# Patient Record
Sex: FEMALE | Race: WHITE | NOT HISPANIC OR LATINO | Employment: FULL TIME | URBAN - METROPOLITAN AREA
[De-identification: names, ages, dates, MRNs, and addresses within clinical notes are randomized per-mention and may not be internally consistent; named-entity substitution may affect disease eponyms.]

---

## 2017-01-09 DIAGNOSIS — G47.00 INSOMNIA: ICD-10-CM

## 2017-01-10 RX ORDER — TRAZODONE HYDROCHLORIDE 50 MG/1
TABLET ORAL
Qty: 30 TABLET | Refills: 0 | Status: SHIPPED | OUTPATIENT
Start: 2017-01-10 | End: 2017-02-06 | Stop reason: SDUPTHER

## 2017-02-06 DIAGNOSIS — G47.00 INSOMNIA: ICD-10-CM

## 2017-02-06 RX ORDER — TRAZODONE HYDROCHLORIDE 50 MG/1
TABLET ORAL
Qty: 30 TABLET | Refills: 0 | Status: SHIPPED | OUTPATIENT
Start: 2017-02-06 | End: 2017-03-10 | Stop reason: SDUPTHER

## 2017-02-15 ENCOUNTER — OFFICE VISIT (OUTPATIENT)
Dept: FAMILY MEDICINE | Facility: CLINIC | Age: 47
End: 2017-02-15
Payer: COMMERCIAL

## 2017-02-15 VITALS
RESPIRATION RATE: 20 BRPM | BODY MASS INDEX: 39.68 KG/M2 | WEIGHT: 293 LBS | HEART RATE: 70 BPM | TEMPERATURE: 98 F | OXYGEN SATURATION: 97 % | SYSTOLIC BLOOD PRESSURE: 118 MMHG | DIASTOLIC BLOOD PRESSURE: 78 MMHG | HEIGHT: 72 IN

## 2017-02-15 DIAGNOSIS — Z01.818 PREOP EXAMINATION: Primary | ICD-10-CM

## 2017-02-15 PROCEDURE — 99999 PR PBB SHADOW E&M-EST. PATIENT-LVL III: CPT | Mod: PBBFAC,,, | Performed by: FAMILY MEDICINE

## 2017-02-15 PROCEDURE — 99213 OFFICE O/P EST LOW 20 MIN: CPT | Mod: S$GLB,,, | Performed by: FAMILY MEDICINE

## 2017-02-15 RX ORDER — ERGOCALCIFEROL 1.25 MG/1
50000 CAPSULE ORAL
Status: ON HOLD | COMMUNITY
End: 2020-03-29 | Stop reason: HOSPADM

## 2017-02-15 NOTE — MR AVS SNAPSHOT
[facility]  [address]  [place] [address]  Phone: [phone]                  Shireen Anton   2/15/2017 11:30 AM   Office Visit    Description:  Female : 1970   Provider:  Jeancarlos Zamora MD   Department:  [facility]           Reason for Visit     Pre-op Exam           Diagnoses this Visit        Comments    Preop examination    -  Primary            To Do List           Goals (5 Years of Data)     None      Follow-Up and Disposition     Return if symptoms worsen or fail to improve.      [facility]     [facility] Care [facility] -  Assistance  Registered nurses in the [facility] Call Center provide clinical advisement, health education, appointment booking, and other advisory services.  Call for this free service at [phone].             Medications           Message regarding Medications     Verify the changes and/or additions to your medication regime listed below are the same as discussed with your clinician today.  If any of these changes or additions are incorrect, please notify your healthcare provider.             Verify that the below list of medications is an accurate representation of the medications you are currently taking.  If none reported, the list may be blank. If incorrect, please contact your healthcare provider. Carry this list with you in case of emergency.           Current Medications     cetirizine (ZYRTEC) 10 MG tablet Take 10 mg by mouth once daily.    citalopram (CELEXA) 40 MG tablet Take 1 tablet (40 mg total) by mouth once daily.    ergocalciferol (VITAMIN D2) 50,000 unit Cap Take 50,000 Units by mouth every 7 days.    etonogestrel-ethinyl estradiol (NUVARING) 0.12-0.015 mg/24 hr vaginal ring Place 1 each vaginally every 28 days.    FLUVIRIN 7793-4267 45 mcg (15 mcg x 3)/0.5 mL Susp     FLUVIRIN [id number] 45 mcg (15 mcg x 3)/0.5 mL Susp ADM 0.5ML IM UTD    gabapentin (NEURONTIN) 300 MG capsule Take 1 capsule  (300 mg total) by mouth 3 (three) times daily.    omeprazole (PRILOSEC OTC) 20 MG tablet Take 20 mg by mouth once daily.    trazodone (DESYREL) 50 MG tablet TAKE ONE TABLET BY MOUTH IN THE EVENING           Clinical Reference Information           Your Vitals Were     BP Pulse Temp Resp Height Weight    118/78 (BP Location: Left arm, Patient Position: Sitting, BP Method: Manual) 70 98.1 °F (36.7 °C) (Oral) 20 6' (1.829 m) 136 kg (299 lb 13.2 oz)    SpO2 BMI             97% 40.66 kg/m2         Blood Pressure          Most Recent Value    BP  118/78      Allergies as of 2/15/2017     No Known Allergies      Immunizations Administered on Date of Encounter - 2/15/2017     None      Language Assistance Services     ATTENTION: Language assistance services are available, free of charge. Please call [phone].      ATENCIÓN: Si habla jeffañol, tiene a colon disposición servicios gratuitos de asistencia lingüística. Llame al [phone].     MIKE Ý: N?u b?n nói Ti?ng Vi?t, có các d?ch v? h? tr? ngôn ng? mi?n phí dành cho b?n. G?i s? [phone].         [facility] complies with applicable Federal civil rights laws and does not discriminate on the basis of race, color, national origin, age, disability, or sex.

## 2017-02-15 NOTE — PROGRESS NOTES
Routine Office Visit    Patient Name: Shireen Anton    : 1970  MRN: [id number]    Subjective:  Shireen is a 47 y.o. female who presents today for:    1. Pre-op clearance  Patient presenting today for pre-op clearance for gastric sleeve procedure.  She has already seen cardiology and been cleared.  She does not suffer from any pulmonary diseases.  She states that she does have a murmur, but it is not causing any problems.  She has no other physical ailments    Past Medical History  Past Medical History   Diagnosis Date    Allergy     Depression     GERD (gastroesophageal reflux disease)        Past Surgical History  Past Surgical History   Procedure Laterality Date     section      Breast augmentation      Cholecystectomy      Orif humerus fracture         Family History  Family History   Problem Relation Age of Onset    Breast cancer Maternal Grandmother 63    Diabetes Father     Hypertension Father     Heart disease Father     Thyroid disease Mother     Cancer Mother 71     SCC of lung    Cancer Sister 48     pharyngeal cancer       Social History  Social History     Social History    Marital status: Single     Spouse name: N/A    Number of children: N/A    Years of education: N/A     Occupational History     [employer]     Social History Main Topics    Smoking status: Former Smoker     Packs/day: 1.00     Years: 16.00     Quit date: 2011    Smokeless tobacco: Never Used    Alcohol use Yes      Comment: Ocaasional    Drug use: Not on file    Sexual activity: Yes     Partners: Male     Birth control/ protection: Inserts     Other Topics Concern    Not on file     Social History Narrative    Together since 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  Playing [employer].She is a registered nurse for [employer]       Current Medications  Current Outpatient Prescriptions on File Prior to Visit   Medication Sig Dispense Refill    cetirizine (ZYRTEC) 10 MG tablet Take 10 mg by mouth  once daily.      citalopram (CELEXA) 40 MG tablet Take 1 tablet (40 mg total) by mouth once daily. 30 tablet 6    etonogestrel-ethinyl estradiol (NUVARING) 0.12-0.015 mg/24 hr vaginal ring Place 1 each vaginally every 28 days. 3 each 4    FLUVIRIN [id number] 45 mcg (15 mcg x 3)/0.5 mL Susp   0    FLUVIRIN [id number] 45 mcg (15 mcg x 3)/0.5 mL Susp ADM 0.5ML IM UTD  0    gabapentin (NEURONTIN) 300 MG capsule Take 1 capsule (300 mg total) by mouth 3 (three) times daily. 90 capsule 11    omeprazole (PRILOSEC OTC) 20 MG tablet Take 20 mg by mouth once daily.      trazodone (DESYREL) 50 MG tablet TAKE ONE TABLET BY MOUTH IN THE EVENING 30 tablet 0     No current facility-administered medications on file prior to visit.        Allergies   Review of patient's allergies indicates:  No Known Allergies    Review of Systems (Pertinent positives)  Review of Systems   Constitutional: Negative.    Eyes: Negative.    Respiratory: Negative.    Cardiovascular: Negative.    Gastrointestinal: Negative.    Genitourinary: Negative.    Musculoskeletal: Negative.    Skin: Negative.          Visit Vitals    /78 (BP Location: Left arm, Patient Position: Sitting, BP Method: Manual)    Pulse 70    Temp 98.1 °F (36.7 °C) (Oral)    Resp 20    Ht 6' (1.829 m)    Wt 136 kg (299 lb 13.2 oz)    SpO2 97%    BMI 40.66 kg/m2       GENERAL APPEARANCE: in no apparent distress and well developed and well nourished  HEENT: PERRL, EOMI, Sclera clear, anicteric, Oropharynx clear, no lesions, Neck supple with midline trachea  NECK: normal, supple, no adenopathy, thyroid normal in size  RESPIRATORY: appears well, vitals normal, no respiratory distress, acyanotic, normal RR, chest clear, no wheezing, crepitations, rhonchi, normal symmetric air entry  HEART: regular rate and rhythm, S1, S2 normal, no murmur, click, rub or gallop.    ABDOMEN: abdomen is soft without tenderness, no masses, no hernias, no organomegaly, no rebound, no guarding.  Suprapubic tenderness absent. No CVA tenderness.  SKIN: no rashes, no wounds, no other lesions  PSYCH: Alert, oriented x 3, thought content appropriate, speech normal, pleasant and cooperative, good eye contact, well groomed    Assessment/Plan:  Shireen Anton is a 47 y.o. female who presents today for :    Shireen was seen today for pre-op exam.    Diagnoses and all orders for this visit:    Preop examination    1.  Patient labs have been done  2.  Cleared by cardiology  3.  Physical exam negative  4.  Follow up as needed  5.  She is low risk for moderate risk procedure    Jeancarlos Zamora MD

## 2017-03-10 DIAGNOSIS — G47.00 INSOMNIA: ICD-10-CM

## 2017-03-10 RX ORDER — TRAZODONE HYDROCHLORIDE 50 MG/1
TABLET ORAL
Qty: 30 TABLET | Refills: 0 | Status: SHIPPED | OUTPATIENT
Start: 2017-03-10 | End: 2017-04-10 | Stop reason: SDUPTHER

## 2017-04-10 DIAGNOSIS — G47.00 INSOMNIA: ICD-10-CM

## 2017-04-10 RX ORDER — TRAZODONE HYDROCHLORIDE 50 MG/1
TABLET ORAL
Qty: 30 TABLET | Refills: 0 | Status: SHIPPED | OUTPATIENT
Start: 2017-04-10 | End: 2017-08-14 | Stop reason: SDUPTHER

## 2017-05-01 ENCOUNTER — HOSPITAL ENCOUNTER (INPATIENT)
Facility: HOSPITAL | Age: 47
LOS: 15 days | Discharge: HOME HEALTH | DRG: 673 | End: 2017-05-16
Attending: EMERGENCY MEDICINE | Admitting: INTERNAL MEDICINE
Payer: COMMERCIAL

## 2017-05-01 DIAGNOSIS — M62.82 NON-TRAUMATIC RHABDOMYOLYSIS: ICD-10-CM

## 2017-05-01 DIAGNOSIS — N17.9 ACUTE RENAL FAILURE, UNSPECIFIED ACUTE RENAL FAILURE TYPE: ICD-10-CM

## 2017-05-01 DIAGNOSIS — I21.4 NSTEMI (NON-ST ELEVATED MYOCARDIAL INFARCTION): ICD-10-CM

## 2017-05-01 DIAGNOSIS — N17.9 ACUTE RENAL FAILURE: Primary | ICD-10-CM

## 2017-05-01 DIAGNOSIS — H91.93 ACUTE HEARING LOSS OF BOTH EARS: ICD-10-CM

## 2017-05-01 DIAGNOSIS — G93.41 METABOLIC ENCEPHALOPATHY: ICD-10-CM

## 2017-05-01 DIAGNOSIS — R41.82 ALTERED MENTAL STATUS: ICD-10-CM

## 2017-05-01 LAB
ALBUMIN SERPL BCP-MCNC: 3.7 G/DL
ALP SERPL-CCNC: 105 U/L
ALT SERPL W/O P-5'-P-CCNC: 3864 U/L
AMORPH CRY URNS QL MICRO: NORMAL
AMPHET+METHAMPHET UR QL: NEGATIVE
ANION GAP SERPL CALC-SCNC: 21 MMOL/L
APAP SERPL-MCNC: <3 UG/ML
AST SERPL-CCNC: 9471 U/L
BACTERIA #/AREA URNS HPF: NORMAL /HPF
BACTERIA GENITAL QL WET PREP: ABNORMAL
BARBITURATES UR QL SCN>200 NG/ML: NEGATIVE
BASOPHILS # BLD AUTO: ABNORMAL K/UL
BASOPHILS NFR BLD: 0 %
BENZODIAZ UR QL SCN>200 NG/ML: NEGATIVE
BILIRUB SERPL-MCNC: 1 MG/DL
BILIRUB UR QL STRIP: NEGATIVE
BNP SERPL-MCNC: 673 PG/ML
BUN SERPL-MCNC: 34 MG/DL
BZE UR QL SCN: NEGATIVE
CALCIUM SERPL-MCNC: 7.8 MG/DL
CANNABINOIDS UR QL SCN: NEGATIVE
CHLORIDE SERPL-SCNC: 105 MMOL/L
CK SERPL-CCNC: ABNORMAL U/L
CLARITY UR: CLEAR
CLUE CELLS VAG QL WET PREP: ABNORMAL
CO2 SERPL-SCNC: 17 MMOL/L
COLOR UR: YELLOW
CREAT SERPL-MCNC: 4.2 MG/DL
CREAT UR-MCNC: 169.7 MG/DL
DIFFERENTIAL METHOD: ABNORMAL
EOSINOPHIL # BLD AUTO: ABNORMAL K/UL
EOSINOPHIL NFR BLD: 0 %
ERYTHROCYTE [DISTWIDTH] IN BLOOD BY AUTOMATED COUNT: 14 %
EST. GFR  (AFRICAN AMERICAN): 14 ML/MIN/1.73 M^2
EST. GFR  (NON AFRICAN AMERICAN): 12 ML/MIN/1.73 M^2
ETHANOL SERPL-MCNC: 11 MG/DL
FILAMENT FUNGI VAG WET PREP-#/AREA: ABNORMAL
GLUCOSE SERPL-MCNC: 116 MG/DL
GLUCOSE UR QL STRIP: NEGATIVE
HCT VFR BLD AUTO: 41.9 %
HGB BLD-MCNC: 13.9 G/DL
HGB UR QL STRIP: ABNORMAL
HYALINE CASTS #/AREA URNS LPF: 0 /LPF
KETONES UR QL STRIP: NEGATIVE
LEUKOCYTE ESTERASE UR QL STRIP: NEGATIVE
LIPASE SERPL-CCNC: 89 U/L
LYMPHOCYTES # BLD AUTO: ABNORMAL K/UL
LYMPHOCYTES NFR BLD: 17 %
MAGNESIUM SERPL-MCNC: 2.7 MG/DL
MCH RBC QN AUTO: 30.5 PG
MCHC RBC AUTO-ENTMCNC: 33.2 %
MCV RBC AUTO: 92 FL
METHADONE UR QL SCN>300 NG/ML: NEGATIVE
MICROSCOPIC COMMENT: NORMAL
MONOCYTES # BLD AUTO: ABNORMAL K/UL
MONOCYTES NFR BLD: 10 %
NEUTROPHILS NFR BLD: 69 %
NEUTS BAND NFR BLD MANUAL: 4 %
NITRITE UR QL STRIP: NEGATIVE
NRBC BLD-RTO: 2 /100 WBC
OPIATES UR QL SCN: NORMAL
PCP UR QL SCN>25 NG/ML: NEGATIVE
PH UR STRIP: 5 [PH] (ref 5–8)
PHOSPHATE SERPL-MCNC: 10.6 MG/DL
PLATELET # BLD AUTO: 129 K/UL
PMV BLD AUTO: 11.9 FL
POCT GLUCOSE: 132 MG/DL (ref 70–110)
POTASSIUM SERPL-SCNC: 5.5 MMOL/L
PROT SERPL-MCNC: 7.4 G/DL
PROT UR QL STRIP: ABNORMAL
RBC # BLD AUTO: 4.55 M/UL
RBC #/AREA URNS HPF: 2 /HPF (ref 0–4)
SALICYLATES SERPL-MCNC: <5 MG/DL
SODIUM SERPL-SCNC: 143 MMOL/L
SP GR UR STRIP: 1.01 (ref 1–1.03)
SPECIMEN SOURCE: ABNORMAL
SQUAMOUS #/AREA URNS HPF: 2 /HPF
T VAGINALIS GENITAL QL WET PREP: ABNORMAL
TOXICOLOGY INFORMATION: NORMAL
TROPONIN I SERPL DL<=0.01 NG/ML-MCNC: 16.18 NG/ML
URN SPEC COLLECT METH UR: ABNORMAL
UROBILINOGEN UR STRIP-ACNC: ABNORMAL EU/DL
WBC # BLD AUTO: 11.2 K/UL
WBC #/AREA URNS HPF: 0 /HPF (ref 0–5)
WBC #/AREA VAG WET PREP: ABNORMAL
YEAST GENITAL QL WET PREP: ABNORMAL

## 2017-05-01 PROCEDURE — P9612 CATHETERIZE FOR URINE SPEC: HCPCS

## 2017-05-01 PROCEDURE — 84100 ASSAY OF PHOSPHORUS: CPT

## 2017-05-01 PROCEDURE — 87591 N.GONORRHOEAE DNA AMP PROB: CPT

## 2017-05-01 PROCEDURE — 93005 ELECTROCARDIOGRAM TRACING: CPT

## 2017-05-01 PROCEDURE — 85027 COMPLETE CBC AUTOMATED: CPT

## 2017-05-01 PROCEDURE — 25000242 PHARM REV CODE 250 ALT 637 W/ HCPCS: Performed by: EMERGENCY MEDICINE

## 2017-05-01 PROCEDURE — 82550 ASSAY OF CK (CPK): CPT

## 2017-05-01 PROCEDURE — 83690 ASSAY OF LIPASE: CPT

## 2017-05-01 PROCEDURE — 82570 ASSAY OF URINE CREATININE: CPT

## 2017-05-01 PROCEDURE — 63600175 PHARM REV CODE 636 W HCPCS: Performed by: EMERGENCY MEDICINE

## 2017-05-01 PROCEDURE — 83880 ASSAY OF NATRIURETIC PEPTIDE: CPT

## 2017-05-01 PROCEDURE — 82962 GLUCOSE BLOOD TEST: CPT

## 2017-05-01 PROCEDURE — 87210 SMEAR WET MOUNT SALINE/INK: CPT

## 2017-05-01 PROCEDURE — 99291 CRITICAL CARE FIRST HOUR: CPT | Mod: 25

## 2017-05-01 PROCEDURE — 85007 BL SMEAR W/DIFF WBC COUNT: CPT

## 2017-05-01 PROCEDURE — 96361 HYDRATE IV INFUSION ADD-ON: CPT

## 2017-05-01 PROCEDURE — 25000003 PHARM REV CODE 250: Performed by: EMERGENCY MEDICINE

## 2017-05-01 PROCEDURE — 96375 TX/PRO/DX INJ NEW DRUG ADDON: CPT

## 2017-05-01 PROCEDURE — 12000002 HC ACUTE/MED SURGE SEMI-PRIVATE ROOM

## 2017-05-01 PROCEDURE — 80329 ANALGESICS NON-OPIOID 1 OR 2: CPT

## 2017-05-01 PROCEDURE — 81000 URINALYSIS NONAUTO W/SCOPE: CPT

## 2017-05-01 PROCEDURE — 80053 COMPREHEN METABOLIC PANEL: CPT

## 2017-05-01 PROCEDURE — 94640 AIRWAY INHALATION TREATMENT: CPT

## 2017-05-01 PROCEDURE — 84484 ASSAY OF TROPONIN QUANT: CPT

## 2017-05-01 PROCEDURE — 80307 DRUG TEST PRSMV CHEM ANLYZR: CPT | Mod: 59

## 2017-05-01 PROCEDURE — 83735 ASSAY OF MAGNESIUM: CPT

## 2017-05-01 PROCEDURE — 96374 THER/PROPH/DIAG INJ IV PUSH: CPT

## 2017-05-01 PROCEDURE — 80320 DRUG SCREEN QUANTALCOHOLS: CPT

## 2017-05-01 RX ORDER — ASPIRIN 325 MG
325 TABLET ORAL
Status: COMPLETED | OUTPATIENT
Start: 2017-05-01 | End: 2017-05-01

## 2017-05-01 RX ORDER — ALBUTEROL SULFATE 2.5 MG/.5ML
2.5 SOLUTION RESPIRATORY (INHALATION)
Status: COMPLETED | OUTPATIENT
Start: 2017-05-01 | End: 2017-05-01

## 2017-05-01 RX ORDER — VASOPRESSIN 20 [USP'U]/ML
0.4 INJECTION, SOLUTION INTRAMUSCULAR; SUBCUTANEOUS ONCE
Status: DISCONTINUED | OUTPATIENT
Start: 2017-05-01 | End: 2017-05-01

## 2017-05-01 RX ORDER — MORPHINE SULFATE 10 MG/ML
4 INJECTION INTRAMUSCULAR; INTRAVENOUS; SUBCUTANEOUS
Status: COMPLETED | OUTPATIENT
Start: 2017-05-01 | End: 2017-05-01

## 2017-05-01 RX ORDER — HEPARIN SODIUM 5000 [USP'U]/ML
5000 INJECTION, SOLUTION INTRAVENOUS; SUBCUTANEOUS
Status: COMPLETED | OUTPATIENT
Start: 2017-05-01 | End: 2017-05-01

## 2017-05-01 RX ADMIN — ALBUTEROL SULFATE 2.5 MG: 2.5 SOLUTION RESPIRATORY (INHALATION) at 08:05

## 2017-05-01 RX ADMIN — SODIUM CHLORIDE 1000 ML: 0.9 INJECTION, SOLUTION INTRAVENOUS at 05:05

## 2017-05-01 RX ADMIN — HEPARIN SODIUM 5000 UNITS: 5000 INJECTION, SOLUTION INTRAVENOUS; SUBCUTANEOUS at 09:05

## 2017-05-01 RX ADMIN — MORPHINE SULFATE 4 MG: 10 INJECTION INTRAVENOUS at 10:05

## 2017-05-01 RX ADMIN — SODIUM CHLORIDE 1000 ML: 0.9 INJECTION, SOLUTION INTRAVENOUS at 07:05

## 2017-05-01 RX ADMIN — ASPIRIN 325 MG ORAL TABLET 325 MG: 325 PILL ORAL at 07:05

## 2017-05-01 NOTE — ED NOTES
Question pt. If she has to urinate, pt. Asked if she has a glover cath in. Explained to pt. That she does not however if she does not urinated we will have to straight cath her. Pt. Unable to recall going to get her MRI done. She reported she did not recall leaving the room.

## 2017-05-01 NOTE — IP AVS SNAPSHOT
[place] [address] [address] [address]  Phone: [phone]           Patient Discharge Instructions   Our goal is to set you up for success. This packet includes information on your condition, medications, and your home care.  It will help you care for yourself to prevent having to return to the hospital.     Please ask your nurse if you have any questions.      There are many details to remember when preparing to leave the hospital. Here is what you will need to do:    1. Take your medicine. If you are prescribed medications, review your Medication List on the following pages. You may have new medications to  at the pharmacy and others that you'll need to stop taking. Review the instructions for how and when to take your medications. Talk with your doctor or nurses if you are unsure of what to do.     2. Go to your follow-up appointments. Specific follow-up information is listed in the following pages. Your may be contacted by a nurse or clinical provider about future appointments. Be sure we have all of the phone numbers to reach you. Please contact your provider's office if you are unable to make an appointment.     3. Watch for warning signs. Your doctor or nurse will give you detailed warning signs to watch for and when to call for assistance. These instructions may also include educational information about your condition. If you experience any of warning signs to your health, call your doctor.               ** Verify the list of medication(s) below is accurate and up to date. Carry this with you in case of emergency. If your medications have changed, please notify your healthcare provider.             Medication List      START taking these medications        Additional Info                      amlodipine 10 MG tablet   Commonly known as:  NORVASC   Quantity:  30 tablet   Refills:  5   Dose:  10 mg    Last time this was given:  10 mg on 5/16/2017  9:40 AM    Instructions:  Take 1 tablet (10 mg total) by mouth once daily.     Begin Date    AM    Noon    PM    Bedtime       amoxicillin-clavulanate 250-125mg 250-125 mg Tab   Commonly known as:  AUGMENTIN   Quantity:  14 tablet   Refills:  0   Dose:  1 tablet   Indications:  uti    Last time this was given:  1 tablet on 5/16/2017  9:40 AM   Instructions:  Take 1 tablet by mouth every 12 (twelve) hours.     Begin Date    AM    Noon    PM    Bedtime       aspirin 81 MG EC tablet   Commonly known as:  ECOTRIN   Refills:  0   Dose:  81 mg    Last time this was given:  81 mg on 5/16/2017  9:40 AM   Instructions:  Take 1 tablet (81 mg total) by mouth once daily.     Begin Date    AM    Noon    PM    Bedtime       calcium citrate 200 mg (950 mg) tablet   Commonly known as:  CALCITRATE   Quantity:  90 tablet   Refills:  5   Dose:  200 mg    Last time this was given:  200 mg on 5/16/2017  2:34 PM   Instructions:  Take 1 tablet (200 mg total) by mouth 3 (three) times daily.     Begin Date    AM    Noon    PM    Bedtime       cyanocobalamin 250 MCG tablet   Commonly known as:  VITAMIN B-12   Quantity:  30 tablet   Refills:  5   Dose:  250 mcg    Last time this was given:  250 mcg on 5/16/2017  9:40 AM   Instructions:  Take 1 tablet (250 mcg total) by mouth once daily.     Begin Date    AM    Noon    PM    Bedtime       hydrALAZINE 50 MG tablet   Commonly known as:  APRESOLINE   Quantity:  90 tablet   Refills:  5   Dose:  50 mg    Last time this was given:  50 mg on 5/16/2017  2:34 PM   Instructions:  Take 1 tablet (50 mg total) by mouth every 8 (eight) hours.     Begin Date    AM    Noon    PM    Bedtime       metoprolol tartrate 25 MG tablet   Commonly known as:  LOPRESSOR   Quantity:  60 tablet   Refills:  5   Dose:  25 mg    Last time this was given:  25 mg on 5/16/2017  9:40 AM   Instructions:  Take 1 tablet (25 mg total) by mouth 2 (two) times daily.     Begin Date    AM    Noon    PM    Bedtime         CONTINUE taking these  medications        Additional Info                      cetirizine 10 MG tablet   Commonly known as:  ZYRTEC   Refills:  0   Dose:  10 mg    Instructions:  Take 10 mg by mouth once daily.     Begin Date    AM    Noon    PM    Bedtime       citalopram 40 MG tablet   Commonly known as:  CELEXA   Quantity:  30 tablet   Refills:  6   Dose:  40 mg    Instructions:  Take 1 tablet (40 mg total) by mouth once daily.     Begin Date    AM    Noon    PM    Bedtime       etonogestrel-ethinyl estradiol 0.12-0.015 mg/24 hr vaginal ring   Commonly known as:  NUVARING   Quantity:  3 each   Refills:  4   Dose:  1 each    Instructions:  Place 1 each vaginally every 28 days.     Begin Date    AM    Noon    PM    Bedtime       FLUVIRIN [id number] 45 mcg (15 mcg x 3)/0.5 mL Susp   Refills:  0   Generic drug:  flu vaccine  2015-16 (4 yr+)      Begin Date    AM    Noon    PM    Bedtime       FLUVIRIN [id number] 45 mcg (15 mcg x 3)/0.5 mL Susp   Refills:  0   Generic drug:  flu vaccine  2016-17(4yr up)    Instructions:  ADM 0.5ML IM UTD     Begin Date    AM    Noon    PM    Bedtime       gabapentin 300 MG capsule   Commonly known as:  NEURONTIN   Quantity:  90 capsule   Refills:  11   Dose:  300 mg    Instructions:  Take 1 capsule (300 mg total) by mouth 3 (three) times daily.     Begin Date    AM    Noon    PM    Bedtime       omeprazole 20 MG tablet   Commonly known as:  PRILOSEC OTC   Refills:  0   Dose:  20 mg    Instructions:  Take 20 mg by mouth once daily.     Begin Date    AM    Noon    PM    Bedtime       trazodone 50 MG tablet   Commonly known as:  DESYREL   Quantity:  30 tablet   Refills:  0    Instructions:  TAKE ONE TABLET BY MOUTH IN THE EVENING     Begin Date    AM    Noon    PM    Bedtime       VITAMIN D2 50,000 unit Cap   Refills:  0   Dose:  66691 Units   Generic drug:  ergocalciferol    Instructions:  Take 50,000 Units by mouth every 7 days.     Begin Date    AM    Noon    PM    Bedtime            Where to Get Your  Medications      You can get these medications from any pharmacy     Bring a paper prescription for each of these medications     amlodipine 10 MG tablet    amoxicillin-clavulanate 250-125mg 250-125 mg Tab    calcium citrate 200 mg (950 mg) tablet    cyanocobalamin 250 MCG tablet    hydrALAZINE 50 MG tablet    metoprolol tartrate 25 MG tablet         Information about where to get these medications is not yet available     ! Ask your nurse or doctor about these medications     aspirin 81 MG EC tablet                  Please bring to all follow up appointments:    1. A copy of your discharge instructions.  2. All medicines you are currently taking in their original bottles.  3. Identification and insurance card.    Please arrive 15 minutes ahead of scheduled appointment time.    Please call 24 hours in advance if you must reschedule your appointment and/or time.        Your Scheduled Appointments     May 23, 2017  1:00 PM CDT   Established Patient Visit with Jeancarlos Zamora MD   [facility] ([facility])    [address] [place] [address]   [phone]            May 30, 2017  1:00 PM CDT   Established Patient Visit with Kamaljit Sierra MD   [facility] ([facility])    [address] [facility] [address]  [place] [address] [phone]              Follow-up Information     Follow up with [facility] On 5/17/2017.    Why:  Outpatient Dialysis:  Report for your first dialysis on Wednesday, May 17, 2017 at 2:30 p.m.  Bring your ID and insurance card.    Contact information:    [address] [place] [address] [address] [facility] [address] [phone]        Follow up with Jeancarlos Zamora MD On 5/23/2017.    Specialty:  Family Medicine    Why:  1:00 p.m. on Tuesday, May 23, 2017 see Dr. Zamora for your hospital followup visit.  The new address of the provider is [facility] [place] [address]    Contact information:    [address] [place] [address] [place]  [id number] [phone]          Follow up with Jeanine Saxena MD In 1 week.    Specialty:  Nephrology    Contact information:    [address] [place] [address] [address]  [place] [address] [phone]          Follow up with Kamaljit Sierra MD On 5/30/2017.    Specialty:  Cardiology    Why:  1:00 p.m. on Tuesday, May 30, 2017 see Dr. Sierra for your hospital followup visit.      Contact information:    [address] [facility]  [place] [address] [phone]          Follow up with [facility].    Specialties:  Home Health Services, Physical Therapy, Occupational Therapy    Why:  Home Health    Contact information:    [address]  [address]  [place] [id number] [phone]          Discharge Instructions     Future Orders    Activity as tolerated     Diet general     Questions:    Total calories:      Fat restriction, if any:      Protein restriction, if any:      Na restriction, if any:  2gNa    Fluid restriction:      Additional restrictions:        Discharge References/Attachments     ALTERED LEVEL OF CONSCIOUSNESS (CHILD) (ENGLISH)    CHOOSING A BARIATRIC SURGERY PROCEDURE (ENGLISH)    EVALUATION FOR BARIATRIC SURGERY (ENGLISH)    RHABDOMYOLYSIS (ENGLISH)    AMLODIPINE TABLETS (ENGLISH)    AMOXICILLIN; CLAVULANIC ACID EXTENDED-RELEASE TABLETS (ENGLISH)    ASPIRIN, ASA CHEWABLE TABLETS (ENGLISH)    CALCIUM SALTS ORAL TABLETS (ENGLISH)    CYANOCOBALAMIN, PYRIDOXINE, AND FOLATE (ENGLISH)    HYDRALAZINE TABLETS (ENGLISH)    METOPROLOL TABLETS (ENGLISH)    KIDNEY INJURY, ACUTE, DISCHARGE INSTRUCTIONS FOR (ENGLISH)        Primary Diagnosis     Your primary diagnosis was:  Acute Kidney Failure      Admission Information     Date & Time Provider Department [facility]    5/1/2017  5:25 PM Cheo Wilks MD [facility] [id number]      Care Providers     Provider Role Specialty Primary office phone    Cheo Wilks MD Attending Provider Hospitalist [phone]    Mateo Villanueva  MD Consulting Physician  Nephrology [phone]    Edson Reeder MD Consulting Physician  Gastroenterology [phone]    Abhishek Harvey MD Consulting Physician  Neurology [phone]    Lizett Calhoun MD Consulting Physician  Infectious Diseases  [phone]      Your Vitals Were     BP Pulse Temp Resp Height Weight    169/77 (BP Location: Right arm, Patient Position: Lying, BP Method: Automatic) 80 98.3 °F (36.8 °C) (Oral) 18 6' (1.829 m) 137 kg (302 lb)    Last Period SpO2 BMI          04/20/2017 94% 40.96 kg/m2        Recent Lab Values        5/2/2017                           6:21 AM           A1C 5.5           Comment for A1C at  6:21 AM on 5/2/2017:  According to ADA guidelines, hemoglobin A1C <7.0% represents  optimal control in non-pregnant diabetic patients.  Different  metrics may apply to specific populations.   Standards of Medical Care in Diabetes - 2016.  For the purpose of screening for the presence of diabetes:  <5.7%     Consistent with the absence of diabetes  5.7-6.4%  Consistent with increasing risk for diabetes   (prediabetes)  >or=6.5%  Consistent with diabetes  Currently no consensus exists for use of hemoglobin A1C  for diagnosis of diabetes for children.        Allergies as of 5/16/2017     No Known Allergies      [facility]     [facility] Care [facility] - 24/7 Assistance  Unless otherwise directed by your provider, please contact [facility], our nurse care line that is available for 24/7 assistance.     Registered nurses in the [facility] provide clinical advisement, health education, appointment booking, and other advisory services.  Call for this free service at [phone].        Advance Directives     An advance directive is a document which, in the event you are no longer able to make decisions for yourself, tells your healthcare team what kind of treatment you do or do not want to receive, or who you would like to make those decisions for you.  If  you do not currently have an advance directive, [facility] encourages you to create one.  For more information call:  [phone]), [phone] ([phone]),  or log on to [email or web address].        Smoking Cessation     If you would like to quit smoking:   You may be eligible for free services if you are a [place] resident and started smoking cigarettes before September 1, 1988.  Call the Smoking Cessation Trust (SCT) toll free at (642) 286-7541 or (021) 796-6340.   Call 9-690-QUIT-NOW if you do not meet the above criteria.   Contact us via email: [email or web address]   View our website for more information: [email or web address]        Language Assistance Services     ATTENTION: Language assistance services are available, free of charge. Please call [phone].      ATENCIÓN: Si habla español, tiene a colon disposición servicios gratuitos de asistencia lingüística. Llame al [phone].     CHÚ Ý: N?u b?n nói Ti?ng Vi?t, có các d?ch v? h? tr? ngôn ng? mi?n phí dành cho b?n. G?i s? [phone].        Stroke Education               [facility] complies with applicable Federal civil rights laws and does not discriminate on the basis of race, color, national origin, age, disability, or sex.

## 2017-05-02 PROBLEM — E66.01 MORBID OBESITY: Status: ACTIVE | Noted: 2017-05-02

## 2017-05-02 PROBLEM — R74.8 ELEVATED LIVER ENZYMES: Status: ACTIVE | Noted: 2017-05-02

## 2017-05-02 PROBLEM — N17.9 ACUTE RENAL FAILURE: Status: ACTIVE | Noted: 2017-05-02

## 2017-05-02 PROBLEM — M62.82 NON-TRAUMATIC RHABDOMYOLYSIS: Status: ACTIVE | Noted: 2017-05-02

## 2017-05-02 PROBLEM — I21.4 NSTEMI (NON-ST ELEVATED MYOCARDIAL INFARCTION): Status: ACTIVE | Noted: 2017-05-02

## 2017-05-02 LAB
ALBUMIN SERPL BCP-MCNC: 3.3 G/DL
ALP SERPL-CCNC: 97 U/L
ALT SERPL W/O P-5'-P-CCNC: 3430 U/L
ANION GAP SERPL CALC-SCNC: 14 MMOL/L
APTT BLDCRRT: 25.7 SEC
APTT BLDCRRT: 40.1 SEC
AST SERPL-CCNC: 9976 U/L
BASOPHILS # BLD AUTO: 0.02 K/UL
BASOPHILS NFR BLD: 0.2 %
BILIRUB SERPL-MCNC: 1 MG/DL
BUN SERPL-MCNC: 45 MG/DL
C TRACH DNA SPEC QL NAA+PROBE: NOT DETECTED
CALCIUM SERPL-MCNC: 7.2 MG/DL
CHLORIDE SERPL-SCNC: 105 MMOL/L
CHLORIDE UR-SCNC: 23 MMOL/L
CHOLEST/HDLC SERPL: 4.7 {RATIO}
CK MB SERPL-MCNC: >600 NG/ML
CK MB SERPL-RTO: ABNORMAL %
CK SERPL-CCNC: ABNORMAL U/L
CK SERPL-CCNC: ABNORMAL U/L
CO2 SERPL-SCNC: 20 MMOL/L
CREAT SERPL-MCNC: 4.4 MG/DL
CREAT UR-MCNC: 282.5 MG/DL
CREAT UR-MCNC: 282.5 MG/DL
CRP SERPL-MCNC: 175.81 MG/L
DIASTOLIC DYSFUNCTION: NO
DIFFERENTIAL METHOD: ABNORMAL
EOSINOPHIL # BLD AUTO: 0 K/UL
EOSINOPHIL NFR BLD: 0 %
EOSINOPHIL URNS QL WRIGHT STN: NORMAL
ERYTHROCYTE [DISTWIDTH] IN BLOOD BY AUTOMATED COUNT: 14 %
ERYTHROCYTE [SEDIMENTATION RATE] IN BLOOD BY WESTERGREN METHOD: 36 MM/HR
EST. GFR  (AFRICAN AMERICAN): 13 ML/MIN/1.73 M^2
EST. GFR  (NON AFRICAN AMERICAN): 11 ML/MIN/1.73 M^2
ESTIMATED PA SYSTOLIC PRESSURE: 26.63
GLUCOSE SERPL-MCNC: 139 MG/DL
GRAM STN SPEC: NORMAL
GRAM STN SPEC: NORMAL
HCT VFR BLD AUTO: 37.1 %
HDL/CHOLESTEROL RATIO: 21.3 %
HDLC SERPL-MCNC: 136 MG/DL
HDLC SERPL-MCNC: 29 MG/DL
HGB BLD-MCNC: 12.5 G/DL
HIV1+2 IGG SERPL QL IA.RAPID: NEGATIVE
INR PPP: 1.4
LDLC SERPL CALC-MCNC: 75.4 MG/DL
LIPASE SERPL-CCNC: 66 U/L
LYMPHOCYTES # BLD AUTO: 1.2 K/UL
LYMPHOCYTES NFR BLD: 9.5 %
MCH RBC QN AUTO: 30.9 PG
MCHC RBC AUTO-ENTMCNC: 33.7 %
MCV RBC AUTO: 92 FL
MITRAL VALVE MOBILITY: NORMAL
MONOCYTES # BLD AUTO: 1 K/UL
MONOCYTES NFR BLD: 8.5 %
N GONORRHOEA DNA SPEC QL NAA+PROBE: NOT DETECTED
NEUTROPHILS # BLD AUTO: 9.9 K/UL
NEUTROPHILS NFR BLD: 80.9 %
NONHDLC SERPL-MCNC: 107 MG/DL
OSMOLALITY SERPL: 304 MOSM/KG
OSMOLALITY UR: 341 MOSM/KG
PLATELET # BLD AUTO: 121 K/UL
PLATELET # BLD AUTO: 121 K/UL
PMV BLD AUTO: 11.7 FL
PMV BLD AUTO: 11.7 FL
POTASSIUM SERPL-SCNC: 4.7 MMOL/L
POTASSIUM UR-SCNC: 60 MMOL/L
PROT SERPL-MCNC: 6.8 G/DL
PROT UR-MCNC: 228 MG/DL
PROT/CREAT RATIO, UR: 0.81
PROTHROMBIN TIME: 14.3 SEC
RBC # BLD AUTO: 4.05 M/UL
RETIRED EF AND QEF - SEE NOTES: 50 (ref 55–65)
SODIUM SERPL-SCNC: 139 MMOL/L
SODIUM UR-SCNC: 30 MMOL/L
T3FREE SERPL-MCNC: 2.1 PG/ML
T4 FREE SERPL-MCNC: 1.52 NG/DL
TRICUSPID VALVE REGURGITATION: NORMAL
TRIGL SERPL-MCNC: 158 MG/DL
TROPONIN I SERPL DL<=0.01 NG/ML-MCNC: 14.15 NG/ML
TROPONIN I SERPL DL<=0.01 NG/ML-MCNC: 16.12 NG/ML
TSH SERPL DL<=0.005 MIU/L-ACNC: 0.47 UIU/ML
WBC # BLD AUTO: 12.16 K/UL

## 2017-05-02 PROCEDURE — 99222 1ST HOSP IP/OBS MODERATE 55: CPT | Mod: ,,, | Performed by: PSYCHIATRY & NEUROLOGY

## 2017-05-02 PROCEDURE — 80061 LIPID PANEL: CPT

## 2017-05-02 PROCEDURE — 94761 N-INVAS EAR/PLS OXIMETRY MLT: CPT

## 2017-05-02 PROCEDURE — 25000003 PHARM REV CODE 250: Performed by: HOSPITALIST

## 2017-05-02 PROCEDURE — 84439 ASSAY OF FREE THYROXINE: CPT

## 2017-05-02 PROCEDURE — 82553 CREATINE MB FRACTION: CPT

## 2017-05-02 PROCEDURE — 85025 COMPLETE CBC W/AUTO DIFF WBC: CPT

## 2017-05-02 PROCEDURE — 80053 COMPREHEN METABOLIC PANEL: CPT

## 2017-05-02 PROCEDURE — 85610 PROTHROMBIN TIME: CPT

## 2017-05-02 PROCEDURE — 36415 COLL VENOUS BLD VENIPUNCTURE: CPT

## 2017-05-02 PROCEDURE — 93306 TTE W/DOPPLER COMPLETE: CPT

## 2017-05-02 PROCEDURE — 93306 TTE W/DOPPLER COMPLETE: CPT | Mod: 26,,, | Performed by: INTERNAL MEDICINE

## 2017-05-02 PROCEDURE — 84300 ASSAY OF URINE SODIUM: CPT

## 2017-05-02 PROCEDURE — 86703 HIV-1/HIV-2 1 RESULT ANTBDY: CPT

## 2017-05-02 PROCEDURE — 84425 ASSAY OF VITAMIN B-1: CPT

## 2017-05-02 PROCEDURE — 99255 IP/OBS CONSLTJ NEW/EST HI 80: CPT | Mod: ,,, | Performed by: INTERNAL MEDICINE

## 2017-05-02 PROCEDURE — 84484 ASSAY OF TROPONIN QUANT: CPT | Mod: 91

## 2017-05-02 PROCEDURE — 84443 ASSAY THYROID STIM HORMONE: CPT

## 2017-05-02 PROCEDURE — 83970 ASSAY OF PARATHORMONE: CPT

## 2017-05-02 PROCEDURE — 83690 ASSAY OF LIPASE: CPT

## 2017-05-02 PROCEDURE — 83036 HEMOGLOBIN GLYCOSYLATED A1C: CPT

## 2017-05-02 PROCEDURE — 82550 ASSAY OF CK (CPK): CPT | Mod: 91

## 2017-05-02 PROCEDURE — 25000003 PHARM REV CODE 250: Performed by: EMERGENCY MEDICINE

## 2017-05-02 PROCEDURE — 87205 SMEAR GRAM STAIN: CPT | Mod: 91

## 2017-05-02 PROCEDURE — 80074 ACUTE HEPATITIS PANEL: CPT

## 2017-05-02 PROCEDURE — 25000003 PHARM REV CODE 250: Performed by: INTERNAL MEDICINE

## 2017-05-02 PROCEDURE — 86141 C-REACTIVE PROTEIN HS: CPT

## 2017-05-02 PROCEDURE — 63600175 PHARM REV CODE 636 W HCPCS: Performed by: EMERGENCY MEDICINE

## 2017-05-02 PROCEDURE — 83930 ASSAY OF BLOOD OSMOLALITY: CPT

## 2017-05-02 PROCEDURE — 82607 VITAMIN B-12: CPT

## 2017-05-02 PROCEDURE — C9113 INJ PANTOPRAZOLE SODIUM, VIA: HCPCS | Performed by: EMERGENCY MEDICINE

## 2017-05-02 PROCEDURE — 82436 ASSAY OF URINE CHLORIDE: CPT

## 2017-05-02 PROCEDURE — 84540 ASSAY OF URINE/UREA-N: CPT

## 2017-05-02 PROCEDURE — 87205 SMEAR GRAM STAIN: CPT

## 2017-05-02 PROCEDURE — 85730 THROMBOPLASTIN TIME PARTIAL: CPT

## 2017-05-02 PROCEDURE — 84133 ASSAY OF URINE POTASSIUM: CPT

## 2017-05-02 PROCEDURE — 21400001 HC TELEMETRY ROOM

## 2017-05-02 PROCEDURE — 84481 FREE ASSAY (FT-3): CPT

## 2017-05-02 PROCEDURE — 83935 ASSAY OF URINE OSMOLALITY: CPT

## 2017-05-02 PROCEDURE — 27000221 HC OXYGEN, UP TO 24 HOURS

## 2017-05-02 PROCEDURE — 85651 RBC SED RATE NONAUTOMATED: CPT

## 2017-05-02 PROCEDURE — 84156 ASSAY OF PROTEIN URINE: CPT

## 2017-05-02 PROCEDURE — 82746 ASSAY OF FOLIC ACID SERUM: CPT

## 2017-05-02 RX ORDER — POLYETHYLENE GLYCOL 3350 17 G/17G
17 POWDER, FOR SOLUTION ORAL DAILY
Status: DISCONTINUED | OUTPATIENT
Start: 2017-05-02 | End: 2017-05-16 | Stop reason: HOSPADM

## 2017-05-02 RX ORDER — MORPHINE SULFATE 10 MG/ML
5 INJECTION INTRAMUSCULAR; INTRAVENOUS; SUBCUTANEOUS EVERY 4 HOURS PRN
Status: COMPLETED | OUTPATIENT
Start: 2017-05-02 | End: 2017-05-08

## 2017-05-02 RX ORDER — MORPHINE SULFATE 10 MG/ML
2 INJECTION INTRAMUSCULAR; INTRAVENOUS; SUBCUTANEOUS EVERY 4 HOURS PRN
Status: DISCONTINUED | OUTPATIENT
Start: 2017-05-02 | End: 2017-05-02

## 2017-05-02 RX ORDER — CLOPIDOGREL BISULFATE 75 MG/1
75 TABLET ORAL DAILY
Status: DISCONTINUED | OUTPATIENT
Start: 2017-05-03 | End: 2017-05-02

## 2017-05-02 RX ORDER — ALBUTEROL SULFATE 2.5 MG/.5ML
2.5 SOLUTION RESPIRATORY (INHALATION)
Status: DISCONTINUED | OUTPATIENT
Start: 2017-05-02 | End: 2017-05-02

## 2017-05-02 RX ORDER — OXYCODONE AND ACETAMINOPHEN 5; 325 MG/1; MG/1
1 TABLET ORAL EVERY 6 HOURS PRN
Status: COMPLETED | OUTPATIENT
Start: 2017-05-02 | End: 2017-05-12

## 2017-05-02 RX ORDER — PANTOPRAZOLE SODIUM 40 MG/10ML
40 INJECTION, POWDER, LYOPHILIZED, FOR SOLUTION INTRAVENOUS DAILY
Status: DISCONTINUED | OUTPATIENT
Start: 2017-05-02 | End: 2017-05-05 | Stop reason: ALTCHOICE

## 2017-05-02 RX ORDER — SEVELAMER CARBONATE 800 MG/1
1600 TABLET, FILM COATED ORAL
Status: DISPENSED | OUTPATIENT
Start: 2017-05-02 | End: 2017-05-05

## 2017-05-02 RX ORDER — SODIUM CHLORIDE 9 MG/ML
INJECTION, SOLUTION INTRAVENOUS CONTINUOUS
Status: DISCONTINUED | OUTPATIENT
Start: 2017-05-02 | End: 2017-05-02

## 2017-05-02 RX ORDER — HEPARIN SODIUM 10000 [USP'U]/100ML
18 INJECTION, SOLUTION INTRAVENOUS
Status: DISCONTINUED | OUTPATIENT
Start: 2017-05-02 | End: 2017-05-02

## 2017-05-02 RX ORDER — ASPIRIN 81 MG/1
81 TABLET ORAL DAILY
Status: DISCONTINUED | OUTPATIENT
Start: 2017-05-02 | End: 2017-05-16 | Stop reason: HOSPADM

## 2017-05-02 RX ORDER — OXYCODONE AND ACETAMINOPHEN 10; 325 MG/1; MG/1
1 TABLET ORAL EVERY 6 HOURS PRN
Status: COMPLETED | OUTPATIENT
Start: 2017-05-02 | End: 2017-05-11

## 2017-05-02 RX ORDER — ONDANSETRON 2 MG/ML
4 INJECTION INTRAMUSCULAR; INTRAVENOUS EVERY 4 HOURS PRN
Status: DISCONTINUED | OUTPATIENT
Start: 2017-05-02 | End: 2017-05-02

## 2017-05-02 RX ORDER — MORPHINE SULFATE 10 MG/ML
4 INJECTION INTRAMUSCULAR; INTRAVENOUS; SUBCUTANEOUS EVERY 4 HOURS PRN
Status: DISCONTINUED | OUTPATIENT
Start: 2017-05-02 | End: 2017-05-02

## 2017-05-02 RX ORDER — ASPIRIN 325 MG
325 TABLET ORAL DAILY
Status: DISCONTINUED | OUTPATIENT
Start: 2017-05-02 | End: 2017-05-02

## 2017-05-02 RX ORDER — ONDANSETRON 2 MG/ML
8 INJECTION INTRAMUSCULAR; INTRAVENOUS EVERY 12 HOURS PRN
Status: DISCONTINUED | OUTPATIENT
Start: 2017-05-02 | End: 2017-05-10

## 2017-05-02 RX ORDER — CLOPIDOGREL 300 MG/1
300 TABLET, FILM COATED ORAL ONCE
Status: DISCONTINUED | OUTPATIENT
Start: 2017-05-02 | End: 2017-05-02

## 2017-05-02 RX ORDER — SODIUM CHLORIDE 9 MG/ML
INJECTION, SOLUTION INTRAVENOUS ONCE
Status: COMPLETED | OUTPATIENT
Start: 2017-05-02 | End: 2017-05-02

## 2017-05-02 RX ORDER — HEPARIN SODIUM,PORCINE/D5W 25000/250
16 INTRAVENOUS SOLUTION INTRAVENOUS CONTINUOUS
Status: DISCONTINUED | OUTPATIENT
Start: 2017-05-02 | End: 2017-05-09

## 2017-05-02 RX ADMIN — SODIUM CHLORIDE: 0.9 INJECTION, SOLUTION INTRAVENOUS at 04:05

## 2017-05-02 RX ADMIN — PANTOPRAZOLE SODIUM 40 MG: 40 INJECTION, POWDER, FOR SOLUTION INTRAVENOUS at 12:05

## 2017-05-02 RX ADMIN — ASPIRIN 81 MG: 81 TABLET, COATED ORAL at 12:05

## 2017-05-02 RX ADMIN — MORPHINE SULFATE 4 MG: 10 INJECTION INTRAVENOUS at 01:05

## 2017-05-02 RX ADMIN — SODIUM CHLORIDE: 0.9 INJECTION, SOLUTION INTRAVENOUS at 12:05

## 2017-05-02 RX ADMIN — DEXTROSE MONOHYDRATE: 5 INJECTION, SOLUTION INTRAVENOUS at 07:05

## 2017-05-02 RX ADMIN — HEPARIN SODIUM AND DEXTROSE 16 UNITS/KG/HR: 10000; 5 INJECTION INTRAVENOUS at 07:05

## 2017-05-02 RX ADMIN — DEXTROSE MONOHYDRATE: 5 INJECTION, SOLUTION INTRAVENOUS at 05:05

## 2017-05-02 RX ADMIN — OXYCODONE HYDROCHLORIDE AND ACETAMINOPHEN 1 TABLET: 5; 325 TABLET ORAL at 07:05

## 2017-05-02 RX ADMIN — SODIUM CHLORIDE: 0.9 INJECTION, SOLUTION INTRAVENOUS at 03:05

## 2017-05-02 RX ADMIN — HEPARIN SODIUM AND DEXTROSE 16 UNITS/KG/HR: 10000; 5 INJECTION INTRAVENOUS at 03:05

## 2017-05-02 NOTE — CONSULTS
"Chief Complaint:  "I cannot hear."    HPI:  The patient is a 47 year old woman with a history of GERD and depression presenting with bilateral hearing loss, a stroke, NSTEMI, shock liver, and renal insufficiency.  She underwent a gastric sleeve procedure in [place] on 17 and the procedure was uncomplicated.  She has been taking a liquid narcotic.  The patient became altered over the weekend and has had bilateral hearing loss. She was found to be hypotensive with multiorgan failure and rhabdomyolysis along with a stroke.  She does not have abdominal pain, weight loss, nausea, emesis, diarrhea, or constipation.  The patient has never undergone a colonoscopy.    Past Medical History:   Diagnosis Date    Allergy     Depression     GERD (gastroesophageal reflux disease)      Past Surgical History:   Procedure Laterality Date    breast augmentation       SECTION      CHOLECYSTECTOMY      LAPAROSCOPIC GASTRIC BANDING  2017    ORIF HUMERUS FRACTURE  1987     Family History   Problem Relation Age of Onset    Breast cancer Maternal Grandmother 63    Diabetes Father     Hypertension Father     Heart disease Father     Thyroid disease Mother     Cancer Mother 71     SCC of lung    Cancer Sister 48     pharyngeal cancer     Social History     Social History    Marital status: Single     Spouse name: N/A    Number of children: N/A    Years of education: N/A     Occupational History     [employer]     Social History Main Topics    Smoking status: Former Smoker     Packs/day: 1.00     Years: 16.00     Quit date: 2011    Smokeless tobacco: Never Used    Alcohol use Yes      Comment: occasional    Drug use: No    Sexual activity: Yes     Partners: Male     Birth control/ protection: Inserts     Other Topics Concern    Not on file     Social History Narrative    Together since 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  Playing [employer].She is a registered nurse for [employer]      aspirin  81 " mg Oral Daily    pantoprazole  40 mg Intravenous Daily    polyethylene glycol  17 g Oral Daily     Review of patient's allergies indicates:  No Known Allergies    ROS:  No chest pain or dyspnea.  No dysuria.  No heartburn or dysphagia.  Otherwise as stated above.  Ten other systems negative.    Vitals:    05/02/17 0400 05/02/17 0744 05/02/17 0816 05/02/17 1208   BP: 132/74 (!) 162/77  139/72   BP Location:  Right arm  Right arm   Patient Position:  Lying  Lying   BP Method:  Automatic  Automatic   Pulse: 90 87 85 88   Resp: 20 19 18 18   Temp: 97.9 °F (36.6 °C) 98.1 °F (36.7 °C)  98.1 °F (36.7 °C)   TempSrc:  Oral  Oral   SpO2: (!) 91% (!) 93% (!) 93% (!) 92%   Weight: 135.9 kg (299 lb 8 oz)      Height:         P.E.:  GEN: A x O x 3, NAD, bilateral hearing loss  SKIN: No jaundice  HEENT: EOMI, PERRL, anicteric sclera  CV: RRR, no M/R/G  Chest: CTA B  Abdomen: soft, NTND, normoactive BS  Ext: No C/C/E.  2+ dorsalis pedis pulses B  Neuro: No asterixes or tremors.    Musculoskeletal: Decreased strength bilaterally    Labs:  Recent Results (from the past 336 hour(s))   CBC auto differential    Collection Time: 05/02/17  1:45 AM   Result Value Ref Range    WBC 12.16 3.90 - 12.70 K/uL    Hemoglobin 12.5 12.0 - 16.0 g/dL    Hematocrit 37.1 37.0 - 48.5 %    Platelets 121 (L) 150 - 350 K/uL   CBC auto differential    Collection Time: 05/01/17  5:36 PM   Result Value Ref Range    WBC 11.20 3.90 - 12.70 K/uL    Hemoglobin 13.9 12.0 - 16.0 g/dL    Hematocrit 41.9 37.0 - 48.5 %    Platelets 129 (L) 150 - 350 K/uL     CMP  Sodium   Date Value Ref Range Status   05/02/2017 139 136 - 145 mmol/L Final     Potassium   Date Value Ref Range Status   05/02/2017 4.7 3.5 - 5.1 mmol/L Final     Chloride   Date Value Ref Range Status   05/02/2017 105 95 - 110 mmol/L Final     CO2   Date Value Ref Range Status   05/02/2017 20 (L) 23 - 29 mmol/L Final     Glucose   Date Value Ref Range Status   05/02/2017 139 (H) 70 - 110 mg/dL Final      BUN, Bld   Date Value Ref Range Status   05/02/2017 45 (H) 6 - 20 mg/dL Final     Creatinine   Date Value Ref Range Status   05/02/2017 4.4 (H) 0.5 - 1.4 mg/dL Final     Calcium   Date Value Ref Range Status   05/02/2017 7.2 (L) 8.7 - 10.5 mg/dL Final     Total Protein   Date Value Ref Range Status   05/02/2017 6.8 6.0 - 8.4 g/dL Final     Albumin   Date Value Ref Range Status   05/02/2017 3.3 (L) 3.5 - 5.2 g/dL Final     Total Bilirubin   Date Value Ref Range Status   05/02/2017 1.0 0.1 - 1.0 mg/dL Final     Comment:     For infants and newborns, interpretation of results should be based  on gestational age, weight and in agreement with clinical  observations.  Premature Infant recommended reference ranges:  Up to 24 hours.............<8.0 mg/dL  Up to 48 hours............<12.0 mg/dL  3-5 days..................<15.0 mg/dL  6-29 days.................<15.0 mg/dL       Alkaline Phosphatase   Date Value Ref Range Status   05/02/2017 97 55 - 135 U/L Final     AST   Date Value Ref Range Status   05/02/2017 9976 (H) 10 - 40 U/L Final     ALT   Date Value Ref Range Status   05/02/2017 3430 (H) 10 - 44 U/L Final     Anion Gap   Date Value Ref Range Status   05/02/2017 14 8 - 16 mmol/L Final     eGFR if    Date Value Ref Range Status   05/02/2017 13 (A) >60 mL/min/1.73 m^2 Final     eGFR if non    Date Value Ref Range Status   05/02/2017 11 (A) >60 mL/min/1.73 m^2 Final     Comment:     Calculation used to obtain the estimated glomerular filtration  rate (eGFR) is the CKD-EPI equation. Since race is unknown   in our information system, the eGFR values for   -American and Non--American patients are given   for each creatinine result.           Recent Labs  Lab 05/02/17  0145 05/02/17  0741   INR 1.4*  --    APTT 25.7 40.1*     CT of Head:  Findings concerning for small area of acute infarction involving the right cerebellar hemisphere.  Otherwise, no acute intracranial  abnormality identified.    A/P:  The patient is a 47 year old woman with a history of GERD and depression presenting with bilateral hearing loss, a stroke, NSTEMI, rhabdomyolysis, shock liver, and renal insufficiency.  1.  Shock Liver - the patient had hypotension, which could have caused her troponin, liver enzymes, and creatinine to increase.  She also has rhabdomyolysis and her AST is markedly increased compared to her ALT, which is consistent with rhabdomyolysis.  She needs to be rehydrated well.  Her liver enzymes may improve quickly with hydration.  A hepatitis panel is pending.  A doppler ultrasound of the liver will be obtained. Her increased INR is concerning and should be monitored daily.    Thank you for this consult.

## 2017-05-02 NOTE — CONSULTS
[facility]  Cardiology  Consult Note    Patient Name: Shireen Anton  MRN: [id number]  Admission Date: 5/1/2017  Hospital Length of Stay: 1 days  Code Status: Full Code   Attending Provider: Cheo Wilks MD   Consulting Provider: Kevin Irby MD  Primary Care Physician: Jeancarlos Zamora MD  Principal Problem:<principal problem not specified>    Patient information was obtained from patient and ER records.     Inpatient consult to Cardiology  Consult performed by: KEVIN IRBY  Consult ordered by: KEVIN OWENS  Reason for consult: elev trop        Subjective:     Chief Complaint:  Hearing loss     HPI:   Card: SHARRI Deluna    47 y.o. female that (in part)  has a past medical history of Allergy; Depression; and GERD (gastroesophageal reflux disease). Presents to [facility] Emergency Department complaining of acute bilateral hearing loss. Patient reports going to bed Sunday night and her last coherent memory was signing a check for her daughter's . Monday morning her  reported that he had a hard time waking her up and said associated vigorously shake her because she appeared unresponsive. She was disoriented and unable to hear. She had a gastric sleeve on Thursday, April 27 that was uncomplicated. During the perioperative period she reported to be normal and had no complaints other than some minor pain expected after surgery. Throughout Friday, Saturday, and Sunday experienced no significant problems other than being increasingly thirsty. She was given a liquid form of opioid medications which she was taking as prescribed, she states. She continues to experience bilateral hearing loss that is constant. Characterized by only hearing high-pitched sounds. This is a first episode and a new problem for her. No radiation of symptoms. No relieving factors. No exacerbating factors.  No seizures, slurred speech, headaches, incoordination,  paraesthesias, ataxia, vertigo, or tremors.     In the emergency department routine laboratory studies, EKG, and cardiac enzymes were obtained. There was concern for multiple organ dysfunction including markedly elevated troponin levels, acute renal failure, markedly elevated liver enzymes, and creatinine kinase levels greater than 40,000. She denies significant nausea, vomiting, diarrhea, or urination. Denies any significant hypotensive episodes. She does not recall anything occurring after  night to the time of hospitalization. She does report having some vaginal bleeding that started this afternoon. She uses NuvaRing for contraception.    The pt denies CP/SOB/palps/LH/dizziness/LOC.  No prior cardiac hx.  She was seen by Dr. Deluna preop and underwent cardiac testing (echo/stress) which were apparently normal.  Aside from acute hearing loss, the pt denies any sxs.      Past Medical History:   Diagnosis Date    Allergy     Depression     GERD (gastroesophageal reflux disease)        Past Surgical History:   Procedure Laterality Date    breast augmentation       SECTION      CHOLECYSTECTOMY      LAPAROSCOPIC GASTRIC BANDING  2017    ORIF HUMERUS FRACTURE         Review of patient's allergies indicates:  No Known Allergies    No current facility-administered medications on file prior to encounter.      Current Outpatient Prescriptions on File Prior to Encounter   Medication Sig    cetirizine (ZYRTEC) 10 MG tablet Take 10 mg by mouth once daily.    citalopram (CELEXA) 40 MG tablet Take 1 tablet (40 mg total) by mouth once daily.    ergocalciferol (VITAMIN D2) 50,000 unit Cap Take 50,000 Units by mouth every 7 days.    gabapentin (NEURONTIN) 300 MG capsule Take 1 capsule (300 mg total) by mouth 3 (three) times daily.    omeprazole (PRILOSEC OTC) 20 MG tablet Take 20 mg by mouth once daily.    trazodone (DESYREL) 50 MG tablet TAKE ONE TABLET BY MOUTH IN THE EVENING     etonogestrel-ethinyl estradiol (NUVARING) 0.12-0.015 mg/24 hr vaginal ring Place 1 each vaginally every 28 days.    FLUVIRIN 1665-2766 45 mcg (15 mcg x 3)/0.5 mL Susp     FLUVIRIN 8768-5189 45 mcg (15 mcg x 3)/0.5 mL Susp ADM 0.5ML IM UTD     Family History     Problem Relation (Age of Onset)    Breast cancer Maternal Grandmother (63)    Cancer Mother (71), Sister (48)    Diabetes Father    Heart disease Father    Hypertension Father    Thyroid disease Mother        Social History Main Topics    Smoking status: Former Smoker     Packs/day: 1.00     Years: 16.00     Quit date: 11/25/2011    Smokeless tobacco: Never Used    Alcohol use Yes      Comment: occasional    Drug use: No    Sexual activity: Yes     Partners: Male     Birth control/ protection: Inserts     Review of Systems   Constitution: Negative for chills, diaphoresis, fever and weakness.   HENT: Positive for hearing loss. Negative for headaches and nosebleeds.    Eyes: Positive for visual disturbance (chronic). Negative for blurred vision and double vision.   Cardiovascular: Negative for chest pain, claudication, cyanosis, dyspnea on exertion, leg swelling, orthopnea, palpitations, paroxysmal nocturnal dyspnea and syncope.   Respiratory: Negative for cough, shortness of breath and wheezing.    Skin: Negative for dry skin and poor wound healing.   Musculoskeletal: Negative for back pain, joint swelling and myalgias.   Gastrointestinal: Positive for abdominal pain (post gastric bypass). Negative for nausea and vomiting.   Genitourinary: Positive for non-menstrual bleeding. Negative for hematuria.   Neurological: Negative for dizziness, numbness and seizures.   Psychiatric/Behavioral: Negative for altered mental status and depression.     Objective:     Vital Signs (Most Recent):  Temp: 97.9 °F (36.6 °C) (05/02/17 0400)  Pulse: 90 (05/02/17 0400)  Resp: 20 (05/02/17 0400)  BP: 132/74 (05/02/17 0400)  SpO2: (!) 91 % (05/02/17 0400) Vital Signs (24h  Range):  Temp:  [97.6 °F (36.4 °C)-97.9 °F (36.6 °C)] 97.9 °F (36.6 °C)  Pulse:  [] 90  Resp:  [18-20] 20  SpO2:  [77 %-100 %] 91 %  BP: ()/(58-79) 132/74     Weight: 135.9 kg (299 lb 8 oz)  Body mass index is 40.62 kg/(m^2).    SpO2: (!) 91 %  O2 Device (Oxygen Therapy): nasal cannula      Intake/Output Summary (Last 24 hours) at 05/02/17 0636  Last data filed at 05/02/17 0600   Gross per 24 hour   Intake          1498.55 ml   Output                0 ml   Net          1498.55 ml       Lines/Drains/Airways     Peripheral Intravenous Line                 Peripheral IV - Single Lumen 05/01/17 1738 Right Hand less than 1 day         Peripheral IV - Single Lumen 05/01/17 2322 Left Hand less than 1 day                Physical Exam   Constitutional: She is oriented to person, place, and time. She appears well-developed and well-nourished. No distress.   HENT:   Head: Normocephalic and atraumatic.   Mouth/Throat: No oropharyngeal exudate.   Eyes: Conjunctivae and EOM are normal. Pupils are equal, round, and reactive to light. No scleral icterus.   Neck: Normal range of motion. Neck supple. No JVD present. No tracheal deviation present.   Cardiovascular: Normal rate, regular rhythm, S1 normal and S2 normal.  Exam reveals no gallop and no friction rub.    No murmur heard.  Pulmonary/Chest: Effort normal and breath sounds normal. No respiratory distress. She has no wheezes. She has no rales. She exhibits no tenderness.   Abdominal: Soft. Bowel sounds are normal. There is no tenderness.   obese   Musculoskeletal: Normal range of motion. She exhibits no edema.   Neurological: She is alert and oriented to person, place, and time. A cranial nerve deficit (Pt reports chr visual difficulty and acute hearing loss) is present.   Skin: Skin is warm and dry. She is not diaphoretic.   Psychiatric: She has a normal mood and affect. Her behavior is normal. Judgment normal.       Current Medications:   aspirin  325 mg Oral  Daily    pantoprazole  40 mg Intravenous Daily    polyethylene glycol  17 g Oral Daily      heparin (porcine) in D5W 16 Units/kg/hr (05/02/17 0315)    sodium bicarbonate drip 150 mL/hr at 05/02/17 0541     heparin (PORCINE), heparin (PORCINE), morphine, ondansetron, oxycodone-acetaminophen, oxycodone-acetaminophen    Laboratory:  CBC:    Recent Labs  Lab 01/28/15  0746 05/01/17  1736 05/02/17  0145   WHITE BLOOD CELL COUNT 6.26 11.20 12.16   HEMOGLOBIN 13.9 13.9 12.5   HEMATOCRIT 39.3 41.9 37.1   PLATELETS 164 129 L 121 L  121 L       CHEMISTRIES:    Recent Labs  Lab 01/28/15  0746 05/01/17  1736   GLUCOSE 101 116 H   SODIUM 140 143   POTASSIUM 3.5 5.5 H   BUN BLD 11 34 H   CREATININE 0.7 4.2 H   EGFR IF  >60 14 A   EGFR IF NON- >60 12 A   CALCIUM 9.3 7.8 L   MAGNESIUM  --  2.7 H       CARDIAC BIOMARKERS:    Recent Labs  Lab 05/01/17  1736 05/02/17  0023   CPK >39696 H  --    TROPONIN I 16.176 H 16.117 H       COAGS:    Recent Labs  Lab 05/02/17  0145   INR 1.4 H       LIPIDS/LFTS:    Recent Labs  Lab 01/28/15  0746 05/01/17  1736   CHOLESTEROL 167  --    TRIGLYCERIDES 96  --    HDL 59  --    LDL CHOLESTEROL 88.8  --    NON-HDL CHOLESTEROL 108  --    AST 28 9471 H   ALT 42 3864 H           Diagnostic Results:  ECG (personally reviewed tracings):   5/1/17 1748 SR 96, IRBBB, NSSTTW changes, no prior tracing available    Chest X-Ray (personally reviewed image(s)): 5/1/17 NAD    Echo: pending    Records requested from Dr. Deluna: EKG, echo, stress test reports      Assessment and Plan:     Acute hearing loss of both ears  Per IM/neuro    NSTEMI (non-ST elevated myocardial infarction)  No clinical correlate for ACS  Trop rise is certainly concerning  EKG with nonspec changes  Prior stress test/echo/ekg reports requested from Dr. Deluna  Change ASA to 81mg qd  Will hold off on plavix load  Agree with IV heparin while undergoing workup  Check echo  Check CKMB  Will consider cath  pending improvement in renal fxn  Check lipids, hold off on statin rx until LFTs normalize pending clinical course      Elevated liver enzymes  Continue to monitor    Acute renal failure  Per IM  Agree with IVF  Avoid nephrotoxins  Consider nephrology consult  Will obviously hope to avoid cath until renal fxn normalizes    Morbid obesity  S/P gastric sleeve on 4/27/17.    Non-traumatic rhabdomyolysis  ?new problem vs CPK elevation from recent surgery.      VTE Risk Mitigation         Ordered     Medium Risk of VTE  Once      05/02/17 0012     Place RENU hose  Until discontinued      05/02/17 0012     Place sequential compression device  Until discontinued      05/02/17 0012          Thank you for your consult. I will follow-up with patient. Please contact us if you have any additional questions.    Kevin Sánchez MD  Cardiology   [facility]

## 2017-05-02 NOTE — ASSESSMENT & PLAN NOTE
No clinical correlate for ACS  Trop rise is certainly concerning  EKG with nonspec changes  Prior stress test/echo/ekg reports requested from Dr. Deluna  Change ASA to 81mg qd  Will hold off on plavix load  Agree with IV heparin while undergoing workup  Check echo  Check CKMB  Will consider cath pending improvement in renal fxn  Check lipids, hold off on statin rx until LFTs normalize pending clinical course

## 2017-05-02 NOTE — PLAN OF CARE
05/02/17 1649   Discharge Assessment   Assessment Type Discharge Planning Assessment   Confirmed/corrected address and phone number on facesheet? Yes   Assessment information obtained from? Patient   Communicated expected length of stay with patient/caregiver no   Type of Healthcare Directive Received (BrotherRicci, is Next of Kin  [phone])   If Healthcare Directive is received, is it scanned into Epic? no (comment)   Prior to hospitilization cognitive status: Alert/Oriented   Prior to hospitalization functional status: Independent   Current cognitive status: Alert/Oriented   Current Functional Status: Independent   Arrived From admitted as an inpatient;home or self-care   Lives With significant other;child(mckenzie), dependent   Able to Return to Prior Arrangements yes   Is patient able to care for self after discharge? Yes   How many people do you have in your home that can help with your care after discharge? 1   Who are your caregiver(s) and their phone number(s)? Bella   Patient's perception of discharge disposition home or selfcare   Readmission Within The Last 30 Days current reason for admission unrelated to previous admission  (Gastric Bypass [facility])   Patient currently being followed by outpatient case management? No   Patient currently receives home health services? No   Does the patient currently use HME? No   Patient currently receives private duty nursing? N/A   Patient currently receives any other outside agency services? No   Equipment Currently Used at Home other (see comments)  (shower chair)   Do you have any problems affording any of your prescribed medications? No   Is the patient taking medications as prescribed? yes   Do you have any financial concerns preventing you from receiving the healthcare you need? No   Does the patient have transportation to healthcare appointments? Yes   Transportation Available car   On Dialysis? No   Does the patient receive  services at the Coumadin Clinic? No   Are there any open cases? No   Discharge Plan A Home with family   Discharge Plan B Home with family   Patient/Family In Agreement With Plan yes   Sandra Schwartz, THOM, ACRUPESH-FINN, CCM  05/02/2017

## 2017-05-02 NOTE — PLAN OF CARE
Problem: Fluid Volume Deficit (Adult)  Goal: Fluid/Electrolyte Balance  Patient will demonstrate the desired outcomes by discharge/transition of care.   Outcome: Ongoing (interventions implemented as appropriate)    05/02/17 0595   Fluid Volume Deficit (Adult)   Fluid/Electrolyte Balance making progress toward outcome         Comments:   Sodium bicarbonate 1 mEq/mL (8.4 %) 150 mEq in dextrose 5 % 1,000 mL infusing @ 150 mL/hr. Heparin infusing at 16 units/kg/hr.

## 2017-05-02 NOTE — H&P
[facility] Hospital Medicine  History & Physical    Patient Name: Shireen Anton  MRN: [id number]  Admission Date: 05/02/2017  Attending Physician: Kevin Crisostomo MD, MPH      PCP:     Jeancarlos Zamora MD    CC:     Chief Complaint   Patient presents with    Hearing Problem     via ems pt complaining of losing hearing since waking up this morning ,also of feeling tingling in several parts of her body        HISTORY OF PRESENT ILLNESS:     Shireen Anton is a 47 y.o. female that (in part)  has a past medical history of Allergy; Depression; and GERD (gastroesophageal reflux disease). Presents to [facility] Emergency Department complaining of acute bilateral hearing loss.  Patient reports going to bed Sunday night and her last coherent memory was signing a check for her daughter's .  Monday morning her  reported that he had a hard time waking her up and said associated vigorously shake her because she appeared unresponsive.  She was disoriented and unable to hear.  She had a gastric sleeve on Thursday, April 27 that was uncomplicated.  During the perioperative period she reported to be normal and had no complaints other than some minor pain expected after surgery.  Throughout Friday, Saturday, and Sunday experienced no significant problems other than being increasingly thirsty.  She was given a liquid form of opioid medications which she was taking as prescribed, she states.  She continues to experience bilateral hearing loss that is constant.  Characterized by only hearing high-pitched sounds.  This is a first episode and a new problem for her.  No radiation of symptoms.  No relieving factors.  No exacerbating factors.   No seizures, slurred speech, headaches, incoordination, paraesthesias, ataxia, vertigo, or tremors.    In the emergency department routine laboratory studies, EKG, and cardiac enzymes were obtained.  There was concern for multiple organ  dysfunction including markedly elevated troponin levels, acute renal failure, markedly elevated liver enzymes, and creatinine kinase levels greater than 40,000.  She denies significant nausea, vomiting, diarrhea, or urination.  Denies any significant hypotensive episodes.  She does not recall anything occurring after Sunday night to the time of hospitalization.  She does report having some vaginal bleeding that started this afternoon.  She uses NuvaRing for contraception.    Hospital medicine has been asked to admit for further evaluation and treatment.       REVIEW OF SYSTEMS:     -- Constitutional: Unresponsiveness as noted above in history of present illness.  No fever or chills.  -- Eyes: No visual changes, diplopia, pain, tearing, blind spots, or discharge.   -- Ears, nose, mouth, throat, and face: No congestion, sore throat, epistaxis, d/c, bleeding gums, neck stiffness masses, or dental issues.  -- Respiratory: No cough, shortness of breath, hemoptysis, stridor, wheezing, or night sweats.  -- Cardiovascular: No chest pain, DAMIAN, syncope, PND, edema, cyanosis, or palpitations.   -- Gastrointestinal: As above in history of present illness.  -- Genitourinary: No hematuria, dysuria, frequency, urgency, nocturia, polyuria, stones, or incontinence.  -- Integument/breast: Chronic erythema/rash on face.  No pruritis, dryness, or changes in hair  -- Hematologic/lymphatic: No easy bruising or lymphadenopathy.   -- Musculoskeletal: Acute diffuse myalgias and increased generalized muscle weakness.  No acute arthralgias,joint swelling, acute limitations of ROM.  -- Neurological: As above in history of present illness.   -- Behavioral/Psych: No auditory or visual hallucinations, depression, or suicidal/homicidal ideations.  -- Endocrine: Polydipsia.  No heat or cold intolerance or unintentional weight gain / loss.  -- Allergy/Immunologic: No recurrent infections or adverse reaction to food, insects, or difficulty  breathing.    Pain Scale  2 on scale of 1 to 10    PAST MEDICAL / SURGICAL HISTORY:     Past Medical History:   Diagnosis Date    Allergy     Depression     GERD (gastroesophageal reflux disease)      Past Surgical History:   Procedure Laterality Date    breast augmentation       SECTION      CHOLECYSTECTOMY      LAPAROSCOPIC GASTRIC BANDING  2017    ORIF HUMERUS FRACTURE           FAMILY HISTORY:     Family History   Problem Relation Age of Onset    Breast cancer Maternal Grandmother 63    Diabetes Father     Hypertension Father     Heart disease Father     Thyroid disease Mother     Cancer Mother 71     SCC of lung    Cancer Sister 48     pharyngeal cancer         SOCIAL HISTORY:     Social History   Substance Use Topics    Smoking status: Former Smoker     Packs/day: 1.00     Years: 16.     Quit date: 2011    Smokeless tobacco: Never Used    Alcohol use Yes      Comment: occasional     Social History     Social History    Marital status: Single     Spouse name: N/A    Number of children: N/A    Years of education: N/A     Occupational History     [employer]     Social History Main Topics    Smoking status: Former Smoker     Packs/day: 1.00     Years: 16.     Quit date: 2011    Smokeless tobacco: Never Used    Alcohol use Yes      Comment: occasional    Drug use: No    Sexual activity: Yes     Partners: Male     Birth control/ protection: Inserts     Other Topics Concern    None     Social History Narrative    Together since 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  Playing [employer].She is a registered nurse for [employer]         ALLERGIES:       Review of patient's allergies indicates:  No Known Allergies      HEALTH SCREENING:     Prevnar 13 pneumonia vaccine = no evidence of previous vaccination found in the medical record      HOME MEDICATIONS:     Prior to Admission medications    Medication Sig Start Date End Date Taking? Authorizing Provider    cetirizine (ZYRTEC) 10 MG tablet Take 10 mg by mouth once daily.   Yes Historical Provider, MD   citalopram (CELEXA) 40 MG tablet Take 1 tablet (40 mg total) by mouth once daily. 10/10/16  Yes Jeancarlos Zamora MD   ergocalciferol (VITAMIN D2) 50,000 unit Cap Take 50,000 Units by mouth every 7 days.   Yes Historical Provider, MD   gabapentin (NEURONTIN) 300 MG capsule Take 1 capsule (300 mg total) by mouth 3 (three) times daily. 10/20/16 10/20/17 Yes Jeancarlos Zamora MD   omeprazole (PRILOSEC OTC) 20 MG tablet Take 20 mg by mouth once daily.   Yes Historical Provider, MD   trazodone (DESYREL) 50 MG tablet TAKE ONE TABLET BY MOUTH IN THE EVENING 4/10/17  Yes Jeancarlos Zamora MD   etonogestrel-ethinyl estradiol (NUVARING) 0.12-0.015 mg/24 hr vaginal ring Place 1 each vaginally every 28 days. 6/20/16   Antione Jung MD   FLUVIRIN 1517-2496 45 mcg (15 mcg x 3)/0.5 mL Susp  10/14/15   Historical Provider, MD   FLUVIRIN [id number] 45 mcg (15 mcg x 3)/0.5 mL Susp ADM 0.5ML IM UTD 9/28/16   Historical Provider, MD         [facility] MEDICATIONS:     Scheduled Meds:   albuterol sulfate  2.5 mg Nebulization Q4H WAKE    pantoprazole  40 mg Intravenous Daily    polyethylene glycol  17 g Oral Daily     Continuous Infusions:   sodium chloride 0.9% 250 mL/hr at 05/02/17 0314    heparin (porcine) in D5W 16 Units/kg/hr (05/02/17 0315)     PRN Meds:.heparin (PORCINE), heparin (PORCINE), morphine, morphine, ondansetron      PHYSICAL EXAM:     Wt Readings from Last 1 Encounters:   05/01/17 1710 136.1 kg (300 lb)     Body mass index is 40.69 kg/(m^2).  Vitals:    05/01/17 2248 05/01/17 2318 05/01/17 2348 05/02/17 0035   BP: 121/76 129/68 122/61    BP Location:       Patient Position:       BP Method:       Pulse: 94 90 90 96   Resp: 20  18    Temp:       TempSrc:       SpO2: 96% (!) 93% (!) 94%    Weight:       Height:              -- General appearance: Alert, awake, morbidly obese female sitting on the side of the bed.  Very hard  of hearing.  well developed. appears stated age   -- Head: normocephalic, atraumatic .  Very hard of hearing  -- Eyes: conjunctivae clear. Extraocular muscles intact  -- Nose: Nares normal. Septum midline.   -- Mouth/Throat: lips, mucosa, and tongue normal. no throat erythema.   -- Neck: supple, symmetrical, trachea midline, no JVD and thyroid not grossly enlarged, appears symmetric  -- Lungs: clear to auscultation bilaterally. normal respiratory effort. No use of accessory muscles.   -- Chest wall: no tenderness. equal bilateral chest rise   -- Heart: regular rate and rhythm. S1, S2 normal.  no click, rub or gallop   -- Abdomen: surgical wound present.  Mildly tender.  Morbidly Obese.  soft, non-distended, non-tympanic; bowel sounds normal; megaly exam limited by body habitus  -- Extremities: no cyanosis, clubbing or edema.   -- Pulses: 2+ and symmetric   -- Skin: Erythematous facial rash.  color normal, texture normal, turgor normal.   -- Neurologic: Bilateral hearing loss.  Normal strength and tone. No focal numbness or weakness. CNII-XII intact. [place] coma scale: eyes open spontaneously-4, oriented & converses-5, obeys commands-6.  No tremors.      LABORATORY STUDIES:     Recent Results (from the past 36 hour(s))   Comprehensive metabolic panel    Collection Time: 05/01/17  5:36 PM   Result Value Ref Range    Sodium 143 136 - 145 mmol/L    Potassium 5.5 (H) 3.5 - 5.1 mmol/L    Chloride 105 95 - 110 mmol/L    CO2 17 (L) 23 - 29 mmol/L    Glucose 116 (H) 70 - 110 mg/dL    BUN, Bld 34 (H) 6 - 20 mg/dL    Creatinine 4.2 (H) 0.5 - 1.4 mg/dL    Calcium 7.8 (L) 8.7 - 10.5 mg/dL    Total Protein 7.4 6.0 - 8.4 g/dL    Albumin 3.7 3.5 - 5.2 g/dL    Total Bilirubin 1.0 0.1 - 1.0 mg/dL    Alkaline Phosphatase 105 55 - 135 U/L    AST 9471 (H) 10 - 40 U/L    ALT 3864 (H) 10 - 44 U/L    Anion Gap 21 (H) 8 - 16 mmol/L    eGFR if African American 14 (A) >60 mL/min/1.73 m^2    eGFR if non African American 12 (A) >60 mL/min/1.73  m^2   CBC auto differential    Collection Time: 05/01/17  5:36 PM   Result Value Ref Range    WBC 11.20 3.90 - 12.70 K/uL    RBC 4.55 4.00 - 5.40 M/uL    Hemoglobin 13.9 12.0 - 16.0 g/dL    Hematocrit 41.9 37.0 - 48.5 %    MCV 92 82 - 98 fL    MCH 30.5 27.0 - 31.0 pg    MCHC 33.2 32.0 - 36.0 %    RDW 14.0 11.5 - 14.5 %    Platelets 129 (L) 150 - 350 K/uL    MPV 11.9 9.2 - 12.9 fL    Lymph # CANCELED 1.0 - 4.8 K/uL    Mono # CANCELED 0.3 - 1.0 K/uL    Eos # CANCELED 0.0 - 0.5 K/uL    Baso # CANCELED 0.00 - 0.20 K/uL    nRBC 2 (A) 0 /100 WBC    Gran% 69.0 38.0 - 73.0 %    Lymph% 17.0 (L) 18.0 - 48.0 %    Mono% 10.0 4.0 - 15.0 %    Eosinophil% 0.0 0.0 - 8.0 %    Basophil% 0.0 0.0 - 1.9 %    Bands 4.0 %    Differential Method Manual    Lipase    Collection Time: 05/01/17  5:36 PM   Result Value Ref Range    Lipase 89 (H) 4 - 60 U/L   Magnesium    Collection Time: 05/01/17  5:36 PM   Result Value Ref Range    Magnesium 2.7 (H) 1.6 - 2.6 mg/dL   Phosphorus    Collection Time: 05/01/17  5:36 PM   Result Value Ref Range    Phosphorus 10.6 (HH) 2.7 - 4.5 mg/dL   Troponin I    Collection Time: 05/01/17  5:36 PM   Result Value Ref Range    Troponin I 16.176 (H) 0.000 - 0.026 ng/mL   Brain natriuretic peptide    Collection Time: 05/01/17  5:36 PM   Result Value Ref Range     (H) 0 - 99 pg/mL   Ethanol    Collection Time: 05/01/17  5:36 PM   Result Value Ref Range    Alcohol, Medical, Serum 11 (H) <10 mg/dL   CPK    Collection Time: 05/01/17  5:36 PM   Result Value Ref Range    CPK >61904 (H) 20 - 180 U/L   Acetaminophen level    Collection Time: 05/01/17  5:36 PM   Result Value Ref Range    Acetaminophen (Tylenol), Serum <3.0 (L) 10.0 - 20.0 ug/mL   Salicylate level    Collection Time: 05/01/17  5:36 PM   Result Value Ref Range    Salicylate Lvl <5.0 (L) 15.0 - 30.0 mg/dL   POCT glucose    Collection Time: 05/01/17  5:37 PM   Result Value Ref Range    POCT Glucose 132 (H) 70 - 110 mg/dL   Urinalysis    Collection Time:  05/01/17  7:05 PM   Result Value Ref Range    Specimen UA Urine, Catheterized     Color, UA Yellow Yellow, Straw, Christina    Appearance, UA Clear Clear    pH, UA 5.0 5.0 - 8.0    Specific Gravity, UA 1.015 1.005 - 1.030    Protein, UA 1+ (A) Negative    Glucose, UA Negative Negative    Ketones, UA Negative Negative    Bilirubin (UA) Negative Negative    Occult Blood UA 2+ (A) Negative    Nitrite, UA Negative Negative    Urobilinogen, UA 2.0-3.0 (A) <2.0 EU/dL    Leukocytes, UA Negative Negative   Drug screen panel, emergency    Collection Time: 05/01/17  7:05 PM   Result Value Ref Range    Benzodiazepines Negative     Methadone metabolites Negative     Cocaine (Metab.) Negative     Opiate Scrn, Ur Presumptive Positive     Barbiturate Screen, Ur Negative     Amphetamine Screen, Ur Negative     THC Negative     Phencyclidine Negative     Creatinine, Random Ur 169.7 15.0 - 325.0 mg/dL    Toxicology Information SEE COMMENT    Urinalysis Microscopic    Collection Time: 05/01/17  7:05 PM   Result Value Ref Range    RBC, UA 2 0 - 4 /hpf    WBC, UA 0 0 - 5 /hpf    Bacteria, UA Rare None-Occ /hpf    Squam Epithel, UA 2 /hpf    Hyaline Casts, UA 0 0-1/lpf /lpf    Amorphous, UA Occasional None-Moderate    Microscopic Comment SEE COMMENT    Vaginal Screen Vagina    Collection Time: 05/01/17  7:16 PM   Result Value Ref Range    Trichomonas None None    Clue Cells, Wet Prep Few (A) None    Budding Yeast None None    Fungal Hyphae None None    WBC - Vaginal Screen Moderate (A) None    Bacteria - Vaginal Screen Moderate (A) None    Wet Prep Source Vagina None   Troponin I    Collection Time: 05/02/17 12:23 AM   Result Value Ref Range    Troponin I 16.117 (H) 0.000 - 0.026 ng/mL   APTT    Collection Time: 05/02/17  1:45 AM   Result Value Ref Range    aPTT 25.7 21.0 - 32.0 sec   Protime-INR    Collection Time: 05/02/17  1:45 AM   Result Value Ref Range    Prothrombin Time 14.3 (H) 9.0 - 12.5 sec    INR 1.4 (H) 0.8 - 1.2   Platelet  count    Collection Time: 05/02/17  1:45 AM   Result Value Ref Range    Platelets 121 (L) 150 - 350 K/uL    MPV 11.7 9.2 - 12.9 fL   CBC auto differential    Collection Time: 05/02/17  1:45 AM   Result Value Ref Range    WBC 12.16 3.90 - 12.70 K/uL    RBC 4.05 4.00 - 5.40 M/uL    Hemoglobin 12.5 12.0 - 16.0 g/dL    Hematocrit 37.1 37.0 - 48.5 %    MCV 92 82 - 98 fL    MCH 30.9 27.0 - 31.0 pg    MCHC 33.7 32.0 - 36.0 %    RDW 14.0 11.5 - 14.5 %    Platelets 121 (L) 150 - 350 K/uL    MPV 11.7 9.2 - 12.9 fL    Gran # 9.9 (H) 1.8 - 7.7 K/uL    Lymph # 1.2 1.0 - 4.8 K/uL    Mono # 1.0 0.3 - 1.0 K/uL    Eos # 0.0 0.0 - 0.5 K/uL    Baso # 0.02 0.00 - 0.20 K/uL    Gran% 80.9 (H) 38.0 - 73.0 %    Lymph% 9.5 (L) 18.0 - 48.0 %    Mono% 8.5 4.0 - 15.0 %    Eosinophil% 0.0 0.0 - 8.0 %    Basophil% 0.2 0.0 - 1.9 %    Differential Method Automated        Lab Results   Component Value Date    INR 1.4 (H) 05/02/2017     No results found for: HGBA1C  Recent Labs      05/01/17   1737   POCTGLUCOSE  132*           MICROBIOLOGY DATA:     Urine Culture, Routine   Date Value Ref Range Status   12/16/2015   Final    Multiple organisms isolated. None in predominance.  Repeat if   12/16/2015 clinically necessary.  Final   02/12/2015 No significant growth  Final       Microbiology x 7d:   Microbiology Results (last 7 days)     Procedure Component Value Units Date/Time    C. trachomatis/N. gonorrhoeae by AMP DNA Cervix [[id number]] Collected:  05/01/17 1916    Order Status:  Sent Specimen:  Genital Updated:  05/01/17 1917            IMAGING:     Imaging Results         CT Renal Stone Study ABD Pelvis WO (Final result) Result time:  05/01/17 20:44:45    Final result by Srinath Loyd MD (05/01/17 20:44:45)    Impression:       Status post gastric bypass surgery.  No evidence of bowel obstruction or gastric outlet obstruction.    Fatty infiltration of the liver.    Diverticulosis coli with minimal inflammatory changes surrounding the sigmoid  mesocolon.  Suggest clinical correlation to exclude component of acute diverticulitis.    Additional findings as above.              Electronically signed by: OREN DICK MD  Date:     05/01/17  Time:    20:44     Narrative:    Exam: [id number]  05/01/17  19:20:17 [id number] ([facility]) : CT RENAL STONE STUDY ABD PELVIS WO    Technique:    This exam was done specifically to evaluate for renal stone disease as per request.  Therefore, no oral and no IV contrast was used. Using helical CT technique, axial images of the abdomen and pelvis were obtained from the top of the liver through the base of the bladder.    Comparison:    3/20/15    Findings:      The examination is degraded by patient motion.      There is diffuse groundglass attenuation of the bilateral lung bases.  The heart is normal in appearance.  The vessels are unremarkable.  There is no evidence of lymphadenopathy in the abdomen or pelvis.    There is fluid within the distal esophagus.  The patient is status post gastrectomy.  The appearance is unremarkable.  There is no evidence of bowel obstruction.  The small bowel loops are unremarkable.  The appendix is within normal limits.  There is diverticulosis coli predominantly involving the sigmoid colon.  There are minimal inflammatory changes surrounding the sigmoid mesocolon.  No drainable collection is identified.  There is fatty hypertrophy of the ileocecal valve.  The large bowel is otherwise unremarkable.    There is fatty infiltration of the liver.  The patient is status post cholecystectomy.  The biliary tree is within normal limits.  The spleen is unremarkable.  There are splenules present.  The pancreas is within normal limits.  The adrenal glands are unremarkable.    The kidneys, ureters, and urinary bladder are within normal limits.  The uterus is unremarkable.    There is no evidence of free fluid in the abdomen or pelvis.  There is no evidence of free air.  There is no evidence of pneumatosis.    There  are degenerative disease in the osseous structures.  No fractures are identified.  The patient is status post bilateral breast prostheses placement.            X-Ray Chest 1 View (Final result) Result time:  05/01/17 18:46:07    Final result by Kirk Felton MD (05/01/17 18:46:07)    Impression:     No acute process.      Electronically signed by: KIRK FELTON MD  Date:     05/01/17  Time:    18:46     Narrative:    Altered mental status.    Comparison: None.    Single frontal view the chest was obtained.    Trachea is patent.  Cardiac silhouette is normal in size.  Lungs are well-expanded and clear.  No effusion, pneumothorax or free air below the diaphragm.  No acute osseous abnormality.            CT Head Without Contrast (Final result) Result time:  05/01/17 19:21:49    Final result by Srinath Dick MD (05/01/17 19:21:49)    Impression:       No acute intracranial process.    Right maxillary sinus disease.          Electronically signed by: SRINATH DICK MD  Date:     05/01/17  Time:    19:21     Narrative:    Exam: [id number]  05/01/17  17:45:34 [id number] ([facility]) : CT HEAD WITHOUT CONTRAST    Technique:    Axial CT scan of the head was obtained from the vertex to the skull base without intravenous contrast. Coronal and Sagittal reformats were obtained.     Comparison:     None     Findings:      The subcutaneous tissues are within normal limits.  The bony calvarium is intact.  There is an air-fluid level within the right maxillary sinus.  The remaining paranasal sinuses and mastoid air cells are clear.    There are no extra-axial fluid collections.  There is no evidence of intracranial hemorrhage.  The ventricles and sulci are within normal limits.  The great-white differentiation is maintained.  There is no evidence of midline shift.  There is no evidence of mass effect.                CONSULTS:     IP CONSULT TO CARDIOLOGY  IP CONSULT TO NEPHROLOGY  IP CONSULT TO DIETARY       ASSESSMENT & PLAN:     Primary  Diagnosis:  <principal problem not specified>    Active Hospital Problems    Diagnosis  POA    NSTEMI (non-ST elevated myocardial infarction) [I21.4]  Yes     Priority: 1 - High    Elevated liver enzymes [R74.8]  Yes     Priority: 2     Acute renal failure [N17.9]  Yes     Priority: 3     Non-traumatic rhabdomyolysis [M62.82]  Yes     Priority: 4      Recent Labs      05/01/17   1736  05/02/17   0023   CPK  >54798*   --    TROPONINI  16.176*  16.117*           Acute hearing loss of both ears [H91.93]  Yes     Priority: 5     Morbid obesity [E66.01]  Yes     Body mass index is 40.69 kg/(m^2).          Resolved Hospital Problems    Diagnosis Date Resolved POA   No resolved problems to display.         NSTEMI (non-ST elevated myocardial infarction)  No report of Acute Chest Pain  · Intitial EKG findings shows no evidence of ST elevation MI, but there are concerns for depressions in lateral leads  · Initial troponin is well above normal limits, although the overall trend is flat    Recent Labs      05/01/17   1736  05/02/17   0023   CPK  >12564*   --    TROPONINI  16.176*  16.117*       · Low to Intermediate to risk for MI;  morbid obesity  · Trend cardiac enzymes  · Aspirin  · Beta-blocker   · Statin  · Supplemental oxygen  · nitroglycerine and morphine PRN  · 2D echocardiogram  · TSH, FT3, FT4  · ESR and cardiac specific CRP   · PT, PTT, INR   · Tight glycemic control  · Monitor on telemetry unit  · Consult cardiologist  · On heparin drip; ED physician verified this was okay with general surgery prior to initiation  · Considered Plavix, although hesitant to do this with recent surgery and potential long lasting effects of Plavix compared to heparin above.  Will defer to cardiology and general surgery for further recommendations      Elevated liver enzymes  · Markedly elevated liver enzymes with normal bilirubin and mildly elevated lipase  · Concern for shock liver due to hypotension episode  · Continue to trend  AST ALT  · Hepatitis panel pending  · Consult GI for further evaluation and recommendations      Acute renal failure  · Concern this may be related to acute hypotensive episode as well  · As evidenced by sharp decrease in GFR.  Baseline creatinine = 0.7  · Will evaluate for pre-renal, intrarenal, and post-obstructive etiology.  · Obtain:  1.  protein/creatinine ratio  2. urine and serum osmolalities  3. urine electrolytes (Na, Cl, K)  4. LDH  5. Complement  6. Bar's stain for eosinophils  · Renal ultrasound  · Monitor with serial Cr / GFR levels closely with serial labs  · Avoid nephrotoxic medications such as NSAIDs, IV contrast, or RAAS blockade  · Nephrology consult            Acute hearing loss of both ears  · Unknown etiology  · Head CT negative except for right maxillary sinus disease  · Concerning this may also be related to hypotensive episode causing central ischemia  · Order MRI without contrast due to decreased GFR  · Neurology consult      Morbid obesity  · Status post gastric bypass   · Nutrition consult as an outpatient      Non-traumatic rhabdomyolysis  · unknown etiology for CPK greater than 40,000, but again suspicious for significant hypotensive episode  · Trend CPK levels  · Initiate sodium bicarbonate drip  · Nephrology consulted          VTE Risk Mitigation         Ordered     Medium Risk of VTE  Once      05/02/17 0012     Place RENU hose  Until discontinued      05/02/17 0012     Place sequential compression device  Until discontinued      05/02/17 0012            Adult PRN medications available   DVT prophylaxis given       DISPOSITION:     Will admit to the Hospital Medicine service for further evaluation and treatment.    Chart reviewed and updated where applicable.    High Risk Conditions:  Patient has a condition that poses threat to life and bodily function: Multiple organ dysfunction syndrome      ===============================================================    Kevin Crisostomo  MD, MPH  Department of Hospital Medicine   [facility]  [phone] pg  (7pm - 6am)          This note is dictated using Dragon Medical 360 voice recognition software.  There are word recognition mistakes that are occasionally missed on review.

## 2017-05-02 NOTE — ASSESSMENT & PLAN NOTE
· Markedly elevated liver enzymes with normal bilirubin and mildly elevated lipase  · Concern for shock liver due to hypotension episode  · Continue to trend AST ALT  · Hepatitis panel pending  · Consult GI for further evaluation and recommendations

## 2017-05-02 NOTE — ASSESSMENT & PLAN NOTE
Per IM  Agree with IVF  Avoid nephrotoxins  Consider nephrology consult  Will obviously hope to avoid cath until renal fxn normalizes

## 2017-05-02 NOTE — ASSESSMENT & PLAN NOTE
· Concern this may be related to acute hypotensive episode as well  · As evidenced by sharp decrease in GFR.  Baseline creatinine = 0.7  · Will evaluate for pre-renal, intrarenal, and post-obstructive etiology.  · Obtain:  1.  protein/creatinine ratio  2. urine and serum osmolalities  3. urine electrolytes (Na, Cl, K)  4. LDH  5. Complement  6. Bar's stain for eosinophils  · Renal ultrasound  · Monitor with serial Cr / GFR levels closely with serial labs  · Avoid nephrotoxic medications such as NSAIDs, IV contrast, or RAAS blockade  · Nephrology consult

## 2017-05-02 NOTE — CONSULTS
[facility]  Neurology  Consult Note    Patient Name: Shireen Anton  MRN: [id number]  Admission Date: 2017  Hospital Length of Stay: 1 days  Code Status: Full Code   Attending Provider: Cheo Wilks MD   Consulting Provider: Abhishek Gilbert MD  Primary Care Physician: Jeancarlos Zamora MD  Principal Problem:<principal problem not specified>    Inpatient consult to Neurology  Consult performed by: ABHISHEK GILBERT  Consult ordered by: CORNELIA OWENS        Subjective:     Chief Complaint/Reason for consult: AMS/Hearing loss    HPI: 46 y/o female with medical Hx as listed below admitted for acute mental status changes and hearing loss. Ms. Anton came to ED after reported two days of confusion with sudden hearing loss yesterday. Her significant other in the room states that she has gastric sleeve surgery almost one week priors with no complications. It was noted on  that pt was speaking incoherently then was difficult to arouse Monday morning. Upon awakening pt complained of hearing loss, bilaterally. In ED pt was found to be in multiorgan failure (kidneys, liver, heart). Today pt is more coherent and hearing has slightly improved. She does state that has been taking pain meds and dose was recently increased. Denies illicit drug use. No lateralized weakness, numbness, visual or speech disturbances.    Past Medical History:   Diagnosis Date    Allergy     Depression     GERD (gastroesophageal reflux disease)        Past Surgical History:   Procedure Laterality Date    breast augmentation       SECTION      CHOLECYSTECTOMY      LAPAROSCOPIC GASTRIC BANDING  2017    ORIF HUMERUS FRACTURE         Review of patient's allergies indicates:  No Known Allergies    Current Neurological Medications:     No current facility-administered medications on file prior to encounter.      Current Outpatient Prescriptions on File Prior to Encounter   Medication Sig     cetirizine (ZYRTEC) 10 MG tablet Take 10 mg by mouth once daily.    citalopram (CELEXA) 40 MG tablet Take 1 tablet (40 mg total) by mouth once daily.    ergocalciferol (VITAMIN D2) 50,000 unit Cap Take 50,000 Units by mouth every 7 days.    gabapentin (NEURONTIN) 300 MG capsule Take 1 capsule (300 mg total) by mouth 3 (three) times daily.    omeprazole (PRILOSEC OTC) 20 MG tablet Take 20 mg by mouth once daily.    trazodone (DESYREL) 50 MG tablet TAKE ONE TABLET BY MOUTH IN THE EVENING    etonogestrel-ethinyl estradiol (NUVARING) 0.12-0.015 mg/24 hr vaginal ring Place 1 each vaginally every 28 days.    FLUVIRIN 7876-3753 45 mcg (15 mcg x 3)/0.5 mL Susp     FLUVIRIN [id number] 45 mcg (15 mcg x 3)/0.5 mL Susp ADM 0.5ML IM UTD      Family History     Problem Relation (Age of Onset)    Breast cancer Maternal Grandmother (63)    Cancer Mother (71), Sister (48)    Diabetes Father    Heart disease Father    Hypertension Father    Thyroid disease Mother        Social History Main Topics    Smoking status: Former Smoker     Packs/day: 1.00     Years: 16.00     Quit date: 11/25/2011    Smokeless tobacco: Never Used    Alcohol use Yes      Comment: occasional    Drug use: No    Sexual activity: Yes     Partners: Male     Birth control/ protection: Inserts     Review of Systems   Constitutional: Negative for chills and fever.   HENT: Positive for hearing loss. Negative for tinnitus and trouble swallowing.    Eyes: Negative for photophobia and visual disturbance.   Respiratory: Negative for shortness of breath.    Cardiovascular: Negative for chest pain and palpitations.   Gastrointestinal: Negative for abdominal pain.   Endocrine: Negative for cold intolerance and heat intolerance.   Genitourinary: Negative for dysuria.   Musculoskeletal: Negative for back pain and gait problem.   Neurological: Positive for dizziness. Negative for tremors, seizures, syncope, facial asymmetry, speech difficulty, weakness,  light-headedness, numbness and headaches.          Objective:     Vital Signs (Most Recent):  Temp: 98.1 °F (36.7 °C) (05/02/17 1208)  Pulse: 88 (05/02/17 1208)  Resp: 18 (05/02/17 1208)  BP: 139/72 (05/02/17 1208)  SpO2: (!) 92 % (05/02/17 1208) Vital Signs (24h Range):  Temp:  [97.6 °F (36.4 °C)-98.1 °F (36.7 °C)] 98.1 °F (36.7 °C)  Pulse:  [] 88  Resp:  [18-20] 18  SpO2:  [77 %-100 %] 92 %  BP: ()/(58-79) 139/72     Weight: 135.9 kg (299 lb 8 oz)  Body mass index is 40.62 kg/(m^2).    Physical Exam  General: well developed, well nourished, appears stated age, no distress  Head: normocephalic, erythema in nasal and  Eyes: conjunctivae/corneas clear.  Nose: no discharge, no epistaxis  Neck: no adenopathy and no carotid bruit  Heart: regular rate and rhythm.  Abdomen: non-distended and bowel sounds normal.  Extremities: no cyanosis or edema  Skin: no rash  Neurologic: Mental status: alert; oriented to person, place; following commands  Cranial nerves: pupils are round at 3 mm and reactive to light; EOMI; no facial asymmetry; tongue protrudes midline;   Strength: 4/5 strength in four extremities       Significant Labs:   CBC:   Recent Labs  Lab 05/01/17  1736 05/02/17  0145   WBC 11.20 12.16   HGB 13.9 12.5   HCT 41.9 37.1   * 121*  121*     CMP:   Recent Labs  Lab 05/01/17  1736 05/02/17  0621   * 139*    139   K 5.5* 4.7    105   CO2 17* 20*   BUN 34* 45*   CREATININE 4.2* 4.4*   CALCIUM 7.8* 7.2*   MG 2.7*  --    PROT 7.4 6.8   ALBUMIN 3.7 3.3*   BILITOT 1.0 1.0   ALKPHOS 105 97   AST 9471* 9976*   ALT 3864* 3430*   ANIONGAP 21* 14   EGFRNONAA 12* 11*       Significant Imaging:   MRI brain  There is no midline shift, hydrocephalus, or mass effect.  There is no abnormal gradient susceptibility to suggest intracranial hemorrhage.  Subtle focus of diffusion restriction within the right cerebellum with corresponding increased T2 signal concerning for a small area of acute  infarction.  This is difficult to confirm on ADC sequence due to its small size.  There are no other foci abnormal diffusion restriction.  There is no significant degree of white matter changes on flair imaging.  No abnormal extra-axial fluid collections.  There is no evidence to suggest a space-occupying mass on noncontrast imaging.  There are no abnormalities in the expected courses of the seventh nerves identified on the 3-D fiesta images.  The orbits and visualized paranasal sinuses demonstrate no significant abnormality.  The mastoid air cells are clear.  The sellar region demonstrates no significant abnormality.  Craniocervical junction demonstrates no significant abnormality.   Impression       Findings concerning for small area of acute infarction involving the right cerebellar hemisphere.  Otherwise, no acute intracranial abnormality identified.    This report was Flagged in the [facility] Medical record.       Electronically signed by: SAMPSON COYNE MD  Date: 05/02/17  Time: 10:49        Assessment and Plan:     48 y/o female consulted for hearing loss and AMS    1. Hearing loss and AMS: suspect that her presentation including multiorgan failure is the result of a global ischemic event. Pt came hypotensive to ED and it's unknown for how long she was like that. Her abnormal liver and renal function as well as possible ischemia have cause an encephalopathy.    -Will see if pt responds to fluids and reassess.    2. Stroke: as above MRI showed a small are of infarction on right cerebellum. This area seems to be in a watershed zone possibly correalting with the theory of a global ischemic event.   -Pt is anticoagulated for her markedly elevated troponins. No need for antiplatelets a this point.     Active Diagnoses:    Diagnosis Date Noted POA    NSTEMI (non-ST elevated myocardial infarction) [I21.4] 05/02/2017 Yes    Elevated liver enzymes [R74.8] 05/02/2017 Yes    Acute renal failure [N17.9] 05/02/2017 Yes     Morbid obesity [E66.01] 05/02/2017 Yes    Non-traumatic rhabdomyolysis [M62.82] 05/02/2017 Yes    Acute hearing loss of both ears [H91.93] 05/01/2017 Yes      Problems Resolved During this Admission:    Diagnosis Date Noted Date Resolved POA       VTE Risk Mitigation         Ordered     Medium Risk of VTE  Once      05/02/17 0012          Thank you for your consult. I will follow-up with patient. Please contact us if you have any additional questions.    Abhishek Harvey MD  Neurology  [facility]

## 2017-05-02 NOTE — PLAN OF CARE
05/02/17 0943   Discharge Assessment   Assessment Type Discharge Planning Assessment  (attempted to meet with pt to complete d/c planning assessment. pt off unit for testing)

## 2017-05-02 NOTE — ASSESSMENT & PLAN NOTE
· Unknown etiology  · Head CT negative except for right maxillary sinus disease  · Concerning this may also be related to hypotensive episode causing central ischemia  · Order MRI without contrast due to decreased GFR  · Neurology consult

## 2017-05-02 NOTE — ASSESSMENT & PLAN NOTE
No report of Acute Chest Pain  · Intitial EKG findings shows no evidence of ST elevation MI, but there are concerns for depressions in lateral leads  · Initial troponin is well above normal limits, although the overall trend is flat    Recent Labs      05/01/17   1736  05/02/17   0023   CPK  >51567*   --    TROPONINI  16.176*  16.117*       · Low to Intermediate to risk for MI;  morbid obesity  · Trend cardiac enzymes  · Aspirin  · Beta-blocker   · Statin  · Supplemental oxygen  · nitroglycerine and morphine PRN  · 2D echocardiogram  · TSH, FT3, FT4  · ESR and cardiac specific CRP   · PT, PTT, INR   · Tight glycemic control  · Monitor on telemetry unit  · Consult cardiologist  · On heparin drip; ED physician verified this was okay with general surgery prior to initiation  · Considered Plavix, although hesitant to do this with recent surgery and potential long lasting effects of Plavix compared to heparin above.  Will defer to cardiology and general surgery for further recommendations

## 2017-05-02 NOTE — SUBJECTIVE & OBJECTIVE
Past Medical History:   Diagnosis Date    Allergy     Depression     GERD (gastroesophageal reflux disease)        Past Surgical History:   Procedure Laterality Date    breast augmentation       SECTION      CHOLECYSTECTOMY      LAPAROSCOPIC GASTRIC BANDING  2017    ORIF HUMERUS FRACTURE         Review of patient's allergies indicates:  No Known Allergies    No current facility-administered medications on file prior to encounter.      Current Outpatient Prescriptions on File Prior to Encounter   Medication Sig    cetirizine (ZYRTEC) 10 MG tablet Take 10 mg by mouth once daily.    citalopram (CELEXA) 40 MG tablet Take 1 tablet (40 mg total) by mouth once daily.    ergocalciferol (VITAMIN D2) 50,000 unit Cap Take 50,000 Units by mouth every 7 days.    gabapentin (NEURONTIN) 300 MG capsule Take 1 capsule (300 mg total) by mouth 3 (three) times daily.    omeprazole (PRILOSEC OTC) 20 MG tablet Take 20 mg by mouth once daily.    trazodone (DESYREL) 50 MG tablet TAKE ONE TABLET BY MOUTH IN THE EVENING    etonogestrel-ethinyl estradiol (NUVARING) 0.12-0.015 mg/24 hr vaginal ring Place 1 each vaginally every 28 days.    FLUVIRIN 8767-1375 45 mcg (15 mcg x 3)/0.5 mL Susp     FLUVIRIN [id number] 45 mcg (15 mcg x 3)/0.5 mL Susp ADM 0.5ML IM UTD     Family History     Problem Relation (Age of Onset)    Breast cancer Maternal Grandmother (63)    Cancer Mother (71), Sister (48)    Diabetes Father    Heart disease Father    Hypertension Father    Thyroid disease Mother        Social History Main Topics    Smoking status: Former Smoker     Packs/day: 1.00     Years: 16.00     Quit date: 2011    Smokeless tobacco: Never Used    Alcohol use Yes      Comment: occasional    Drug use: No    Sexual activity: Yes     Partners: Male     Birth control/ protection: Inserts     Review of Systems   Constitution: Negative for chills, diaphoresis, fever and weakness.   HENT: Positive for hearing  loss. Negative for headaches and nosebleeds.    Eyes: Positive for visual disturbance (chronic). Negative for blurred vision and double vision.   Cardiovascular: Negative for chest pain, claudication, cyanosis, dyspnea on exertion, leg swelling, orthopnea, palpitations, paroxysmal nocturnal dyspnea and syncope.   Respiratory: Negative for cough, shortness of breath and wheezing.    Skin: Negative for dry skin and poor wound healing.   Musculoskeletal: Negative for back pain, joint swelling and myalgias.   Gastrointestinal: Positive for abdominal pain (post gastric bypass). Negative for nausea and vomiting.   Genitourinary: Positive for non-menstrual bleeding. Negative for hematuria.   Neurological: Negative for dizziness, numbness and seizures.   Psychiatric/Behavioral: Negative for altered mental status and depression.     Objective:     Vital Signs (Most Recent):  Temp: 97.9 °F (36.6 °C) (05/02/17 0400)  Pulse: 90 (05/02/17 0400)  Resp: 20 (05/02/17 0400)  BP: 132/74 (05/02/17 0400)  SpO2: (!) 91 % (05/02/17 0400) Vital Signs (24h Range):  Temp:  [97.6 °F (36.4 °C)-97.9 °F (36.6 °C)] 97.9 °F (36.6 °C)  Pulse:  [] 90  Resp:  [18-20] 20  SpO2:  [77 %-100 %] 91 %  BP: ()/(58-79) 132/74     Weight: 135.9 kg (299 lb 8 oz)  Body mass index is 40.62 kg/(m^2).    SpO2: (!) 91 %  O2 Device (Oxygen Therapy): nasal cannula      Intake/Output Summary (Last 24 hours) at 05/02/17 0636  Last data filed at 05/02/17 0600   Gross per 24 hour   Intake          1498.55 ml   Output                0 ml   Net          1498.55 ml       Lines/Drains/Airways     Peripheral Intravenous Line                 Peripheral IV - Single Lumen 05/01/17 1738 Right Hand less than 1 day         Peripheral IV - Single Lumen 05/01/17 2322 Left Hand less than 1 day                Physical Exam   Constitutional: She is oriented to person, place, and time. She appears well-developed and well-nourished. No distress.   HENT:   Head: Normocephalic  and atraumatic.   Mouth/Throat: No oropharyngeal exudate.   Eyes: Conjunctivae and EOM are normal. Pupils are equal, round, and reactive to light. No scleral icterus.   Neck: Normal range of motion. Neck supple. No JVD present. No tracheal deviation present.   Cardiovascular: Normal rate, regular rhythm, S1 normal and S2 normal.  Exam reveals no gallop and no friction rub.    No murmur heard.  Pulmonary/Chest: Effort normal and breath sounds normal. No respiratory distress. She has no wheezes. She has no rales. She exhibits no tenderness.   Abdominal: Soft. Bowel sounds are normal. There is no tenderness.   obese   Musculoskeletal: Normal range of motion. She exhibits no edema.   Neurological: She is alert and oriented to person, place, and time. A cranial nerve deficit (Pt reports chr visual difficulty and acute hearing loss) is present.   Skin: Skin is warm and dry. She is not diaphoretic.   Psychiatric: She has a normal mood and affect. Her behavior is normal. Judgment normal.       Current Medications:   aspirin  325 mg Oral Daily    pantoprazole  40 mg Intravenous Daily    polyethylene glycol  17 g Oral Daily      heparin (porcine) in D5W 16 Units/kg/hr (05/02/17 0315)    sodium bicarbonate drip 150 mL/hr at 05/02/17 0541     heparin (PORCINE), heparin (PORCINE), morphine, ondansetron, oxycodone-acetaminophen, oxycodone-acetaminophen    Laboratory:  CBC:    Recent Labs  Lab 01/28/15  0746 05/01/17  1736 05/02/17  0145   WHITE BLOOD CELL COUNT 6.26 11.20 12.16   HEMOGLOBIN 13.9 13.9 12.5   HEMATOCRIT 39.3 41.9 37.1   PLATELETS 164 129 L 121 L  121 L       CHEMISTRIES:    Recent Labs  Lab 01/28/15  0746 05/01/17  1736   GLUCOSE 101 116 H   SODIUM 140 143   POTASSIUM 3.5 5.5 H   BUN BLD 11 34 H   CREATININE 0.7 4.2 H   EGFR IF  >60 14 A   EGFR IF NON- >60 12 A   CALCIUM 9.3 7.8 L   MAGNESIUM  --  2.7 H       CARDIAC BIOMARKERS:    Recent Labs  Lab 05/01/17  1736 05/02/17  0023    CPK >22824 H  --    TROPONIN I 16.176 H 16.117 H       COAGS:    Recent Labs  Lab 05/02/17  0145   INR 1.4 H       LIPIDS/LFTS:    Recent Labs  Lab 01/28/15  0746 05/01/17  1736   CHOLESTEROL 167  --    TRIGLYCERIDES 96  --    HDL 59  --    LDL CHOLESTEROL 88.8  --    NON-HDL CHOLESTEROL 108  --    AST 28 9471 H   ALT 42 3864 H           Diagnostic Results:  ECG (personally reviewed tracings):   5/1/17 1748 SR 96, IRBBB, NSSTTW changes, no prior tracing available    Chest X-Ray (personally reviewed image(s)): 5/1/17 NAD    Echo: pending    Records requested from Dr. Deluna: EKG, echo, stress test reports

## 2017-05-02 NOTE — CONSULTS
Very unfortunate 47 y o f with hx of GERD, Depression, Obesity who this past Thursday had a gastric sleeve.    Did well posty op drinking @ 2 qt a day. Yesterday pt family found her with AMS and brought her to the hospital.    Evaluation in the er showed hypotension, low O2 sat,severe rhabdomyolysis with a CPK > 40,000 cause unclear. CT of abdomen was non diagnostic . WBC 11K no L shift, HH 13/41 Plat 129K. Lytes 143/105/5.5/17 BUN/Creat 34/4.2 PO4 10.6 Bili 1 AST/ALT 9471/3864  Trop 1 16  . UA 1+ prot 2+ heme Renal US    Pt co being somewhat confussed and has noted decreased hearing. Denies taking meds not prescribed to her, denies OTC meds, of her meds she has been prescribed she does not take them all on a regular basis.    Denies prior hx of rhabdo, recent massive trauma (except for recent surgery) or being recumbent for a prolonged time.    Denies CNS ENT CP GI  RHEUM OR DERM SX    Last BM 4 days ago. Has had some bloody Vag dc not uncommon on her BC meds    Past Medical History:   Diagnosis Date    Allergy     Depression     GERD (gastroesophageal reflux disease)      Review of patient's allergies indicates:  No Known Allergies    Current Facility-Administered Medications   Medication    aspirin EC tablet 81 mg    heparin 25,000 units in dextrose 5% 250 mL (100 units/mL) bolus from bag; PRN BOLUS    heparin 25,000 units in dextrose 5% 250 mL (100 units/mL) bolus from bag; PRN BOLUS    heparin 25,000 units in dextrose 5% 250 mL (100 units/mL) infusion; FEMALE    morphine injection 5 mg    ondansetron injection 8 mg    oxycodone-acetaminophen  mg per tablet 1 tablet    oxycodone-acetaminophen 5-325 mg per tablet 1 tablet    pantoprazole injection 40 mg    polyethylene glycol packet 17 g    sodium bicarbonate 1 mEq/mL (8.4 %) 150 mEq in dextrose 5 % 1,000 mL infusion       LABS    Recent Results (from the past 24 hour(s))   Comprehensive metabolic panel    Collection Time: 05/01/17   5:36 PM   Result Value Ref Range    Sodium 143 136 - 145 mmol/L    Potassium 5.5 (H) 3.5 - 5.1 mmol/L    Chloride 105 95 - 110 mmol/L    CO2 17 (L) 23 - 29 mmol/L    Glucose 116 (H) 70 - 110 mg/dL    BUN, Bld 34 (H) 6 - 20 mg/dL    Creatinine 4.2 (H) 0.5 - 1.4 mg/dL    Calcium 7.8 (L) 8.7 - 10.5 mg/dL    Total Protein 7.4 6.0 - 8.4 g/dL    Albumin 3.7 3.5 - 5.2 g/dL    Total Bilirubin 1.0 0.1 - 1.0 mg/dL    Alkaline Phosphatase 105 55 - 135 U/L    AST 9471 (H) 10 - 40 U/L    ALT 3864 (H) 10 - 44 U/L    Anion Gap 21 (H) 8 - 16 mmol/L    eGFR if African American 14 (A) >60 mL/min/1.73 m^2    eGFR if non African American 12 (A) >60 mL/min/1.73 m^2   CBC auto differential    Collection Time: 05/01/17  5:36 PM   Result Value Ref Range    WBC 11.20 3.90 - 12.70 K/uL    RBC 4.55 4.00 - 5.40 M/uL    Hemoglobin 13.9 12.0 - 16.0 g/dL    Hematocrit 41.9 37.0 - 48.5 %    MCV 92 82 - 98 fL    MCH 30.5 27.0 - 31.0 pg    MCHC 33.2 32.0 - 36.0 %    RDW 14.0 11.5 - 14.5 %    Platelets 129 (L) 150 - 350 K/uL    MPV 11.9 9.2 - 12.9 fL    Lymph # CANCELED 1.0 - 4.8 K/uL    Mono # CANCELED 0.3 - 1.0 K/uL    Eos # CANCELED 0.0 - 0.5 K/uL    Baso # CANCELED 0.00 - 0.20 K/uL    nRBC 2 (A) 0 /100 WBC    Gran% 69.0 38.0 - 73.0 %    Lymph% 17.0 (L) 18.0 - 48.0 %    Mono% 10.0 4.0 - 15.0 %    Eosinophil% 0.0 0.0 - 8.0 %    Basophil% 0.0 0.0 - 1.9 %    Bands 4.0 %    Differential Method Manual    Lipase    Collection Time: 05/01/17  5:36 PM   Result Value Ref Range    Lipase 89 (H) 4 - 60 U/L   Magnesium    Collection Time: 05/01/17  5:36 PM   Result Value Ref Range    Magnesium 2.7 (H) 1.6 - 2.6 mg/dL   Phosphorus    Collection Time: 05/01/17  5:36 PM   Result Value Ref Range    Phosphorus 10.6 (HH) 2.7 - 4.5 mg/dL   Troponin I    Collection Time: 05/01/17  5:36 PM   Result Value Ref Range    Troponin I 16.176 (H) 0.000 - 0.026 ng/mL   Brain natriuretic peptide    Collection Time: 05/01/17  5:36 PM   Result Value Ref Range     (H) 0 -  99 pg/mL   Ethanol    Collection Time: 05/01/17  5:36 PM   Result Value Ref Range    Alcohol, Medical, Serum 11 (H) <10 mg/dL   CPK    Collection Time: 05/01/17  5:36 PM   Result Value Ref Range    CPK >82517 (H) 20 - 180 U/L   Acetaminophen level    Collection Time: 05/01/17  5:36 PM   Result Value Ref Range    Acetaminophen (Tylenol), Serum <3.0 (L) 10.0 - 20.0 ug/mL   Salicylate level    Collection Time: 05/01/17  5:36 PM   Result Value Ref Range    Salicylate Lvl <5.0 (L) 15.0 - 30.0 mg/dL   POCT glucose    Collection Time: 05/01/17  5:37 PM   Result Value Ref Range    POCT Glucose 132 (H) 70 - 110 mg/dL   Urinalysis    Collection Time: 05/01/17  7:05 PM   Result Value Ref Range    Specimen UA Urine, Catheterized     Color, UA Yellow Yellow, Straw, Christina    Appearance, UA Clear Clear    pH, UA 5.0 5.0 - 8.0    Specific Gravity, UA 1.015 1.005 - 1.030    Protein, UA 1+ (A) Negative    Glucose, UA Negative Negative    Ketones, UA Negative Negative    Bilirubin (UA) Negative Negative    Occult Blood UA 2+ (A) Negative    Nitrite, UA Negative Negative    Urobilinogen, UA 2.0-3.0 (A) <2.0 EU/dL    Leukocytes, UA Negative Negative   Drug screen panel, emergency    Collection Time: 05/01/17  7:05 PM   Result Value Ref Range    Benzodiazepines Negative     Methadone metabolites Negative     Cocaine (Metab.) Negative     Opiate Scrn, Ur Presumptive Positive     Barbiturate Screen, Ur Negative     Amphetamine Screen, Ur Negative     THC Negative     Phencyclidine Negative     Creatinine, Random Ur 169.7 15.0 - 325.0 mg/dL    Toxicology Information SEE COMMENT    Urinalysis Microscopic    Collection Time: 05/01/17  7:05 PM   Result Value Ref Range    RBC, UA 2 0 - 4 /hpf    WBC, UA 0 0 - 5 /hpf    Bacteria, UA Rare None-Occ /hpf    Squam Epithel, UA 2 /hpf    Hyaline Casts, UA 0 0-1/lpf /lpf    Amorphous, UA Occasional None-Moderate    Microscopic Comment SEE COMMENT    C. trachomatis/N. gonorrhoeae by AMP DNA Cervix     Collection Time: 05/01/17  7:16 PM   Result Value Ref Range    Chlamydia, Amplified DNA Not Detected Not Detected    N gonorrhoeae, amplified DNA Not Detected Not Detected   Vaginal Screen Vagina    Collection Time: 05/01/17  7:16 PM   Result Value Ref Range    Trichomonas None None    Clue Cells, Wet Prep Few (A) None    Budding Yeast None None    Fungal Hyphae None None    WBC - Vaginal Screen Moderate (A) None    Bacteria - Vaginal Screen Moderate (A) None    Wet Prep Source Vagina None   Troponin I    Collection Time: 05/02/17 12:23 AM   Result Value Ref Range    Troponin I 16.117 (H) 0.000 - 0.026 ng/mL   APTT    Collection Time: 05/02/17  1:45 AM   Result Value Ref Range    aPTT 25.7 21.0 - 32.0 sec   Protime-INR    Collection Time: 05/02/17  1:45 AM   Result Value Ref Range    Prothrombin Time 14.3 (H) 9.0 - 12.5 sec    INR 1.4 (H) 0.8 - 1.2   Platelet count    Collection Time: 05/02/17  1:45 AM   Result Value Ref Range    Platelets 121 (L) 150 - 350 K/uL    MPV 11.7 9.2 - 12.9 fL   CBC auto differential    Collection Time: 05/02/17  1:45 AM   Result Value Ref Range    WBC 12.16 3.90 - 12.70 K/uL    RBC 4.05 4.00 - 5.40 M/uL    Hemoglobin 12.5 12.0 - 16.0 g/dL    Hematocrit 37.1 37.0 - 48.5 %    MCV 92 82 - 98 fL    MCH 30.9 27.0 - 31.0 pg    MCHC 33.7 32.0 - 36.0 %    RDW 14.0 11.5 - 14.5 %    Platelets 121 (L) 150 - 350 K/uL    MPV 11.7 9.2 - 12.9 fL    Gran # 9.9 (H) 1.8 - 7.7 K/uL    Lymph # 1.2 1.0 - 4.8 K/uL    Mono # 1.0 0.3 - 1.0 K/uL    Eos # 0.0 0.0 - 0.5 K/uL    Baso # 0.02 0.00 - 0.20 K/uL    Gran% 80.9 (H) 38.0 - 73.0 %    Lymph% 9.5 (L) 18.0 - 48.0 %    Mono% 8.5 4.0 - 15.0 %    Eosinophil% 0.0 0.0 - 8.0 %    Basophil% 0.2 0.0 - 1.9 %    Differential Method Automated    TSH    Collection Time: 05/02/17  1:45 AM   Result Value Ref Range    TSH 0.474 0.400 - 4.000 uIU/mL   T4, free    Collection Time: 05/02/17  1:45 AM   Result Value Ref Range    Free T4 1.52 (H) 0.71 - 1.51 ng/dL   T3, free     Collection Time: 05/02/17  1:45 AM   Result Value Ref Range    T3, Free 2.1 (L) 2.3 - 4.2 pg/mL   High sensitivity CRP (Cardiac CRP)    Collection Time: 05/02/17  1:45 AM   Result Value Ref Range    CRP, High Sensitivity 175.81 (H) 0.00 - 3.19 mg/L   Sedimentation rate, manual    Collection Time: 05/02/17  6:20 AM   Result Value Ref Range    Sed Rate 36 (H) 0 - 20 mm/Hr   Troponin I    Collection Time: 05/02/17  6:21 AM   Result Value Ref Range    Troponin I 14.148 (H) 0.000 - 0.026 ng/mL   Comprehensive metabolic panel    Collection Time: 05/02/17  6:21 AM   Result Value Ref Range    Sodium 139 136 - 145 mmol/L    Potassium 4.7 3.5 - 5.1 mmol/L    Chloride 105 95 - 110 mmol/L    CO2 20 (L) 23 - 29 mmol/L    Glucose 139 (H) 70 - 110 mg/dL    BUN, Bld 45 (H) 6 - 20 mg/dL    Creatinine 4.4 (H) 0.5 - 1.4 mg/dL    Calcium 7.2 (L) 8.7 - 10.5 mg/dL    Total Protein 6.8 6.0 - 8.4 g/dL    Albumin 3.3 (L) 3.5 - 5.2 g/dL    Total Bilirubin 1.0 0.1 - 1.0 mg/dL    Alkaline Phosphatase 97 55 - 135 U/L    AST 9976 (H) 10 - 40 U/L    ALT 3430 (H) 10 - 44 U/L    Anion Gap 14 8 - 16 mmol/L    eGFR if African American 13 (A) >60 mL/min/1.73 m^2    eGFR if non African American 11 (A) >60 mL/min/1.73 m^2   Rapid HIV    Collection Time: 05/02/17  6:21 AM   Result Value Ref Range    HIV Rapid Testing Negative Negative   Osmolality    Collection Time: 05/02/17  6:21 AM   Result Value Ref Range    Osmolality 304 (H) 275 - 295 mOsm/kg   CK    Collection Time: 05/02/17  6:21 AM   Result Value Ref Range    CPK >62836 (H) 20 - 180 U/L   Lipase    Collection Time: 05/02/17  6:21 AM   Result Value Ref Range    Lipase 66 (H) 4 - 60 U/L   CK-MB    Collection Time: 05/02/17  6:21 AM   Result Value Ref Range    CPK >14046 (H) 20 - 180 U/L    CPK MB >600.0 (H) 0.1 - 6.5 ng/mL    MB% Unable to calculate 0.0 - 5.0 %   Lipid panel    Collection Time: 05/02/17  6:21 AM   Result Value Ref Range    Cholesterol 136 120 - 199 mg/dL    Triglycerides 158 (H)  30 - 150 mg/dL    HDL 29 (L) 40 - 75 mg/dL    LDL Cholesterol 75.4 63.0 - 159.0 mg/dL    HDL/Chol Ratio 21.3 20.0 - 50.0 %    Total Cholesterol/HDL Ratio 4.7 2.0 - 5.0    Non-HDL Cholesterol 107 mg/dL   APTT    Collection Time: 05/02/17  7:41 AM   Result Value Ref Range    aPTT 40.1 (H) 21.0 - 32.0 sec   2D echo with color flow doppler    Collection Time: 05/02/17 11:20 AM   Result Value Ref Range    EF 50 55 - 65    Diastolic Dysfunction No     Est. PA Systolic Pressure 26.63     Mitral Valve Mobility NORMAL     Tricuspid Valve Regurgitation TRIVIAL        I/O last 3 completed shifts:  In: 1498.6 [I.V.:1403.3; IV Piggyback:95.3]  Out: -     Vitals:    05/02/17 0400 05/02/17 0744 05/02/17 0816 05/02/17 1208   BP: 132/74 (!) 162/77  139/72   Pulse: 90 87 85 88   Resp: 20 19 18 18   Temp: 97.9 °F (36.6 °C) 98.1 °F (36.7 °C)  98.1 °F (36.7 °C)   TempSrc:  Oral  Oral   SpO2: (!) 91% (!) 93% (!) 93% (!) 92%   Weight: 135.9 kg (299 lb 8 oz)      Height:           No Jvd, Thyromegaly or Lymphadenopathy  Lungs: Fairly clear anteriorly and laterally  Cor: RRR no G or rubs  Abd: Soft benign very decreased bowel sounds non tender, incisions dry not red not swollen. Few subcutaneous hematomas noted and lower right abd (anticoagulation shots)    Ext: No E C C  Face is very ruboric specially cheeks and forehead ( she has hx of rosacea)    A)  Severe GASPER  Acute and severe Rhabdomyolysis cause unclear  Hx of Hypotension and low o2 sat in the ER  Decrease mentation  High CRP  Severe Hyperpo4  Elevation LFT's  Elevated Trop 1  Pulmonary HTN    At this point we do not have a clear cut cause for Rhabdo, I can speculate on maybe prolonged recumbent position, medications (she is not taking any of the usual medications that can cause rhabdo) or infections ( there are no signs of it )     P)    Treatment of choice IV bicarb drips, forced uo, close follow up daily weight, I/O and  Bmp  Treat po4  Check PTHi  Consider lasix iv to force uo,  might need a loop diuretics and or mannitol IV ( if bp allows)  Watch and replace Ca as needed  Close obsevration for possible causes of Rhabdo  Consider MI/CVA/DVT  Might need HD

## 2017-05-02 NOTE — ED PROVIDER NOTES
"Encounter Date: 5/1/2017    SCRIBE #1 NOTE: I, Salina Gary, am scribing for, and in the presence of,  Susana Clarke MD. I have scribed the following portions of the note - Other sections scribed: HP, ROS and PE.       History     Chief Complaint   Patient presents with    Hearing Problem     via ems pt complaining of losing hearing since waking up this morning ,also of feeling tingling in several parts of her body      Review of patient's allergies indicates:  No Known Allergies  HPI Comments: CC: Hearing Problem    HPI: This 47 y.o. Female presents to the ED with acute, improved AMS and persistent severe bilateral hearing loss, onset today.     Patient is s/p partial gastrectomy with gastric sleeve by Dr. Kevin Monroy of [facility] on Thursday 4/27/17 at outpatient surgical center in [place]. She was discharged Friday 4/28/17 without issue. She has been tolerating PO "flavored water" and recently advanced her diet to protein shakes. Per her fiance, who accompanies her in the ED, she was doing well until today. She had mild abdominal discomfort but has been making normal stools. She has been taking lortab elixir PRN pain without issue.    Patient was fatigued this morning and slept all day. Her fiance tried to call her around 1:30pm and she did not answer. When he arrived home from work around 4pm, she was difficult to rouse, and he activated EMS. Patient states, "I had gastric sleeve (surgery) on Thursday (4/27/17)...my fiance woke me up (this afternoon) and said I was out of my mind". No attempted treatment reported. No alleviating factors. Patient complains primarily of being unable to hear. She also states she does not remember the weekend at all; she thought today was Thursday 4/27. (The above weekend report is via fiance.)    Joanna adds that upon awaking this afternoon, patient also began to experience vaginal bleeding. Patient removed her NuvaRing this past Monday 5/24/17 " around 1 week ago (was instructed to remove it prior to surgery) and has not replaced it.      PMH allergy, depression, GERD  PSH breast augmentation, cholecystectomy, gastrectomy with sleeve as above, csection  Meds nuvaring, neurontin, celexa, zyrtec, trazodone    The history is provided by the patient and a significant other. No  was used.     Past Medical History:   Diagnosis Date    Allergy     Depression     GERD (gastroesophageal reflux disease)      Past Surgical History:   Procedure Laterality Date    breast augmentation       SECTION      CHOLECYSTECTOMY      LAPAROSCOPIC GASTRIC BANDING  2017    ORIF HUMERUS FRACTURE  1987     Family History   Problem Relation Age of Onset    Breast cancer Maternal Grandmother 63    Diabetes Father     Hypertension Father     Heart disease Father     Thyroid disease Mother     Cancer Mother 71     SCC of lung    Cancer Sister 48     pharyngeal cancer     Social History   Substance Use Topics    Smoking status: Former Smoker     Packs/day: 1.00     Years: 16.00     Quit date: 2011    Smokeless tobacco: Never Used    Alcohol use Yes      Comment: occasional     Review of Systems   Constitutional: Negative for fever.   HENT: Positive for hearing loss (acute). Negative for sore throat.         (+) difficulty hearing in the bilateral ears   Eyes: Negative for pain.   Respiratory: Negative for shortness of breath.    Cardiovascular: Negative for chest pain.   Gastrointestinal: Positive for abdominal pain (soreness). Negative for nausea.   Genitourinary: Positive for vaginal bleeding. Negative for dysuria.   Musculoskeletal: Negative for back pain and neck stiffness.   Skin: Positive for wound (postop scars). Negative for rash.   Neurological: Negative for weakness.       Physical Exam   Initial Vitals   BP Pulse Resp Temp SpO2   17 1710 17 1710 17 1710 -- 17 1751   99/58 98 18  77 %      Physical Exam    Nursing note and vitals reviewed.  Constitutional: She appears well-developed and well-nourished. She is not diaphoretic.  Non-toxic appearance. She does not appear ill.   Awake, alert female, appears stated age. Answers questions appropriately if those questions are shouted at her or written out.   HENT:   Head: Normocephalic and atraumatic.   Dry oropharynx   Eyes: Conjunctivae and EOM are normal. Pupils are equal, round, and reactive to light.   Neck: Normal range of motion. Neck supple.   Cardiovascular: Normal rate, regular rhythm and normal heart sounds.   Pulmonary/Chest: Effort normal. No respiratory distress. She has no wheezes. She has no rhonchi. She has rales.   Trace rales to left lung.   Abdominal: Soft. Normal appearance and bowel sounds are normal. She exhibits no distension. There is no tenderness. There is no rebound and no guarding.   Multiple surgical sites present to the abdomen. Steri-strips in place with dried blood. No erythema. Some bruising.   Genitourinary:   Genitourinary Comments: Normal external genitalia. Moderate red blood in vault without clot. Os closed. No CMT.   Musculoskeletal: Normal range of motion. She exhibits no edema or tenderness.   Neurological: She is alert. She has normal strength.   Awake, alert, appropriate aside from thinking today is Thursday. After correction x 1, A/O x 4.   Skin: Skin is warm and dry.   Minimal erythema to chin, appears chronic.    Psychiatric: She has a normal mood and affect.         ED Course   Critical Care  Date/Time: 5/1/2017 11:00 PM  Performed by: ANKUR ALEJO  Authorized by: LATONYA MCCURDY   Direct patient critical care time: 10 minutes  Additional history critical care time: 10 minutes  Ordering / reviewing critical care time: 10 minutes  Documentation critical care time: 10 minutes  Consulting other physicians critical care time: 10 minutes  Consult with family critical care time: 5 minutes  Total  critical care time (exclusive of procedural time) : 55 minutes        Labs Reviewed   COMPREHENSIVE METABOLIC PANEL - Abnormal; Notable for the following:        Result Value    Potassium 5.5 (*)     CO2 17 (*)     Glucose 116 (*)     BUN, Bld 34 (*)     Creatinine 4.2 (*)     Calcium 7.8 (*)     AST 9471 (*)     ALT 3864 (*)     Anion Gap 21 (*)     eGFR if  14 (*)     eGFR if non  12 (*)     All other components within normal limits   CBC W/ AUTO DIFFERENTIAL - Abnormal; Notable for the following:     Platelets 129 (*)     nRBC 2 (*)     Lymph% 17.0 (*)     All other components within normal limits   LIPASE - Abnormal; Notable for the following:     Lipase 89 (*)     All other components within normal limits   MAGNESIUM - Abnormal; Notable for the following:     Magnesium 2.7 (*)     All other components within normal limits   PHOSPHORUS - Abnormal; Notable for the following:     Phosphorus 10.6 (*)     All other components within normal limits    Narrative:     PHOS   critical result(s) called and verbal readback obtained from   LUDWIN LEIVA., 05/01/2017 18:54   TROPONIN I - Abnormal; Notable for the following:     Troponin I 16.176 (*)     All other components within normal limits   B-TYPE NATRIURETIC PEPTIDE - Abnormal; Notable for the following:      (*)     All other components within normal limits   ALCOHOL,MEDICAL (ETHANOL) - Abnormal; Notable for the following:     Alcohol, Medical, Serum 11 (*)     All other components within normal limits   POCT GLUCOSE - Abnormal; Notable for the following:     POCT Glucose 132 (*)     All other components within normal limits   URINALYSIS   DRUG SCREEN PANEL, URINE EMERGENCY   POCT GLUCOSE MONITORING CONTINUOUS     EKG Readings: (Independently Interpreted)   17:48: NSR, HR 96. Normal axis. Normal intervals. ST depressions in V3-V6. Minimally peaked T waves. No STEMI.  20:02: NSR, HR 95. Normal axis. Normal intervals. ST  depressions in II, III, aVF, V3-V6. No STEMI.       X-Rays:   Independently Interpreted Readings:   Other Readings:  CXR NAD    Medical Decision Making:   History:   Old Medical Records: I decided to obtain old medical records.  Old Records Summarized: records from previous admission(s).  Initial Assessment:   This is an emergent evaluation of a 47-year-old female who is postop day 4 status post elective partial gastrectomy with gastric sleeve that outpatient surgical center, now with acute onset altered mental status and severely diminished hearing.  Patient complains that her oropharynx is dry.  She reports mild abdominal soreness.  Differential Diagnosis:   Differential includes postop infection, acute renal failure, electrolyte derangement, CVA, ICH, toxidrome (questionable narcotic overdose, DC'd on Lortab elixir), rhabdomyolysis, ACS, CHF, other.  Independently Interpreted Test(s):   I have ordered and independently interpreted X-rays - see prior notes.  I have ordered and independently interpreted EKG Reading(s) - see prior notes  Clinical Tests:   Lab Tests: Ordered and Reviewed  Radiological Study: Ordered and Reviewed  Medical Tests: Ordered and Reviewed  ED Management:  The patient's EKG show inferolateral depressions without STEMI.  Chest x-ray is reassuring.  Head CT is negative for acute process.      Labs are significantly deranged with a CPK greater than 40,000, consistent with rhabdomyolysis.  Troponin is 16.  BNP is 673.  Alcohol is 11.  Acetaminophen and salicylate are negative.  CBC is reassuring; minimally low platelet count, 129, is consistent with priors.  Patient is in acute renal failure.  BUNs 34, creatinine 4.2.  Potassium 5.5.  Bicarbonate 17, presumably renal.  AST and ALT extremely elevated, 9471 and 3874.  Lipase elevated, 89.  Magnesium elevated, 2.7.  Phosphorus elevated, 10.6.  Urine (straight cath) is not infected.  Tox screen shows only opiates, prescribed.    CT of abdomen and  pelvis was performed without contrast due to acute renal failure.  This did not show any acute complications of the patient's recent procedure.    Unclear etiology of patient's rhabdomyolysis, acute renal failure, elevated troponin, deranged LFTs.  I suspect her hearing loss and altered mental status are secondary to her metabolic derangements. (I suspect her vaginal bleeding is unrelated to this episode and is likely due to her pre-op discontinuing of her NuvaRing as instructed.)    I discussed the patient with Dr. Crisostomo, nocturnist, for admission to hospitalist Dr. Wilks.  He agreed to admit to telemetry for trending troponins, echocardiogram, cardiology consult, nephrology consult, and IV hydration, as well as neuro checks.  He suggested I discuss the patient with her surgeons to determine whether it was safe to heparinize her given that she is postop.  (Unfortunately with her ARF she is unlikely to go to cath lab.)    I discussed the patient with Dr. Andrea Yoder, on call for her surgeon, Dr. Kevin Monroy.  Dr. Yoder actually called Dr. Monroy, who then called me as well.  Both surgeons state that the procedure was uncomplicated, and they are equally puzzled.  I did ask specifically whether it was alright to anticoagulate the patient with heparin given her recent procedure, and they agreed that this was appropriate as long as the CT did not show any evidence of bleeding.  Dr. Monroy has left his cell phone number for our providers in case we need him:  [phone].    In the emergency department, I gave the patient normal saline 2 L IV bolus for her hyperphosphatemia and hypermagnesemia as well as albuterol nebs ×3 for her hyperkalemia.  I gave the patient oral aspirin for her elevated troponin.  I have anticoagulated her with IV heparin 5000 units and have ordered a heparin drip for her admission.  She also had morphine IV for discomfort.    The patient is aware of her diagnoses.  She is aware of her  impending admission.  She remains unable to hear well but is awake and alert and her vitals are reassuring.    Other:   I have discussed this case with another health care provider.            Scribe Attestation:   Scribe #1: I performed the above scribed service and the documentation accurately describes the services I performed. I attest to the accuracy of the note.    Attending Attestation:           Physician Attestation for Scribe:  Physician Attestation Statement for Scribe #1: I, Susana Clarke MD, reviewed documentation, as scribed by Salina Gary in my presence, and it is both accurate and complete.                 ED Course     Clinical Impression:   The primary encounter diagnosis was Acute hearing loss of both ears. Diagnoses of Altered mental status, Acute renal failure, unspecified acute renal failure type, NSTEMI (non-ST elevated myocardial infarction), and Non-traumatic rhabdomyolysis were also pertinent to this visit.          Susana Clarke MD  05/02/17 0435       Susana Clarke MD  05/02/17 0523       Susana Clarke MD  05/02/17 1548

## 2017-05-02 NOTE — SIGNIFICANT EVENT
Reviewed H and P by my colleague and agree with A and P. Patient presenting with bilateral hearing loss and multiple lab abnormalities all could be explained from hypotension/decreased perfusion- from narcotics?  Multiple consults are now following. Labs reviewed from 5/2. MRI shows acute infarct in R cerebellar hemisphere. Neuro, renal and GI consults pending.

## 2017-05-02 NOTE — NURSING
"At the request of Dr. Clarke, I communicated to pt via her [employer] writing board, that she was in Rhabdo, acute renal failure, elevated liver enzymes and troponin.  I also wrote that Dr. Clarke had spoken with her surgery team.  I informed her the plan was for her to be admitted.  Pt states she has no questions.  I told her I wanted to speak with her significant other.  Pt states "he must not have checked on me since my surgery for me to have rhabdo"  She does not remember anything since surgery.      I spoke with Flynn (sig. Other- [phone]) He states she was normal since surgery.  She was ambulating around home, drinking the liquids and protein shakes as directed, keeping up with the amounts consumed.  She has a liquid form of hydrocodone, and the bottle is half full.  The last time he saw her normal was Sunday pm.  He did not awaken her when he left for work this morning, he tried to call her around 1pm and got no answer.  Found her unresponsive, altered when he got home.  Flynn states he offered to help her during the weekend and she responded she was doing good and did not need his help, he thought her recovery was going well until he got home from work today.  "

## 2017-05-02 NOTE — ASSESSMENT & PLAN NOTE
· unknown etiology for CPK greater than 40,000, but again suspicious for significant hypotensive episode  · Trend CPK levels  · Initiate sodium bicarbonate drip  · Nephrology consulted

## 2017-05-03 PROBLEM — I63.9 ACUTE ISCHEMIC STROKE: Status: ACTIVE | Noted: 2017-05-03

## 2017-05-03 PROBLEM — G93.41 METABOLIC ENCEPHALOPATHY: Status: ACTIVE | Noted: 2017-05-03

## 2017-05-03 LAB
ALBUMIN SERPL BCP-MCNC: 2.8 G/DL
ALP SERPL-CCNC: 85 U/L
ALT SERPL W/O P-5'-P-CCNC: 2344 U/L
ANION GAP SERPL CALC-SCNC: 16 MMOL/L
APTT BLDCRRT: 43.8 SEC
AST SERPL-CCNC: 3385 U/L
BILIRUB SERPL-MCNC: 0.9 MG/DL
BUN SERPL-MCNC: 55 MG/DL
CALCIUM SERPL-MCNC: 6.8 MG/DL
CHLORIDE SERPL-SCNC: 98 MMOL/L
CHOLEST/HDLC SERPL: 5 {RATIO}
CK MB SERPL-MCNC: 173 NG/ML
CK MB SERPL-RTO: ABNORMAL %
CK SERPL-CCNC: ABNORMAL U/L
CK SERPL-CCNC: ABNORMAL U/L
CO2 SERPL-SCNC: 23 MMOL/L
CREAT SERPL-MCNC: 4.9 MG/DL
EST. GFR  (AFRICAN AMERICAN): 12 ML/MIN/1.73 M^2
EST. GFR  (NON AFRICAN AMERICAN): 10 ML/MIN/1.73 M^2
ESTIMATED AVG GLUCOSE: 111 MG/DL
FOLATE SERPL-MCNC: 19.8 NG/ML
GLUCOSE SERPL-MCNC: 146 MG/DL
HAV IGM SERPL QL IA: NEGATIVE
HBA1C MFR BLD HPLC: 5.5 %
HBV CORE IGM SERPL QL IA: NEGATIVE
HBV SURFACE AG SERPL QL IA: NEGATIVE
HCV AB SERPL QL IA: NEGATIVE
HDL/CHOLESTEROL RATIO: 20 %
HDLC SERPL-MCNC: 125 MG/DL
HDLC SERPL-MCNC: 25 MG/DL
INR PPP: 1.3
LDLC SERPL CALC-MCNC: 67.4 MG/DL
LIPASE SERPL-CCNC: 70 U/L
MAGNESIUM SERPL-MCNC: 2.4 MG/DL
NONHDLC SERPL-MCNC: 100 MG/DL
PHOSPHATE SERPL-MCNC: 5.4 MG/DL
POTASSIUM SERPL-SCNC: 4.1 MMOL/L
PROT SERPL-MCNC: 6 G/DL
PROTHROMBIN TIME: 13.2 SEC
PTH-INTACT SERPL-MCNC: 480.9 PG/ML
SODIUM SERPL-SCNC: 137 MMOL/L
TRIGL SERPL-MCNC: 163 MG/DL
UUN UR-MCNC: 185 MG/DL
VIT B12 SERPL-MCNC: >2000 PG/ML

## 2017-05-03 PROCEDURE — C9113 INJ PANTOPRAZOLE SODIUM, VIA: HCPCS | Performed by: EMERGENCY MEDICINE

## 2017-05-03 PROCEDURE — 63600175 PHARM REV CODE 636 W HCPCS: Performed by: EMERGENCY MEDICINE

## 2017-05-03 PROCEDURE — 85610 PROTHROMBIN TIME: CPT

## 2017-05-03 PROCEDURE — 94761 N-INVAS EAR/PLS OXIMETRY MLT: CPT

## 2017-05-03 PROCEDURE — 25000003 PHARM REV CODE 250: Performed by: INTERNAL MEDICINE

## 2017-05-03 PROCEDURE — 80053 COMPREHEN METABOLIC PANEL: CPT

## 2017-05-03 PROCEDURE — 99233 SBSQ HOSP IP/OBS HIGH 50: CPT | Mod: ,,, | Performed by: INTERNAL MEDICINE

## 2017-05-03 PROCEDURE — 21400001 HC TELEMETRY ROOM

## 2017-05-03 PROCEDURE — 84100 ASSAY OF PHOSPHORUS: CPT

## 2017-05-03 PROCEDURE — 83690 ASSAY OF LIPASE: CPT

## 2017-05-03 PROCEDURE — 99232 SBSQ HOSP IP/OBS MODERATE 35: CPT | Mod: ,,, | Performed by: PSYCHIATRY & NEUROLOGY

## 2017-05-03 PROCEDURE — 27000221 HC OXYGEN, UP TO 24 HOURS

## 2017-05-03 PROCEDURE — 82553 CREATINE MB FRACTION: CPT

## 2017-05-03 PROCEDURE — 83735 ASSAY OF MAGNESIUM: CPT

## 2017-05-03 PROCEDURE — 25000003 PHARM REV CODE 250: Performed by: HOSPITALIST

## 2017-05-03 PROCEDURE — 97110 THERAPEUTIC EXERCISES: CPT

## 2017-05-03 PROCEDURE — 97165 OT EVAL LOW COMPLEX 30 MIN: CPT

## 2017-05-03 PROCEDURE — 63600175 PHARM REV CODE 636 W HCPCS: Performed by: INTERNAL MEDICINE

## 2017-05-03 PROCEDURE — 80061 LIPID PANEL: CPT

## 2017-05-03 PROCEDURE — 85730 THROMBOPLASTIN TIME PARTIAL: CPT

## 2017-05-03 RX ORDER — FUROSEMIDE 10 MG/ML
80 INJECTION INTRAMUSCULAR; INTRAVENOUS ONCE
Status: COMPLETED | OUTPATIENT
Start: 2017-05-03 | End: 2017-05-03

## 2017-05-03 RX ORDER — METOPROLOL TARTRATE 25 MG/1
25 TABLET, FILM COATED ORAL 2 TIMES DAILY
Status: DISCONTINUED | OUTPATIENT
Start: 2017-05-03 | End: 2017-05-16 | Stop reason: HOSPADM

## 2017-05-03 RX ORDER — CALCIUM CARBONATE 200(500)MG
500 TABLET,CHEWABLE ORAL EVERY 8 HOURS
Status: DISCONTINUED | OUTPATIENT
Start: 2017-05-03 | End: 2017-05-04

## 2017-05-03 RX ADMIN — CALCIUM CARBONATE (ANTACID) CHEW TAB 500 MG 500 MG: 500 CHEW TAB at 10:05

## 2017-05-03 RX ADMIN — SEVELAMER CARBONATE 1600 MG: 800 TABLET, FILM COATED ORAL at 11:05

## 2017-05-03 RX ADMIN — DEXTROSE MONOHYDRATE: 5 INJECTION, SOLUTION INTRAVENOUS at 05:05

## 2017-05-03 RX ADMIN — CALCIUM CARBONATE (ANTACID) CHEW TAB 500 MG 500 MG: 500 CHEW TAB at 02:05

## 2017-05-03 RX ADMIN — METOPROLOL TARTRATE 25 MG: 25 TABLET, FILM COATED ORAL at 07:05

## 2017-05-03 RX ADMIN — ASPIRIN 81 MG: 81 TABLET, COATED ORAL at 07:05

## 2017-05-03 RX ADMIN — HEPARIN SODIUM AND DEXTROSE 16 UNITS/KG/HR: 10000; 5 INJECTION INTRAVENOUS at 07:05

## 2017-05-03 RX ADMIN — METOPROLOL TARTRATE 25 MG: 25 TABLET, FILM COATED ORAL at 10:05

## 2017-05-03 RX ADMIN — PANTOPRAZOLE SODIUM 40 MG: 40 INJECTION, POWDER, FOR SOLUTION INTRAVENOUS at 09:05

## 2017-05-03 RX ADMIN — SEVELAMER CARBONATE 1600 MG: 800 TABLET, FILM COATED ORAL at 07:05

## 2017-05-03 RX ADMIN — DEXTROSE MONOHYDRATE: 5 INJECTION, SOLUTION INTRAVENOUS at 10:05

## 2017-05-03 RX ADMIN — DEXTROSE MONOHYDRATE: 5 INJECTION, SOLUTION INTRAVENOUS at 03:05

## 2017-05-03 RX ADMIN — SEVELAMER CARBONATE 1600 MG: 800 TABLET, FILM COATED ORAL at 05:05

## 2017-05-03 RX ADMIN — FUROSEMIDE 80 MG: 10 INJECTION, SOLUTION INTRAMUSCULAR; INTRAVENOUS at 11:05

## 2017-05-03 NOTE — NURSING
Pt accidentally pulled IV out.  Cath tip intact.  Bleeding stopped after pressure held and pressure dressing applied.  Techs gave pt a complete bath and linen change.

## 2017-05-03 NOTE — ASSESSMENT & PLAN NOTE
No report of Acute Chest Pain  · Intitial EKG findings shows no evidence of ST elevation MI, but there are concerns for depressions in lateral leads  · Initial troponin is well above normal limits, although the overall trend is flat    Recent Labs      05/01/17   1736  05/02/17   0023  05/02/17   0621  05/03/17   0607   CPK  >84053*   --   >50819*  >63520*  >13413*  >99834*   CPKMB   --    --   >600.0*  173.0*   MB   --    --   Unable to calculate  Unable to calculate   TROPONINI  16.176*  16.117*  14.148*   --        · Low to Intermediate to risk for MI;  morbid obesity  · Trend cardiac enzymes  · Aspirin  · Beta-blocker   · Statin  · Supplemental oxygen  · nitroglycerine and morphine PRN  · 2D echocardiogram  · TSH, FT3, FT4  · ESR and cardiac specific CRP   · PT, PTT, INR   · Tight glycemic control  · Monitor on telemetry unit  · Consult cardiologist  · On heparin drip; ED physician verified this was okay with general surgery prior to initiation  · Considered Plavix, although hesitant to do this with recent surgery and potential long lasting effects of Plavix compared to heparin above.  Will defer to cardiology and general surgery for further recommendations

## 2017-05-03 NOTE — ASSESSMENT & PLAN NOTE
· Head CT negative except for right maxillary sinus disease  · Concerning this may also be related to hypotensive episode causing central ischemia  · Order MRI without contrast due to decreased GFR  · Neurology consult  · Some improvement.

## 2017-05-03 NOTE — CONSULTS
[facility]  Adult Nutrition  Consult Note    SUMMARY     Recommendations  Recommendation/Intervention:   1.) Continue with Sugar Free Clear liquids for x5 days advancing to Full Liquid diet for x14 days.   2.) Recommend Beneprotein TID mixed in sugar free clears or milk to meet protein needs or (60 g/day) or patient may bring protein powder from home.   3.) Recommend chewable x2 MVI daily, calcium citrate + vitamin D (500 mg TID), B12 sublingual (500 mcg daily), and iron tablet 36 mg/day.     Goals: 1.) patient will tolerate diet with minimal nausea/vomiting   Nutrition Goal Status: new  Communication of RD Recs:  (sticky note to MD)    1. Acute hearing loss of both ears    2. Altered mental status    3. Acute renal failure, unspecified acute renal failure type    4. NSTEMI (non-ST elevated myocardial infarction)    5. Non-traumatic rhabdomyolysis      Past Medical History:   Diagnosis Date    Allergy     Depression     GERD (gastroesophageal reflux disease)        Reason for Assessment  Reason for Assessment: nurse/nurse practitioner consult  General Information Comments: Admits with acute hearing loss in both ears. S/p gastric sleeve on 4/27/17. Tolerating sugar free clear liquids this am, sipping slowly. Patient reports she has a protein powder from home, ok to bring to hospital and mix in sugar free clears or full liquids after diet advances. Denies nausea/vomiting this am. Reports UBW of 300#. Discussed diet progression with patient who reports she had surgery at [facility]. States her fiance will bring her diet packet from that hospital. RD agreed. Discussed diet advancement, importance of sugar free liquids, and meeting protein needs.     Nutrition Prescription Ordered  Current Diet Order: Sugar Free Clear Liquids       Evaluation of Received Nutrients/Fluid Intake  Other Calories (kcal): 612  IV Fluid (mL): 3600  Tolerance: tolerating     Nutrition Risk Screen  Nutrition Risk  Screen: other (see comments) (Post Gastric Bypass)    Nutrition/Diet History  Factors Affecting Nutritional Intake: altered gastrointestinal function    Labs/Tests/Procedures/Meds  Diagnostic Test/Procedure Review: reviewed, pertinent  Pertinent Labs Reviewed: reviewed, pertinent  BMP  Lab Results   Component Value Date     05/03/2017    K 4.1 05/03/2017    CL 98 05/03/2017    CO2 23 05/03/2017    BUN 55 (H) 05/03/2017    CREATININE 4.9 (H) 05/03/2017    CALCIUM 6.8 (LL) 05/03/2017    ANIONGAP 16 05/03/2017    ESTGFRAFRICA 12 (A) 05/03/2017    EGFRNONAA 10 (A) 05/03/2017     Lab Results   Component Value Date    ALBUMIN 2.8 (L) 05/03/2017     Lab Results   Component Value Date    CALCIUM 6.8 (LL) 05/03/2017    PHOS 5.4 (H) 05/03/2017     No results for input(s): POCTGLUCOSE in the last 24 hours.   Lab Results   Component Value Date    CHOL 125 05/03/2017    CHOL 136 05/02/2017    CHOL 167 01/28/2015     Lab Results   Component Value Date    HDL 25 (L) 05/03/2017    HDL 29 (L) 05/02/2017    HDL 59 01/28/2015     Lab Results   Component Value Date    LDLCALC 67.4 05/03/2017    LDLCALC 75.4 05/02/2017    LDLCALC 88.8 01/28/2015     Lab Results   Component Value Date    TRIG 163 (H) 05/03/2017    TRIG 158 (H) 05/02/2017    TRIG 96 01/28/2015     Lab Results   Component Value Date    CHOLHDL 20.0 05/03/2017    CHOLHDL 21.3 05/02/2017    CHOLHDL 35.3 01/28/2015         Pertinent Medications Reviewed: reviewed  Scheduled Meds:   aspirin  81 mg Oral Daily    metoprolol tartrate  25 mg Oral BID    pantoprazole  40 mg Intravenous Daily    polyethylene glycol  17 g Oral Daily    sevelamer carbonate  1,600 mg Oral TID WM     Continuous Infusions:   heparin (porcine) in D5W 16 Units/kg/hr (05/03/17 0748)    sodium bicarbonate drip 150 mL/hr at 05/03/17 0506         Physical Findings  Overall Physical Appearance: obese, on oxygen therapy  Skin:  (Darwin score 15)    Anthropometrics  Height (inches): 72.01 in  Weight  Method: Stated  Weight (kg): 135.9 kg  Ideal Body Weight (IBW), Female: 160.05 lb  % Ideal Body Weight, Female (lb): 187.2 lb  BMI (kg/m2): 40.62  BMI Grade: greater than 40 - morbid obesity      Estimated/Assessed Needs  Weight Used For Calorie Calculations: 135.9 kg (299 lb 9.7 oz)   Height (cm): 182.9 cm  Energy Need Method: [place]-St Jeor (Will not meet energy needs due to intentional weight loss, S/p gastric sleeve)  RMR ([place]-St. Jeor Equation): 2118.37  Weight Used For Protein Calculations: 72.7 kg (160 lb 4.4 oz) (ideal body weight)  1.0 gm Protein (gm): 72.85 and 1.2 gm Protein (gm): 87.42- 60 gram protein/day minimum for s/p gastric sleeve  Fluid Need Method:  (1864 mls or per MD)      Assessment and Plan  Nutrition Diagnosis:   Problem- altered gastrointestinal function  Etiology: alterations in GI structure/function  Signs/Symptoms: S/p gastric sleeve   Status: new    Monitor and Evaluation  Food and Nutrient Intake: energy intake, food and beverage intake  Food and Nutrient Adminstration: diet order  Anthropometric Measurements: weight, weight change, body mass index  Biochemical Data, Medical Tests and Procedures: electrolyte and renal panel, glucose/endocrine profile, lipid profile  Nutrition-Focused Physical Findings: skin, overall appearance  % Intake of Estimated Energy Needs: 0 - 25 %  % Meal Intake: 25%    Nutrition Risk  Level of Risk:  (x1 weekly)    Nutrition Follow-Up  RD Follow-up?: Yes    Discharge Planning: discharge on s/p gastric sleeve pureed diet.

## 2017-05-03 NOTE — PLAN OF CARE
Problem: Patient Care Overview  Goal: Plan of Care Review  Outcome: Ongoing (interventions implemented as appropriate)    05/02/17 2209   Coping/Psychosocial   Plan Of Care Reviewed With patient         Problem: Fall Risk (Adult)  Goal: Absence of Falls  Patient will demonstrate the desired outcomes by discharge/transition of care.   Outcome: Ongoing (interventions implemented as appropriate)    05/02/17 2209   Fall Risk (Adult)   Absence of Falls making progress toward outcome         Problem: Fluid Volume Deficit (Adult)  Goal: Fluid/Electrolyte Balance  Patient will demonstrate the desired outcomes by discharge/transition of care.   Outcome: Ongoing (interventions implemented as appropriate)    05/02/17 2209   Fluid Volume Deficit (Adult)   Fluid/Electrolyte Balance making progress toward outcome         Problem: Pressure Ulcer Risk (Darwin Scale) (Adult,Obstetrics,Pediatric)  Goal: Skin Integrity  Patient will demonstrate the desired outcomes by discharge/transition of care.   Outcome: Ongoing (interventions implemented as appropriate)    05/02/17 2209   Pressure Ulcer Risk (Darwin Scale) (Adult,Obstetrics,Pediatric)   Skin Integrity making progress toward outcome         Problem: Infection, Risk/Actual (Adult)  Goal: Infection Prevention/Resolution  Patient will demonstrate the desired outcomes by discharge/transition of care.   Outcome: Ongoing (interventions implemented as appropriate)    05/02/17 2209   Infection, Risk/Actual (Adult)   Infection Prevention/Resolution making progress toward outcome

## 2017-05-03 NOTE — SUBJECTIVE & OBJECTIVE
Past Medical History:   Diagnosis Date    Allergy     Depression     GERD (gastroesophageal reflux disease)        Past Surgical History:   Procedure Laterality Date    breast augmentation       SECTION      CHOLECYSTECTOMY      LAPAROSCOPIC GASTRIC BANDING  2017    ORIF HUMERUS FRACTURE         Review of patient's allergies indicates:  No Known Allergies    No current facility-administered medications on file prior to encounter.      Current Outpatient Prescriptions on File Prior to Encounter   Medication Sig    cetirizine (ZYRTEC) 10 MG tablet Take 10 mg by mouth once daily.    citalopram (CELEXA) 40 MG tablet Take 1 tablet (40 mg total) by mouth once daily.    ergocalciferol (VITAMIN D2) 50,000 unit Cap Take 50,000 Units by mouth every 7 days.    gabapentin (NEURONTIN) 300 MG capsule Take 1 capsule (300 mg total) by mouth 3 (three) times daily.    omeprazole (PRILOSEC OTC) 20 MG tablet Take 20 mg by mouth once daily.    trazodone (DESYREL) 50 MG tablet TAKE ONE TABLET BY MOUTH IN THE EVENING    etonogestrel-ethinyl estradiol (NUVARING) 0.12-0.015 mg/24 hr vaginal ring Place 1 each vaginally every 28 days.    FLUVIRIN 4525-4858 45 mcg (15 mcg x 3)/0.5 mL Susp     FLUVIRIN 1526-3758 45 mcg (15 mcg x 3)/0.5 mL Susp ADM 0.5ML IM UTD     Family History     Problem Relation (Age of Onset)    Breast cancer Maternal Grandmother (63)    Cancer Mother (71), Sister (48)    Diabetes Father    Heart disease Father    Hypertension Father    Thyroid disease Mother        Social History Main Topics    Smoking status: Former Smoker     Packs/day: 1.00     Years: 16.00     Quit date: 2011    Smokeless tobacco: Never Used    Alcohol use Yes      Comment: occasional    Drug use: No    Sexual activity: Yes     Partners: Male     Birth control/ protection: Inserts     Review of Systems   Gastrointestinal: Negative for melena.   Genitourinary: Negative for hematuria.     Objective:      Vital Signs (Most Recent):  Temp: 98.1 °F (36.7 °C) (05/03/17 0400)  Pulse: 86 (05/03/17 0400)  Resp: 18 (05/03/17 0400)  BP: (!) 168/86 (05/03/17 0400)  SpO2: 95 % (05/03/17 0400) Vital Signs (24h Range):  Temp:  [97.7 °F (36.5 °C)-98.1 °F (36.7 °C)] 98.1 °F (36.7 °C)  Pulse:  [83-92] 86  Resp:  [18-19] 18  SpO2:  [92 %-96 %] 95 %  BP: (139-168)/(67-86) 168/86     Weight: 135.9 kg (299 lb 8 oz)  Body mass index is 40.62 kg/(m^2).    SpO2: 95 %  O2 Device (Oxygen Therapy): nasal cannula      Intake/Output Summary (Last 24 hours) at 05/03/17 0724  Last data filed at 05/03/17 0506   Gross per 24 hour   Intake          2209.28 ml   Output              450 ml   Net          1759.28 ml       Lines/Drains/Airways     Drain                 Urethral Catheter 05/02/17 1540 Straight-tip less than 1 day          Peripheral Intravenous Line                 Peripheral IV - Single Lumen 05/03/17 0107 Left Forearm less than 1 day         Peripheral IV - Single Lumen 05/03/17 0202 Right Forearm less than 1 day                Physical Exam   Constitutional: She is oriented to person, place, and time. She appears well-developed and well-nourished. No distress.   HENT:   Head: Normocephalic and atraumatic.   Mouth/Throat: No oropharyngeal exudate.   Eyes: Conjunctivae and EOM are normal. Pupils are equal, round, and reactive to light. No scleral icterus.   Neck: Normal range of motion. Neck supple. No JVD present. No tracheal deviation present.   Cardiovascular: Normal rate, regular rhythm, S1 normal and S2 normal.  Exam reveals no gallop and no friction rub.    No murmur heard.  Pulmonary/Chest: Effort normal and breath sounds normal. No respiratory distress. She has no wheezes. She has no rales. She exhibits no tenderness.   Abdominal: Soft. Bowel sounds are normal. There is no tenderness.   obese   Musculoskeletal: Normal range of motion. She exhibits no edema.   Neurological: She is alert and oriented to person, place, and  time. A cranial nerve deficit (Pt reports chr visual difficulty and acute hearing loss) is present.   Skin: Skin is warm and dry. She is not diaphoretic.   Psychiatric: She has a normal mood and affect. Her behavior is normal. Judgment normal.       Current Medications:   aspirin  81 mg Oral Daily    pantoprazole  40 mg Intravenous Daily    polyethylene glycol  17 g Oral Daily    sevelamer carbonate  1,600 mg Oral TID WM      heparin (porcine) in D5W 16 Units/kg/hr (05/02/17 1912)    sodium bicarbonate drip 150 mL/hr at 05/03/17 0506     heparin (PORCINE), heparin (PORCINE), morphine, ondansetron, oxycodone-acetaminophen, oxycodone-acetaminophen    Laboratory:  CBC:    Recent Labs  Lab 01/28/15  0746 05/01/17 1736 05/02/17  0145   WHITE BLOOD CELL COUNT 6.26 11.20 12.16   HEMOGLOBIN 13.9 13.9 12.5   HEMATOCRIT 39.3 41.9 37.1   PLATELETS 164 129 L 121 L  121 L       CHEMISTRIES:    Recent Labs  Lab 01/28/15  0746 05/01/17 1736 05/02/17  0621   GLUCOSE 101 116 H 139 H   SODIUM 140 143 139   POTASSIUM 3.5 5.5 H 4.7   BUN BLD 11 34 H 45 H   CREATININE 0.7 4.2 H 4.4 H   EGFR IF  >60 14 A 13 A   EGFR IF NON- >60 12 A 11 A   CALCIUM 9.3 7.8 L 7.2 L   MAGNESIUM  --  2.7 H  --        CARDIAC BIOMARKERS:    Recent Labs  Lab 05/01/17 1736 05/02/17  0023 05/02/17  0621   CPK >78438 H  --  >03687 H  >49047 H   CPK MB  --   --  >600.0 H   TROPONIN I 16.176 H 16.117 H 14.148 H       COAGS:    Recent Labs  Lab 05/02/17  0145 05/03/17  0607   INR 1.4 H 1.3 H       LIPIDS/LFTS:    Recent Labs  Lab 01/28/15  0746 05/01/17 1736 05/02/17  0621   CHOLESTEROL 167  --  136   TRIGLYCERIDES 96  --  158 H   HDL 59  --  29 L   LDL CHOLESTEROL 88.8  --  75.4   NON-HDL CHOLESTEROL 108  --  107   AST 28 9471 H 9976 H   ALT 42 3864 H 3430 H     Lab Results   Component Value Date    TSH 0.474 05/02/2017           Diagnostic Results:  ECG (personally reviewed tracings):   5/1/17 1748 SR 96, IRBBB, NSSTTW  changes, no prior tracing available    Chest X-Ray (personally reviewed image(s)): 5/1/17 NAD    Echo: 5/2/17 (images pers rev)    1 - TDS, poor endocardial definition.     2 - Low normal to mildly depressed left ventricular systolic function (EF 50-55%).     3 - No diagnostic wall motion abnormalities.     4 - Trivial tricuspid regurgitation.     5 - The estimated PA systolic pressure is greater than 27 mmHg.     Records requested from Dr. Deluna: EKG, echo, stress test reports

## 2017-05-03 NOTE — PROGRESS NOTES
[facility] Hospital Medicine  Progress Note    Patient Name: Shireen Anton  MRN: [id number]  Patient Class: IP- Inpatient   Admission Date: 5/1/2017  Length of Stay: 2 days  Attending Physician: Cheo Wilks MD  Primary Care Provider: Jeancarlos Zamora MD        Subjective:     Principal Problem:Acute hearing loss of both ears    HPI:  Shireen Anton is a 47 y.o. female that (in part)  has a past medical history of Allergy; Depression; and GERD (gastroesophageal reflux disease). Presents to [facility] Emergency Department complaining of acute bilateral hearing loss.  Patient reports going to bed Sunday night and her last coherent memory was signing a check for her daughter's .  Monday morning her  reported that he had a hard time waking her up and said associated vigorously shake her because she appeared unresponsive.  She was disoriented and unable to hear.  She had a gastric sleeve on Thursday, April 27 that was uncomplicated.  During the perioperative period she reported to be normal and had no complaints other than some minor pain expected after surgery.  Throughout Friday, Saturday, and Sunday experienced no significant problems other than being increasingly thirsty.  She was given a liquid form of opioid medications which she was taking as prescribed, she states.  She continues to experience bilateral hearing loss that is constant.  Characterized by only hearing high-pitched sounds.  This is a first episode and a new problem for her.  No radiation of symptoms.  No relieving factors.  No exacerbating factors.    In the emergency department routine laboratory studies, EKG, and cardiac enzymes were obtained.  There was concern for multiple organ dysfunction including markedly elevated troponin levels, acute renal failure, markedly elevated liver enzymes, and creatinine kinase levels greater than 40,000.  She denies significant nausea, vomiting,  diarrhea, or urination.  Denies any significant hypotensive episodes.  She does not recall anything occurring after Sunday night to the time of hospitalization.  She does report having some vaginal bleeding that started this afternoon.  She uses NuvaRing for contraception.    Hospital medicine has been asked to admit for further evaluation and treatment.         Hospital Course:  Patient was admitted to the hospital for evaluation of acute renal failure, shock liver, as well as rhabdomyolysis likely from episode of prolonged hypotension possibly from narcotic use given post surgery for gastric bypass.  The patient's LFT's improved markedly by the second day. The patient was started on IV fluids with a bicarb drip.  She was found by imaging to have a R cerebellar infarct consistent with a water-shed area. Multiple consults were called in including- GI, Cards, Neurolgy and nephrology.  The patients bilateral hearing loss improved some.     Interval History: No new issues. Hearing a little better.     Review of Systems   Constitutional: Negative for activity change.   HENT: Negative for congestion.    Respiratory: Negative for chest tightness and shortness of breath.    Cardiovascular: Negative for chest pain.   Gastrointestinal: Negative for abdominal pain.     Objective:     Vital Signs (Most Recent):  Temp: 98.1 °F (36.7 °C) (05/03/17 0802)  Pulse: 84 (05/03/17 0802)  Resp: 18 (05/03/17 0802)  BP: 136/88 (05/03/17 0802)  SpO2: 98 % (05/03/17 0858) Vital Signs (24h Range):  Temp:  [97.7 °F (36.5 °C)-98.1 °F (36.7 °C)] 98.1 °F (36.7 °C)  Pulse:  [83-92] 84  Resp:  [18] 18  SpO2:  [87 %-98 %] 98 %  BP: (136-168)/(67-88) 136/88     Weight: 135.9 kg (299 lb 8 oz)  Body mass index is 40.62 kg/(m^2).    Intake/Output Summary (Last 24 hours) at 05/03/17 0859  Last data filed at 05/03/17 0506   Gross per 24 hour   Intake          2209.28 ml   Output              450 ml   Net          1759.28 ml      Physical Exam    Constitutional: She appears well-developed and well-nourished.   Neurological: She is alert.   Skin: Skin is warm.   Psychiatric: She has a normal mood and affect.   Vitals reviewed.      Significant Labs:   BMP:   Recent Labs  Lab 05/03/17  0607   *      K 4.1   CL 98   CO2 23   BUN 55*   CREATININE 4.9*   CALCIUM 6.8*   MG 2.4     CBC:   Recent Labs  Lab 05/01/17  1736 05/02/17  0145   WBC 11.20 12.16   HGB 13.9 12.5   HCT 41.9 37.1   * 121*  121*       Significant Imaging    Assessment/Plan:      * Acute hearing loss of both ears    · Head CT negative except for right maxillary sinus disease  · Concerning this may also be related to hypotensive episode causing central ischemia  · Order MRI without contrast due to decreased GFR  · Neurology consult  · Some improvement.       NSTEMI (non-ST elevated myocardial infarction)  No report of Acute Chest Pain  · Intitial EKG findings shows no evidence of ST elevation MI, but there are concerns for depressions in lateral leads  · Initial troponin is well above normal limits, although the overall trend is flat    Recent Labs      05/01/17   1736  05/02/17   0023  05/02/17   0621  05/03/17   0607   CPK  >24505*   --   >46405*  >99721*  >31978*  >85083*   CPKMB   --    --   >600.0*  173.0*   MB   --    --   Unable to calculate  Unable to calculate   TROPONINI  16.176*  16.117*  14.148*   --        · Low to Intermediate to risk for MI;  morbid obesity  · Trend cardiac enzymes  · Aspirin  · Beta-blocker   · Statin  · Supplemental oxygen  · nitroglycerine and morphine PRN  · 2D echocardiogram  · TSH, FT3, FT4  · ESR and cardiac specific CRP   · PT, PTT, INR   · Tight glycemic control  · Monitor on telemetry unit  · Consult cardiologist  · On heparin drip; ED physician verified this was okay with general surgery prior to initiation  · Considered Plavix, although hesitant to do this with recent surgery and potential long lasting effects of Plavix compared  to heparin above.  Will defer to cardiology and general surgery for further recommendations  · Eventual cath per cards once renal function improves.   ·       Elevated liver enzymes  · Markedly elevated liver enzymes with normal bilirubin and mildly elevated lipase  · Concern for shock liver due to hypotension episode  · Continue to trend AST ALT  · Hepatitis panel pending  · Consult GI for further evaluation and recommendations  · Improved       Acute renal failure  · Concern this may be related to acute hypotensive episode as well  · As evidenced by sharp decrease in GFR.  Baseline creatinine = 0.7  · Will evaluate for pre-renal, intrarenal, and post-obstructive etiology.  · Obtain:  1.  protein/creatinine ratio  2. urine and serum osmolalities  3. urine electrolytes (Na, Cl, K)  4. LDH  5. Complement  6. Bar's stain for eosinophils  · Renal ultrasound  · Monitor with serial Cr / GFR levels closely with serial labs  · Avoid nephrotoxic medications such as NSAIDs, IV contrast, or RAAS blockade  · Nephrology consult            Morbid obesity  · Status post gastric bypass   · Nutrition consult as an outpatient      Non-traumatic rhabdomyolysis  · unknown etiology for CPK greater than 40,000, but again suspicious for significant hypotensive episode  · Trend CPK levels  · Initiate sodium bicarbonate drip  · Nephrology consulted      Acute ischemic stroke        VTE Risk Mitigation         Ordered     Medium Risk of VTE  Once      05/02/17 0012        Continue IV fluids. Supportive care. Multiple consults. OT recommends home with H/H.   Cheo Bond MD  Department of Hospital Medicine   [facility]

## 2017-05-03 NOTE — PROGRESS NOTES
[facility]  Neurology  Progress Note    Patient Name: Shireen Anton  MRN: [id number]  Admission Date: 5/1/2017  Hospital Length of Stay: 2 days  Code Status: Full Code   Attending Provider: Cheo Wilks MD  Primary Care Physician: Jeancarlos Zamora MD   Principal Problem:Acute hearing loss of both ears    Subjective:     Interval History: 46 y/o female with medical Hx as listed below admitted for acute mental status changes and hearing loss. Ms. Anton came to ED after reported two days of confusion with sudden hearing loss yesterday. Her significant other in the room states that she has gastric sleeve surgery almost one week priors with no complications. It was noted on Sunday that pt was speaking incoherently then was difficult to arouse Monday morning. Upon awakening pt complained of hearing loss, bilaterally. In ED pt was found to be in multiorgan failure (kidneys, liver, heart). Today pt is more coherent and hearing has slightly improved. She does state that has been taking pain meds and dose was recently increased. Denies illicit drug use. No lateralized weakness, numbness, visual or speech disturbances.    -5/3/17: Pt reports feeling better today. Hearing improving as well as mentation.    Current Neurological Medications:    Current Facility-Administered Medications   Medication Dose Route Frequency Provider Last Rate Last Dose    aspirin EC tablet 81 mg  81 mg Oral Daily Kevin Sánchez MD   81 mg at 05/03/17 0749    heparin 25,000 units in dextrose 5% 250 mL (100 units/mL) bolus from bag; PRN BOLUS  70 Units/kg Intravenous PRN Kevin Crisostomo MD   9,527 Units at 05/02/17 0258    heparin 25,000 units in dextrose 5% 250 mL (100 units/mL) bolus from bag; PRN BOLUS  35 Units/kg Intravenous PRN Kevin Crisostomo MD        heparin 25,000 units in dextrose 5% 250 mL (100 units/mL) infusion; FEMALE  16 Units/kg/hr Intravenous Continuous Kevin Crisostomo MD 21.8 mL/hr at  05/03/17 0748 16 Units/kg/hr at 05/03/17 0748    metoprolol tartrate (LOPRESSOR) tablet 25 mg  25 mg Oral BID Kevin Sánchez MD   25 mg at 05/03/17 0750    morphine injection 5 mg  5 mg Intravenous Q4H PRN Kevin Crisostomo MD        ondansetron injection 8 mg  8 mg Intravenous Q12H PRN Kevin Crisostomo MD        oxycodone-acetaminophen  mg per tablet 1 tablet  1 tablet Oral Q6H PRN Kevin Crisostomo MD        oxycodone-acetaminophen 5-325 mg per tablet 1 tablet  1 tablet Oral Q6H PRN Kevin Crisostomo MD   1 tablet at 05/02/17 1958    pantoprazole injection 40 mg  40 mg Intravenous Daily Susana Clarke MD   40 mg at 05/03/17 0952    polyethylene glycol packet 17 g  17 g Oral Daily Susana Clarke MD        sevelamer carbonate tablet 1,600 mg  1,600 mg Oral TID WM Mateo Villanueva MD   1,600 mg at 05/03/17 0750    sodium bicarbonate 1 mEq/mL (8.4 %) 150 mEq in dextrose 5 % 1,000 mL infusion   Intravenous Continuous Kevin Crisostomo  mL/hr at 05/03/17 0506         Review of Systems   Constitutional: Negative for chills and fever.   HENT: Positive for hearing loss. Negative for tinnitus and trouble swallowing.   Eyes: Negative for photophobia and visual disturbance.   Respiratory: Negative for shortness of breath.   Cardiovascular: Negative for chest pain and palpitations.   Gastrointestinal: Negative for abdominal pain.   Endocrine: Negative for cold intolerance and heat intolerance.   Genitourinary: Negative for dysuria.   Musculoskeletal: Negative for back pain and gait problem.   Neurological: Negative for tremors, seizures, syncope, facial asymmetry, speech difficulty, weakness, light-headedness, numbness, or headaches.     Objective:     Vital Signs (Most Recent):  Temp: 98.1 °F (36.7 °C) (05/03/17 0802)  Pulse: 84 (05/03/17 0802)  Resp: 18 (05/03/17 0802)  BP: 136/88 (05/03/17 0802)  SpO2: 98 % (05/03/17 0858) Vital Signs (24h Range):  Temp:  [97.7 °F (36.5  °C)-98.1 °F (36.7 °C)] 98.1 °F (36.7 °C)  Pulse:  [83-92] 84  Resp:  [18] 18  SpO2:  [87 %-98 %] 98 %  BP: (136-168)/(67-88) 136/88     Weight: 135.9 kg (299 lb 8 oz)  Body mass index is 40.62 kg/(m^2).    Physical Exam  General: well developed, well nourished, appears stated age, no distress  Head: normocephalic, erythema in nasal and  Eyes: conjunctivae/corneas clear.  Nose/Ears: no discharge, no epistaxis; normal tympanic membrane  Neck: no adenopathy and no carotid bruit  Heart: regular rate and rhythm.  Abdomen: non-distended and bowel sounds normal.  Extremities: no cyanosis or edema  Skin: no rash  Neurologic: Mental status: alert; oriented to person, place, time; following commands  Cranial nerves: pupils are round at 3 mm and reactive to light; EOMI; no facial asymmetry; tongue protrudes midline;   Strength: 4/5 strength in four extremities proximally; 4+/5 distally  No sensory deficits       Significant Labs:   CBC:   Recent Labs  Lab 05/01/17 1736 05/02/17  0145   WBC 11.20 12.16   HGB 13.9 12.5   HCT 41.9 37.1   * 121*  121*     CMP:   Recent Labs  Lab 05/01/17 1736 05/02/17  0621 05/03/17  0607   * 139* 146*    139 137   K 5.5* 4.7 4.1    105 98   CO2 17* 20* 23   BUN 34* 45* 55*   CREATININE 4.2* 4.4* 4.9*   CALCIUM 7.8* 7.2* 6.8*   MG 2.7*  --  2.4   PROT 7.4 6.8 6.0   ALBUMIN 3.7 3.3* 2.8*   BILITOT 1.0 1.0 0.9   ALKPHOS 105 97 85   AST 9471* 9976* 3385*   ALT 3864* 3430* 2344*   ANIONGAP 21* 14 16   EGFRNONAA 12* 11* 10*         Assessment and Plan:     46 y/o female consulted for hearing loss and AMS     1. Hearing loss and AMS: suspect that her presentation including multiorgan failure is the result of a global ischemic event causing an encephalopathy. Pt came hypotensive to ED and it's unknown for how long she was like that. Uncertain if an unknown illicit substance is playing a role but pt denies.  -Improving mental status and hearing. Liver enzymes trending down,  renal function not improving.   Unresolved rhabdo with high CRP is concerning for an inflammatory myopathy (e.g. dermatomyositis given her facial rash) among other etiologies such as metabolic. If no resolution with IVF's pt may need muscle biopsy.     2. Stroke: as above MRI showed a small are of infarction on right cerebellum. This area seems to be in a watershed zone possibly correlating with the theory of a global ischemic event. On focal findings from this infarction.  -For now no other interventions.     Active Diagnoses:    Diagnosis Date Noted POA    PRINCIPAL PROBLEM:  Acute hearing loss of both ears [H91.93] 05/01/2017 Yes    Acute ischemic stroke [I63.9] 05/03/2017 Yes    NSTEMI (non-ST elevated myocardial infarction) [I21.4] 05/02/2017 Yes    Elevated liver enzymes [R74.8] 05/02/2017 Yes    Acute renal failure [N17.9] 05/02/2017 Yes    Morbid obesity [E66.01] 05/02/2017 Yes    Non-traumatic rhabdomyolysis [M62.82] 05/02/2017 Yes      Problems Resolved During this Admission:    Diagnosis Date Noted Date Resolved POA       VTE Risk Mitigation         Ordered     Medium Risk of VTE  Once      05/02/17 0012          Abhishek Harvey MD  Neurology  [facility]

## 2017-05-03 NOTE — PROGRESS NOTES
[facility]  Cardiology  Progress Note    Patient Name: Shireen Anton  MRN: [id number]  Admission Date: 2017  Hospital Length of Stay: 2 days  Code Status: Full Code   Attending Physician: Cheo Wilks MD   Primary Care Physician: Jeancarlos Zamora MD  Expected Discharge Date:   Principal Problem:<principal problem not specified>    Subjective:     Hospital Course:   No events  Denies cp/sob.  Hearing improved.  Pt seen by renal, neuro and GI consults yesterday.  Still awaiting records from Dr. Hassan, d/w RN    Tele: SR, PSVT      Past Medical History:   Diagnosis Date    Allergy     Depression     GERD (gastroesophageal reflux disease)        Past Surgical History:   Procedure Laterality Date    breast augmentation       SECTION      CHOLECYSTECTOMY      LAPAROSCOPIC GASTRIC BANDING  2017    ORIF HUMERUS FRACTURE         Review of patient's allergies indicates:  No Known Allergies    No current facility-administered medications on file prior to encounter.      Current Outpatient Prescriptions on File Prior to Encounter   Medication Sig    cetirizine (ZYRTEC) 10 MG tablet Take 10 mg by mouth once daily.    citalopram (CELEXA) 40 MG tablet Take 1 tablet (40 mg total) by mouth once daily.    ergocalciferol (VITAMIN D2) 50,000 unit Cap Take 50,000 Units by mouth every 7 days.    gabapentin (NEURONTIN) 300 MG capsule Take 1 capsule (300 mg total) by mouth 3 (three) times daily.    omeprazole (PRILOSEC OTC) 20 MG tablet Take 20 mg by mouth once daily.    trazodone (DESYREL) 50 MG tablet TAKE ONE TABLET BY MOUTH IN THE EVENING    etonogestrel-ethinyl estradiol (NUVARING) 0.12-0.015 mg/24 hr vaginal ring Place 1 each vaginally every 28 days.    FLUVIRIN 0364-2133 45 mcg (15 mcg x 3)/0.5 mL Susp     FLUVIRIN [id number] 45 mcg (15 mcg x 3)/0.5 mL Susp ADM 0.5ML IM UTD     Family History     Problem Relation (Age of Onset)    Breast cancer Maternal  Grandmother (63)    Cancer Mother (71), Sister (48)    Diabetes Father    Heart disease Father    Hypertension Father    Thyroid disease Mother        Social History Main Topics    Smoking status: Former Smoker     Packs/day: 1.00     Years: 16.00     Quit date: 11/25/2011    Smokeless tobacco: Never Used    Alcohol use Yes      Comment: occasional    Drug use: No    Sexual activity: Yes     Partners: Male     Birth control/ protection: Inserts     Review of Systems   Gastrointestinal: Negative for melena.   Genitourinary: Negative for hematuria.     Objective:     Vital Signs (Most Recent):  Temp: 98.1 °F (36.7 °C) (05/03/17 0400)  Pulse: 86 (05/03/17 0400)  Resp: 18 (05/03/17 0400)  BP: (!) 168/86 (05/03/17 0400)  SpO2: 95 % (05/03/17 0400) Vital Signs (24h Range):  Temp:  [97.7 °F (36.5 °C)-98.1 °F (36.7 °C)] 98.1 °F (36.7 °C)  Pulse:  [83-92] 86  Resp:  [18-19] 18  SpO2:  [92 %-96 %] 95 %  BP: (139-168)/(67-86) 168/86     Weight: 135.9 kg (299 lb 8 oz)  Body mass index is 40.62 kg/(m^2).    SpO2: 95 %  O2 Device (Oxygen Therapy): nasal cannula      Intake/Output Summary (Last 24 hours) at 05/03/17 0724  Last data filed at 05/03/17 0506   Gross per 24 hour   Intake          2209.28 ml   Output              450 ml   Net          1759.28 ml       Lines/Drains/Airways     Drain                 Urethral Catheter 05/02/17 1540 Straight-tip less than 1 day          Peripheral Intravenous Line                 Peripheral IV - Single Lumen 05/03/17 0107 Left Forearm less than 1 day         Peripheral IV - Single Lumen 05/03/17 0202 Right Forearm less than 1 day                Physical Exam   Constitutional: She is oriented to person, place, and time. She appears well-developed and well-nourished. No distress.   HENT:   Head: Normocephalic and atraumatic.   Mouth/Throat: No oropharyngeal exudate.   Eyes: Conjunctivae and EOM are normal. Pupils are equal, round, and reactive to light. No scleral icterus.   Neck: Normal  range of motion. Neck supple. No JVD present. No tracheal deviation present.   Cardiovascular: Normal rate, regular rhythm, S1 normal and S2 normal.  Exam reveals no gallop and no friction rub.    No murmur heard.  Pulmonary/Chest: Effort normal and breath sounds normal. No respiratory distress. She has no wheezes. She has no rales. She exhibits no tenderness.   Abdominal: Soft. Bowel sounds are normal. There is no tenderness.   obese   Musculoskeletal: Normal range of motion. She exhibits no edema.   Neurological: She is alert and oriented to person, place, and time. A cranial nerve deficit (Pt reports chr visual difficulty and acute hearing loss) is present.   Skin: Skin is warm and dry. She is not diaphoretic.   Psychiatric: She has a normal mood and affect. Her behavior is normal. Judgment normal.       Current Medications:   aspirin  81 mg Oral Daily    pantoprazole  40 mg Intravenous Daily    polyethylene glycol  17 g Oral Daily    sevelamer carbonate  1,600 mg Oral TID WM      heparin (porcine) in D5W 16 Units/kg/hr (05/02/17 1912)    sodium bicarbonate drip 150 mL/hr at 05/03/17 0506     heparin (PORCINE), heparin (PORCINE), morphine, ondansetron, oxycodone-acetaminophen, oxycodone-acetaminophen    Laboratory:  CBC:    Recent Labs  Lab 01/28/15  0746 05/01/17  1736 05/02/17  0145   WHITE BLOOD CELL COUNT 6.26 11.20 12.16   HEMOGLOBIN 13.9 13.9 12.5   HEMATOCRIT 39.3 41.9 37.1   PLATELETS 164 129 L 121 L  121 L       CHEMISTRIES:    Recent Labs  Lab 01/28/15  0746 05/01/17 1736 05/02/17  0621   GLUCOSE 101 116 H 139 H   SODIUM 140 143 139   POTASSIUM 3.5 5.5 H 4.7   BUN BLD 11 34 H 45 H   CREATININE 0.7 4.2 H 4.4 H   EGFR IF  >60 14 A 13 A   EGFR IF NON- >60 12 A 11 A   CALCIUM 9.3 7.8 L 7.2 L   MAGNESIUM  --  2.7 H  --        CARDIAC BIOMARKERS:    Recent Labs  Lab 05/01/17  1736 05/02/17  0023 05/02/17  0621   CPK >48941 H  --  >61057 H  >28851 H   CPK MB  --   --   >600.0 H   TROPONIN I 16.176 H 16.117 H 14.148 H       COAGS:    Recent Labs  Lab 05/02/17  0145 05/03/17  0607   INR 1.4 H 1.3 H       LIPIDS/LFTS:    Recent Labs  Lab 01/28/15  0746 05/01/17  1736 05/02/17  0621   CHOLESTEROL 167  --  136   TRIGLYCERIDES 96  --  158 H   HDL 59  --  29 L   LDL CHOLESTEROL 88.8  --  75.4   NON-HDL CHOLESTEROL 108  --  107   AST 28 9471 H 9976 H   ALT 42 3864 H 3430 H     Lab Results   Component Value Date    TSH 0.474 05/02/2017           Diagnostic Results:  ECG (personally reviewed tracings):   5/1/17 1748 SR 96, IRBBB, NSSTTW changes, no prior tracing available    Chest X-Ray (personally reviewed image(s)): 5/1/17 NAD    Echo: 5/2/17 (images pers rev)    1 - TDS, poor endocardial definition.     2 - Low normal to mildly depressed left ventricular systolic function (EF 50-55%).     3 - No diagnostic wall motion abnormalities.     4 - Trivial tricuspid regurgitation.     5 - The estimated PA systolic pressure is greater than 27 mmHg.     Records requested from Dr. Deluna: EKG, echo, stress test reports      Assessment and Plan:     NSTEMI (non-ST elevated myocardial infarction)  No clinical correlate for ACS  Trop rise is certainly concerning  EKG with nonspec changes  Prior stress test/echo/ekg reports requested from Dr. Deluna  Cont med rx  Add metoprolol 25mg bid  Agree with IV heparin while undergoing workup  Will consider eventual cath pending improvement in renal fxn  Check lipids, hold off on statin rx until LFTs normalize pending clinical course      Acute renal failure  Per IM/Renal  Agree with IVF  Avoid nephrotoxins  Will obviously hope to avoid cath until renal fxn normalizes    Non-traumatic rhabdomyolysis  ?new problem vs CPK elevation from recent surgery.    Acute hearing loss of both ears  Improving  Per IM/neuro  ?Watershed cerebellar CVA    Elevated liver enzymes  Presumed shock liver  Continue to monitor  GI following    Morbid obesity  S/P gastric sleeve  on 4/27/17.      VTE Risk Mitigation         Ordered     Medium Risk of VTE  Once      05/02/17 0012          Kevin Sánchez MD  Cardiology  [facility]

## 2017-05-03 NOTE — ASSESSMENT & PLAN NOTE
· Markedly elevated liver enzymes with normal bilirubin and mildly elevated lipase  · Concern for shock liver due to hypotension episode  · Continue to trend AST ALT  · Hepatitis panel pending  · Consult GI for further evaluation and recommendations  · Improved

## 2017-05-03 NOTE — PT/OT/SLP EVAL
Occupational Therapy  Evaluation    Shireen Anton   MRN: [id number]   Admitting Diagnosis: Acute hearing loss of both ears    OT Date of Treatment: 17   OT Start Time: 1155  OT Stop Time: 1225  OT Total Time (min): 30 min    Billable Minutes:  Evaluation 15  Therapeutic Exercise 15    Diagnosis: Acute hearing loss of both ears       Past Medical History:   Diagnosis Date    Allergy     Depression     GERD (gastroesophageal reflux disease)       Past Surgical History:   Procedure Laterality Date    breast augmentation       SECTION      CHOLECYSTECTOMY      LAPAROSCOPIC GASTRIC BANDING  2017    ORIF HUMERUS FRACTURE         Referring physician: Efe  Date referred to OT: 5/3/17    General Precautions: Standard, fall  Orthopedic Precautions: N/A  Braces: N/A          Patient History:  Living Environment  Lives With: significant other, child(mckenzie), dependent  Living Arrangements: house  Home Layout: Able to live on 1st floor  Equipment Currently Used at Home: shower chair    Prior level of function:   Bed Mobility/Transfers: independent  Grooming: independent  Bathing: independent  Upper Body Dressing: independent  Lower Body Dressing: independent  Toileting: independent  Home Management Skills: independent  Homemaking Responsibilities: Yes  Driving License: Yes  Mode of Transportation: Family  Occupation: Full time employment     Dominant hand: right    Subjective:  Communicated with nurse prior to session.    Chief Complaint: Kidney Failure as per patient  Patient/Family stated goals: to get better    Pain Ratin/10      Pain Rating Post-Intervention: 0/10    Objective:  Patient found with: oxygen, peripheral IV, telemetry    Cognitive Exam:  Oriented to: Situation  Follows Commands/attention: Follows one-step commands  Communication: voice is coarse  Memory:  Appropriate at this time but nurse reports confusion and decreased memory  Safety awareness/insight to disability:  impaired  Coping skills/emotional control: Inappropriate laughter    Visual/perceptual:  Impaired  nurse reports that patient wears glasses, pt states that her vision is blurry    Physical Exam:  Postural examination/scapula alignment: No postural abnormalities identified  Skin integrity: Visible skin intact  Edema: None noted     Sensation:   Intact    Upper Extremity Range of Motion:  Right Upper Extremity: WFL  Left Upper Extremity: WFL    Upper Extremity Strength:  Right Upper Extremity: WFL  Left Upper Extremity: WFL   Strength: good    Fine motor coordination:   Intact    Gross motor coordination: WFL    Functional Mobility:  Bed Mobility:  Rolling/Turning to Left: Supervision  Scooting/Bridging: Supervision  Sit to Supine: Supervision    Transfers:  Sit <> Stand Assistance: Contact Guard Assistance  Sit <> Stand Assistive Device: No Assistive Device    Activities of Daily Living:  Feeding Level of Assistance: Modified independent  UE Dressing Level of Assistance: Minimum assistance     Balance:   Static Sit: GOOD-: Takes MODERATE challenges from all directions but inconsistently  Dynamic Sit: GOOD-: Maintains balance through MODERATE excursions of active trunk movement,     Static Stand: POOR+: Needs MINIMAL assist to maintain  Dynamic stand: POOR: N/A    Therapeutic Activities and Exercises:  3 sets of 10 edge of bed    AM-PAC 6 CLICK ADL  How much help from another person does this patient currently need?  1 = Unable, Total/Dependent Assistance  2 = A lot, Maximum/Moderate Assistance  3 = A little, Minimum/Contact Guard/Supervision  4 = None, Modified Slope/Independent    Putting on and taking off regular lower body clothing? : 2  Bathing (including washing, rinsing, drying)?: 2  Toileting, which includes using toilet, bedpan, or urinal? : 2  Putting on and taking off regular upper body clothing?: 3  Taking care of personal grooming such as brushing teeth?: 3  Eating meals?: 4  Total Score:  "16    AM-PAC Raw Score CMS "G-Code Modifier Level of Impairment Assistance   6 % Total / Unable   7 - 9 CM 80 - 100% Maximal Assist   10 - 14 CL 60 - 80% Moderate Assist   15 - 19 CK 40 - 60% Moderate Assist   20 - 22 CJ 20 - 40% Minimal Assist   23 CI 1-20% SBA / CGA   24 CH 0% Independent/ Mod I       Patient left edge of bed and then pt returned self to bed after 5 minutes with all lines intact and call button in reach with significant other in room and nurse notified.    Assessment:  Shireen Anton is a 47 y.o. female with a medical diagnosis of Acute hearing loss of both ears and presents with good mobility. Pt's de meaner is slightly altered. Pt would benefit from continued therapy to address functional mobility.     Rehab identified problem list/impairments: Rehab identified problem list/impairments: weakness, impaired endurance, impaired self care skills, impaired functional mobilty, gait instability, impaired balance, visual deficits    Rehab potential is good.    Activity tolerance: Good    Discharge recommendations: Discharge Facility/Level Of Care Needs: home with home health     Barriers to discharge: Barriers to Discharge: Significant other is arranging assistance from aunt when he is at work.     Equipment recommendations:  (TBD)     GOALS:   Occupational Therapy Goals        Problem: Occupational Therapy Goal    Goal Priority Disciplines Outcome Interventions   Occupational Therapy Goal     OT, PT/OT Ongoing (interventions implemented as appropriate)    Description:  Goals to be met by: 5/9/17     Patient will increase functional independence with ADLs by performing:    UE Dressing with Supervision.  Grooming while standing with Minimal Assistance.  Toileting from toilet with Supervision for hygiene and clothing management.   Toilet transfer to toilet with Supervision.  Upper extremity exercise program x20 reps per handout, with independence.                PLAN:  Patient to be seen 3 x/week " to address the above listed problems via self-care/home management, therapeutic activities, therapeutic exercises  Plan of Care expires: 05/09/17  Plan of Care reviewed with: patient, significant other         Denia Heard OT  05/03/2017

## 2017-05-03 NOTE — NURSING
Pt returned from test and is sitting up in bed AAOx3 with family and friends at BS.  Pt falling asleep between care.  Resp even NL.  O2 on @3L NC with sats 96%.  Pt states she is still having hearing difficulty.  No weakness noted in any extremities.  No facial drooping or slurred speech noted.  Campos intact draining dark tea colored urine.  Heparin drip infusing @ 21.8 ml/hr and Bicarb infusing @ 150 ml/hr.  Pt c/o HA - Admin PRN pain medication.  Plan of care discussed with pt.  Repositioned pt utilizing wedge.  Advised pt to call for assistance.  Call light in reach, bed alarm on, pt close to the nursing station.

## 2017-05-03 NOTE — PLAN OF CARE
Problem: Patient Care Overview  Goal: Plan of Care Review  Recommendations  Recommendation/Intervention:   1.) Continue with Sugar Free Clear liquids for x5 days advancing to Full Liquid diet for x14 days.   2.) Recommend Beneprotein TID mixed in sugar free clears or milk to meet protein needs or (60 g/day) or patient may bring protein powder from home.   3.) Recommend chewable x2 MVI daily, calcium citrate + vitamin D (500 mg TID), B12 sublingual (500 mcg daily), and iron tablet 36 mg/day.      Goals: 1.) patient will tolerate diet with minimal nausea/vomiting   Nutrition Goal Status: new  Communication of JESSICA Recs: (sticky note to MD)

## 2017-05-03 NOTE — SUBJECTIVE & OBJECTIVE
Interval History: No new issues. Hearing a little better.     Review of Systems   Constitutional: Negative for activity change.   HENT: Negative for congestion.    Respiratory: Negative for chest tightness and shortness of breath.    Cardiovascular: Negative for chest pain.   Gastrointestinal: Negative for abdominal pain.     Objective:     Vital Signs (Most Recent):  Temp: 98.1 °F (36.7 °C) (05/03/17 0802)  Pulse: 84 (05/03/17 0802)  Resp: 18 (05/03/17 0802)  BP: 136/88 (05/03/17 0802)  SpO2: 98 % (05/03/17 0858) Vital Signs (24h Range):  Temp:  [97.7 °F (36.5 °C)-98.1 °F (36.7 °C)] 98.1 °F (36.7 °C)  Pulse:  [83-92] 84  Resp:  [18] 18  SpO2:  [87 %-98 %] 98 %  BP: (136-168)/(67-88) 136/88     Weight: 135.9 kg (299 lb 8 oz)  Body mass index is 40.62 kg/(m^2).    Intake/Output Summary (Last 24 hours) at 05/03/17 0859  Last data filed at 05/03/17 0506   Gross per 24 hour   Intake          2209.28 ml   Output              450 ml   Net          1759.28 ml      Physical Exam   Constitutional: She appears well-developed and well-nourished.   Neurological: She is alert.   Skin: Skin is warm.   Psychiatric: She has a normal mood and affect.   Vitals reviewed.      Significant Labs:   BMP:   Recent Labs  Lab 05/03/17  0607   *      K 4.1   CL 98   CO2 23   BUN 55*   CREATININE 4.9*   CALCIUM 6.8*   MG 2.4     CBC:   Recent Labs  Lab 05/01/17  1736 05/02/17  0145   WBC 11.20 12.16   HGB 13.9 12.5   HCT 41.9 37.1   * 121*  121*       Significant Imaging

## 2017-05-03 NOTE — ASSESSMENT & PLAN NOTE
No clinical correlate for ACS  Trop rise is certainly concerning  EKG with nonspec changes  Prior stress test/echo/ekg reports requested from Dr. Deluna  Cont med rx  Add metoprolol 25mg bid  Agree with IV heparin while undergoing workup  Will consider eventual cath pending improvement in renal fxn  Check lipids, hold off on statin rx until LFTs normalize pending clinical course

## 2017-05-03 NOTE — PROGRESS NOTES
Subjective:  CC= elevated liver enzymes    Patient overall feeling better this morning. She denies abdominal pain and emesis.     Objective:  Vitals:    05/03/17 0400 05/03/17 0802 05/03/17 0855 05/03/17 0858   BP: (!) 168/86 136/88     BP Location:  Right arm     Patient Position:  Lying     BP Method:  Automatic     Pulse: 86 84     Resp: 18 18     Temp: 98.1 °F (36.7 °C) 98.1 °F (36.7 °C)     TempSrc: Oral Oral     SpO2: 95% 96% (!) 87% 98%   Weight:       Height:         GEN: Alert and oriented. Obese female in no apparent distress   HEENT: Normocephalic, anicteric sclera, hearing loss   Cardiovascular: Regular rate and rhythm. No murmur appreciated.   Chest: Non-labored respirations. Breath sound equal.   Abdomen: soft, NTND, +BS  Extermities: No C/C/E. 2+ dorsalis pedis pulses bilaterally    Lab Results   Component Value Date    WBC 12.16 05/02/2017    HGB 12.5 05/02/2017    HCT 37.1 05/02/2017    MCV 92 05/02/2017     (L) 05/02/2017     (L) 05/02/2017     Lab Results   Component Value Date     05/03/2017    K 4.1 05/03/2017    CL 98 05/03/2017    CO2 23 05/03/2017    BUN 55 (H) 05/03/2017    CREATININE 4.9 (H) 05/03/2017    CALCIUM 6.8 (LL) 05/03/2017    ANIONGAP 16 05/03/2017    ESTGFRAFRICA 12 (A) 05/03/2017    EGFRNONAA 10 (A) 05/03/2017     AST= 9976 --> 3385  ALT= 3430 --> 2344  ALP= 85  Bilirubin= 0.9  INR= 1.4 --> 1.3    Imaging:  Ultrasound liver with doppler (5/3/17):  Impression:  Hepatomegaly with fatty change. Liver vasculature unremarkable.   Prior cholecystectomy      Assessment:  47 year old female with a history of GERD, depression and recent sleeve gastrectomy presenting with bilateral hearing loss, CVA, NSTEMI, rhabdomyolysis, shock liver and acute renal failure.    Plan:  1. Elevated liver enzymes - hepatocellular pattern, suspect due to shock liver from hypotension / rhabdo. Improved significantly with hydration. Doppler ultrasound with unremarkable liver vasculature.  Follow-up results of acute viral hepatitis panel. Monitor hepatic function panel, INR. Will follow briefly.

## 2017-05-03 NOTE — ASSESSMENT & PLAN NOTE
Per IM/Renal  Agree with IVF  Avoid nephrotoxins  Will obviously hope to avoid cath until renal fxn normalizes

## 2017-05-03 NOTE — PROGRESS NOTES
Awake alert oriented NAD    Hearing is back to 90 % of usual    Denies CNS ENT CP GI  RHEUM OR DERM SX  Past Medical History:   Diagnosis Date    Allergy     Depression     GERD (gastroesophageal reflux disease)      Review of patient's allergies indicates:  No Known Allergies    Current Facility-Administered Medications   Medication    aspirin EC tablet 81 mg    calcium carbonate 200 mg calcium (500 mg) chewable tablet 500 mg    heparin 25,000 units in dextrose 5% 250 mL (100 units/mL) bolus from bag; PRN BOLUS    heparin 25,000 units in dextrose 5% 250 mL (100 units/mL) bolus from bag; PRN BOLUS    heparin 25,000 units in dextrose 5% 250 mL (100 units/mL) infusion; FEMALE    metoprolol tartrate (LOPRESSOR) tablet 25 mg    morphine injection 5 mg    ondansetron injection 8 mg    oxycodone-acetaminophen  mg per tablet 1 tablet    oxycodone-acetaminophen 5-325 mg per tablet 1 tablet    pantoprazole injection 40 mg    polyethylene glycol packet 17 g    sevelamer carbonate tablet 1,600 mg    sodium bicarbonate 1 mEq/mL (8.4 %) 150 mEq in dextrose 5 % 1,000 mL infusion       LABS    Recent Results (from the past 24 hour(s))   2D echo with color flow doppler    Collection Time: 05/02/17 11:20 AM   Result Value Ref Range    EF 50 55 - 65    Diastolic Dysfunction No     Est. PA Systolic Pressure 26.63     Mitral Valve Mobility NORMAL     Tricuspid Valve Regurgitation TRIVIAL    Vitamin B12    Collection Time: 05/02/17 12:49 PM   Result Value Ref Range    Vitamin B-12 >2000 (H) 210 - 950 pg/mL   Folate    Collection Time: 05/02/17 12:49 PM   Result Value Ref Range    Folate 19.8 4.0 - 24.0 ng/mL   PTH, intact    Collection Time: 05/02/17 12:49 PM   Result Value Ref Range    PTH, Intact 480.9 (H) 9.0 - 77.0 pg/mL   Gram stain    Collection Time: 05/02/17  3:40 PM   Result Value Ref Range    Gram Stain Result No organisms seen     Gram Stain Result Few WBC's    Bar's Stain, Urine Random     Collection Time: 05/02/17  3:40 PM   Result Value Ref Range    Bar's Stain, Ur No eosinophils seen No eosinophils seen   Osmolality, urine    Collection Time: 05/02/17  3:40 PM   Result Value Ref Range    Osmolality, Ur 341 50 - 1200 mOsm/kg   Urea nitrogen, urine, random    Collection Time: 05/02/17  3:40 PM   Result Value Ref Range    Urine Urea Nitrogen, Random 185 140 - 1050 mg/dL   Creatinine, urine, random    Collection Time: 05/02/17  3:40 PM   Result Value Ref Range    Creatinine, Random Ur 282.5 15.0 - 325.0 mg/dL   Sodium, urine, random    Collection Time: 05/02/17  3:40 PM   Result Value Ref Range    Sodium Urine Random 30 20 - 250 mmol/L   Chloride, urine, random    Collection Time: 05/02/17  3:40 PM   Result Value Ref Range    Chloride, [place] Ur 23 (L) 25 - 200 mmol/L   Potassium, urine, random    Collection Time: 05/02/17  3:40 PM   Result Value Ref Range    Potassium Urine Random 60 15 - 95 mmol/L   Protein / creatinine ratio, urine    Collection Time: 05/02/17  3:40 PM   Result Value Ref Range    Protein, Urine Random 228 mg/dL    Creatinine, Random Ur 282.5 15.0 - 325.0 mg/dL    Prot/Creat Ratio, Ur 0.81 (H) 0.00 - 0.20   Protime-INR    Collection Time: 05/03/17  6:07 AM   Result Value Ref Range    Prothrombin Time 13.2 (H) 9.0 - 12.5 sec    INR 1.3 (H) 0.8 - 1.2   Comprehensive metabolic panel    Collection Time: 05/03/17  6:07 AM   Result Value Ref Range    Sodium 137 136 - 145 mmol/L    Potassium 4.1 3.5 - 5.1 mmol/L    Chloride 98 95 - 110 mmol/L    CO2 23 23 - 29 mmol/L    Glucose 146 (H) 70 - 110 mg/dL    BUN, Bld 55 (H) 6 - 20 mg/dL    Creatinine 4.9 (H) 0.5 - 1.4 mg/dL    Calcium 6.8 (LL) 8.7 - 10.5 mg/dL    Total Protein 6.0 6.0 - 8.4 g/dL    Albumin 2.8 (L) 3.5 - 5.2 g/dL    Total Bilirubin 0.9 0.1 - 1.0 mg/dL    Alkaline Phosphatase 85 55 - 135 U/L    AST 3385 (H) 10 - 40 U/L    ALT 2344 (H) 10 - 44 U/L    Anion Gap 16 8 - 16 mmol/L    eGFR if African American 12 (A) >60 mL/min/1.73 m^2     eGFR if non African American 10 (A) >60 mL/min/1.73 m^2   Lipid panel    Collection Time: 05/03/17  6:07 AM   Result Value Ref Range    Cholesterol 125 120 - 199 mg/dL    Triglycerides 163 (H) 30 - 150 mg/dL    HDL 25 (L) 40 - 75 mg/dL    LDL Cholesterol 67.4 63.0 - 159.0 mg/dL    HDL/Chol Ratio 20.0 20.0 - 50.0 %    Total Cholesterol/HDL Ratio 5.0 2.0 - 5.0    Non-HDL Cholesterol 100 mg/dL   Phosphorus    Collection Time: 05/03/17  6:07 AM   Result Value Ref Range    Phosphorus 5.4 (H) 2.7 - 4.5 mg/dL   Magnesium    Collection Time: 05/03/17  6:07 AM   Result Value Ref Range    Magnesium 2.4 1.6 - 2.6 mg/dL   CK    Collection Time: 05/03/17  6:07 AM   Result Value Ref Range    CPK >16788 (H) 20 - 180 U/L   Lipase    Collection Time: 05/03/17  6:07 AM   Result Value Ref Range    Lipase 70 (H) 4 - 60 U/L   CK-MB    Collection Time: 05/03/17  6:07 AM   Result Value Ref Range    CPK >09736 (H) 20 - 180 U/L    CPK .0 (H) 0.1 - 6.5 ng/mL    MB% Unable to calculate 0.0 - 5.0 %   ]    I/O last 3 completed shifts:  In: 3707.8 [P.O.:480; I.V.:3132.6; IV Piggyback:95.3]  Out: 450 [Urine:450]    Vitals:    05/03/17 0400 05/03/17 0802 05/03/17 0855 05/03/17 0858   BP: (!) 168/86 136/88     Pulse: 86 84     Resp: 18 18     Temp: 98.1 °F (36.7 °C) 98.1 °F (36.7 °C)     TempSrc: Oral Oral     SpO2: 95% 96% (!) 87% 98%   Weight:       Height:           No Jvd, Thyromegaly or Lymphadenopathy  Lungs: Fairly clear anteriorly and laterally  Cor: RRR no G or rubs  Abd: Soft benign good bowel sounds non tender  Ext: No E C C    A)  Severe GASPER  ml 24 hours day 3   Acute and severe Rhabdomyolysis cause unclear  Hx of Hypotension and low o2 sat in the ER  Decrease mentation better  High CRP  Severe Hyperpo4 better  Elevation LFT's better  Elevated Trop 1 cariology following  Pulmonary HTN      P)    Continue rx as is    I am worried about using Mannitol on this pt because she Oliguric. Will try lasix instead

## 2017-05-03 NOTE — NURSING
"Pt called on call light.  When I went into the room pt was holding her hand where she just broke her IV tubing and IV was dislodged.  Pt was saying "I don't know why I did it but I just went pop and broke the tubing.  I'm crazy right?"  While saying this, pt was laughing.  Pt kept repeating herself and saying she's crazy while laughing at the same time.  Pt would quickly fall asleep.  I woke pt again and asked her if she remembered what she did and she repeated the same above.  House supervisor called in to restart a second IV and pt telling the supervisor the same thing.  I also noted some dry blood in pt's left ear.  This is the second time tonight.  I cleaned the ear really well and will continue to monitor for bleeding.     "

## 2017-05-03 NOTE — PLAN OF CARE
Problem: Occupational Therapy Goal  Goal: Occupational Therapy Goal  Goals to be met by: 5/9/17     Patient will increase functional independence with ADLs by performing:    UE Dressing with Supervision.  Grooming while standing with Minimal Assistance.  Toileting from toilet with Supervision for hygiene and clothing management.   Toilet transfer to toilet with Supervision.  Upper extremity exercise program x20 reps per handout, with independence.  Outcome: Ongoing (interventions implemented as appropriate)  Pt to continue to work with therapy to address self care and general mobility.

## 2017-05-04 PROBLEM — K72.00 SHOCK LIVER: Status: ACTIVE | Noted: 2017-05-02

## 2017-05-04 LAB
ALBUMIN SERPL BCP-MCNC: 2.8 G/DL
ALP SERPL-CCNC: 84 U/L
ALT SERPL W/O P-5'-P-CCNC: 1714 U/L
ANION GAP SERPL CALC-SCNC: 14 MMOL/L
APTT BLDCRRT: 57.8 SEC
APTT BLDCRRT: 57.8 SEC
AST SERPL-CCNC: 1378 U/L
BILIRUB SERPL-MCNC: 0.9 MG/DL
BUN SERPL-MCNC: 59 MG/DL
CALCIUM SERPL-MCNC: 6.8 MG/DL
CHLORIDE SERPL-SCNC: 90 MMOL/L
CK SERPL-CCNC: ABNORMAL U/L
CO2 SERPL-SCNC: 35 MMOL/L
CREAT SERPL-MCNC: 5.9 MG/DL
EST. GFR  (AFRICAN AMERICAN): 9 ML/MIN/1.73 M^2
EST. GFR  (NON AFRICAN AMERICAN): 8 ML/MIN/1.73 M^2
GLUCOSE SERPL-MCNC: 111 MG/DL
POTASSIUM SERPL-SCNC: 3.2 MMOL/L
PROT SERPL-MCNC: 5.7 G/DL
SODIUM SERPL-SCNC: 139 MMOL/L

## 2017-05-04 PROCEDURE — 21400001 HC TELEMETRY ROOM

## 2017-05-04 PROCEDURE — 80053 COMPREHEN METABOLIC PANEL: CPT

## 2017-05-04 PROCEDURE — 99233 SBSQ HOSP IP/OBS HIGH 50: CPT | Mod: ,,, | Performed by: INTERNAL MEDICINE

## 2017-05-04 PROCEDURE — 63600175 PHARM REV CODE 636 W HCPCS: Performed by: EMERGENCY MEDICINE

## 2017-05-04 PROCEDURE — 25000003 PHARM REV CODE 250: Performed by: EMERGENCY MEDICINE

## 2017-05-04 PROCEDURE — 25000003 PHARM REV CODE 250: Performed by: INTERNAL MEDICINE

## 2017-05-04 PROCEDURE — 99232 SBSQ HOSP IP/OBS MODERATE 35: CPT | Mod: ,,, | Performed by: PSYCHIATRY & NEUROLOGY

## 2017-05-04 PROCEDURE — G8979 MOBILITY GOAL STATUS: HCPCS | Mod: CH

## 2017-05-04 PROCEDURE — 85730 THROMBOPLASTIN TIME PARTIAL: CPT

## 2017-05-04 PROCEDURE — 36415 COLL VENOUS BLD VENIPUNCTURE: CPT

## 2017-05-04 PROCEDURE — 25000003 PHARM REV CODE 250: Performed by: HOSPITALIST

## 2017-05-04 PROCEDURE — G8978 MOBILITY CURRENT STATUS: HCPCS | Mod: CJ

## 2017-05-04 PROCEDURE — 97161 PT EVAL LOW COMPLEX 20 MIN: CPT

## 2017-05-04 PROCEDURE — C9113 INJ PANTOPRAZOLE SODIUM, VIA: HCPCS | Performed by: EMERGENCY MEDICINE

## 2017-05-04 PROCEDURE — 82550 ASSAY OF CK (CPK): CPT

## 2017-05-04 RX ORDER — SODIUM CHLORIDE 450 MG/100ML
INJECTION, SOLUTION INTRAVENOUS CONTINUOUS
Status: DISCONTINUED | OUTPATIENT
Start: 2017-05-04 | End: 2017-05-06

## 2017-05-04 RX ADMIN — SODIUM CHLORIDE: 0.45 INJECTION, SOLUTION INTRAVENOUS at 11:05

## 2017-05-04 RX ADMIN — METOPROLOL TARTRATE 25 MG: 25 TABLET, FILM COATED ORAL at 09:05

## 2017-05-04 RX ADMIN — ASPIRIN 81 MG: 81 TABLET, COATED ORAL at 08:05

## 2017-05-04 RX ADMIN — HEPARIN SODIUM AND DEXTROSE 16 UNITS/KG/HR: 10000; 5 INJECTION INTRAVENOUS at 05:05

## 2017-05-04 RX ADMIN — DEXTROSE MONOHYDRATE: 5 INJECTION, SOLUTION INTRAVENOUS at 05:05

## 2017-05-04 RX ADMIN — METOPROLOL TARTRATE 25 MG: 25 TABLET, FILM COATED ORAL at 08:05

## 2017-05-04 RX ADMIN — CALCIUM CARBONATE (ANTACID) CHEW TAB 500 MG 500 MG: 500 CHEW TAB at 05:05

## 2017-05-04 RX ADMIN — POLYETHYLENE GLYCOL 3350 17 G: 17 POWDER, FOR SOLUTION ORAL at 08:05

## 2017-05-04 RX ADMIN — SEVELAMER CARBONATE 1600 MG: 800 TABLET, FILM COATED ORAL at 11:05

## 2017-05-04 RX ADMIN — SEVELAMER CARBONATE 1600 MG: 800 TABLET, FILM COATED ORAL at 05:05

## 2017-05-04 RX ADMIN — SEVELAMER CARBONATE 1600 MG: 800 TABLET, FILM COATED ORAL at 08:05

## 2017-05-04 RX ADMIN — PANTOPRAZOLE SODIUM 40 MG: 40 INJECTION, POWDER, FOR SOLUTION INTRAVENOUS at 08:05

## 2017-05-04 NOTE — SUBJECTIVE & OBJECTIVE
Interval History: Pt reports she is now able to hear, generalized weakness, no acute events.    Review of Systems   Constitutional: Negative for chills and fever.   HENT: Negative for trouble swallowing.    Respiratory: Negative for shortness of breath.    Cardiovascular: Negative for chest pain.   Gastrointestinal: Negative for nausea and vomiting.     Objective:     Vital Signs (Most Recent):  Temp: 98.6 °F (37 °C) (05/04/17 0739)  Pulse: 64 (05/04/17 0851)  Resp: 18 (05/04/17 0739)  BP: 130/84 (05/04/17 0739)  SpO2: (!) 93 % (05/04/17 0739) Vital Signs (24h Range):  Temp:  [97.7 °F (36.5 °C)-98.6 °F (37 °C)] 98.6 °F (37 °C)  Pulse:  [59-72] 64  Resp:  [18-19] 18  SpO2:  [92 %-98 %] 93 %  BP: (111-152)/(56-92) 130/84     Weight: (!) 138.8 kg (305 lb 14.4 oz)  Body mass index is 41.49 kg/(m^2).    Intake/Output Summary (Last 24 hours) at 05/04/17 0930  Last data filed at 05/04/17 0700   Gross per 24 hour   Intake             4297 ml   Output              275 ml   Net             4022 ml      Physical Exam   Constitutional: She is oriented to person, place, and time. She appears well-developed and well-nourished.   Eyes: EOM are normal.   Neck: Normal range of motion.   Cardiovascular: Normal rate and regular rhythm.    Pulmonary/Chest: Effort normal and breath sounds normal.   Abdominal: Soft. Bowel sounds are normal.   Musculoskeletal: Normal range of motion.   Neurological: She is alert and oriented to person, place, and time.   UE 4/5 and LE 5/5 strength.   Skin: Skin is warm and dry.   Psychiatric: She has a normal mood and affect.   Vitals reviewed.      Significant Labs: All pertinent labs within the past 24 hours have been reviewed.    Significant Imaging: I have reviewed and interpreted all pertinent imaging results/findings within the past 24 hours.

## 2017-05-04 NOTE — PROGRESS NOTES
Dr. Justin informed patient output this shift 50cc and patient is currently on 1/2 NS at 75cc/hr.

## 2017-05-04 NOTE — PT/OT/SLP EVAL
Physical Therapy  Evaluation    Shireen Anton   MRN: [id number]   Admitting Diagnosis: Acute hearing loss of both ears    PT Received On: 17  PT Start Time: 1056     PT Stop Time: 1118    PT Total Time (min): 22 min       Billable Minutes:  Evaluation 22 min    Diagnosis: Acute hearing loss of both ears  Acute CVA, NSTEMI    Past Medical History:   Diagnosis Date    Allergy     Depression     GERD (gastroesophageal reflux disease)       Past Surgical History:   Procedure Laterality Date    breast augmentation       SECTION      CHOLECYSTECTOMY      LAPAROSCOPIC GASTRIC BANDING  2017    ORIF HUMERUS FRACTURE         General Precautions: Standard, fall, hearing impaired, respiratory    Patient History:  Lives With: significant other, child(mckenzie), dependent  Living Arrangements: house (no concerns)  Transportation Available: car (Pt was driving PTA.)  Equipment Currently Used at Home: shower chair      Previous Level of Function:  Ambulation Skills: independent    Subjective:  Pt reported SOB sitting EOB without O2.    Chief Complaint: SOB and weakness  Patient goals: to get well    Pain Rating:  (Pt c/o soreness to abdomen from recent gastric sleeve sx on .)                    Objective:   Patient found with: glover catheter, oxygen 3L, peripheral IV, telemetry ([employer] monitor)       Follows Commands/attention: Follows multistep commands; Pt regaining hearing, although not 100% yet.   Communication: clear/fluent  Safety awareness/insight to disability: intact    Physical Exam:  Postural examination/scapula alignment: No postural abnormalities identified    Skin integrity: Visible skin intact  Edema: Mild abdomen and BLE    Sensation:   Intact  light/touch BLE      Lower Extremity Range of Motion:  Right Lower Extremity: WNL  Left Lower Extremity: WNL    Lower Extremity Strength:  Right Lower Extremity: 4+/5  Left Lower Extremity: 4+/5     Fine motor  coordination:  Intact    Gross motor coordination: WFL    Functional Mobility:  Bed Mobility:  Scooting/Bridging: Modified Independent  Supine to Sit: Modified Independent (HOB elevated)    Transfers:  Sit <> Stand Assistance: Modified Independent  Sit <> Stand Assistive Device: Rolling Walker    Gait:   Gait Distance: 50 ft with brief standing rest breaks; Gait limited 2* pt c/o SOB despite 3L O2 NC.  Pt also c/o weakness.  Assistance 1: Stand by Assistance  Gait Assistive Device: Rolling walker and 3L O2 NC  Gait Pattern: swing-through gait  Gait Deviation(s): decreased step length, decreased velocity of limb motion, increased time in double stance, decreased amish      Balance:   Static Sit: FAIR+: Able to take MINIMAL challenges from all directions  Dynamic Sit: FAIR+: Maintains balance through MINIMAL excursions of active trunk motion  Static Stand: FAIR+: Takes MINIMAL challenges from all directions  Dynamic stand: FAIR+: Needs CLOSE SUPERVISION during gait and is able to right self with minor LOB      AM-PAC 6 CLICK MOBILITY  How much help from another person does this patient currently need?   1 = Unable, Total/Dependent Assistance  2 = A lot, Maximum/Moderate Assistance  3 = A little, Minimum/Contact Guard/Supervision  4 = None, Modified [place]/Independent    Turning over in bed (including adjusting bedclothes, sheets and blankets)?: 4  Sitting down on and standing up from a chair with arms (e.g., wheelchair, bedside commode, etc.): 4  Moving from lying on back to sitting on the side of the bed?: 4  Moving to and from a bed to a chair (including a wheelchair)?: 3  Need to walk in hospital room?: 3  Climbing 3-5 steps with a railing?: 3  Total Score: 21     AM-PAC Raw Score CMS G-Code Modifier Level of Impairment Assistance   6 % Total / Unable   7 - 9 CM 80 - 100% Maximal Assist   10 - 14 CL 60 - 80% Moderate Assist   15 - 19 CK 40 - 60% Moderate Assist   20 - 22 CJ 20 - 40% Minimal Assist    23 CI 1-20% SBA / CGA   24 CH 0% Independent/ Mod I     Patient left up in chair with all lines intact, call button in reach, nurse Abdi notified and [employer] present.    Assessment:     Rehab identified problem list/impairments: Rehab identified problem list/impairments: weakness, impaired endurance, gait instability, impaired balance, decreased lower extremity function, impaired cardiopulmonary response to activity    Rehab potential is good.    Activity tolerance: Fair-    Discharge recommendations: Discharge Facility/Level Of Care Needs: home health PT (with family assistance)     Barriers to discharge: Barriers to Discharge: None    Equipment recommendations: Equipment Needed After Discharge:  (TBD)     GOALS:   Physical Therapy Goals        Problem: Physical Therapy Goal    Goal Priority Disciplines Outcome Goal Variances Interventions   Physical Therapy Goal     PT/OT, PT      Description:  Goals to be met by: 17     Patient will increase functional independence with mobility by performin. Supine to sit with Modified [place]  2. Rolling to Left and Right with Modified [place]  3. Sit to stand transfer with Modified [place]  4. Bed to chair transfer with Modified [place]  4. Gait  x 250 feet with Modified [place]   5. Lower extremity exercise program x 30 reps per handout, with independence                PLAN:    Patient to be seen 6 x/week (Mon-Fri; Sat or Sun) to address the above listed problems via gait training, therapeutic activities, therapeutic exercises  Plan of Care expires: 17  Plan of Care reviewed with: patient    Functional Assessment Tool Used: AM-PAC  Score: 21  Functional Limitation: Mobility: Walking and moving around  Mobility: Walking and Moving Around Current Status (): MEETA  Mobility: Walking and Moving Around Goal Status (): CARL Ruano, PT  2017

## 2017-05-04 NOTE — PLAN OF CARE
Problem: Physical Therapy Goal  Goal: Physical Therapy Goal  Goals to be met by: 17     Patient will increase functional independence with mobility by performin. Supine to sit with Modified [place]  2. Rolling to Left and Right with Modified [place]  3. Sit to stand transfer with Modified [place]  4. Bed to chair transfer with Modified [place]  4. Gait x 250 feet with Modified [place]   5. Lower extremity exercise program x 30 reps per handout, with independence  Please encourage and assist pt OOB>chair for all meals.

## 2017-05-04 NOTE — ASSESSMENT & PLAN NOTE
Continue heparin gtt and ASA, as per Cardiology, will need cath pending improvement of renal function.

## 2017-05-04 NOTE — ASSESSMENT & PLAN NOTE
Per IM/Renal  Creat rising  Agree with IVF  Avoid nephrotoxins  Will obviously hope to avoid cath until renal fxn normalizes

## 2017-05-04 NOTE — PLAN OF CARE
Problem: Patient Care Overview  Goal: Plan of Care Review  Outcome: Ongoing (interventions implemented as appropriate)  No falls this shift bed kept in low position call light and phone remain within reach bed alarm remain active. Patient able to reposition self in bed without assist and 1 person assist to and from chair. S/P surgery to abdomen 4/28/17     S/P gastric sleeve 4/27/17 possible multiple organ failure r/t gastric sleeve.   Gi consulted   Admit 5/2/17 AST 9,976/ ALT 3,430 as of 5/4/17 AST 1378/ ALT 1714  5/2/17 US of liver: Hepatomegaly with fatty change. Liver vasculature unremarkable.     5/1/17 non traumatic rhabdomyolysis   Admit 5/3/17 CPK greater 40,000 as of 5/4/17 35,289      Nephrology following  5/2/17 US of Kidney: Bilateral kidneys demonstrate abnormal elevated resistive indices which can be seen with acute or chronic medical kidney disease  Today 5/4/17 Ca 6.8 however, calcium carbonate d/c 5/4/17  Admit 5/1/17 BUN 34/ creatine 4.2 today 5/4/17 BUN 59/creatine 5.9  Patient Na bicarb IV and PO d/c 5/4/17 patient started on 1/2 NS at 45cc/hr.     Neurology consulted and following  Hearing loss noted however, hearing has improved.   5/2/17 MRI of brain: Findings concerning for small area of acute infarction involving the right cerebellar hemisphere.     Cardiology consulted  Highest troponin 16.176   5/2/17 EF 50%

## 2017-05-04 NOTE — PROGRESS NOTES
[facility] Hospital Medicine  Progress Note    Patient Name: Shireen Anton  MRN: [id number]  Patient Class: IP- Inpatient   Admission Date: 5/1/2017  Length of Stay: 3 days  Attending Physician: Gely Justin MD  Primary Care Provider: Jeancarlos Zamora MD        Subjective:     Principal Problem:Acute hearing loss of both ears    HPI:  Shireen Anton is a 47 y.o. female that (in part)  has a past medical history of Allergy; Depression; and GERD (gastroesophageal reflux disease). Presents to [facility] Emergency Department complaining of acute bilateral hearing loss.  Patient reports going to bed Sunday night and her last coherent memory was signing a check for her daughter's .  Monday morning her  reported that he had a hard time waking her up and said associated vigorously shake her because she appeared unresponsive.  She was disoriented and unable to hear.  She had a gastric sleeve on Thursday, April 27 that was uncomplicated.  During the perioperative period she reported to be normal and had no complaints other than some minor pain expected after surgery.  Throughout Friday, Saturday, and Sunday experienced no significant problems other than being increasingly thirsty.  She was given a liquid form of opioid medications which she was taking as prescribed, she states.  She continues to experience bilateral hearing loss that is constant.  Characterized by only hearing high-pitched sounds.  This is a first episode and a new problem for her.  No radiation of symptoms.  No relieving factors.  No exacerbating factors. In the emergency department routine laboratory studies, EKG, and cardiac enzymes were obtained.  There was concern for multiple organ dysfunction including markedly elevated troponin levels, acute renal failure, markedly elevated liver enzymes, and creatinine kinase levels greater than 40,000.  She denies significant nausea, vomiting, diarrhea, or  urination.  Denies any significant hypotensive episodes.  She does not recall anything occurring after Sunday night to the time of hospitalization.  She does report having some vaginal bleeding that started this afternoon.  She uses NuvaRing for contraception.    Hospital Course:  Ms. Anton was admitted for an acute CVA with hearing loss and MSOF with NSTEMI, acute renal failure, shock liver, and rhabdomyolysis likely from episode of prolonged hypotension possibly from narcotic use given post surgery for gastric sleave.  Pt seen by Neurology, Cardiology, Nephrology with slow clinical improvement.    Interval History: Pt reports she is now able to hear, generalized weakness, no acute events.    Review of Systems   Constitutional: Negative for chills and fever.   HENT: Negative for trouble swallowing.    Respiratory: Negative for shortness of breath.    Cardiovascular: Negative for chest pain.   Gastrointestinal: Negative for nausea and vomiting.     Objective:     Vital Signs (Most Recent):  Temp: 98.6 °F (37 °C) (05/04/17 0739)  Pulse: 64 (05/04/17 0851)  Resp: 18 (05/04/17 0739)  BP: 130/84 (05/04/17 0739)  SpO2: (!) 93 % (05/04/17 0739) Vital Signs (24h Range):  Temp:  [97.7 °F (36.5 °C)-98.6 °F (37 °C)] 98.6 °F (37 °C)  Pulse:  [59-72] 64  Resp:  [18-19] 18  SpO2:  [92 %-98 %] 93 %  BP: (111-152)/(56-92) 130/84     Weight: (!) 138.8 kg (305 lb 14.4 oz)  Body mass index is 41.49 kg/(m^2).    Intake/Output Summary (Last 24 hours) at 05/04/17 0930  Last data filed at 05/04/17 0700   Gross per 24 hour   Intake             4297 ml   Output              275 ml   Net             4022 ml      Physical Exam   Constitutional: She is oriented to person, place, and time. She appears well-developed and well-nourished.   Eyes: EOM are normal.   Neck: Normal range of motion.   Cardiovascular: Normal rate and regular rhythm.    Pulmonary/Chest: Effort normal and breath sounds normal.   Abdominal: Soft. Bowel sounds are normal.    Musculoskeletal: Normal range of motion.   Neurological: She is alert and oriented to person, place, and time.   UE 4/5 and LE 5/5 strength.   Skin: Skin is warm and dry.   Psychiatric: She has a normal mood and affect.   Vitals reviewed.      Significant Labs: All pertinent labs within the past 24 hours have been reviewed.    Significant Imaging: I have reviewed and interpreted all pertinent imaging results/findings within the past 24 hours.    Assessment/Plan:      Acute ischemic stroke  Seen on MRI, continue ASA, no statin at this time due to shock liver, Neurology following, PT/OT.  Will likely need placement. Hearing improved.    Metabolic encephalopathy  Resolved, due to MSOF.    * Acute hearing loss of both ears  Due to above, resolving.    NSTEMI (non-ST elevated myocardial infarction)  Continue heparin gtt and ASA, as per Cardiology, will need cath pending improvement of renal function.    Acute renal failure  Creatinine continues to increase, CPK trending down, as per Nephrology, monitor closely.    Non-traumatic rhabdomyolysis  Probable global hypotensive event causing MSOF, CPK now trending down, monitor.    Shock liver  Due to shock liver, LFTs trending down, monitor.    Morbid obesity  Status post gastric sleave, will consult nutrition consult soon.      VTE Risk Mitigation         Ordered     Medium Risk of VTE  Once      05/02/17 0012          Gely Justin MD  Department of Hospital Medicine   [facility]

## 2017-05-04 NOTE — ASSESSMENT & PLAN NOTE
Seen on MRI, continue ASA, no statin at this time due to shock liver, Neurology following, PT/OT.  Will likely need placement. Hearing improved.

## 2017-05-04 NOTE — PROGRESS NOTES
PT aaox4 with telemetry monitor in place with alarm o2 reapplied at 3 liters n/c Intravenous fluids were infusing to rt and lt forearm. Sodium bicarb in D5W infusing at 150 ml/hour and heparin infusion  Is infusing at 21.8 ml hour.iv sites are clear.no s/s of bleeding noted.glover catheter is patent and draining well tea colored urine no clots or sediment noted. Personal items are within reach and bed alarm is in place for safety.

## 2017-05-04 NOTE — PROGRESS NOTES
[facility]  Cardiology  Progress Note    Patient Name: Shireen Anton  MRN: [id number]  Admission Date: 2017  Hospital Length of Stay: 3 days  Code Status: Full Code   Attending Physician: Gely Justin MD   Primary Care Physician: Jeancarlos Zamora MD  Expected Discharge Date:   Principal Problem:Acute hearing loss of both ears    Subjective:     Hospital Course:   Pt had some chest discomfort overnight.  No sob.  Hearing improved.  Records from Dr. Deluna office reviewed.  Stress test referenced, but no reports available.  Not clear what type of test this was (i.e. ETT, MARLO, MPI?).    Tele: SR      Past Medical History:   Diagnosis Date    Allergy     Depression     GERD (gastroesophageal reflux disease)        Past Surgical History:   Procedure Laterality Date    breast augmentation       SECTION      CHOLECYSTECTOMY      LAPAROSCOPIC GASTRIC BANDING  2017    ORIF HUMERUS FRACTURE         Review of patient's allergies indicates:  No Known Allergies    No current facility-administered medications on file prior to encounter.      Current Outpatient Prescriptions on File Prior to Encounter   Medication Sig    cetirizine (ZYRTEC) 10 MG tablet Take 10 mg by mouth once daily.    citalopram (CELEXA) 40 MG tablet Take 1 tablet (40 mg total) by mouth once daily.    ergocalciferol (VITAMIN D2) 50,000 unit Cap Take 50,000 Units by mouth every 7 days.    gabapentin (NEURONTIN) 300 MG capsule Take 1 capsule (300 mg total) by mouth 3 (three) times daily.    omeprazole (PRILOSEC OTC) 20 MG tablet Take 20 mg by mouth once daily.    trazodone (DESYREL) 50 MG tablet TAKE ONE TABLET BY MOUTH IN THE EVENING    etonogestrel-ethinyl estradiol (NUVARING) 0.12-0.015 mg/24 hr vaginal ring Place 1 each vaginally every 28 days.    FLUVIRIN 3295-1010 45 mcg (15 mcg x 3)/0.5 mL Susp     FLUVIRIN 5199-3234 45 mcg (15 mcg x 3)/0.5 mL Susp ADM 0.5ML IM UTD     Family History      Problem Relation (Age of Onset)    Breast cancer Maternal Grandmother (63)    Cancer Mother (71), Sister (48)    Diabetes Father    Heart disease Father    Hypertension Father    Thyroid disease Mother        Social History Main Topics    Smoking status: Former Smoker     Packs/day: 1.00     Years: 16.00     Quit date: 11/25/2011    Smokeless tobacco: Never Used    Alcohol use Yes      Comment: occasional    Drug use: No    Sexual activity: Yes     Partners: Male     Birth control/ protection: Inserts     Review of Systems   Gastrointestinal: Negative for melena.   Genitourinary: Negative for hematuria.     Objective:     Vital Signs (Most Recent):  Temp: 98 °F (36.7 °C) (05/04/17 0519)  Pulse: 67 (05/04/17 0519)  Resp: 18 (05/04/17 0519)  BP: 135/77 (05/04/17 0519)  SpO2: 95 % (05/04/17 0519) Vital Signs (24h Range):  Temp:  [97.7 °F (36.5 °C)-98.4 °F (36.9 °C)] 98 °F (36.7 °C)  Pulse:  [63-84] 67  Resp:  [18-19] 18  SpO2:  [87 %-98 %] 95 %  BP: (111-152)/(56-92) 135/77     Weight: (!) 138.8 kg (305 lb 14.4 oz)  Body mass index is 41.49 kg/(m^2).    SpO2: 95 %  O2 Device (Oxygen Therapy): nasal cannula      Intake/Output Summary (Last 24 hours) at 05/04/17 0711  Last data filed at 05/04/17 0520   Gross per 24 hour   Intake             2421 ml   Output              275 ml   Net             2146 ml       Lines/Drains/Airways     Drain                 Urethral Catheter 05/02/17 1540 Straight-tip 1 day          Peripheral Intravenous Line                 Peripheral IV - Single Lumen 05/03/17 0107 Left Forearm 1 day         Peripheral IV - Single Lumen 05/03/17 0202 Right Forearm 1 day                Physical Exam   Constitutional: She is oriented to person, place, and time. She appears well-developed and well-nourished. No distress.   HENT:   Head: Normocephalic and atraumatic.   Mouth/Throat: No oropharyngeal exudate.   Eyes: Conjunctivae and EOM are normal. Pupils are equal, round, and reactive to light. No  scleral icterus.   Neck: Normal range of motion. Neck supple. No JVD present. No tracheal deviation present.   Cardiovascular: Normal rate, regular rhythm, S1 normal and S2 normal.  Exam reveals no gallop and no friction rub.    No murmur heard.  Pulmonary/Chest: Effort normal and breath sounds normal. No respiratory distress. She has no wheezes. She has no rales. She exhibits no tenderness.   Abdominal: Soft. Bowel sounds are normal. There is no tenderness.   obese   Musculoskeletal: Normal range of motion. She exhibits no edema.   Neurological: She is alert and oriented to person, place, and time. A cranial nerve deficit (Pt reports chr visual difficulty and acute hearing loss) is present.   Skin: Skin is warm and dry. She is not diaphoretic.   Psychiatric: She has a normal mood and affect. Her behavior is normal. Judgment normal.       Current Medications:   aspirin  81 mg Oral Daily    calcium carbonate  500 mg Oral Q8H    metoprolol tartrate  25 mg Oral BID    pantoprazole  40 mg Intravenous Daily    polyethylene glycol  17 g Oral Daily    sevelamer carbonate  1,600 mg Oral TID WM      heparin (porcine) in D5W 16 Units/kg/hr (05/04/17 0559)     heparin (PORCINE), heparin (PORCINE), morphine, ondansetron, oxycodone-acetaminophen, oxycodone-acetaminophen    Laboratory:  CBC:    Recent Labs  Lab 01/28/15  0746 05/01/17  1736 05/02/17  0145   WHITE BLOOD CELL COUNT 6.26 11.20 12.16   HEMOGLOBIN 13.9 13.9 12.5   HEMATOCRIT 39.3 41.9 37.1   PLATELETS 164 129 L 121 L  121 L       CHEMISTRIES:    Recent Labs  Lab 05/01/17  1736 05/02/17  0621 05/03/17  0607   GLUCOSE 116 H 139 H 146 H   SODIUM 143 139 137   POTASSIUM 5.5 H 4.7 4.1   BUN BLD 34 H 45 H 55 H   CREATININE 4.2 H 4.4 H 4.9 H   EGFR IF  14 A 13 A 12 A   EGFR IF NON- 12 A 11 A 10 A   CALCIUM 7.8 L 7.2 L 6.8 LL   MAGNESIUM 2.7 H  --  2.4       CARDIAC BIOMARKERS:    Recent Labs  Lab 05/01/17  1736 05/02/17  0023  05/02/17  0621 05/03/17  0607   CPK >57157 H  --  >82087 H  >13040 H >93220 H  >14798 H   CPK MB  --   --  >600.0 H 173.0 H   TROPONIN I 16.176 H 16.117 H 14.148 H  --        COAGS:    Recent Labs  Lab 05/02/17  0145 05/03/17  0607   INR 1.4 H 1.3 H       LIPIDS/LFTS:    Recent Labs  Lab 01/28/15  0746 05/01/17  1736 05/02/17  0621 05/03/17  0607   CHOLESTEROL 167  --  136 125   TRIGLYCERIDES 96  --  158 H 163 H   HDL 59  --  29 L 25 L   LDL CHOLESTEROL 88.8  --  75.4 67.4   NON-HDL CHOLESTEROL 108  --  107 100   AST 28 9471 H 9976 H 3385 H   ALT 42 3864 H 3430 H 2344 H     Lab Results   Component Value Date    TSH 0.474 05/02/2017           Diagnostic Results:  ECG (personally reviewed tracings):   5/1/17 1748 SR 96, IRBBB, NSSTTW changes, no prior tracing available    Chest X-Ray (personally reviewed image(s)): 5/1/17 NAD    Echo: 5/2/17 (images pers rev)    1 - TDS, poor endocardial definition.     2 - Low normal to mildly depressed left ventricular systolic function (EF 50-55%).     3 - No diagnostic wall motion abnormalities.     4 - Trivial tricuspid regurgitation.     5 - The estimated PA systolic pressure is greater than 27 mmHg.     Records requested from Dr. Deluna: EKG, echo, stress test reports (reviewed, but not particularly helpful).      Assessment and Plan:     NSTEMI (non-ST elevated myocardial infarction)  Trop rise is certainly concerning  EKG with nonspec changes  Cont med rx  Inc metoprolol 50mg bid  Agree with IV heparin while undergoing workup  Will consider eventual cath pending improvement in renal fxn  Check lipids, hold off on statin rx until LFTs normalize pending clinical course      Acute renal failure  Per IM/Renal  Creat rising  Agree with IVF  Avoid nephrotoxins  Will obviously hope to avoid cath until renal fxn normalizes    Non-traumatic rhabdomyolysis  ?new problem vs CPK elevation from recent surgery.    * Acute hearing loss of both ears  Improving  Per IM/neuro  ?Watershed  cerebellar CVA    Elevated liver enzymes  Presumed shock liver  Continue to monitor  GI following    Morbid obesity  S/P gastric sleeve on 4/27/17.      VTE Risk Mitigation         Ordered     Medium Risk of VTE  Once      05/02/17 0012          Kevin Sánchez MD  Cardiology  [facility]

## 2017-05-04 NOTE — SUBJECTIVE & OBJECTIVE
Past Medical History:   Diagnosis Date    Allergy     Depression     GERD (gastroesophageal reflux disease)        Past Surgical History:   Procedure Laterality Date    breast augmentation       SECTION      CHOLECYSTECTOMY      LAPAROSCOPIC GASTRIC BANDING  2017    ORIF HUMERUS FRACTURE         Review of patient's allergies indicates:  No Known Allergies    No current facility-administered medications on file prior to encounter.      Current Outpatient Prescriptions on File Prior to Encounter   Medication Sig    cetirizine (ZYRTEC) 10 MG tablet Take 10 mg by mouth once daily.    citalopram (CELEXA) 40 MG tablet Take 1 tablet (40 mg total) by mouth once daily.    ergocalciferol (VITAMIN D2) 50,000 unit Cap Take 50,000 Units by mouth every 7 days.    gabapentin (NEURONTIN) 300 MG capsule Take 1 capsule (300 mg total) by mouth 3 (three) times daily.    omeprazole (PRILOSEC OTC) 20 MG tablet Take 20 mg by mouth once daily.    trazodone (DESYREL) 50 MG tablet TAKE ONE TABLET BY MOUTH IN THE EVENING    etonogestrel-ethinyl estradiol (NUVARING) 0.12-0.015 mg/24 hr vaginal ring Place 1 each vaginally every 28 days.    FLUVIRIN 6717-2295 45 mcg (15 mcg x 3)/0.5 mL Susp     FLUVIRIN 4227-1954 45 mcg (15 mcg x 3)/0.5 mL Susp ADM 0.5ML IM UTD     Family History     Problem Relation (Age of Onset)    Breast cancer Maternal Grandmother (63)    Cancer Mother (71), Sister (48)    Diabetes Father    Heart disease Father    Hypertension Father    Thyroid disease Mother        Social History Main Topics    Smoking status: Former Smoker     Packs/day: 1.00     Years: 16.00     Quit date: 2011    Smokeless tobacco: Never Used    Alcohol use Yes      Comment: occasional    Drug use: No    Sexual activity: Yes     Partners: Male     Birth control/ protection: Inserts     Review of Systems   Gastrointestinal: Negative for melena.   Genitourinary: Negative for hematuria.     Objective:      Vital Signs (Most Recent):  Temp: 98 °F (36.7 °C) (05/04/17 0519)  Pulse: 67 (05/04/17 0519)  Resp: 18 (05/04/17 0519)  BP: 135/77 (05/04/17 0519)  SpO2: 95 % (05/04/17 0519) Vital Signs (24h Range):  Temp:  [97.7 °F (36.5 °C)-98.4 °F (36.9 °C)] 98 °F (36.7 °C)  Pulse:  [63-84] 67  Resp:  [18-19] 18  SpO2:  [87 %-98 %] 95 %  BP: (111-152)/(56-92) 135/77     Weight: (!) 138.8 kg (305 lb 14.4 oz)  Body mass index is 41.49 kg/(m^2).    SpO2: 95 %  O2 Device (Oxygen Therapy): nasal cannula      Intake/Output Summary (Last 24 hours) at 05/04/17 0711  Last data filed at 05/04/17 0520   Gross per 24 hour   Intake             2421 ml   Output              275 ml   Net             2146 ml       Lines/Drains/Airways     Drain                 Urethral Catheter 05/02/17 1540 Straight-tip 1 day          Peripheral Intravenous Line                 Peripheral IV - Single Lumen 05/03/17 0107 Left Forearm 1 day         Peripheral IV - Single Lumen 05/03/17 0202 Right Forearm 1 day                Physical Exam   Constitutional: She is oriented to person, place, and time. She appears well-developed and well-nourished. No distress.   HENT:   Head: Normocephalic and atraumatic.   Mouth/Throat: No oropharyngeal exudate.   Eyes: Conjunctivae and EOM are normal. Pupils are equal, round, and reactive to light. No scleral icterus.   Neck: Normal range of motion. Neck supple. No JVD present. No tracheal deviation present.   Cardiovascular: Normal rate, regular rhythm, S1 normal and S2 normal.  Exam reveals no gallop and no friction rub.    No murmur heard.  Pulmonary/Chest: Effort normal and breath sounds normal. No respiratory distress. She has no wheezes. She has no rales. She exhibits no tenderness.   Abdominal: Soft. Bowel sounds are normal. There is no tenderness.   obese   Musculoskeletal: Normal range of motion. She exhibits no edema.   Neurological: She is alert and oriented to person, place, and time. A cranial nerve deficit (Pt  reports chr visual difficulty and acute hearing loss) is present.   Skin: Skin is warm and dry. She is not diaphoretic.   Psychiatric: She has a normal mood and affect. Her behavior is normal. Judgment normal.       Current Medications:   aspirin  81 mg Oral Daily    calcium carbonate  500 mg Oral Q8H    metoprolol tartrate  25 mg Oral BID    pantoprazole  40 mg Intravenous Daily    polyethylene glycol  17 g Oral Daily    sevelamer carbonate  1,600 mg Oral TID WM      heparin (porcine) in D5W 16 Units/kg/hr (05/04/17 0559)     heparin (PORCINE), heparin (PORCINE), morphine, ondansetron, oxycodone-acetaminophen, oxycodone-acetaminophen    Laboratory:  CBC:    Recent Labs  Lab 01/28/15  0746 05/01/17 1736 05/02/17  0145   WHITE BLOOD CELL COUNT 6.26 11.20 12.16   HEMOGLOBIN 13.9 13.9 12.5   HEMATOCRIT 39.3 41.9 37.1   PLATELETS 164 129 L 121 L  121 L       CHEMISTRIES:    Recent Labs  Lab 05/01/17 1736 05/02/17 0621 05/03/17  0607   GLUCOSE 116 H 139 H 146 H   SODIUM 143 139 137   POTASSIUM 5.5 H 4.7 4.1   BUN BLD 34 H 45 H 55 H   CREATININE 4.2 H 4.4 H 4.9 H   EGFR IF  14 A 13 A 12 A   EGFR IF NON- 12 A 11 A 10 A   CALCIUM 7.8 L 7.2 L 6.8 LL   MAGNESIUM 2.7 H  --  2.4       CARDIAC BIOMARKERS:    Recent Labs  Lab 05/01/17  1736 05/02/17  0023 05/02/17 0621 05/03/17  0607   CPK >91039 H  --  >09719 H  >88247 H >33925 H  >73935 H   CPK MB  --   --  >600.0 H 173.0 H   TROPONIN I 16.176 H 16.117 H 14.148 H  --        COAGS:    Recent Labs  Lab 05/02/17  0145 05/03/17  0607   INR 1.4 H 1.3 H       LIPIDS/LFTS:    Recent Labs  Lab 01/28/15  0746 05/01/17  1736 05/02/17  0621 05/03/17  0607   CHOLESTEROL 167  --  136 125   TRIGLYCERIDES 96  --  158 H 163 H   HDL 59  --  29 L 25 L   LDL CHOLESTEROL 88.8  --  75.4 67.4   NON-HDL CHOLESTEROL 108  --  107 100   AST 28 9471 H 9976 H 3385 H   ALT 42 3864 H 3430 H 2344 H     Lab Results   Component Value Date    TSH 0.474 05/02/2017            Diagnostic Results:  ECG (personally reviewed tracings):   5/1/17 1748 SR 96, IRBBB, NSSTTW changes, no prior tracing available    Chest X-Ray (personally reviewed image(s)): 5/1/17 NAD    Echo: 5/2/17 (images pers rev)    1 - TDS, poor endocardial definition.     2 - Low normal to mildly depressed left ventricular systolic function (EF 50-55%).     3 - No diagnostic wall motion abnormalities.     4 - Trivial tricuspid regurgitation.     5 - The estimated PA systolic pressure is greater than 27 mmHg.     Records requested from Dr. Deluna: EKG, echo, stress test reports (reviewed, but not particularly helpful).

## 2017-05-04 NOTE — PROGRESS NOTES
Shireen Anton is a 47 y.o. female patient.    Follow for GASPER, rhabdomyolysis    Reportedly feeling better  Hearing coming back  Comfortable    Scheduled Meds:   aspirin  81 mg Oral Daily    calcium carbonate  500 mg Oral Q8H    metoprolol tartrate  25 mg Oral BID    pantoprazole  40 mg Intravenous Daily    polyethylene glycol  17 g Oral Daily    sevelamer carbonate  1,600 mg Oral TID WM       Review of patient's allergies indicates:  No Known Allergies      Vital Signs Range (Last 24H):  Temp:  [97.7 °F (36.5 °C)-98.6 °F (37 °C)]   Pulse:  [59-72]   Resp:  [18-19]   BP: (111-152)/(56-92)   SpO2:  [92 %-98 %]     I & O (Last 24H):  Intake/Output Summary (Last 24 hours) at 05/04/17 1025  Last data filed at 05/04/17 0700   Gross per 24 hour   Intake             4297 ml   Output              275 ml   Net             4022 ml           Physical Exam:  General appearance: well developed, well nourished, no distress  Lungs:  clear to auscultation bilaterally and normal respiratory effort  Heart: regular rate and rhythm  Abdomen: soft, non-tender non-distented; bowel sounds normal; no masses,  no organomegaly  Extremities: no cyanosis or edema, or clubbing    Laboratory:  CBC:   Recent Labs  Lab 05/02/17  0145   WBC 12.16   RBC 4.05   HGB 12.5   HCT 37.1   *  121*   MCV 92   MCH 30.9   MCHC 33.7     CMP:   Recent Labs  Lab 05/04/17  0449   *   CALCIUM 6.8*   ALBUMIN 2.8*   PROT 5.7*      K 3.2*   CO2 35*   CL 90*   BUN 59*   CREATININE 5.9*   ALKPHOS 84   ALT 1714*   AST 1378*   BILITOT 0.9       Imp/Plan    GASPER - ATN, creatinine worse  Rhabdomyolysis  Elevated LFT - ? Shock liver vs ischemia  Pulmonary HTN    D/c oral bicarb  IVF w/ 1/2 NS  CMP in am      Trac T Le  5/4/2017

## 2017-05-04 NOTE — PROGRESS NOTES
[facility]  Neurology  Progress Note    Patient Name: Shireen Anton  MRN: [id number]  Admission Date: 5/1/2017  Hospital Length of Stay: 3 days  Code Status: Full Code   Attending Provider: Gely Justin MD  Primary Care Physician: Jeancarlos Zamora MD   Principal Problem:Acute hearing loss of both ears    Subjective:     Interval History: 46 y/o female with medical Hx as listed below admitted for acute mental status changes and hearing loss. Ms. Anton came to ED after reported two days of confusion with sudden hearing loss yesterday. Her significant other in the room states that she has gastric sleeve surgery almost one week priors with no complications. It was noted on Sunday that pt was speaking incoherently then was difficult to arouse Monday morning. Upon awakening pt complained of hearing loss, bilaterally. In ED pt was found to be in multiorgan failure (kidneys, liver, heart). Today pt is more coherent and hearing has slightly improved. She does state that has been taking pain meds and dose was recently increased. Denies illicit drug use. No lateralized weakness, numbness, visual or speech disturbances.     -5/3/17: Pt reports feeling better today. Hearing improving as well as mentation.    -5/4/17: hearing continues to improve. Pt able to hear low tone sounds. No muscle pain.    Current Neurological Medications:     Current Facility-Administered Medications   Medication Dose Route Frequency Provider Last Rate Last Dose    aspirin EC tablet 81 mg  81 mg Oral Daily Kevin Sánchez MD   81 mg at 05/04/17 0851    calcium carbonate 200 mg calcium (500 mg) chewable tablet 500 mg  500 mg Oral Q8H Cheo Wilks MD   500 mg at 05/04/17 0519    heparin 25,000 units in dextrose 5% 250 mL (100 units/mL) bolus from bag; PRN BOLUS  70 Units/kg Intravenous PRN Kevin Crisostomo MD   9,527 Units at 05/02/17 0258    heparin 25,000 units in dextrose 5% 250 mL (100 units/mL) bolus from bag; PRN  BOLUS  35 Units/kg Intravenous PRN Kevin Crisostomo MD        heparin 25,000 units in dextrose 5% 250 mL (100 units/mL) infusion; FEMALE  16 Units/kg/hr Intravenous Continuous Kevin Crisostomo MD 21.8 mL/hr at 05/04/17 0559 16 Units/kg/hr at 05/04/17 0559    metoprolol tartrate (LOPRESSOR) tablet 25 mg  25 mg Oral BID Kevin Sánchez MD   25 mg at 05/04/17 0851    morphine injection 5 mg  5 mg Intravenous Q4H PRN Kevin Crisostomo MD        ondansetron injection 8 mg  8 mg Intravenous Q12H PRN Kevin Crisostomo MD        oxycodone-acetaminophen  mg per tablet 1 tablet  1 tablet Oral Q6H PRN Kevin Crisostomo MD        oxycodone-acetaminophen 5-325 mg per tablet 1 tablet  1 tablet Oral Q6H PRN Kevin Crisostomo MD   1 tablet at 05/02/17 1958    pantoprazole injection 40 mg  40 mg Intravenous Daily Susana Clarke MD   40 mg at 05/04/17 0852    polyethylene glycol packet 17 g  17 g Oral Daily Susana Clarke MD   17 g at 05/04/17 0851    sevelamer carbonate tablet 1,600 mg  1,600 mg Oral TID WM Mateo Villanueva MD   1,600 mg at 05/04/17 0851       Review of Systems   Constitutional: Negative for chills and fever.   HENT: Positive for hearing loss. Negative for tinnitus and trouble swallowing.   Eyes: Negative for photophobia and visual disturbance.   Respiratory: Negative for shortness of breath.   Cardiovascular: Negative for chest pain and palpitations.   Gastrointestinal: Negative for abdominal pain.   Endocrine: Negative for cold intolerance and heat intolerance.   Genitourinary: Negative for dysuria.   Musculoskeletal: Negative for back pain and gait problem.   Neurological: Negative for tremors, seizures, syncope, facial asymmetry, speech difficulty, weakness, light-headedness, numbness, or headaches.     Objective:     Vital Signs (Most Recent):  Temp: 98.6 °F (37 °C) (05/04/17 0739)  Pulse: 64 (05/04/17 0851)  Resp: 18 (05/04/17 0739)  BP: 130/84 (05/04/17  0739)  SpO2: (!) 93 % (05/04/17 0739) Vital Signs (24h Range):  Temp:  [97.7 °F (36.5 °C)-98.6 °F (37 °C)] 98.6 °F (37 °C)  Pulse:  [59-72] 64  Resp:  [18-19] 18  SpO2:  [92 %-98 %] 93 %  BP: (111-152)/(56-92) 130/84     Weight: (!) 138.8 kg (305 lb 14.4 oz)  Body mass index is 41.49 kg/(m^2).    Physical Exam  General: well developed, well nourished, appears stated age, no distress  Head: normocephalic, erythema in nasal and  Eyes: conjunctivae/corneas clear.  Nose/Ears: no discharge, no epistaxis; normal tympanic membrane  Neck: no adenopathy and no carotid bruit  Heart: regular rate and rhythm.  Abdomen: non-distended and bowel sounds normal.  Extremities: no cyanosis or edema  Skin: no rash  Neurologic: Mental status: alert; oriented to person, place, time; following commands  Cranial nerves: pupils are round at 3 mm and reactive to light; EOMI; no facial asymmetry; tongue protrudes midline;   Strength: 4/5 in proximal limbs; distal 4+/5 strength in four extremities  No sensory deficits       Significant Labs:   CBC: No results for input(s): WBC, HGB, HCT, PLT in the last 48 hours.  CMP:   Recent Labs  Lab 05/03/17  0607 05/04/17  0449   * 111*    139   K 4.1 3.2*   CL 98 90*   CO2 23 35*   BUN 55* 59*   CREATININE 4.9* 5.9*   CALCIUM 6.8* 6.8*   MG 2.4  --    PROT 6.0 5.7*   ALBUMIN 2.8* 2.8*   BILITOT 0.9 0.9   ALKPHOS 85 84   AST 3385* 1378*   ALT 2344* 1714*   ANIONGAP 16 14   EGFRNONAA 10* 8*     Inflammatory Markers: No results for input(s): SEDRATE, CRP, PROCAL in the last 48 hours.      Assessment and Plan:     48 y/o female consulted for hearing loss and AMS      1. Hearing loss and AMS: suspect that her presentation including multiorgan failure is the result of a global ischemic event causing an encephalopathy. Pt came hypotensive to ED and it's unknown for how long she was like that. Uncertain if an unknown illicit substance is playing a role but pt denies.  -Improving mental status and  hearing. Liver enzymes trending down, renal function has worsned. Renal on case.  CK beginning to decrease. Will continue to monitor.      2. Stroke: as above MRI showed a small are of infarction on right cerebellum. This area seems to be in a watershed zone possibly correlating with the theory of a global ischemic event. On focal findings from this infarction.  -For now no other interventions.     Active Diagnoses:    Diagnosis Date Noted POA    PRINCIPAL PROBLEM:  Acute hearing loss of both ears [H91.93] 05/01/2017 Yes    Acute ischemic stroke [I63.9] 05/03/2017 Yes    Metabolic encephalopathy [G93.41] 05/03/2017 Yes    NSTEMI (non-ST elevated myocardial infarction) [I21.4] 05/02/2017 Yes    Elevated liver enzymes [R74.8] 05/02/2017 Yes    Acute renal failure [N17.9] 05/02/2017 Yes    Morbid obesity [E66.01] 05/02/2017 Yes    Non-traumatic rhabdomyolysis [M62.82] 05/02/2017 Yes      Problems Resolved During this Admission:    Diagnosis Date Noted Date Resolved POA       VTE Risk Mitigation         Ordered     Medium Risk of VTE  Once      05/02/17 0012          Abhishek Harvey MD  Neurology  [facility]

## 2017-05-04 NOTE — PROGRESS NOTES
Chief Complaint / Reason for Consult: Abnormal LFT's    No acute events overnight    ROS: No CP, SOB, F/C    Patient Vitals for the past 24 hrs:   BP Temp Temp src Pulse Resp SpO2 Weight   05/04/17 1140 121/76 98.6 °F (37 °C) Oral (!) 58 16 98 % -   05/04/17 0851 - - - 64 - - -   05/04/17 0803 - - - (!) 59 - - -   05/04/17 0739 130/84 98.6 °F (37 °C) Oral (!) 59 18 (!) 93 % -   05/04/17 0520 - - - - - - (!) 138.8 kg (305 lb 14.4 oz)   05/04/17 0519 135/77 98 °F (36.7 °C) Oral 67 18 95 % -   05/04/17 0056 (!) 152/81 98.2 °F (36.8 °C) Oral 63 19 96 % (!) 140.2 kg (309 lb)   05/03/17 2210 131/79 - - 66 - - -   05/03/17 2122 (!) 134/92 98.3 °F (36.8 °C) Oral 67 19 (!) 92 % -   05/03/17 1920 - - - 68 - - -   05/03/17 1644 131/77 98.4 °F (36.9 °C) Oral 64 18 (!) 94 % -       Physical Exam:  Gen - Well developed, well nourished, no apparent distress  HEENT - Anicteric  CV - S1, S2, no murmurs/rubs  Lungs - CTA-B, normal excursion  Abd - Soft, NT, ND, normal BS's, no HSM.  Ext - No c/c/e  Neuro - No asterixis    Labs:    Recent Labs  Lab 05/04/17  0449   CALCIUM 6.8*   PROT 5.7*      K 3.2*   CO2 35*   CL 90*   BUN 59*   CREATININE 5.9*   ALKPHOS 84   ALT 1714*   AST 1378*   BILITOT 0.9       Recent Labs  Lab 05/04/17  0449   APTT 57.8*  57.8*     CPK 09106  Viral Hep serologies - Negative A/B/C    Imaging:  Reviewed U/S    Assessment:  This patient is a 47 y.o. female with:   1. Abnormal LFT's - consistent with Rhabdo +/- Hypotension/shock liver.  Improving.  Negative Viral Hep serologies, U/S shows no significant abnormalities.    2. CVA, NSTEMI, CKD, B-hearing loss....    Recommendations:  1. Continue with supportive care, IVF's.  2. Follow LFT's, INR.  3. Will follow with you.

## 2017-05-04 NOTE — ASSESSMENT & PLAN NOTE
Trop rise is certainly concerning  EKG with nonspec changes  Cont med rx  Inc metoprolol 50mg bid  Agree with IV heparin while undergoing workup  Will consider eventual cath pending improvement in renal fxn  Check lipids, hold off on statin rx until LFTs normalize pending clinical course

## 2017-05-04 NOTE — PLAN OF CARE
Problem: Fluid Volume Deficit (Adult)  Intervention: Monitor/Manage Hypovolemia    05/04/17 0625   Coping/Psychosocial Interventions   Environmental Support calm environment promoted   Nutrition Interventions   Fluid/Electrolyte Management electrolyte binding therapy initiated;intravenous fluid replacement initiated           Comments:   Pt continues with iv fluids sodium bicarb infusion at 150 ml hour and heparin is infusing at 21.8cc/hour.pt urinary out put 100ml for this shift.pt consumed 240ml  Po this shift.

## 2017-05-05 LAB
ALBUMIN SERPL BCP-MCNC: 2.7 G/DL
ALP SERPL-CCNC: 74 U/L
ALT SERPL W/O P-5'-P-CCNC: 1173 U/L
ANION GAP SERPL CALC-SCNC: 15 MMOL/L
APTT BLDCRRT: 60.4 SEC
AST SERPL-CCNC: 561 U/L
BILIRUB SERPL-MCNC: 1.1 MG/DL
BUN SERPL-MCNC: 72 MG/DL
CALCIUM SERPL-MCNC: 7.2 MG/DL
CHLORIDE SERPL-SCNC: 87 MMOL/L
CK SERPL-CCNC: ABNORMAL U/L
CO2 SERPL-SCNC: 33 MMOL/L
CREAT SERPL-MCNC: 6.9 MG/DL
EST. GFR  (AFRICAN AMERICAN): 8 ML/MIN/1.73 M^2
EST. GFR  (NON AFRICAN AMERICAN): 7 ML/MIN/1.73 M^2
GLUCOSE SERPL-MCNC: 101 MG/DL
POTASSIUM SERPL-SCNC: 3.1 MMOL/L
PROT SERPL-MCNC: 5.8 G/DL
SODIUM SERPL-SCNC: 135 MMOL/L
VIT B1 SERPL-MCNC: 85 UG/L (ref 38–122)

## 2017-05-05 PROCEDURE — 99232 SBSQ HOSP IP/OBS MODERATE 35: CPT | Mod: ,,, | Performed by: PSYCHIATRY & NEUROLOGY

## 2017-05-05 PROCEDURE — 25000003 PHARM REV CODE 250: Performed by: INTERNAL MEDICINE

## 2017-05-05 PROCEDURE — 25000003 PHARM REV CODE 250: Performed by: EMERGENCY MEDICINE

## 2017-05-05 PROCEDURE — 97116 GAIT TRAINING THERAPY: CPT

## 2017-05-05 PROCEDURE — 27000221 HC OXYGEN, UP TO 24 HOURS

## 2017-05-05 PROCEDURE — 85730 THROMBOPLASTIN TIME PARTIAL: CPT

## 2017-05-05 PROCEDURE — 36415 COLL VENOUS BLD VENIPUNCTURE: CPT

## 2017-05-05 PROCEDURE — 21400001 HC TELEMETRY ROOM

## 2017-05-05 PROCEDURE — 82550 ASSAY OF CK (CPK): CPT

## 2017-05-05 PROCEDURE — 97110 THERAPEUTIC EXERCISES: CPT

## 2017-05-05 PROCEDURE — 97530 THERAPEUTIC ACTIVITIES: CPT

## 2017-05-05 PROCEDURE — 63600175 PHARM REV CODE 636 W HCPCS: Performed by: EMERGENCY MEDICINE

## 2017-05-05 PROCEDURE — 80053 COMPREHEN METABOLIC PANEL: CPT

## 2017-05-05 PROCEDURE — 25000003 PHARM REV CODE 250: Performed by: HOSPITALIST

## 2017-05-05 PROCEDURE — C9113 INJ PANTOPRAZOLE SODIUM, VIA: HCPCS | Performed by: EMERGENCY MEDICINE

## 2017-05-05 PROCEDURE — 99232 SBSQ HOSP IP/OBS MODERATE 35: CPT | Mod: ,,, | Performed by: INTERNAL MEDICINE

## 2017-05-05 RX ORDER — PANTOPRAZOLE SODIUM 40 MG/1
40 TABLET, DELAYED RELEASE ORAL DAILY
Status: DISCONTINUED | OUTPATIENT
Start: 2017-05-06 | End: 2017-05-16 | Stop reason: HOSPADM

## 2017-05-05 RX ADMIN — SEVELAMER CARBONATE 1600 MG: 800 TABLET, FILM COATED ORAL at 08:05

## 2017-05-05 RX ADMIN — OXYCODONE HYDROCHLORIDE AND ACETAMINOPHEN 1 TABLET: 5; 325 TABLET ORAL at 08:05

## 2017-05-05 RX ADMIN — POLYETHYLENE GLYCOL 3350 17 G: 17 POWDER, FOR SOLUTION ORAL at 08:05

## 2017-05-05 RX ADMIN — METOPROLOL TARTRATE 25 MG: 25 TABLET, FILM COATED ORAL at 08:05

## 2017-05-05 RX ADMIN — SODIUM CHLORIDE: 0.45 INJECTION, SOLUTION INTRAVENOUS at 01:05

## 2017-05-05 RX ADMIN — HEPARIN SODIUM AND DEXTROSE 16 UNITS/KG/HR: 10000; 5 INJECTION INTRAVENOUS at 04:05

## 2017-05-05 RX ADMIN — ASPIRIN 81 MG: 81 TABLET, COATED ORAL at 08:05

## 2017-05-05 RX ADMIN — PANTOPRAZOLE SODIUM 40 MG: 40 INJECTION, POWDER, FOR SOLUTION INTRAVENOUS at 08:05

## 2017-05-05 RX ADMIN — SEVELAMER CARBONATE 1600 MG: 800 TABLET, FILM COATED ORAL at 12:05

## 2017-05-05 RX ADMIN — OXYCODONE HYDROCHLORIDE AND ACETAMINOPHEN 1 TABLET: 5; 325 TABLET ORAL at 01:05

## 2017-05-05 RX ADMIN — SODIUM CHLORIDE 75 ML/HR: 0.45 INJECTION, SOLUTION INTRAVENOUS at 12:05

## 2017-05-05 NOTE — NURSING
1752- patient tolerating iv heparin no active bleeding. Campos output low po intake is not so good she doesn't like her diet but understands why she is on a renal diet clear liquids with no sugar. Urine is dark donald color. Both iv sites are clean dry and intact. Shara system monitor in progress and bed alarm is on.

## 2017-05-05 NOTE — NURSING
1100- assisted patient to the chair for position changes tolerating well. Shara monitor in progress and call light is on. Tolerating iv fluids and heparin . Campos to gravity urine is dark donald color with rest cast to it. No acute changes on telemetry.       1420- medicated for pain while sitting in chair with low dose of percocet. Effective patient was getting tired of sitting up in chair assisted back to bed ; bed alarm activated. No changes on telemetry . Shara monitor in progress . Side rails and head of bed elevated.

## 2017-05-05 NOTE — PLAN OF CARE
Problem: Physical Therapy Goal  Goal: Physical Therapy Goal  Goals to be met by: 17     Patient will increase functional independence with mobility by performin. Supine to sit with Modified Eddy  2. Rolling to Left and Right with Modified Eddy  3. Sit to stand transfer with Modified Eddy  4. Bed to chair transfer with Modified Eddy  4. Gait x 250 feet with Modified Eddy   5. Lower extremity exercise program x 30 reps per handout, with independence   Outcome: Ongoing (interventions implemented as appropriate)  Continue with POC.

## 2017-05-05 NOTE — NURSING
0730- bedside rounding report received from sara herring rn on patients progress and updated hand off report sheet given too. Patient is awake and alert. Campos to gravity . Nurse sara reports patient had some maroon colored vaginal drainage minimal . Patient reports she removed the nova ring birth control device and was suppose to reinsert it the day she was hospitalized. No acute distress or needs.

## 2017-05-05 NOTE — PROGRESS NOTES
[facility]  Cardiology  Progress Note    Patient Name: Shireen Anton  MRN: [id number]  Admission Date: 5/1/2017  Hospital Length of Stay: 4 days  Code Status: Full Code   Attending Physician: Gely Justin MD   Primary Care Physician: Jeancarlos Zamora MD  Expected Discharge Date:   Principal Problem:Acute hearing loss of both ears    Subjective:     Hospital Course: Rhabdo/ARF    Interval History: No acute events.  Currently sleeping    Review of Systems   All other systems reviewed and are negative.    Objective:     Vital Signs (Most Recent):  Temp: 97.7 °F (36.5 °C) (05/05/17 0735)  Pulse: 61 (05/05/17 0735)  Resp: 14 (05/05/17 0735)  BP: (!) 167/89 (05/05/17 0735)  SpO2: 98 % (05/05/17 0735) Vital Signs (24h Range):  Temp:  [97 °F (36.1 °C)-98.6 °F (37 °C)] 97.7 °F (36.5 °C)  Pulse:  [57-63] 61  Resp:  [14-20] 14  SpO2:  [90 %-98 %] 98 %  BP: (121-176)/(72-90) 167/89     Weight: (!) 138.8 kg (305 lb 14.4 oz)  Body mass index is 41.49 kg/(m^2).    SpO2: 98 %  O2 Device (Oxygen Therapy): nasal cannula      Intake/Output Summary (Last 24 hours) at 05/05/17 0915  Last data filed at 05/05/17 0700   Gross per 24 hour   Intake           3063.2 ml   Output              200 ml   Net           2863.2 ml       Lines/Drains/Airways     Drain                 Urethral Catheter 05/02/17 1540 Straight-tip 2 days          Peripheral Intravenous Line                 Peripheral IV - Single Lumen 05/03/17 0107 Left Forearm 2 days         Peripheral IV - Single Lumen 05/03/17 0202 Right Forearm 2 days                Physical Exam   Constitutional: She appears well-developed and well-nourished.   Cardiovascular: Normal rate and regular rhythm.    Pulmonary/Chest: Effort normal and breath sounds normal.   Musculoskeletal: She exhibits no edema.       Significant Labs:   BMP:   Recent Labs  Lab 05/04/17  0449 05/05/17  0536   * 101    135*   K 3.2* 3.1*   CL 90* 87*   CO2 35* 33*   BUN 59* 72*    CREATININE 5.9* 6.9*   CALCIUM 6.8* 7.2*    and CBC No results for input(s): WBC, HGB, HCT, PLT in the last 48 hours.    Significant Imaging: Echocardiogram:   2D echo with color flow doppler:   Results for orders placed or performed during the hospital encounter of 05/01/17   2D echo with color flow doppler   Result Value Ref Range    EF 50 55 - 65    Diastolic Dysfunction No     Est. PA Systolic Pressure 26.63     Mitral Valve Mobility NORMAL     Tricuspid Valve Regurgitation TRIVIAL      Assessment and Plan:     Brief HPI:     Active Diagnoses:    Diagnosis Date Noted POA    PRINCIPAL PROBLEM:  Acute hearing loss of both ears [H91.93] 05/01/2017 Yes    Acute ischemic stroke [I63.9] 05/03/2017 Yes    Metabolic encephalopathy [G93.41] 05/03/2017 Yes    NSTEMI (non-ST elevated myocardial infarction) [I21.4] 05/02/2017 Yes    Shock liver [K72.00] 05/02/2017 Yes    Acute renal failure [N17.9] 05/02/2017 Yes    Morbid obesity [E66.01] 05/02/2017 Yes    Non-traumatic rhabdomyolysis [M62.82] 05/02/2017 Yes      Problems Resolved During this Admission:    Diagnosis Date Noted Date Resolved POA       VTE Risk Mitigation         Ordered     Medium Risk of VTE  Once      05/02/17 0012        NSTEMI (non-ST elevated myocardial infarction)  Trop rise is certainly concerning but likely 2nd to strain  EKG with nonspec changes  Cont med rx  Inc metoprolol 50mg bid  Agree with IV heparin while undergoing workup  Will consider eventual cath pending improvement in renal fxn  Check lipids, hold off on statin rx until LFTs normalize pending clinical course        Acute renal failure  Per IM/Renal  Creat rising  Agree with IVF  Avoid nephrotoxins  Will obviously hope to avoid cath until renal fxn normalizes     Non-traumatic rhabdomyolysis  ?new problem vs CPK elevation from recent surgery.     * Acute hearing loss of both ears  Improving  Per IM/neuro  ?Watershed cerebellar CVA     Elevated liver enzymes  Presumed shock  liver  Continue to monitor  GI following     Morbid obesity  S/P gastric sleeve on 4/27/17.    Pedro Miller MD  Cardiology  [facility] [place]

## 2017-05-05 NOTE — NURSING
1700- patient sleepy encouraged her to eat . Campos to gravity. Patient has oxygen on at via nasal canula at 3 liters. Head of bed elevated. Shara monitor in progress and bed alarm is on. No acute distress

## 2017-05-05 NOTE — PROGRESS NOTES
Gastroenterology    CC: Elevated LFTs    HPI 47 y.o. female with no reported pain this morning.  No N/V.  No bleeding.       Past Medical History:   Diagnosis Date    Allergy     Depression     GERD (gastroesophageal reflux disease)          Review of Systems  General ROS: negative for chills, fever   Cardiovascular ROS: no chest pain or dyspnea     Physical Examination  BP (!) 176/90 (BP Location: Right arm, Patient Position: Lying, BP Method: Automatic)  Pulse 60  Temp 97 °F (36.1 °C) (Oral)   Resp 18  Ht 6' (1.829 m)  Wt (!) 138.8 kg (305 lb 14.4 oz)  LMP 04/20/2017  SpO2 (!) 90%  Breastfeeding? No  BMI 41.49 kg/m2  General appearance: alert, cooperative, no distress  HENT: Normocephalic, atraumatic, neck symmetrical, no nasal discharge   Eyes: conjunctivae/corneas clear, no icterus, EOM's intact  Lungs: resp non-labored  Heart: regular rate   Abdomen: soft, non-tender; ND  Extremities: extremities symmetric; no clubbing, cyanosis, or edema  Neurologic: Alert and oriented X 3, MAEW    Assessment:     This patient is a 47 y.o. female with:   1. Abnormal LFT's - consistent with Rhabdo +/- Hypotension/shock liver. Improving. Negative Viral Hep serologies, U/S shows no significant abnormalities. INR maintained.   2. CVA, NSTEMI, CKD, B-hearing loss....    Plan:   - Will follow up on LFTs this AM    Addendum  AST/ALT continue to trend down.  INR down on last check as well.  Will sign off- call with questions.

## 2017-05-05 NOTE — NURSING
0811- assessment completed and plan of care reviewed with the patient . Explained purpose of dario remote monitor. Campos to gravity dark donald color. pericare given with feminine toilettes. Labeled heparin infusion going into the right forearm . Primary fluids into the left forearm. Head of bed elevated. Patient encouraged to eat and drink fluids.

## 2017-05-05 NOTE — PLAN OF CARE
Problem: Occupational Therapy Goal  Goal: Occupational Therapy Goal  Goals to be met by: 5/9/17     Patient will increase functional independence with ADLs by performing:    UE Dressing with Supervision.  Grooming while standing with Minimal Assistance.  Toileting from toilet with Supervision for hygiene and clothing management.   Toilet transfer to toilet with Supervision.  Upper extremity exercise program x20 reps per handout, with independence.   Outcome: Ongoing (interventions implemented as appropriate)  OT recommended  Home health OT

## 2017-05-05 NOTE — NURSING
0915- patient ate all of her breakfast meal tolerated well. Denies pain or needs when i ask her. She looks sleepy and she is wearing her oxygen at 2 liters with aqua ilan added.    0940- head of bed elevated dario monitor in progress patient dozing off to sleep. Continue to monitor.

## 2017-05-05 NOTE — PROGRESS NOTES
[facility]  Neurology  Progress Note    Patient Name: Shireen Anton  MRN: [id number]  Admission Date: 5/1/2017  Hospital Length of Stay: 4 days  Code Status: Full Code   Attending Provider: Gely Justin MD  Primary Care Physician: Jeancarlos Zamora MD   Principal Problem:Acute hearing loss of both ears    Subjective:     Interval History: 48 y/o female with medical Hx as listed below admitted for acute mental status changes and hearing loss. Ms. Anton came to ED after reported two days of confusion with sudden hearing loss yesterday. Her significant other in the room states that she has gastric sleeve surgery almost one week priors with no complications. It was noted on Sunday that pt was speaking incoherently then was difficult to arouse Monday morning. Upon awakening pt complained of hearing loss, bilaterally. In ED pt was found to be in multiorgan failure (kidneys, liver, heart). Today pt is more coherent and hearing has slightly improved. She does state that has been taking pain meds and dose was recently increased. Denies illicit drug use. No lateralized weakness, numbness, visual or speech disturbances.      -5/3/17: Pt reports feeling better today. Hearing improving as well as mentation.     -5/4/17: hearing continues to improve. Pt able to hear low tone sounds. No muscle pain.    -5/5/17: Ms. Anton with no new complaints. Hearing markedly improved.       Current Neurological Medications:     Current Facility-Administered Medications   Medication Dose Route Frequency Provider Last Rate Last Dose    0.45% NaCl infusion   Intravenous Continuous Trac SOY Gomez MD 75 mL/hr at 05/05/17 1321      aspirin EC tablet 81 mg  81 mg Oral Daily Kevin Sánchez MD   81 mg at 05/05/17 0811    heparin 25,000 units in dextrose 5% 250 mL (100 units/mL) bolus from bag; PRN BOLUS  70 Units/kg Intravenous PRN Kevin Crisostomo MD   9,527 Units at 05/02/17 0258    heparin 25,000 units in dextrose 5% 250 mL  (100 units/mL) bolus from bag; PRN BOLUS  35 Units/kg Intravenous PRN Kvein Crisostomo MD        heparin 25,000 units in dextrose 5% 250 mL (100 units/mL) infusion; FEMALE  16 Units/kg/hr Intravenous Continuous Kevin Crisostomo MD 21.8 mL/hr at 05/05/17 1626 16 Units/kg/hr at 05/05/17 1626    metoprolol tartrate (LOPRESSOR) tablet 25 mg  25 mg Oral BID Kevin Sánchez MD   25 mg at 05/05/17 0811    morphine injection 5 mg  5 mg Intravenous Q4H PRN Kevin Crisostomo MD        ondansetron injection 8 mg  8 mg Intravenous Q12H PRN Kevin Crisostomo MD        oxycodone-acetaminophen  mg per tablet 1 tablet  1 tablet Oral Q6H PRN Kevin Crisostomo MD        oxycodone-acetaminophen 5-325 mg per tablet 1 tablet  1 tablet Oral Q6H PRN Kevin Crisostomo MD   1 tablet at 05/05/17 1318    [START ON 5/6/2017] pantoprazole EC tablet 40 mg  40 mg Oral Daily Gely Justin MD        polyethylene glycol packet 17 g  17 g Oral Daily Susana Clarke MD   17 g at 05/05/17 0811       Review of Systems   Constitutional: Negative for chills and fever.   HENT: Positive for hearing loss. Negative for tinnitus and trouble swallowing.   Eyes: Negative for photophobia and visual disturbance.   Respiratory: Negative for shortness of breath.   Cardiovascular: Negative for chest pain and palpitations.   Gastrointestinal: Negative for abdominal pain.   Endocrine: Negative for cold intolerance and heat intolerance.   Genitourinary: Negative for dysuria.   Musculoskeletal: Negative for back pain and gait problem.   Neurological: Negative for tremors, seizures, syncope, facial asymmetry, speech difficulty, weakness, light-headedness, numbness, or headaches.     Objective:     Vital Signs (Most Recent):  Temp: 98.7 °F (37.1 °C) (05/05/17 1613)  Pulse: (!) 57 (05/05/17 1613)  Resp: 17 (05/05/17 1613)  BP: (!) 141/85 (05/05/17 1613)  SpO2: 96 % (05/05/17 1613) Vital Signs (24h Range):  Temp:  [97 °F (36.1 °C)-98.7 °F  (37.1 °C)] 98.7 °F (37.1 °C)  Pulse:  [56-63] 57  Resp:  [14-20] 17  SpO2:  [90 %-98 %] 96 %  BP: (132-176)/(72-90) 141/85     Weight: (!) 138.8 kg (305 lb 14.4 oz)  Body mass index is 41.49 kg/(m^2).    Physical Exam  General: well developed, well nourished, appears stated age, no distress  Head: normocephalic, erythema in nasal and  Eyes: conjunctivae/corneas clear.  Nose/Ears: no discharge, no epistaxis; normal tympanic membrane  Neck: no adenopathy and no carotid bruit  Heart: regular rate and rhythm.  Abdomen: non-distended and bowel sounds normal.  Extremities: no cyanosis or edema  Skin: no rash  Neurologic: Mental status: alert; oriented to person, place, time; following commands  Cranial nerves: pupils are round at 3 mm and reactive to light; EOMI; no facial asymmetry; tongue protrudes midline;   Strength: 4/5 in proximal limbs; distal 4+/5 strength in four extremities  No sensory deficits       Significant Labs:   CBC: No results for input(s): WBC, HGB, HCT, PLT in the last 48 hours.  CMP:   Recent Labs  Lab 05/04/17  0449 05/05/17  0536   * 101    135*   K 3.2* 3.1*   CL 90* 87*   CO2 35* 33*   BUN 59* 72*   CREATININE 5.9* 6.9*   CALCIUM 6.8* 7.2*   PROT 5.7* 5.8*   ALBUMIN 2.8* 2.7*   BILITOT 0.9 1.1*   ALKPHOS 84 74   AST 1378* 561*   ALT 1714* 1173*   ANIONGAP 14 15   EGFRNONAA 8* 7*           Assessment and Plan:     46 y/o female consulted for hearing loss and AMS      1. Hearing loss and AMS: suspect that her presentation including multiorgan failure is the result of a global ischemic event causing also encephalopathy.   -Improving mental status and hearing. Liver enzymes trending down, renal function has worsened. Renal on case.  CK continue to trend down Will continue to monitor.      2. Stroke: as above MRI showed a small are of infarction on right cerebellum. This area seems to be in a watershed zone possibly correlating with the theory of a global ischemic event. On focal findings  from this infarction.  -For now no other interventions.     Active Diagnoses:    Diagnosis Date Noted POA    PRINCIPAL PROBLEM:  Acute hearing loss of both ears [H91.93] 05/01/2017 Yes    Acute ischemic stroke [I63.9] 05/03/2017 Yes    Metabolic encephalopathy [G93.41] 05/03/2017 Yes    NSTEMI (non-ST elevated myocardial infarction) [I21.4] 05/02/2017 Yes    Shock liver [K72.00] 05/02/2017 Yes    Acute renal failure [N17.9] 05/02/2017 Yes    Morbid obesity [E66.01] 05/02/2017 Yes    Non-traumatic rhabdomyolysis [M62.82] 05/02/2017 Yes      Problems Resolved During this Admission:    Diagnosis Date Noted Date Resolved POA       VTE Risk Mitigation         Ordered     Medium Risk of VTE  Once      05/02/17 0012          Abhishek Harvey MD  Neurology  [facility]

## 2017-05-05 NOTE — PT/OT/SLP PROGRESS
Occupational Therapy  Treatment    Shireen Anton   MRN: [id number]   Admitting Diagnosis: Acute hearing loss of both ears    OT Date of Treatment: 17   OT Start Time: 810  OT Stop Time: 833  OT Total Time (min): 23 min    Billable Minutes:  Therapeutic Activity 23    General Precautions: Standard, fall, respiratory  Orthopedic Precautions: N/A  Braces: N/A         Subjective:  Communicated with nurse prior to session.    Pain Ratin/10      Pain Rating Post-Intervention: 0/10    Objective:  Patient found with: glover catheter, peripheral IV     Functional Mobility:  Bed Mobility:  Rolling/Turning to Left: Supervision  Rolling/Turning Right: Supervision  Scooting/Bridging: Supervision  Supine to Sit: Supervision  Sit to Supine: Supervision    Transfers:   Sit <> Stand Assistance: Stand By Assistance    Activities of Daily Living:  Feeding Level of Assistance: Modified independent  Grooming Position: Seated  Grooming Level of Assistance: Stand by assistance      Balance:   Static Sit: GOOD-: Takes MODERATE challenges from all directions but inconsistently  Dynamic Sit: GOOD-: Maintains balance through MODERATE excursions of active trunk movement,     Therapeutic Activities and Exercises:  3 set of 10 on each plane using green [employer]    -[facility] 6 CLICK ADL   How much help from another person does this patient currently need?   1 = Unable, Total/Dependent Assistance  2 = A lot, Maximum/Moderate Assistance  3 = A little, Minimum/Contact Guard/Supervision  4 = None, Modified [place]/Independent    Putting on and taking off regular lower body clothing? : 2  Bathing (including washing, rinsing, drying)?: 2  Toileting, which includes using toilet, bedpan, or urinal? : 3  Putting on and taking off regular upper body clothing?: 3  Taking care of personal grooming such as brushing teeth?: 3  Eating meals?: 3  Total Score: 16     AM-PAC Raw Score CMS G-Code Modifier Level of Impairment Assistance   6 %  Total / Unable   7 - 9 CM 80 - 100% Maximal Assist   10 - 14 CL 60 - 80% Moderate Assist   15 - 19 CK 40 - 60% Moderate Assist   20 - 22 CJ 20 - 40% Minimal Assist   23 CI 1-20% SBA / CGA   24 CH 0% Independent/ Mod I     Patient left supine with all lines intact and call button in reach    ASSESSMENT:  Shireen Anton is a 47 y.o. female with a medical diagnosis of Acute hearing loss of both ears and presents with inappropriate laughter during session. Pt laughs at the end of each sentence.    Rehab identified problem list/impairments: Rehab identified problem list/impairments: weakness, impaired endurance, impaired self care skills, impaired functional mobilty, impaired balance, impaired cardiopulmonary response to activity, other (comment)    Rehab potential is good.    Activity tolerance: Good    Discharge recommendations: Discharge Facility/Level Of Care Needs: home health OT     Barriers to discharge: Barriers to Discharge: None    Equipment recommendations:  (TBD)     GOALS:   Occupational Therapy Goals        Problem: Occupational Therapy Goal    Goal Priority Disciplines Outcome Interventions   Occupational Therapy Goal     OT, PT/OT Ongoing (interventions implemented as appropriate)    Description:  Goals to be met by: 5/9/17     Patient will increase functional independence with ADLs by performing:    UE Dressing with Supervision.  Grooming while standing with Minimal Assistance.  Toileting from toilet with Supervision for hygiene and clothing management.   Toilet transfer to toilet with Supervision.  Upper extremity exercise program x20 reps per handout, with independence.                Plan:  Patient to be seen 3 x/week to address the above listed problems via self-care/home management, therapeutic activities, therapeutic exercises  Plan of Care expires: 05/12/17  Plan of Care reviewed with: patient         Denia Heard OT  05/05/2017

## 2017-05-05 NOTE — PLAN OF CARE
Problem: Renal Failure/Kidney Injury, Acute (Adult)  Intervention: Prevent/Manage Infection    05/05/17 0452   Safety Interventions   Infection Management aseptic technique maintained   Prevent/Manage Colorectal Surgical Infection   Fever Reduction/Comfort Measures medication administered       Intervention: Monitor/Manage Fluid, Acid Base Balance    05/05/17 0452   Nutrition Interventions   Fluid/Electrolyte Management fluids provided;oral rehydration therapy initiated   Positioning   Body Position positioned/repositioned independently   Safety Interventions   Medication Review/Management medications reviewed       Intervention: Evaluate/Maintain Nutrition Support    05/05/17 0452   Coping/Psychosocial Interventions   Environmental Support calm environment promoted       Intervention: Monitor/Manage Anemia/Signs of Bleeding    05/05/17 0452   Safety Interventions   Bleeding Precautions blood pressure closely monitored;monitored for signs of bleeding       Intervention: Promote Energy Conservation    05/05/17 0452   Pain/Comfort/Sleep Interventions   Sleep/Rest Enhancement relaxation techniques promoted       Intervention: Provide Oxygenation/Ventilation/Perfusion Support    05/05/17 0452   Activity   Activity Type activity adjusted per tolerance;activity clustered for rest period;bedrest with commode   Positioning   Head of Bed (HOB) HOB at 60 degrees   Respiratory Interventions   Airway/Ventilation Management airway patency maintained;pulmonary hygiene promoted           Comments:   Pt continues to be monitor for renal injury. Iv fluids of 1/2 normal saline infusing at 75 ml /hour. Heparin infusing at 21.8ml/hour both sites are clear. Pt continues with bariatric renal diet no nausea or vomiting reported.labs bun and cr on 5/4 59 and 5.9. This morning labs are pending. Urinary output this shift is 150ml

## 2017-05-05 NOTE — PROGRESS NOTES
Shireen Anton is a 47 y.o. female patient.    Follow for GASPER, rhabdomyolysis    No new c/o, comfortable    Scheduled Meds:   aspirin  81 mg Oral Daily    metoprolol tartrate  25 mg Oral BID    [START ON 5/6/2017] pantoprazole  40 mg Oral Daily    polyethylene glycol  17 g Oral Daily    sevelamer carbonate  1,600 mg Oral TID WM       Review of patient's allergies indicates:  No Known Allergies      Vital Signs Range (Last 24H):  Temp:  [97 °F (36.1 °C)-98.6 °F (37 °C)]   Pulse:  [57-63]   Resp:  [14-20]   BP: (121-176)/(72-90)   SpO2:  [90 %-98 %]     I & O (Last 24H):  Intake/Output Summary (Last 24 hours) at 05/05/17 0919  Last data filed at 05/05/17 0700   Gross per 24 hour   Intake           3063.2 ml   Output              200 ml   Net           2863.2 ml           Physical Exam:  General appearance: well developed, well nourished, no distress  Lungs:  diminished breath sounds bilaterally  Heart: regular rate and rhythm  Abdomen: mildly distended, (+) bowel sounds  Extremities: edema (+)    Laboratory:  CBC:   Recent Labs  Lab 05/02/17  0145   WBC 12.16   RBC 4.05   HGB 12.5   HCT 37.1   *  121*   MCV 92   MCH 30.9   MCHC 33.7     CMP:   Recent Labs  Lab 05/05/17  0536      CALCIUM 7.2*   ALBUMIN 2.7*   PROT 5.8*   *   K 3.1*   CO2 33*   CL 87*   BUN 72*   CREATININE 6.9*   ALKPHOS 74   ALT 1173*   *   BILITOT 1.1*       Imp/Plan    GASPER - ATN, creatinine up, u/o still low  Rhabdomyolysis  Pulmonary HTN  Elevated LFT - improving    Continue current Rx  May need acute dialysis if creatinine trending up and u/o low  Watch renal function closely  Discussed with patient    Nito Gomez  5/5/2017

## 2017-05-05 NOTE — NURSING
"Pt called nsg.station with c/o of a  Light tingly feeling to her facial cheeks.pt [email or web address].jing, pt moves all extremities well,vital signs taken  See flow sheet,pt on telemetry monitor showing normal sinus rhythm.pt denies c/o of chest pains and no respiratory distress noted. After assessment completed pt stated "I may just be a little anxious and denied tingling to face. Iv fluids infusing to rt and lt fore arm .heparin is infusing at 21.8cc/hour for 16 units and 1/2 normal saline infusing at 75 ml hour.glover catheter patent with light pink color urine in catheter. Pt with break through menses the patient stated she needs to change her neuva ring contraceptive.pt personal items within reach ,bedside camera Princess is at the bedside for safety as well as bed alarm.  "

## 2017-05-05 NOTE — PT/OT/SLP PROGRESS
Physical Therapy  Treatment    Shireen Anton   MRN: [id number]   Admitting Diagnosis: Acute hearing loss of both ears    PT Received On: 05/05/17  PT Start Time: 1455     PT Stop Time: 1520    PT Total Time (min): 25 min       Billable Minutes:  Gait [id number] and Therapeutic Exercise 10    Treatment Type: Treatment  PT/PTA: PTA     PTA Visit Number: 1       General Precautions: Standard, fall, respiratory, hearing impaired  Orthopedic Precautions: N/A   Braces: N/A         Subjective:  Communicated with nurse Mckeon prior to session.  Pt agreeable to therapy.     Pain Rating: 3/10  Location - Side: Bilateral     Location: leg , BLE swollen .  Pain Addressed: Pre-medicate for activity, Cessation of Activity, Nurse notified  Pain Rating Post-Intervention: 3/10    Objective:   Patient found with: telemetry, oxygen, peripheral IV    Functional Mobility:  Bed Mobility:   Rolling/Turning Right: Modified independent  Scooting/Bridging: Modified Independent  Supine to Sit: Modified Independent  Sit to Supine: Modified Independent    Transfers: VC's for safety technique and walker management.   Sit <> Stand Assistance: Contact Guard Assistance  Sit <> Stand Assistive Device: Rolling Walker    Gait:   Gait Distance: ~ 80 ft with 3L O2 NC. Pt required mutiples standing rest breaks during gait training 2* fatigue .  VC's for safety technique and walker management.   Assistance 1: Contact Guard Assistance/SBA   Gait Assistive Device: Rolling walker  Gait Pattern: swing-through gait  Gait Deviation(s): decreased amish, decreased velocity of limb motion, decreased step length, decreased swing-to-stance ratio, decreased weight-shifting ability    Balance:   Static Sit: GOOD-: Takes MODERATE challenges from all directions but inconsistently  Dynamic Sit: GOOD-: Maintains balance through MODERATE excursions of active trunk movement,     Static Stand: FAIR+: Takes MINIMAL challenges from all directions  Dynamic stand: FAIR+: Needs  CLOSE SUPERVISION during gait and is able to right self with minor LOB     Therapeutic Activities and Exercises:  Pt performed seated BLE x 10 reps x 2 : heel/toes raises, LAQ, HS, hip flexion and hip ABD/ADD. VC's for proper technique and sequence. Pt tolerated well.      AM-PAC 6 CLICK MOBILITY  How much help from another person does this patient currently need?   1 = Unable, Total/Dependent Assistance  2 = A lot, Maximum/Moderate Assistance  3 = A little, Minimum/Contact Guard/Supervision  4 = None, Modified [place]/Independent    Turning over in bed (including adjusting bedclothes, sheets and blankets)?: 4  Sitting down on and standing up from a chair with arms (e.g., wheelchair, bedside commode, etc.): 3  Moving from lying on back to sitting on the side of the bed?: 4  Moving to and from a bed to a chair (including a wheelchair)?: 3  Need to walk in hospital room?: 3  Climbing 3-5 steps with a railing?: 3  Total Score: 20    AM-PAC Raw Score CMS G-Code Modifier Level of Impairment Assistance   6 % Total / Unable   7 - 9 CM 80 - 100% Maximal Assist   10 - 14 CL 60 - 80% Moderate Assist   15 - 19 CK 40 - 60% Moderate Assist   20 - 22 CJ 20 - 40% Minimal Assist   23 CI 1-20% SBA / CGA   24 CH 0% Independent/ Mod I     Patient left with bed in chair position with all lines intact, call button in reach, nurse Linda notified and [employer] monitor  present.    Assessment:  Shireen Anton is a 47 y.o. female with a medical diagnosis of Acute hearing loss of both ears and presents with weakness, decreased endurance and functional mobility. Pt will benefit form further skilled therapy in order to get back to OF.    Rehab identified problem list/impairments: Rehab identified problem list/impairments: weakness, impaired endurance, decreased lower extremity function, pain, decreased coordination, decreased ROM, edema, impaired cardiopulmonary response to activity    Rehab potential is good.    Activity tolerance:  Fair+    Discharge recommendations: Discharge Facility/Level Of Care Needs: home health PT (with family assistance)     Barriers to discharge:  none     Equipment recommendations:   TBD     GOALS:   Physical Therapy Goals        Problem: Physical Therapy Goal    Goal Priority Disciplines Outcome Goal Variances Interventions   Physical Therapy Goal     PT/OT, PT      Description:  Goals to be met by: 17     Patient will increase functional independence with mobility by performin. Supine to sit with Modified [place]  2. Rolling to Left and Right with Modified [place]  3. Sit to stand transfer with Modified [place]  4. Bed to chair transfer with Modified [place]  4. Gait  x 250 feet with Modified [place]   5. Lower extremity exercise program x 30 reps per handout, with independence                PLAN:    Patient to be seen 6 x/week (Mon-Fri; Sat or Sun)  to address the above listed problems via gait training, therapeutic activities, therapeutic exercises  Plan of Care expires: 17  Plan of Care reviewed with: patient         Virginia Fung, PTA  2017

## 2017-05-06 LAB
ALBUMIN SERPL BCP-MCNC: 2.6 G/DL
ALP SERPL-CCNC: 69 U/L
ALT SERPL W/O P-5'-P-CCNC: 798 U/L
ANION GAP SERPL CALC-SCNC: 14 MMOL/L
APTT BLDCRRT: 59.5 SEC
AST SERPL-CCNC: 256 U/L
BILIRUB SERPL-MCNC: 0.9 MG/DL
BUN SERPL-MCNC: 82 MG/DL
CALCIUM SERPL-MCNC: 7.4 MG/DL
CHLORIDE SERPL-SCNC: 83 MMOL/L
CK SERPL-CCNC: 8158 U/L
CO2 SERPL-SCNC: 32 MMOL/L
CREAT SERPL-MCNC: 8 MG/DL
EST. GFR  (AFRICAN AMERICAN): 6 ML/MIN/1.73 M^2
EST. GFR  (NON AFRICAN AMERICAN): 6 ML/MIN/1.73 M^2
GLUCOSE SERPL-MCNC: 84 MG/DL
POTASSIUM SERPL-SCNC: 3.2 MMOL/L
PROT SERPL-MCNC: 5.9 G/DL
SODIUM SERPL-SCNC: 129 MMOL/L

## 2017-05-06 PROCEDURE — 25000003 PHARM REV CODE 250: Performed by: HOSPITALIST

## 2017-05-06 PROCEDURE — 97110 THERAPEUTIC EXERCISES: CPT

## 2017-05-06 PROCEDURE — 82550 ASSAY OF CK (CPK): CPT

## 2017-05-06 PROCEDURE — 21400001 HC TELEMETRY ROOM

## 2017-05-06 PROCEDURE — 63600175 PHARM REV CODE 636 W HCPCS: Performed by: INTERNAL MEDICINE

## 2017-05-06 PROCEDURE — 25000003 PHARM REV CODE 250: Performed by: INTERNAL MEDICINE

## 2017-05-06 PROCEDURE — 80053 COMPREHEN METABOLIC PANEL: CPT

## 2017-05-06 PROCEDURE — 99232 SBSQ HOSP IP/OBS MODERATE 35: CPT | Mod: ,,, | Performed by: PSYCHIATRY & NEUROLOGY

## 2017-05-06 PROCEDURE — 85730 THROMBOPLASTIN TIME PARTIAL: CPT

## 2017-05-06 PROCEDURE — 99232 SBSQ HOSP IP/OBS MODERATE 35: CPT | Mod: ,,, | Performed by: INTERNAL MEDICINE

## 2017-05-06 PROCEDURE — 25000003 PHARM REV CODE 250: Performed by: EMERGENCY MEDICINE

## 2017-05-06 PROCEDURE — 63600175 PHARM REV CODE 636 W HCPCS: Performed by: HOSPITALIST

## 2017-05-06 PROCEDURE — 97116 GAIT TRAINING THERAPY: CPT

## 2017-05-06 PROCEDURE — 36415 COLL VENOUS BLD VENIPUNCTURE: CPT

## 2017-05-06 RX ORDER — FUROSEMIDE 10 MG/ML
100 INJECTION INTRAMUSCULAR; INTRAVENOUS ONCE
Status: COMPLETED | OUTPATIENT
Start: 2017-05-06 | End: 2017-05-06

## 2017-05-06 RX ORDER — POTASSIUM CHLORIDE 20 MEQ/1
40 TABLET, EXTENDED RELEASE ORAL ONCE
Status: COMPLETED | OUTPATIENT
Start: 2017-05-06 | End: 2017-05-06

## 2017-05-06 RX ADMIN — ONDANSETRON 8 MG: 2 INJECTION INTRAMUSCULAR; INTRAVENOUS at 12:05

## 2017-05-06 RX ADMIN — OXYCODONE HYDROCHLORIDE AND ACETAMINOPHEN 1 TABLET: 10; 325 TABLET ORAL at 02:05

## 2017-05-06 RX ADMIN — ASPIRIN 81 MG: 81 TABLET, COATED ORAL at 08:05

## 2017-05-06 RX ADMIN — POLYETHYLENE GLYCOL 3350 17 G: 17 POWDER, FOR SOLUTION ORAL at 08:05

## 2017-05-06 RX ADMIN — HEPARIN SODIUM AND DEXTROSE 16 UNITS/KG/HR: 10000; 5 INJECTION INTRAVENOUS at 02:05

## 2017-05-06 RX ADMIN — OXYCODONE HYDROCHLORIDE AND ACETAMINOPHEN 1 TABLET: 5; 325 TABLET ORAL at 06:05

## 2017-05-06 RX ADMIN — FUROSEMIDE 100 MG: 10 INJECTION, SOLUTION INTRAMUSCULAR; INTRAVENOUS at 08:05

## 2017-05-06 RX ADMIN — PANTOPRAZOLE SODIUM 40 MG: 40 TABLET, DELAYED RELEASE ORAL at 08:05

## 2017-05-06 RX ADMIN — METOPROLOL TARTRATE 25 MG: 25 TABLET, FILM COATED ORAL at 08:05

## 2017-05-06 RX ADMIN — MORPHINE SULFATE 5 MG: 10 INJECTION INTRAVENOUS at 10:05

## 2017-05-06 RX ADMIN — SODIUM CHLORIDE: 0.45 INJECTION, SOLUTION INTRAVENOUS at 02:05

## 2017-05-06 RX ADMIN — POTASSIUM CHLORIDE 40 MEQ: 1500 TABLET, EXTENDED RELEASE ORAL at 08:05

## 2017-05-06 NOTE — ASSESSMENT & PLAN NOTE
Creatinine continues to increase, but CPK trending down, as per Nephrology, monitor closely.

## 2017-05-06 NOTE — SUBJECTIVE & OBJECTIVE
Interval History: Pt reports generalized weakness but did more with therapy with more steps taken, feeling swollen; no acute events.    Review of Systems   Constitutional: Negative for chills and fever.   HENT: Negative for trouble swallowing.    Respiratory: Negative for shortness of breath.    Cardiovascular: Negative for chest pain.   Gastrointestinal: Negative for nausea and vomiting.     Objective:     Vital Signs (Most Recent):  Temp: 97.5 °F (36.4 °C) (05/06/17 0805)  Pulse: 60 (05/06/17 0805)  Resp: 20 (05/06/17 0805)  BP: (!) 188/94 (05/06/17 0805)  SpO2: 99 % (05/06/17 0805) Vital Signs (24h Range):  Temp:  [97.5 °F (36.4 °C)-98.7 °F (37.1 °C)] 97.5 °F (36.4 °C)  Pulse:  [57-65] 60  Resp:  [14-20] 20  SpO2:  [96 %-99 %] 99 %  BP: (140-188)/(59-94) 188/94     Weight: (!) 138.8 kg (305 lb 14.4 oz)  Body mass index is 41.49 kg/(m^2).    Intake/Output Summary (Last 24 hours) at 05/06/17 0922  Last data filed at 05/06/17 0700   Gross per 24 hour   Intake           3941.2 ml   Output              185 ml   Net           3756.2 ml      Physical Exam   Constitutional: She is oriented to person, place, and time. She appears well-developed and well-nourished.   Eyes: EOM are normal.   Neck: Normal range of motion.   Cardiovascular: Normal rate and regular rhythm.    Pulmonary/Chest: Effort normal and breath sounds normal.   Abdominal: Soft. Bowel sounds are normal.   Musculoskeletal: Normal range of motion. She exhibits edema.   Neurological: She is alert and oriented to person, place, and time.   UE 4/5 and LE 5/5 strength.   Skin: Skin is warm and dry.   Psychiatric: She has a normal mood and affect.   Vitals reviewed.      Significant Labs: All pertinent labs within the past 24 hours have been reviewed.    Significant Imaging: I have reviewed and interpreted all pertinent imaging results/findings within the past 24 hours.

## 2017-05-06 NOTE — NURSING
1030- bedside rounding report given to ilene medrano rn on patients progress and updated hand off report sheet given as well. Dr woodruff is in the room at this time. No acute events urine color has improved straw yellow glover to gravity , bed alarm is on.

## 2017-05-06 NOTE — PLAN OF CARE
Problem: Physical Therapy Goal  Goal: Physical Therapy Goal  Goals to be met by: 17     Patient will increase functional independence with mobility by performin. Supine to sit with Modified [place]  2. Rolling to Left and Right with Modified [place]  3. Sit to stand transfer with Modified [place]  4. Bed to chair transfer with Modified [place]  4. Gait x 250 feet with Modified [place]   5. Lower extremity exercise program x 30 reps per handout, with independence   Outcome: Ongoing (interventions implemented as appropriate)  Patient with increase in gait distance today. Pt is continuing to progress well towards goals

## 2017-05-06 NOTE — SUBJECTIVE & OBJECTIVE
Interval History: Pt reports she is now able to hear, generalized weakness, no acute events.    Review of Systems   Constitutional: Negative for chills and fever.   HENT: Negative for trouble swallowing.    Respiratory: Negative for shortness of breath.    Cardiovascular: Negative for chest pain.   Gastrointestinal: Negative for nausea and vomiting.     Objective:     Vital Signs (Most Recent):  Temp: 97.7 °F (36.5 °C) (05/05/17 2347)  Pulse: (!) 59 (05/05/17 2347)  Resp: 18 (05/05/17 2347)  BP: (!) 140/67 (05/05/17 2347)  SpO2: 98 % (05/05/17 2000) Vital Signs (24h Range):  Temp:  [97 °F (36.1 °C)-98.7 °F (37.1 °C)] 97.7 °F (36.5 °C)  Pulse:  [56-65] 59  Resp:  [14-20] 18  SpO2:  [90 %-98 %] 98 %  BP: (138-176)/(59-91) 140/67     Weight: (!) 138.8 kg (305 lb 14.4 oz)  Body mass index is 41.49 kg/(m^2).    Intake/Output Summary (Last 24 hours) at 05/06/17 0038  Last data filed at 05/05/17 2348   Gross per 24 hour   Intake          4648.36 ml   Output              285 ml   Net          4363.36 ml      Physical Exam   Constitutional: She is oriented to person, place, and time. She appears well-developed and well-nourished.   Eyes: EOM are normal.   Neck: Normal range of motion.   Cardiovascular: Normal rate and regular rhythm.    Pulmonary/Chest: Effort normal and breath sounds normal.   Abdominal: Soft. Bowel sounds are normal.   Musculoskeletal: Normal range of motion.   Neurological: She is alert and oriented to person, place, and time.   UE 4/5 and LE 5/5 strength.   Skin: Skin is warm and dry.   Psychiatric: She has a normal mood and affect.   Vitals reviewed.      Significant Labs: All pertinent labs within the past 24 hours have been reviewed.    Significant Imaging: I have reviewed and interpreted all pertinent imaging results/findings within the past 24 hours.

## 2017-05-06 NOTE — ASSESSMENT & PLAN NOTE
Creatinine continues to increase, but CPK trending down, as per Nephrology, monitor closely. Pt will likely need HD as per Nephrology soon if no improvement soon.

## 2017-05-06 NOTE — PROGRESS NOTES
Shireen Anton is a 47 y.o. female patient.    Follow for GASPER, rhabdomyolysis    Feeling bloated  Otherwise comfortable    Scheduled Meds:   aspirin  81 mg Oral Daily    metoprolol tartrate  25 mg Oral BID    pantoprazole  40 mg Oral Daily    polyethylene glycol  17 g Oral Daily       Review of patient's allergies indicates:  No Known Allergies      Vital Signs Range (Last 24H):  Temp:  [97.7 °F (36.5 °C)-98.7 °F (37.1 °C)]   Pulse:  [56-65]   Resp:  [14-20]   BP: (140-171)/(59-91)   SpO2:  [96 %-98 %]     I & O (Last 24H):  Intake/Output Summary (Last 24 hours) at 05/06/17 0719  Last data filed at 05/06/17 0700   Gross per 24 hour   Intake           4197.2 ml   Output              185 ml   Net           4012.2 ml           Physical Exam:  General appearance: well developed, well nourished, no distress  Lungs:  diminished breath sounds bilaterally  Heart: regular rate and rhythm  Abdomen: soft, non-tender non-distented; bowel sounds normal; no masses,  no organomegaly  Extremities: edema (+)    Laboratory:  CBC:   Recent Labs  Lab 05/02/17  0145   WBC 12.16   RBC 4.05   HGB 12.5   HCT 37.1   *  121*   MCV 92   MCH 30.9   MCHC 33.7     CMP:   Recent Labs  Lab 05/05/17  0536      CALCIUM 7.2*   ALBUMIN 2.7*   PROT 5.8*   *   K 3.1*   CO2 33*   CL 87*   BUN 72*   CREATININE 6.9*   ALKPHOS 74   ALT 1173*   *   BILITOT 1.1*       Imp/Plan    GASPER - ATN, oliguric  Rhabdomyolysis - CPK trending down  Pulmonary HTN  CVA  Elevated LFT - trending down    Follow up on am labs  D/c IVF  KCl supplement  IV Lasix  Watch renal function closely  May need acute dialysis if not improving    Addendum - creatinine up  Continue as above  Patient is heading for dialysis    Nito Gomez  5/6/2017

## 2017-05-06 NOTE — NURSING
0715-bedside rounding report received from sirena tang rn on patients progress and updated hand off report sheet given too. No acute events overnight .

## 2017-05-06 NOTE — ASSESSMENT & PLAN NOTE
Seen on MRI, continue ASA, no statin at this time due to shock liver, Neurology following, PT/OT.  Hearing improved.

## 2017-05-06 NOTE — PT/OT/SLP PROGRESS
"Physical Therapy  Treatment    Shireen Anton   MRN: [id number]   Admitting Diagnosis: Acute hearing loss of both ears    PT Received On: 05/07/17  PT Start Time: 1056     PT Stop Time: 1119    PT Total Time (min): 23 min       Billable Minutes:  Gait [id number] and Therapeutic Exercise 9    Treatment Type: Treatment  PT/PTA: PTA     PTA Visit Number: 2       General Precautions: Standard, fall, respiratory  Orthopedic Precautions: N/A   Braces: N/A         Subjective:  Communicated with patient and nursing  prior to session. "I want to sit in the chair for a little while"    Pain Rating: 3/10  Location - Side: Bilateral     Location: leg  Pain Addressed: Pre-medicate for activity, Cessation of Activity, Nurse notified  Pain Rating Post-Intervention: 4/10    Objective:   Patient found with: peripheral IV, telemetry, oxygen    Functional Mobility:  Bed Mobility:   Rolling/Turning Right: Modified independent  Scooting/Bridging: Modified Independent  Supine to Sit: Modified Independent  Sit to Supine: Modified Independent    Transfers:  Sit <> Stand Assistance: Contact Guard Assistance  Sit <> Stand Assistive Device: Rolling Walker    Gait:   Gait Distance: 100 feet with rolling walker and 3 liter of O2 via NC. Decreased need for multiple rest breaks today but pt with noted decreased step length and amish. Cues for walker management   Assistance 1: Contact Guard Assistance  Gait Assistive Device: Rolling walker  Gait Pattern: swing-through gait  Gait Deviation(s): decreased amish, decreased velocity of limb motion, decreased swing-to-stance ratio      Balance:   Static Sit: GOOD-: Takes MODERATE challenges from all directions but inconsistently  Dynamic Sit: GOOD-: Maintains balance through MODERATE excursions of active trunk movement,     Static Stand: FAIR+: Takes MINIMAL challenges from all directions  Dynamic stand: FAIR+: Needs CLOSE SUPERVISION during gait and is able to right self with minor LOB     Therapeutic " Activities and Exercises:  Patient instructed in and performed seated bilateral lower extremity therapeutic exercises seated at edge of bed in all available planes 2 x 10 reps each with cues for form / technique      AM-PAC 6 CLICK MOBILITY  How much help from another person does this patient currently need?   1 = Unable, Total/Dependent Assistance  2 = A lot, Maximum/Moderate Assistance  3 = A little, Minimum/Contact Guard/Supervision  4 = None, Modified [place]/Independent    Turning over in bed (including adjusting bedclothes, sheets and blankets)?: 4  Sitting down on and standing up from a chair with arms (e.g., wheelchair, bedside commode, etc.): 3  Moving from lying on back to sitting on the side of the bed?: 4  Moving to and from a bed to a chair (including a wheelchair)?: 3  Need to walk in hospital room?: 3  Climbing 3-5 steps with a railing?: 3  Total Score: 20    AM-PAC Raw Score CMS G-Code Modifier Level of Impairment Assistance   6 % Total / Unable   7 - 9 CM 80 - 100% Maximal Assist   10 - 14 CL 60 - 80% Moderate Assist   15 - 19 CK 40 - 60% Moderate Assist   20 - 22 CJ 20 - 40% Minimal Assist   23 CI 1-20% SBA / CGA   24 CH 0% Independent/ Mod I     Patient left up in chair with all lines intact, call button in reach and spouse present.    Assessment:  Shireen Anton is a 47 y.o. female with a medical diagnosis of Acute hearing loss of both ears and presents with decreased strength , balance and impaired functional mobility and will continue to benefit from skilled physical therapy .    Rehab identified problem list/impairments: Rehab identified problem list/impairments: weakness, impaired endurance, decreased upper extremity function, decreased lower extremity function, impaired balance, impaired cardiopulmonary response to activity    Rehab potential is good.    Activity tolerance: Good    Discharge recommendations: Discharge Facility/Level Of Care Needs: home health PT     Barriers to  discharge: Barriers to Discharge: None    Equipment recommendations:       GOALS:   Physical Therapy Goals        Problem: Physical Therapy Goal    Goal Priority Disciplines Outcome Goal Variances Interventions   Physical Therapy Goal     PT/OT, PT Ongoing (interventions implemented as appropriate)     Description:  Goals to be met by: 17     Patient will increase functional independence with mobility by performin. Supine to sit with Modified [place]  2. Rolling to Left and Right with Modified [place]  3. Sit to stand transfer with Modified [place]  4. Bed to chair transfer with Modified [place]  4. Gait  x 250 feet with Modified [place]   5. Lower extremity exercise program x 30 reps per handout, with independence                PLAN:    Patient to be seen 6 x/week  to address the above listed problems via gait training, therapeutic activities, therapeutic exercises  Plan of Care expires: 17  Plan of Care reviewed with: patient         Kristin Menjivar, PTA  2017

## 2017-05-06 NOTE — PROGRESS NOTES
[facility] Hospital Medicine  Progress Note    Patient Name: Shireen Anton  MRN: [id number]  Patient Class: IP- Inpatient   Admission Date: 5/1/2017  Length of Stay: 5 days  Attending Physician: Gely Justin MD  Primary Care Provider: Jeancarlos Zamora MD        Subjective:     Principal Problem:Acute hearing loss of both ears    HPI:  Shireen Anton is a 47 y.o. female that (in part)  has a past medical history of Allergy; Depression; and GERD (gastroesophageal reflux disease). Presents to [facility] Emergency Department complaining of acute bilateral hearing loss.  Patient reports going to bed Sunday night and her last coherent memory was signing a check for her daughter's .  Monday morning her  reported that he had a hard time waking her up and said associated vigorously shake her because she appeared unresponsive.  She was disoriented and unable to hear.  She had a gastric sleeve on Thursday, April 27 that was uncomplicated.  During the perioperative period she reported to be normal and had no complaints other than some minor pain expected after surgery.  Throughout Friday, Saturday, and Sunday experienced no significant problems other than being increasingly thirsty.  She was given a liquid form of opioid medications which she was taking as prescribed, she states.  She continues to experience bilateral hearing loss that is constant.  Characterized by only hearing high-pitched sounds.  This is a first episode and a new problem for her.  No radiation of symptoms.  No relieving factors.  No exacerbating factors. In the emergency department routine laboratory studies, EKG, and cardiac enzymes were obtained.  There was concern for multiple organ dysfunction including markedly elevated troponin levels, acute renal failure, markedly elevated liver enzymes, and creatinine kinase levels greater than 40,000.  She denies significant nausea, vomiting, diarrhea, or  urination.  Denies any significant hypotensive episodes.  She does not recall anything occurring after Sunday night to the time of hospitalization.  She does report having some vaginal bleeding that started this afternoon.  She uses NuvaRing for contraception.    Hospital Course:  Ms. Anton was admitted for an acute CVA with hearing loss and MSOF with NSTEMI, acute renal failure, shock liver, and rhabdomyolysis likely from episode of prolonged hypotension possibly from narcotic use given post surgery for gastric sleave.  Pt seen by Neurology, Cardiology, Nephrology with slow clinical improvement except for continued climb in creatinine.    Interval History: Pt reports generalized weakness but did more with therapy with more steps taken, feeling swollen; no acute events.    Review of Systems   Constitutional: Negative for chills and fever.   HENT: Negative for trouble swallowing.    Respiratory: Negative for shortness of breath.    Cardiovascular: Negative for chest pain.   Gastrointestinal: Negative for nausea and vomiting.     Objective:     Vital Signs (Most Recent):  Temp: 97.5 °F (36.4 °C) (05/06/17 0805)  Pulse: 60 (05/06/17 0805)  Resp: 20 (05/06/17 0805)  BP: (!) 188/94 (05/06/17 0805)  SpO2: 99 % (05/06/17 0805) Vital Signs (24h Range):  Temp:  [97.5 °F (36.4 °C)-98.7 °F (37.1 °C)] 97.5 °F (36.4 °C)  Pulse:  [57-65] 60  Resp:  [14-20] 20  SpO2:  [96 %-99 %] 99 %  BP: (140-188)/(59-94) 188/94     Weight: (!) 138.8 kg (305 lb 14.4 oz)  Body mass index is 41.49 kg/(m^2).    Intake/Output Summary (Last 24 hours) at 05/06/17 0922  Last data filed at 05/06/17 0700   Gross per 24 hour   Intake           3941.2 ml   Output              185 ml   Net           3756.2 ml      Physical Exam   Constitutional: She is oriented to person, place, and time. She appears well-developed and well-nourished.   Eyes: EOM are normal.   Neck: Normal range of motion.   Cardiovascular: Normal rate and regular rhythm.     Pulmonary/Chest: Effort normal and breath sounds normal.   Abdominal: Soft. Bowel sounds are normal.   Musculoskeletal: Normal range of motion. She exhibits edema.   Neurological: She is alert and oriented to person, place, and time.   UE 4/5 and LE 5/5 strength.   Skin: Skin is warm and dry.   Psychiatric: She has a normal mood and affect.   Vitals reviewed.      Significant Labs: All pertinent labs within the past 24 hours have been reviewed.    Significant Imaging: I have reviewed and interpreted all pertinent imaging results/findings within the past 24 hours.    Assessment/Plan:      Acute ischemic stroke  Seen on MRI, continue ASA, no statin at this time due to shock liver, Neurology following, PT/OT.  Hearing improved.    Metabolic encephalopathy  Resolved, due to MSOF.    * Acute hearing loss of both ears  Due to above, resolving.    NSTEMI (non-ST elevated myocardial infarction)  Continue heparin gtt and ASA, as per Cardiology, will need cath pending improvement of renal function.    Acute renal failure  Creatinine continues to increase, but CPK trending down, as per Nephrology, monitor closely. Pt will likely need HD as per Nephrology soon if no improvement soon.    Non-traumatic rhabdomyolysis  Probable global hypotensive event causing MSOF, CPK now trending down, monitor.    Shock liver  Due to shock liver, LFTs trending down, monitor.    Morbid obesity  Status post gastric sleave, will consult nutrition consult soon.      VTE Risk Mitigation         Ordered     Medium Risk of VTE  Once      05/02/17 0012          Gely Justin MD  Department of Hospital Medicine   [facility]

## 2017-05-06 NOTE — PLAN OF CARE
Problem: Fall Risk (Adult)  Goal: Absence of Falls  Patient will demonstrate the desired outcomes by discharge/transition of care.   Outcome: Ongoing (interventions implemented as appropriate)    05/06/17 0526   Fall Risk (Adult)   Absence of Falls making progress toward outcome         Problem: Fluid Volume Deficit (Adult)  Goal: Fluid/Electrolyte Balance  Patient will demonstrate the desired outcomes by discharge/transition of care.   Outcome: Ongoing (interventions implemented as appropriate)    05/06/17 0526   Fluid Volume Deficit (Adult)   Fluid/Electrolyte Balance making progress toward outcome         Problem: Pressure Ulcer Risk (Darwin Scale) (Adult,Obstetrics,Pediatric)  Goal: Skin Integrity  Patient will demonstrate the desired outcomes by discharge/transition of care.   Outcome: Ongoing (interventions implemented as appropriate)    05/06/17 0526   Pressure Ulcer Risk (Darwin Scale) (Adult,Obstetrics,Pediatric)   Skin Integrity making progress toward outcome         Problem: Infection, Risk/Actual (Adult)  Goal: Infection Prevention/Resolution  Patient will demonstrate the desired outcomes by discharge/transition of care.   Outcome: Ongoing (interventions implemented as appropriate)    05/06/17 0526   Infection, Risk/Actual (Adult)   Infection Prevention/Resolution making progress toward outcome         Problem: Renal Failure/Kidney Injury, Acute (Adult)  Goal: Signs and Symptoms of Listed Potential Problems Will be Absent, Minimized or Managed (Renal Failure/Kidney Injury, Acute)  Signs and symptoms of listed potential problems will be absent, minimized or managed by discharge/transition of care (reference Renal Failure/Kidney Injury, Acute (Adult) CPG).   Outcome: Ongoing (interventions implemented as appropriate)    05/06/17 0526   Renal Failure/Kidney Injury, Acute   Problems Assessed (Acute Renal Failure/Kidney Injury) electrolyte imbalance   Problems Present (Acute Renal Failure/Kidney Injury)  electrolyte imbalance

## 2017-05-06 NOTE — NURSING
1845- bedside rounding report given to sirena tang rn on patients progress today and updated hand off report sheet given. No acute events this shift.

## 2017-05-06 NOTE — PROGRESS NOTES
[facility]  Cardiology  Progress Note    Patient Name: Shireen Anton  MRN: [id number]  Admission Date: 5/1/2017  Hospital Length of Stay: 5 days  Code Status: Full Code   Attending Physician: Gely Justin MD   Primary Care Physician: Jeancarlos Zamora MD  Expected Discharge Date:   Principal Problem:Acute hearing loss of both ears    Subjective:     Hospital Course: Rhabdo/ARF    Interval History: No acute events.  No cp or sob    Review of Systems   All other systems reviewed and are negative.    Objective:     Vital Signs (Most Recent):  Temp: 97.5 °F (36.4 °C) (05/06/17 0805)  Pulse: 60 (05/06/17 0805)  Resp: 20 (05/06/17 0805)  BP: (!) 188/94 (05/06/17 0805)  SpO2: 99 % (05/06/17 0805) Vital Signs (24h Range):  Temp:  [97.5 °F (36.4 °C)-98.7 °F (37.1 °C)] 97.5 °F (36.4 °C)  Pulse:  [57-65] 60  Resp:  [14-20] 20  SpO2:  [96 %-99 %] 99 %  BP: (140-188)/(59-94) 188/94     Weight: (!) 138.8 kg (305 lb 14.4 oz)  Body mass index is 41.49 kg/(m^2).    SpO2: 99 %  O2 Device (Oxygen Therapy): nasal cannula      Intake/Output Summary (Last 24 hours) at 05/06/17 1024  Last data filed at 05/06/17 0900   Gross per 24 hour   Intake           4015.4 ml   Output              185 ml   Net           3830.4 ml       Lines/Drains/Airways     Drain                 Urethral Catheter 05/02/17 1540 Straight-tip 3 days          Peripheral Intravenous Line                 Peripheral IV - Single Lumen 05/03/17 0202 Right Forearm 3 days         Peripheral IV - Single Lumen 05/05/17 2017 Left Wrist less than 1 day                Physical Exam   Constitutional: She appears well-developed and well-nourished.   Cardiovascular: Normal rate and regular rhythm.    Pulmonary/Chest: Effort normal and breath sounds normal.   Musculoskeletal: She exhibits no edema.       Significant Labs:   BMP:     Recent Labs  Lab 05/05/17  0536 05/06/17  0521    84   * 129*   K 3.1* 3.2*   CL 87* 83*   CO2 33* 32*   BUN 72* 82*    CREATININE 6.9* 8.0*   CALCIUM 7.2* 7.4*    and CBC No results for input(s): WBC, HGB, HCT, PLT in the last 48 hours.    Significant Imaging: Echocardiogram:   2D echo with color flow doppler:   Results for orders placed or performed during the hospital encounter of 05/01/17   2D echo with color flow doppler   Result Value Ref Range    EF 50 55 - 65    Diastolic Dysfunction No     Est. PA Systolic Pressure 26.63     Mitral Valve Mobility NORMAL     Tricuspid Valve Regurgitation TRIVIAL      Assessment and Plan:     Brief HPI:     Active Diagnoses:    Diagnosis Date Noted POA    PRINCIPAL PROBLEM:  Acute hearing loss of both ears [H91.93] 05/01/2017 Yes    Acute ischemic stroke [I63.9] 05/03/2017 Yes    Metabolic encephalopathy [G93.41] 05/03/2017 Yes    NSTEMI (non-ST elevated myocardial infarction) [I21.4] 05/02/2017 Yes    Shock liver [K72.00] 05/02/2017 Yes    Acute renal failure [N17.9] 05/02/2017 Yes    Morbid obesity [E66.01] 05/02/2017 Yes    Non-traumatic rhabdomyolysis [M62.82] 05/02/2017 Yes      Problems Resolved During this Admission:    Diagnosis Date Noted Date Resolved POA       VTE Risk Mitigation         Ordered     Medium Risk of VTE  Once      05/02/17 0012        NSTEMI (non-ST elevated myocardial infarction)  Trop rise is certainly concerning but likely 2nd to strain  EKG with nonspec changes  Cont med rx  Inc metoprolol 50mg bid  Agree with IV heparin while undergoing workup  Will consider eventual cath pending improvement in renal fxn  Check lipids, hold off on statin rx until LFTs normalize pending clinical course        Acute renal failure  Per IM/Renal  Creat rising  Agree with IVF  Avoid nephrotoxins       Non-traumatic rhabdomyolysis  ?new problem vs CPK elevation from recent surgery.     * Acute hearing loss of both ears  Improving  Per IM/neuro  ?Watershed cerebellar CVA     Elevated liver enzymes  Presumed shock liver  Continue to monitor  GI following     Morbid obesity  S/P  gastric sleeve on 4/27/17.    Pedro Miller MD  Cardiology  [facility]

## 2017-05-06 NOTE — PROGRESS NOTES
[facility] Hospital Medicine  Progress Note    Patient Name: Shireen Anton  MRN: [id number]  Patient Class: IP- Inpatient   Admission Date: 5/1/2017  Length of Stay: 5 days  Attending Physician: Gely Justin MD  Primary Care Provider: Jeancarlos Zamora MD        Subjective:     Principal Problem:Acute hearing loss of both ears    HPI:  Shireen Anton is a 47 y.o. female that (in part)  has a past medical history of Allergy; Depression; and GERD (gastroesophageal reflux disease). Presents to [facility] Emergency Department complaining of acute bilateral hearing loss.  Patient reports going to bed Sunday night and her last coherent memory was signing a check for her daughter's .  Monday morning her  reported that he had a hard time waking her up and said associated vigorously shake her because she appeared unresponsive.  She was disoriented and unable to hear.  She had a gastric sleeve on Thursday, April 27 that was uncomplicated.  During the perioperative period she reported to be normal and had no complaints other than some minor pain expected after surgery.  Throughout Friday, Saturday, and Sunday experienced no significant problems other than being increasingly thirsty.  She was given a liquid form of opioid medications which she was taking as prescribed, she states.  She continues to experience bilateral hearing loss that is constant.  Characterized by only hearing high-pitched sounds.  This is a first episode and a new problem for her.  No radiation of symptoms.  No relieving factors.  No exacerbating factors. In the emergency department routine laboratory studies, EKG, and cardiac enzymes were obtained.  There was concern for multiple organ dysfunction including markedly elevated troponin levels, acute renal failure, markedly elevated liver enzymes, and creatinine kinase levels greater than 40,000.  She denies significant nausea, vomiting, diarrhea, or  urination.  Denies any significant hypotensive episodes.  She does not recall anything occurring after Sunday night to the time of hospitalization.  She does report having some vaginal bleeding that started this afternoon.  She uses NuvaRing for contraception.    Hospital Course:  Ms. Anton was admitted for an acute CVA with hearing loss and MSOF with NSTEMI, acute renal failure, shock liver, and rhabdomyolysis likely from episode of prolonged hypotension possibly from narcotic use given post surgery for gastric sleave.  Pt seen by Neurology, Cardiology, Nephrology with slow clinical improvement.    Interval History: Pt reports she is now able to hear, generalized weakness, no acute events.    Review of Systems   Constitutional: Negative for chills and fever.   HENT: Negative for trouble swallowing.    Respiratory: Negative for shortness of breath.    Cardiovascular: Negative for chest pain.   Gastrointestinal: Negative for nausea and vomiting.     Objective:     Vital Signs (Most Recent):  Temp: 97.7 °F (36.5 °C) (05/05/17 2347)  Pulse: (!) 59 (05/05/17 2347)  Resp: 18 (05/05/17 2347)  BP: (!) 140/67 (05/05/17 2347)  SpO2: 98 % (05/05/17 2000) Vital Signs (24h Range):  Temp:  [97 °F (36.1 °C)-98.7 °F (37.1 °C)] 97.7 °F (36.5 °C)  Pulse:  [56-65] 59  Resp:  [14-20] 18  SpO2:  [90 %-98 %] 98 %  BP: (138-176)/(59-91) 140/67     Weight: (!) 138.8 kg (305 lb 14.4 oz)  Body mass index is 41.49 kg/(m^2).    Intake/Output Summary (Last 24 hours) at 05/06/17 0038  Last data filed at 05/05/17 2348   Gross per 24 hour   Intake          4648.36 ml   Output              285 ml   Net          4363.36 ml      Physical Exam   Constitutional: She is oriented to person, place, and time. She appears well-developed and well-nourished.   Eyes: EOM are normal.   Neck: Normal range of motion.   Cardiovascular: Normal rate and regular rhythm.    Pulmonary/Chest: Effort normal and breath sounds normal.   Abdominal: Soft. Bowel sounds are  normal.   Musculoskeletal: Normal range of motion.   Neurological: She is alert and oriented to person, place, and time.   UE 4/5 and LE 5/5 strength.   Skin: Skin is warm and dry.   Psychiatric: She has a normal mood and affect.   Vitals reviewed.      Significant Labs: All pertinent labs within the past 24 hours have been reviewed.    Significant Imaging: I have reviewed and interpreted all pertinent imaging results/findings within the past 24 hours.    Assessment/Plan:      Acute ischemic stroke  Seen on MRI, continue ASA, no statin at this time due to shock liver, Neurology following, PT/OT.  Hearing improved.    Metabolic encephalopathy  Resolved, due to MSOF.    * Acute hearing loss of both ears  Due to above, resolving.    NSTEMI (non-ST elevated myocardial infarction)  Continue heparin gtt and ASA, as per Cardiology, will need cath pending improvement of renal function.    Acute renal failure  Creatinine continues to increase, but CPK trending down, as per Nephrology, monitor closely.    Non-traumatic rhabdomyolysis  Probable global hypotensive event causing MSOF, CPK now trending down, monitor.    Shock liver  Due to shock liver, LFTs trending down, monitor.    Morbid obesity  Status post gastric sleave, will consult nutrition consult soon.      VTE Risk Mitigation         Ordered     Medium Risk of VTE  Once      05/02/17 0012          Gely Justin MD  Department of Hospital Medicine   [facility]

## 2017-05-06 NOTE — PROGRESS NOTES
[facility]  Neurology  Progress Note    Patient Name: Shireen Anton  MRN: [id number]  Admission Date: 5/1/2017  Hospital Length of Stay: 5 days  Code Status: Full Code   Attending Provider: Gely Justin MD  Primary Care Physician: Jeancarlos Zmaora MD   Principal Problem:Acute hearing loss of both ears    Subjective:     Interval History: 46 y/o female with medical Hx as listed below admitted for acute mental status changes and hearing loss. Ms. Anton came to ED after reported two days of confusion with sudden hearing loss yesterday. Her significant other in the room states that she has gastric sleeve surgery almost one week priors with no complications. It was noted on Sunday that pt was speaking incoherently then was difficult to arouse Monday morning. Upon awakening pt complained of hearing loss, bilaterally. In ED pt was found to be in multiorgan failure (kidneys, liver, heart). Today pt is more coherent and hearing has slightly improved. She does state that has been taking pain meds and dose was recently increased. Denies illicit drug use. No lateralized weakness, numbness, visual or speech disturbances.      -5/3/17: Pt reports feeling better today. Hearing improving as well as mentation.      -5/4/17: hearing continues to improve. Pt able to hear low tone sounds. No muscle pain.     -5/5/17: Ms. Anton with no new complaints. Hearing markedly improved.    -5/6/17: No myalgias. Hearing loss resolved. Pt remains alert and oriented.       Current Neurological Medications:     Current Facility-Administered Medications   Medication Dose Route Frequency Provider Last Rate Last Dose    aspirin EC tablet 81 mg  81 mg Oral Daily Kevin Sánchez MD   81 mg at 05/06/17 0834    heparin 25,000 units in dextrose 5% 250 mL (100 units/mL) bolus from bag; PRN BOLUS  70 Units/kg Intravenous PRN Kevin Crisostomo MD   9,527 Units at 05/02/17 0258    heparin 25,000 units in dextrose 5% 250 mL (100  units/mL) bolus from bag; PRN BOLUS  35 Units/kg Intravenous PRN Kevin Crisostomo MD        heparin 25,000 units in dextrose 5% 250 mL (100 units/mL) infusion; FEMALE  16 Units/kg/hr Intravenous Continuous Kevin Crisostomo MD 21.8 mL/hr at 05/06/17 1436 16 Units/kg/hr at 05/06/17 1436    metoprolol tartrate (LOPRESSOR) tablet 25 mg  25 mg Oral BID Kevin Sánchez MD   25 mg at 05/06/17 0834    morphine injection 5 mg  5 mg Intravenous Q4H PRN Kevin Crisostomo MD        ondansetron injection 8 mg  8 mg Intravenous Q12H PRN Kevin Crisostomo MD   8 mg at 05/06/17 0002    oxycodone-acetaminophen  mg per tablet 1 tablet  1 tablet Oral Q6H PRN Kevin Crisostomo MD   1 tablet at 05/06/17 1441    oxycodone-acetaminophen 5-325 mg per tablet 1 tablet  1 tablet Oral Q6H PRN Kevin Crisostomo MD   1 tablet at 05/06/17 0658    pantoprazole EC tablet 40 mg  40 mg Oral Daily Gely Justin MD   40 mg at 05/06/17 0834    polyethylene glycol packet 17 g  17 g Oral Daily Susana Clarke MD   17 g at 05/06/17 0834       Review of Systems   Constitutional: Negative for chills and fever.   HENT: Negative for tinnitus or trouble swallowing.   Eyes: Negative for photophobia and visual disturbance.   Respiratory: Negative for shortness of breath.   Cardiovascular: Negative for chest pain and palpitations.   Gastrointestinal: Negative for abdominal pain.   Endocrine: Negative for cold intolerance and heat intolerance.   Genitourinary: Negative for dysuria.   Musculoskeletal: Negative for back pain and gait problem.   Neurological: Negative for tremors, seizures, syncope, facial asymmetry, speech difficulty, weakness, light-headedness, numbness, or headaches.     Objective:     Vital Signs (Most Recent):  Temp: 97.7 °F (36.5 °C) (05/06/17 1600)  Pulse: 66 (05/06/17 1600)  Resp: 16 (05/06/17 1600)  BP: (!) 174/92 (05/06/17 1600)  SpO2: 100 % (05/06/17 1600) Vital Signs (24h Range):  Temp:  [97.5 °F (36.4  °C)-98.4 °F (36.9 °C)] 97.7 °F (36.5 °C)  Pulse:  [56-66] 66  Resp:  [16-20] 16  SpO2:  [97 %-100 %] 100 %  BP: (137-188)/(59-94) 174/92     Weight: (!) 138.8 kg (305 lb 14.4 oz)  Body mass index is 41.49 kg/(m^2).    Physical Exam  General: well developed, well nourished, appears stated age, no distress  Head: normocephalic, erythema in nasal and  Eyes: conjunctivae/corneas clear.  Nose/Ears: no discharge, no epistaxis; normal tympanic membrane  Neck: no adenopathy and no carotid bruit  Heart: regular rate and rhythm.  Abdomen: non-distended and bowel sounds normal.  Extremities: no cyanosis or edema  Skin: no rash  Neurologic: Mental status: alert; oriented to person, place, time; following commands  Cranial nerves: pupils are round at 3 mm and reactive to light; EOMI; no facial asymmetry; tongue protrudes midline;   Strength: 4/5 in proximal limbs; distal 4+/5 strength in four extremities  No sensory deficits       Significant Labs:   CBC: No results for input(s): WBC, HGB, HCT, PLT in the last 48 hours.  CMP:   Recent Labs  Lab 05/05/17  0536 05/06/17  0521    84   * 129*   K 3.1* 3.2*   CL 87* 83*   CO2 33* 32*   BUN 72* 82*   CREATININE 6.9* 8.0*   CALCIUM 7.2* 7.4*   PROT 5.8* 5.9*   ALBUMIN 2.7* 2.6*   BILITOT 1.1* 0.9   ALKPHOS 74 69   * 256*   ALT 1173* 798*   ANIONGAP 15 14   EGFRNONAA 7* 6*         Assessment and Plan:     46 y/o female consulted for hearing loss and AMS      1. Hearing loss and AMS: suspect that her presentation including multiorgan failure is the result of a global ischemic event causing also encephalopathy.   -Mental status and hearing back to baseline. Liver enzymes and CK trending down. Renal function has worsened. Renal on case.        2. Stroke: as above MRI showed a small are of infarction on right cerebellum. This area seems to be in a watershed zone possibly correlating with hypotension. On focal findings from this infarction.  -For now no other  interventions.     Will follow as needed.    Active Diagnoses:    Diagnosis Date Noted POA    PRINCIPAL PROBLEM:  Acute hearing loss of both ears [H91.93] 05/01/2017 Yes    Acute ischemic stroke [I63.9] 05/03/2017 Yes    Metabolic encephalopathy [G93.41] 05/03/2017 Yes    NSTEMI (non-ST elevated myocardial infarction) [I21.4] 05/02/2017 Yes    Shock liver [K72.00] 05/02/2017 Yes    Acute renal failure [N17.9] 05/02/2017 Yes    Morbid obesity [E66.01] 05/02/2017 Yes    Non-traumatic rhabdomyolysis [M62.82] 05/02/2017 Yes      Problems Resolved During this Admission:    Diagnosis Date Noted Date Resolved POA       VTE Risk Mitigation         Ordered     Medium Risk of VTE  Once      05/02/17 0012          Abhishek Harvey MD  Neurology  [facility]

## 2017-05-06 NOTE — PROGRESS NOTES
Assumed care from LEN Mckeon. Awake, alert oriented. Neuro intact. No hearing deficit at this time. She said that was when her electrolytes were out of whack. Up in amador with P.T. Up in chair. [email or web address].well.

## 2017-05-06 NOTE — NURSING
0840- assessment completed and plan of care reviewed again with patient and updated dr watts added lasix once and po potassium once.     0930- patient ate some of her clear liquid diet food. No nausea or vomiting and reports she is pain free at this time.

## 2017-05-07 LAB
ALBUMIN SERPL BCP-MCNC: 2.7 G/DL
ALP SERPL-CCNC: 71 U/L
ALT SERPL W/O P-5'-P-CCNC: 565 U/L
ANION GAP SERPL CALC-SCNC: 18 MMOL/L
APTT BLDCRRT: 63 SEC
AST SERPL-CCNC: 136 U/L
BILIRUB SERPL-MCNC: 0.7 MG/DL
BUN SERPL-MCNC: 91 MG/DL
CALCIUM SERPL-MCNC: 7.8 MG/DL
CHLORIDE SERPL-SCNC: 83 MMOL/L
CK SERPL-CCNC: 3924 U/L
CO2 SERPL-SCNC: 29 MMOL/L
CREAT SERPL-MCNC: 9.5 MG/DL
EST. GFR  (AFRICAN AMERICAN): 5 ML/MIN/1.73 M^2
EST. GFR  (NON AFRICAN AMERICAN): 4 ML/MIN/1.73 M^2
GLUCOSE SERPL-MCNC: 92 MG/DL
POTASSIUM SERPL-SCNC: 3.6 MMOL/L
PROT SERPL-MCNC: 6.1 G/DL
SODIUM SERPL-SCNC: 130 MMOL/L

## 2017-05-07 PROCEDURE — 82550 ASSAY OF CK (CPK): CPT

## 2017-05-07 PROCEDURE — 21400001 HC TELEMETRY ROOM

## 2017-05-07 PROCEDURE — 25000003 PHARM REV CODE 250: Performed by: INTERNAL MEDICINE

## 2017-05-07 PROCEDURE — 63600175 PHARM REV CODE 636 W HCPCS: Performed by: INTERNAL MEDICINE

## 2017-05-07 PROCEDURE — 80053 COMPREHEN METABOLIC PANEL: CPT

## 2017-05-07 PROCEDURE — 27000221 HC OXYGEN, UP TO 24 HOURS

## 2017-05-07 PROCEDURE — 25000003 PHARM REV CODE 250: Performed by: HOSPITALIST

## 2017-05-07 PROCEDURE — 36415 COLL VENOUS BLD VENIPUNCTURE: CPT

## 2017-05-07 PROCEDURE — 63600175 PHARM REV CODE 636 W HCPCS: Performed by: HOSPITALIST

## 2017-05-07 PROCEDURE — 85730 THROMBOPLASTIN TIME PARTIAL: CPT

## 2017-05-07 PROCEDURE — 25000003 PHARM REV CODE 250: Performed by: EMERGENCY MEDICINE

## 2017-05-07 PROCEDURE — 94761 N-INVAS EAR/PLS OXIMETRY MLT: CPT

## 2017-05-07 RX ORDER — FUROSEMIDE 10 MG/ML
120 INJECTION INTRAMUSCULAR; INTRAVENOUS ONCE
Status: COMPLETED | OUTPATIENT
Start: 2017-05-07 | End: 2017-05-07

## 2017-05-07 RX ADMIN — OXYCODONE HYDROCHLORIDE AND ACETAMINOPHEN 1 TABLET: 5; 325 TABLET ORAL at 03:05

## 2017-05-07 RX ADMIN — HEPARIN SODIUM AND DEXTROSE 16 UNITS/KG/HR: 10000; 5 INJECTION INTRAVENOUS at 03:05

## 2017-05-07 RX ADMIN — OXYCODONE HYDROCHLORIDE AND ACETAMINOPHEN 1 TABLET: 10; 325 TABLET ORAL at 09:05

## 2017-05-07 RX ADMIN — FUROSEMIDE 120 MG: 10 INJECTION, SOLUTION INTRAVENOUS at 12:05

## 2017-05-07 RX ADMIN — MORPHINE SULFATE 5 MG: 10 INJECTION INTRAVENOUS at 12:05

## 2017-05-07 RX ADMIN — METOPROLOL TARTRATE 25 MG: 25 TABLET, FILM COATED ORAL at 08:05

## 2017-05-07 RX ADMIN — ASPIRIN 81 MG: 81 TABLET, COATED ORAL at 09:05

## 2017-05-07 RX ADMIN — MORPHINE SULFATE 5 MG: 10 INJECTION INTRAVENOUS at 08:05

## 2017-05-07 RX ADMIN — PANTOPRAZOLE SODIUM 40 MG: 40 TABLET, DELAYED RELEASE ORAL at 09:05

## 2017-05-07 RX ADMIN — POLYETHYLENE GLYCOL 3350 17 G: 17 POWDER, FOR SOLUTION ORAL at 09:05

## 2017-05-07 RX ADMIN — METOPROLOL TARTRATE 25 MG: 25 TABLET, FILM COATED ORAL at 09:05

## 2017-05-07 RX ADMIN — OXYCODONE HYDROCHLORIDE AND ACETAMINOPHEN 1 TABLET: 10; 325 TABLET ORAL at 06:05

## 2017-05-07 NOTE — PROGRESS NOTES
[facility] Hospital Medicine  Progress Note    Patient Name: Shireen Anton  MRN: [id number]  Patient Class: IP- Inpatient   Admission Date: 5/1/2017  Length of Stay: 6 days  Attending Physician: Gely Justin MD  Primary Care Provider: Jeancarlos Zamora MD        Subjective:     Principal Problem:Acute hearing loss of both ears    HPI:  Shireen Anton is a 47 y.o. female that (in part)  has a past medical history of Allergy; Depression; and GERD (gastroesophageal reflux disease). Presents to [facility] Emergency Department complaining of acute bilateral hearing loss.  Patient reports going to bed Sunday night and her last coherent memory was signing a check for her daughter's .  Monday morning her  reported that he had a hard time waking her up and said associated vigorously shake her because she appeared unresponsive.  She was disoriented and unable to hear.  She had a gastric sleeve on Thursday, April 27 that was uncomplicated.  During the perioperative period she reported to be normal and had no complaints other than some minor pain expected after surgery.  Throughout Friday, Saturday, and Sunday experienced no significant problems other than being increasingly thirsty.  She was given a liquid form of opioid medications which she was taking as prescribed, she states.  She continues to experience bilateral hearing loss that is constant.  Characterized by only hearing high-pitched sounds.  This is a first episode and a new problem for her.  No radiation of symptoms.  No relieving factors.  No exacerbating factors. In the emergency department routine laboratory studies, EKG, and cardiac enzymes were obtained.  There was concern for multiple organ dysfunction including markedly elevated troponin levels, acute renal failure, markedly elevated liver enzymes, and creatinine kinase levels greater than 40,000.  She denies significant nausea, vomiting, diarrhea, or  urination.  Denies any significant hypotensive episodes.  She does not recall anything occurring after Sunday night to the time of hospitalization.  She does report having some vaginal bleeding that started this afternoon.  She uses NuvaRing for contraception.    Hospital Course:  Ms. Anton was admitted for an acute CVA with hearing loss and MSOF with NSTEMI, acute renal failure, shock liver, and rhabdomyolysis likely from episode of prolonged hypotension possibly from narcotic use given post surgery for gastric sleave.  Pt seen by Neurology, Cardiology, Nephrology with slow clinical improvement except for continued climb in creatinine.    Interval History: Pt reports generalized weakness and feeling swollen; no acute events.    Review of Systems   Constitutional: Negative for chills and fever.   HENT: Negative for trouble swallowing.    Respiratory: Negative for shortness of breath.    Cardiovascular: Negative for chest pain.   Gastrointestinal: Negative for nausea and vomiting.     Objective:     Vital Signs (Most Recent):  Temp: 97.7 °F (36.5 °C) (05/07/17 0757)  Pulse: 60 (05/07/17 0757)  Resp: 20 (05/07/17 0757)  BP: (!) 180/93 (05/07/17 0757)  SpO2: 99 % (05/07/17 0757) Vital Signs (24h Range):  Temp:  [97.6 °F (36.4 °C)-98 °F (36.7 °C)] 97.7 °F (36.5 °C)  Pulse:  [56-66] 60  Resp:  [16-20] 20  SpO2:  [97 %-100 %] 99 %  BP: (137-182)/(68-93) 180/93     Weight: (!) 146.5 kg (323 lb)  Body mass index is 43.81 kg/(m^2).    Intake/Output Summary (Last 24 hours) at 05/07/17 0854  Last data filed at 05/07/17 0621   Gross per 24 hour   Intake            951.4 ml   Output              401 ml   Net            550.4 ml      Physical Exam   Constitutional: She is oriented to person, place, and time. She appears well-developed and well-nourished.   Eyes: EOM are normal.   Neck: Normal range of motion.   Cardiovascular: Normal rate and regular rhythm.    Pulmonary/Chest: Effort normal and breath sounds normal.   Abdominal:  Soft. Bowel sounds are normal.   Musculoskeletal: Normal range of motion. She exhibits edema.   Neurological: She is alert and oriented to person, place, and time.   UE 4/5 and LE 5/5 strength.   Skin: Skin is warm and dry.   Psychiatric: She has a normal mood and affect.   Vitals reviewed.      Significant Labs: All pertinent labs within the past 24 hours have been reviewed.    Significant Imaging: I have reviewed and interpreted all pertinent imaging results/findings within the past 24 hours.    Assessment/Plan:      Acute ischemic stroke  Seen on MRI, continue ASA, no statin at this time due to shock liver, Neurology following, PT/OT.  Hearing improved.    Metabolic encephalopathy  Resolved, due to MSOF.    * Acute hearing loss of both ears  Due to above, resolved.    NSTEMI (non-ST elevated myocardial infarction)  Continue heparin gtt and ASA, as per Cardiology, will need cath pending improvement of renal function.    Acute renal failure  Creatinine continues to increase, but CPK trending down, as per Nephrology, monitor closely. Pt will likely need HD as per Nephrology soon if no improvement soon.    Non-traumatic rhabdomyolysis  Probable global hypotensive event causing MSOF, CPK now trending down, monitor.    Shock liver  Due to shock liver, LFTs trending down, monitor.    Morbid obesity  Status post gastric sleave, will consult nutrition consult soon.      VTE Risk Mitigation         Ordered     Medium Risk of VTE  Once      05/02/17 0012          Gely Justin MD  Department of Hospital Medicine   [facility]

## 2017-05-07 NOTE — NURSING
Lab called and report , potassium 9.4 , Calcium 1.4.  Stated they would send  to repeat test since all levels were drastically different from 5/6/2017.

## 2017-05-07 NOTE — PLAN OF CARE
Problem: Patient Care Overview  Goal: Plan of Care Review  Outcome: Ongoing (interventions implemented as appropriate)    05/07/17 0340   Coping/Psychosocial   Plan Of Care Reviewed With patient         Problem: Fall Risk (Adult)  Goal: Absence of Falls  Patient will demonstrate the desired outcomes by discharge/transition of care.   Outcome: Ongoing (interventions implemented as appropriate)    05/07/17 0340   Fall Risk (Adult)   Absence of Falls making progress toward outcome         Problem: Fluid Volume Deficit (Adult)  Goal: Fluid/Electrolyte Balance  Patient will demonstrate the desired outcomes by discharge/transition of care.   Outcome: Ongoing (interventions implemented as appropriate)    05/07/17 0340   Fluid Volume Deficit (Adult)   Fluid/Electrolyte Balance making progress toward outcome         Problem: Pressure Ulcer Risk (Darwin Scale) (Adult,Obstetrics,Pediatric)  Goal: Skin Integrity  Patient will demonstrate the desired outcomes by discharge/transition of care.   Outcome: Ongoing (interventions implemented as appropriate)    05/07/17 0340   Pressure Ulcer Risk (Darwin Scale) (Adult,Obstetrics,Pediatric)   Skin Integrity making progress toward outcome         Problem: Infection, Risk/Actual (Adult)  Goal: Infection Prevention/Resolution  Patient will demonstrate the desired outcomes by discharge/transition of care.   Outcome: Ongoing (interventions implemented as appropriate)    05/07/17 0340   Infection, Risk/Actual (Adult)   Infection Prevention/Resolution making progress toward outcome         Problem: Renal Failure/Kidney Injury, Acute (Adult)  Goal: Signs and Symptoms of Listed Potential Problems Will be Absent, Minimized or Managed (Renal Failure/Kidney Injury, Acute)  Signs and symptoms of listed potential problems will be absent, minimized or managed by discharge/transition of care (reference Renal Failure/Kidney Injury, Acute (Adult) CPG).   Outcome: Ongoing (interventions implemented as  appropriate)    05/07/17 0340   Renal Failure/Kidney Injury, Acute   Problems Assessed (Acute Renal Failure/Kidney Injury) electrolyte imbalance   Problems Present (Acute Renal Failure/Kidney Injury) electrolyte imbalance         Problem: Stroke (Ischemic) (Adult)  Goal: Signs and Symptoms of Listed Potential Problems Will be Absent, Minimized or Managed (Stroke)  Signs and symptoms of listed potential problems will be absent, minimized or managed by discharge/transition of care (reference Stroke (Ischemic) (Adult) CPG).     05/07/17 0340   Stroke (Ischemic)   Problems Assessed (Stroke (Ischemic)/TIA) situational response   Problems Present (Stroke (Ischemic)/TIA) situational response

## 2017-05-07 NOTE — SUBJECTIVE & OBJECTIVE
Interval History: Pt reports generalized weakness and feeling swollen; no acute events.    Review of Systems   Constitutional: Negative for chills and fever.   HENT: Negative for trouble swallowing.    Respiratory: Negative for shortness of breath.    Cardiovascular: Negative for chest pain.   Gastrointestinal: Negative for nausea and vomiting.     Objective:     Vital Signs (Most Recent):  Temp: 97.7 °F (36.5 °C) (05/07/17 0757)  Pulse: 60 (05/07/17 0757)  Resp: 20 (05/07/17 0757)  BP: (!) 180/93 (05/07/17 0757)  SpO2: 99 % (05/07/17 0757) Vital Signs (24h Range):  Temp:  [97.6 °F (36.4 °C)-98 °F (36.7 °C)] 97.7 °F (36.5 °C)  Pulse:  [56-66] 60  Resp:  [16-20] 20  SpO2:  [97 %-100 %] 99 %  BP: (137-182)/(68-93) 180/93     Weight: (!) 146.5 kg (323 lb)  Body mass index is 43.81 kg/(m^2).    Intake/Output Summary (Last 24 hours) at 05/07/17 0854  Last data filed at 05/07/17 0621   Gross per 24 hour   Intake            951.4 ml   Output              401 ml   Net            550.4 ml      Physical Exam   Constitutional: She is oriented to person, place, and time. She appears well-developed and well-nourished.   Eyes: EOM are normal.   Neck: Normal range of motion.   Cardiovascular: Normal rate and regular rhythm.    Pulmonary/Chest: Effort normal and breath sounds normal.   Abdominal: Soft. Bowel sounds are normal.   Musculoskeletal: Normal range of motion. She exhibits edema.   Neurological: She is alert and oriented to person, place, and time.   UE 4/5 and LE 5/5 strength.   Skin: Skin is warm and dry.   Psychiatric: She has a normal mood and affect.   Vitals reviewed.      Significant Labs: All pertinent labs within the past 24 hours have been reviewed.    Significant Imaging: I have reviewed and interpreted all pertinent imaging results/findings within the past 24 hours.

## 2017-05-07 NOTE — PROGRESS NOTES
Shireen Anton is a 47 y.o. female patient.    Follow for rhabdomyolysis    No new c/o, comfortable    Scheduled Meds:   aspirin  81 mg Oral Daily    metoprolol tartrate  25 mg Oral BID    pantoprazole  40 mg Oral Daily    polyethylene glycol  17 g Oral Daily       Review of patient's allergies indicates:  No Known Allergies      Vital Signs Range (Last 24H):  Temp:  [97.7 °F (36.5 °C)-98 °F (36.7 °C)]   Pulse:  [60-66]   Resp:  [16-20]   BP: (137-182)/(68-93)   SpO2:  [97 %-100 %]     I & O (Last 24H):  Intake/Output Summary (Last 24 hours) at 05/07/17 1212  Last data filed at 05/07/17 0621   Gross per 24 hour   Intake            907.8 ml   Output              401 ml   Net            506.8 ml           Physical Exam:  General appearance: well developed, well nourished, no distress  Lungs:  diminished breath sounds bilaterally  Heart: regular rate and rhythm  Abdomen: soft, non-tender non-distented; bowel sounds normal; no masses,  no organomegaly  Extremities: edema (+)    Laboratory:  CBC:   Recent Labs  Lab 05/02/17  0145   WBC 12.16   RBC 4.05   HGB 12.5   HCT 37.1   *  121*   MCV 92   MCH 30.9   MCHC 33.7     CMP:   Recent Labs  Lab 05/07/17  0645   GLU 92   CALCIUM 7.8*   ALBUMIN 2.7*   PROT 6.1   *   K 3.6   CO2 29   CL 83*   BUN 91*   CREATININE 9.5*   ALKPHOS 71   *   *   BILITOT 0.7     Imp/Plan    GASPER - creatinine continues rising, u/o still low  Rhabdomyolysis  Pulmonary HTN  Elevated LFT  CVA    Discussed with patient, agree to acute dialysis  Consult IR for dialysis catheter in am  HD after access available      Nito Gomez  5/7/2017

## 2017-05-08 LAB
ALBUMIN SERPL BCP-MCNC: 2.6 G/DL
ALP SERPL-CCNC: 76 U/L
ALT SERPL W/O P-5'-P-CCNC: 416 U/L
ANION GAP SERPL CALC-SCNC: 19 MMOL/L
APTT BLDCRRT: 65.4 SEC
AST SERPL-CCNC: 82 U/L
BILIRUB SERPL-MCNC: 0.6 MG/DL
BUN SERPL-MCNC: 99 MG/DL
CALCIUM SERPL-MCNC: 7.9 MG/DL
CHLORIDE SERPL-SCNC: 81 MMOL/L
CK SERPL-CCNC: 2172 U/L
CO2 SERPL-SCNC: 29 MMOL/L
CREAT SERPL-MCNC: 10.8 MG/DL
EST. GFR  (AFRICAN AMERICAN): 4 ML/MIN/1.73 M^2
EST. GFR  (NON AFRICAN AMERICAN): 4 ML/MIN/1.73 M^2
GLUCOSE SERPL-MCNC: 87 MG/DL
POTASSIUM SERPL-SCNC: 3.8 MMOL/L
PROT SERPL-MCNC: 6 G/DL
SODIUM SERPL-SCNC: 129 MMOL/L

## 2017-05-08 PROCEDURE — 02HV33Z INSERTION OF INFUSION DEVICE INTO SUPERIOR VENA CAVA, PERCUTANEOUS APPROACH: ICD-10-PCS | Performed by: RADIOLOGY

## 2017-05-08 PROCEDURE — 25000003 PHARM REV CODE 250: Performed by: INTERNAL MEDICINE

## 2017-05-08 PROCEDURE — 90935 HEMODIALYSIS ONE EVALUATION: CPT

## 2017-05-08 PROCEDURE — 25000003 PHARM REV CODE 250: Performed by: EMERGENCY MEDICINE

## 2017-05-08 PROCEDURE — 82550 ASSAY OF CK (CPK): CPT

## 2017-05-08 PROCEDURE — 25000003 PHARM REV CODE 250: Performed by: HOSPITALIST

## 2017-05-08 PROCEDURE — 36415 COLL VENOUS BLD VENIPUNCTURE: CPT

## 2017-05-08 PROCEDURE — 80053 COMPREHEN METABOLIC PANEL: CPT

## 2017-05-08 PROCEDURE — 86706 HEP B SURFACE ANTIBODY: CPT

## 2017-05-08 PROCEDURE — 25000003 PHARM REV CODE 250: Performed by: RADIOLOGY

## 2017-05-08 PROCEDURE — 86704 HEP B CORE ANTIBODY TOTAL: CPT

## 2017-05-08 PROCEDURE — 63600175 PHARM REV CODE 636 W HCPCS: Performed by: RADIOLOGY

## 2017-05-08 PROCEDURE — 85730 THROMBOPLASTIN TIME PARTIAL: CPT

## 2017-05-08 PROCEDURE — 63600175 PHARM REV CODE 636 W HCPCS: Performed by: HOSPITALIST

## 2017-05-08 PROCEDURE — 21400001 HC TELEMETRY ROOM

## 2017-05-08 RX ORDER — HEPARIN SODIUM 5000 [USP'U]/ML
5000 INJECTION, SOLUTION INTRAVENOUS; SUBCUTANEOUS
Status: DISCONTINUED | OUTPATIENT
Start: 2017-05-08 | End: 2017-05-09

## 2017-05-08 RX ORDER — MIDAZOLAM HYDROCHLORIDE 1 MG/ML
INJECTION, SOLUTION INTRAMUSCULAR; INTRAVENOUS CODE/TRAUMA/SEDATION MEDICATION
Status: COMPLETED | OUTPATIENT
Start: 2017-05-08 | End: 2017-05-08

## 2017-05-08 RX ORDER — FENTANYL CITRATE 50 UG/ML
INJECTION, SOLUTION INTRAMUSCULAR; INTRAVENOUS CODE/TRAUMA/SEDATION MEDICATION
Status: COMPLETED | OUTPATIENT
Start: 2017-05-08 | End: 2017-05-08

## 2017-05-08 RX ORDER — CLONIDINE HYDROCHLORIDE 0.1 MG/1
0.3 TABLET ORAL 3 TIMES DAILY PRN
Status: DISCONTINUED | OUTPATIENT
Start: 2017-05-08 | End: 2017-05-08

## 2017-05-08 RX ORDER — HEPARIN SODIUM 5000 [USP'U]/ML
INJECTION, SOLUTION INTRAVENOUS; SUBCUTANEOUS CODE/TRAUMA/SEDATION MEDICATION
Status: COMPLETED | OUTPATIENT
Start: 2017-05-08 | End: 2017-05-08

## 2017-05-08 RX ORDER — CLONIDINE HYDROCHLORIDE 0.1 MG/1
0.3 TABLET ORAL 3 TIMES DAILY
Status: DISCONTINUED | OUTPATIENT
Start: 2017-05-08 | End: 2017-05-08

## 2017-05-08 RX ORDER — CLONIDINE HYDROCHLORIDE 0.1 MG/1
0.3 TABLET ORAL 3 TIMES DAILY PRN
Status: DISCONTINUED | OUTPATIENT
Start: 2017-05-08 | End: 2017-05-16 | Stop reason: HOSPADM

## 2017-05-08 RX ADMIN — ASPIRIN 81 MG: 81 TABLET, COATED ORAL at 06:05

## 2017-05-08 RX ADMIN — FENTANYL CITRATE 25 MCG: 50 INJECTION, SOLUTION INTRAMUSCULAR; INTRAVENOUS at 09:05

## 2017-05-08 RX ADMIN — HEPARIN SODIUM 5000 UNITS: 5000 INJECTION, SOLUTION INTRAVENOUS; SUBCUTANEOUS at 04:05

## 2017-05-08 RX ADMIN — HEPARIN SODIUM 5000 UNITS: 5000 INJECTION, SOLUTION INTRAVENOUS; SUBCUTANEOUS at 10:05

## 2017-05-08 RX ADMIN — HEPARIN SODIUM AND DEXTROSE 16 UNITS/KG/HR: 10000; 5 INJECTION INTRAVENOUS at 04:05

## 2017-05-08 RX ADMIN — METOPROLOL TARTRATE 25 MG: 25 TABLET, FILM COATED ORAL at 09:05

## 2017-05-08 RX ADMIN — PANTOPRAZOLE SODIUM 40 MG: 40 TABLET, DELAYED RELEASE ORAL at 06:05

## 2017-05-08 RX ADMIN — POLYETHYLENE GLYCOL 3350 17 G: 17 POWDER, FOR SOLUTION ORAL at 06:05

## 2017-05-08 RX ADMIN — MIDAZOLAM HYDROCHLORIDE 0.5 MG: 1 INJECTION INTRAMUSCULAR; INTRAVENOUS at 09:05

## 2017-05-08 RX ADMIN — MORPHINE SULFATE 5 MG: 10 INJECTION INTRAVENOUS at 12:05

## 2017-05-08 RX ADMIN — OXYCODONE HYDROCHLORIDE AND ACETAMINOPHEN 1 TABLET: 10; 325 TABLET ORAL at 06:05

## 2017-05-08 RX ADMIN — HEPARIN SODIUM AND DEXTROSE 16 UNITS/KG/HR: 10000; 5 INJECTION INTRAVENOUS at 06:05

## 2017-05-08 RX ADMIN — MIDAZOLAM HYDROCHLORIDE 1 MG: 1 INJECTION INTRAMUSCULAR; INTRAVENOUS at 09:05

## 2017-05-08 RX ADMIN — CLONIDINE HYDROCHLORIDE 0.3 MG: 0.1 TABLET ORAL at 12:05

## 2017-05-08 NOTE — PROGRESS NOTES
New bag of heparin started; IV tubing changed per protocol; pt in bed; HOB elevated 45 degrees for comfort; no distress noted; pt alert and oriented; no distress noted; bed in lowest position; side rails up x 2; personal items and call light within reach; will cont to monitor.

## 2017-05-08 NOTE — H&P
Radiology Consult    Shireen Anton is a 47 y.o. female with a history of recent bariatric surgery, subsequent renal failure, rhabdomyolysis, on ASA & hepain infusion,  PTT 65.4.  Central IV cath insertion requested for Acute hemodialysis.  Past Medical History:   Diagnosis Date    Allergy     Depression     GERD (gastroesophageal reflux disease)      Past Surgical History:   Procedure Laterality Date    breast augmentation       SECTION      CHOLECYSTECTOMY      LAPAROSCOPIC GASTRIC BANDING  2017    ORIF HUMERUS FRACTURE             Imaging reviewed with Radiology staff, Dr. Salazar.     Procedure: Winnie Cath insertion    Scheduled Meds:    aspirin  81 mg Oral Daily    metoprolol tartrate  25 mg Oral BID    pantoprazole  40 mg Oral Daily    polyethylene glycol  17 g Oral Daily     Continuous Infusions:    heparin (porcine) in D5W 16 Units/kg/hr (17 0432)     PRN Meds:cloNIDine, heparin (PORCINE), heparin (PORCINE), morphine, ondansetron, oxycodone-acetaminophen, oxycodone-acetaminophen    Allergies: Review of patient's allergies indicates:  No Known Allergies    Labs:    Recent Labs  Lab 17  0607   INR 1.3*       Recent Labs  Lab 17  0145   WBC 12.16   HGB 12.5   HCT 37.1   MCV 92   *  121*      Recent Labs  Lab 17  0607  17  0455   *  < > 87     < > 129*   K 4.1  < > 3.8   CL 98  < > 81*   CO2 23  < > 29   BUN 55*  < > 99*   CREATININE 4.9*  < > 10.8*   CALCIUM 6.8*  < > 7.9*   MG 2.4  --   --    ALT 2344*  < > 416*   AST 3385*  < > 82*   ALBUMIN 2.8*  < > 2.6*   BILITOT 0.9  < > 0.6   < > = values in this interval not displayed.  ASA 2  MALAMPATI 3    Vitals (Most Recent):  Temp: 97 °F (36.1 °C) (17)  Pulse: 65 (17)  Resp: 18 (17)  BP: (!) 167/85 (17)  SpO2: 96 % (17)    Plan:   1. NPO after midnight.  2. Local & IV moderate SEDATION  3.  scheduled for winnie cath  insertion.    Wendy Salazar MD  Staff Radiologist  Department of Radiology  Pager: [phone]

## 2017-05-08 NOTE — ASSESSMENT & PLAN NOTE
Creatinine continues to increase, but CPK trending down.  Pt to get tunneled catheter today and initiation of HD today, as per Nephrology. Notified  to look into setting up HD as outpatient for d/c planning.

## 2017-05-08 NOTE — PT/OT/SLP PROGRESS
Physical Therapy      Shireen Anton  MRN: [id number]    Patient not seen today for PT tx secondary to Unavailable (pt off unit for medical procedures). Will follow-up tomorrow.    Dafne Ruano, PT

## 2017-05-08 NOTE — NURSING
Report received from LEN Fisher. Patient resting in bed. Heparin infusing at 21.8 ml/hr. Verbalizes 9/10, generalized pain due to skin tightening from fluid retention. Communication board update and explained, bed in lowest position, locked, call bell within reach.

## 2017-05-08 NOTE — OP NOTE
Radiology Post-Procedure Note    Pre Op Diagnosis: Renal failure    Post Op Diagnosis: Same    Procedure:  Rt IJ Cath  placement    Procedure performed by: Wendy Salazar MD    Written Informed Consent Obtained: Yes    Specimen Removed: No    Estimated Blood Loss: less than 100     Findings:   Using realtime U/S guidance a 14 Fr 20 cm long dual lumen catheter was placed into the right IJ vein with tip of the catheter in the SVC.    Catheter is ready for use.     Wendy Salazar MD  Staff Radiologist  Department of Radiology  Pager: [phone] Post-Procedure Note

## 2017-05-08 NOTE — SUBJECTIVE & OBJECTIVE
Interval History: Pt reports generalized weakness and feeling bloated; no acute events and minimal urine output.    Review of Systems   Constitutional: Negative for chills and fever.   HENT: Negative for trouble swallowing.    Respiratory: Negative for shortness of breath.    Cardiovascular: Negative for chest pain.   Gastrointestinal: Negative for nausea and vomiting.     Objective:     Vital Signs (Most Recent):  Temp: 97 °F (36.1 °C) (05/08/17 0721)  Pulse: 68 (05/08/17 0935)  Resp: 16 (05/08/17 0935)  BP: (!) 171/84 (05/08/17 0935)  SpO2: 99 % (05/08/17 0935) Vital Signs (24h Range):  Temp:  [97 °F (36.1 °C)-98.7 °F (37.1 °C)] 97 °F (36.1 °C)  Pulse:  [59-68] 68  Resp:  [16-20] 16  SpO2:  [95 %-100 %] 99 %  BP: (158-212)/() 171/84     Weight: (!) 147 kg (324 lb)  Body mass index is 43.94 kg/(m^2).    Intake/Output Summary (Last 24 hours) at 05/08/17 0940  Last data filed at 05/08/17 0600   Gross per 24 hour   Intake            601.8 ml   Output              126 ml   Net            475.8 ml      Physical Exam   Constitutional: She is oriented to person, place, and time. She appears well-developed and well-nourished.   Eyes: EOM are normal.   Neck: Normal range of motion.   Cardiovascular: Normal rate and regular rhythm.    Pulmonary/Chest: Effort normal and breath sounds normal.   Abdominal: Soft. Bowel sounds are normal.   Musculoskeletal: Normal range of motion. She exhibits edema.   Neurological: She is alert and oriented to person, place, and time.   UE 4/5 and LE 5/5 strength.   Skin: Skin is warm and dry.   Psychiatric: She has a normal mood and affect.   Vitals reviewed.      Significant Labs: All pertinent labs within the past 24 hours have been reviewed.    Significant Imaging: I have reviewed and interpreted all pertinent imaging results/findings within the past 24 hours.

## 2017-05-08 NOTE — PROGRESS NOTES
Awake alert oriented NAD    Feels bad all over, sore and swollen    Denies CNS ENT CP GI  RHEUM OR DERM SX  Past Medical History:   Diagnosis Date    Allergy     Depression     GERD (gastroesophageal reflux disease)      Review of patient's allergies indicates:  No Known Allergies    Current Facility-Administered Medications   Medication    aspirin EC tablet 81 mg    cloNIDine tablet 0.3 mg    heparin 25,000 units in dextrose 5% 250 mL (100 units/mL) bolus from bag; PRN BOLUS    heparin 25,000 units in dextrose 5% 250 mL (100 units/mL) bolus from bag; PRN BOLUS    heparin 25,000 units in dextrose 5% 250 mL (100 units/mL) infusion; FEMALE    metoprolol tartrate (LOPRESSOR) tablet 25 mg    morphine injection 5 mg    ondansetron injection 8 mg    oxycodone-acetaminophen  mg per tablet 1 tablet    oxycodone-acetaminophen 5-325 mg per tablet 1 tablet    pantoprazole EC tablet 40 mg    polyethylene glycol packet 17 g       LABS    Recent Results (from the past 24 hour(s))   Comprehensive metabolic panel    Collection Time: 05/08/17  4:55 AM   Result Value Ref Range    Sodium 129 (L) 136 - 145 mmol/L    Potassium 3.8 3.5 - 5.1 mmol/L    Chloride 81 (L) 95 - 110 mmol/L    CO2 29 23 - 29 mmol/L    Glucose 87 70 - 110 mg/dL    BUN, Bld 99 (H) 6 - 20 mg/dL    Creatinine 10.8 (H) 0.5 - 1.4 mg/dL    Calcium 7.9 (L) 8.7 - 10.5 mg/dL    Total Protein 6.0 6.0 - 8.4 g/dL    Albumin 2.6 (L) 3.5 - 5.2 g/dL    Total Bilirubin 0.6 0.1 - 1.0 mg/dL    Alkaline Phosphatase 76 55 - 135 U/L    AST 82 (H) 10 - 40 U/L     (H) 10 - 44 U/L    Anion Gap 19 (H) 8 - 16 mmol/L    eGFR if African American 4 (A) >60 mL/min/1.73 m^2    eGFR if non African American 4 (A) >60 mL/min/1.73 m^2   APTT    Collection Time: 05/08/17  4:55 AM   Result Value Ref Range    aPTT 65.4 (H) 21.0 - 32.0 sec   CK    Collection Time: 05/08/17  4:55 AM   Result Value Ref Range    CPK 2172 (H) 20 - 180 U/L       I/O last 3 completed  shifts:  In: 1003.4 [P.O.:240; I.V.:763.4]  Out: 427 [Urine:425; Stool:2]    Vitals:    05/08/17 0110 05/08/17 0213 05/08/17 0400 05/08/17 0721   BP: (!) 165/88  (!) 182/98 (!) 167/85   Pulse: 62  62 65   Resp:   20 18   Temp:   98.7 °F (37.1 °C) 97 °F (36.1 °C)   TempSrc:   Oral Oral   SpO2:  97% 97% 96%   Weight:   (!) 147 kg (324 lb)    Height:           No Jvd, Thyromegaly or Lymphadenopathy  Lungs: Fairly clear anteriorly and laterally  Cor: RRR no G or rubs  Abd: Soft benign good bowel sounds non tender  Ext: POS edema    A)    GASPER secondary to rhabdo uo barely 400 cc in 24 hours  Pulm htn  LFT's better  CVA  Obesity  Recent Gastric sleeve    P)  HD once hd cath is in place

## 2017-05-08 NOTE — NURSING TRANSFER
Nursing Transfer Note      5/8/2017     Transfer from IR    Transfer via Bed    Transfer with telemetry     Transported by IR nurses    Medicines sent: NA    Chart send with patient: YES    Notified: NA    Patient reassessed at: 5/8/17 @ 1025    Upon arrival to floor: Head to toe assessment performed right IJ noted dressing clean dry and intact, no complaints of pain or SOB noted bed in low position call light and phone within reach bed alarm active will continue to monitor.

## 2017-05-08 NOTE — PROGRESS NOTES
Pt PTT 65.4; no change in heparin dose per nomogram; labs to be drawn in am; will cont to monitor.

## 2017-05-08 NOTE — PROGRESS NOTES
[facility] Hospital Medicine  Progress Note    Patient Name: Shireen Anton  MRN: [id number]  Patient Class: IP- Inpatient   Admission Date: 5/1/2017  Length of Stay: 7 days  Attending Physician: Gely Justin MD  Primary Care Provider: Jeancarlos Zamora MD        Subjective:     Principal Problem:Acute hearing loss of both ears    HPI:  Shireen Anton is a 47 y.o. female that (in part)  has a past medical history of Allergy; Depression; and GERD (gastroesophageal reflux disease). Presents to [facility] Emergency Department complaining of acute bilateral hearing loss.  Patient reports going to bed Sunday night and her last coherent memory was signing a check for her daughter's .  Monday morning her  reported that he had a hard time waking her up and said associated vigorously shake her because she appeared unresponsive.  She was disoriented and unable to hear.  She had a gastric sleeve on Thursday, April 27 that was uncomplicated.  During the perioperative period she reported to be normal and had no complaints other than some minor pain expected after surgery.  Throughout Friday, Saturday, and Sunday experienced no significant problems other than being increasingly thirsty.  She was given a liquid form of opioid medications which she was taking as prescribed, she states.  She continues to experience bilateral hearing loss that is constant.  Characterized by only hearing high-pitched sounds.  This is a first episode and a new problem for her.  No radiation of symptoms.  No relieving factors.  No exacerbating factors. In the emergency department routine laboratory studies, EKG, and cardiac enzymes were obtained.  There was concern for multiple organ dysfunction including markedly elevated troponin levels, acute renal failure, markedly elevated liver enzymes, and creatinine kinase levels greater than 40,000.  She denies significant nausea, vomiting, diarrhea, or  urination.  Denies any significant hypotensive episodes.  She does not recall anything occurring after Sunday night to the time of hospitalization.  She does report having some vaginal bleeding that started this afternoon.  She uses NuvaRing for contraception.    Hospital Course:  Ms. Anton was admitted for an acute CVA with hearing loss and MSOF with NSTEMI, acute renal failure, shock liver, and rhabdomyolysis likely from episode of prolonged hypotension possibly from narcotic use given post surgery for gastric sleave.  Pt seen by Neurology, Cardiology, Nephrology with slow clinical improvement except for continued climb in creatinine. Pt CPK trended down but ARF did not improve. Nephrology ordered tunneled catheter for 5/8 with plans to initiate HD.    Interval History: Pt reports generalized weakness and feeling bloated; no acute events and minimal urine output.    Review of Systems   Constitutional: Negative for chills and fever.   HENT: Negative for trouble swallowing.    Respiratory: Negative for shortness of breath.    Cardiovascular: Negative for chest pain.   Gastrointestinal: Negative for nausea and vomiting.     Objective:     Vital Signs (Most Recent):  Temp: 97 °F (36.1 °C) (05/08/17 0721)  Pulse: 68 (05/08/17 0935)  Resp: 16 (05/08/17 0935)  BP: (!) 171/84 (05/08/17 0935)  SpO2: 99 % (05/08/17 0935) Vital Signs (24h Range):  Temp:  [97 °F (36.1 °C)-98.7 °F (37.1 °C)] 97 °F (36.1 °C)  Pulse:  [59-68] 68  Resp:  [16-20] 16  SpO2:  [95 %-100 %] 99 %  BP: (158-212)/() 171/84     Weight: (!) 147 kg (324 lb)  Body mass index is 43.94 kg/(m^2).    Intake/Output Summary (Last 24 hours) at 05/08/17 0940  Last data filed at 05/08/17 0600   Gross per 24 hour   Intake            601.8 ml   Output              126 ml   Net            475.8 ml      Physical Exam   Constitutional: She is oriented to person, place, and time. She appears well-developed and well-nourished.   Eyes: EOM are normal.   Neck: Normal  range of motion.   Cardiovascular: Normal rate and regular rhythm.    Pulmonary/Chest: Effort normal and breath sounds normal.   Abdominal: Soft. Bowel sounds are normal.   Musculoskeletal: Normal range of motion. She exhibits edema.   Neurological: She is alert and oriented to person, place, and time.   UE 4/5 and LE 5/5 strength.   Skin: Skin is warm and dry.   Psychiatric: She has a normal mood and affect.   Vitals reviewed.      Significant Labs: All pertinent labs within the past 24 hours have been reviewed.    Significant Imaging: I have reviewed and interpreted all pertinent imaging results/findings within the past 24 hours.    Assessment/Plan:      Acute ischemic stroke  Seen on MRI, continue ASA, no statin at this time due to shock liver, Neurology following, PT/OT.  Hearing improved. Pt to start statin after LFTS normalize. Pt making progress with therapy and currently with recommendation for home health when ready for discharge.    Metabolic encephalopathy  Resolved, due to MSOF.    * Acute hearing loss of both ears  Due to above, resolved.    NSTEMI (non-ST elevated myocardial infarction)  Pt treated with heparin gtt and ASA, as per Cardiology, will need cath pending improvement of renal function or with coordination with Nephrology for HD.    Acute renal failure  Creatinine continues to increase, but CPK trending down.  Pt to get tunneled catheter today and initiation of HD today, as per Nephrology. Notified  to look into setting up HD as outpatient for d/c planning.    Non-traumatic rhabdomyolysis  Probable global hypotensive event causing MSOF, CPK now trending down, monitor.    Shock liver  Due to shock liver, LFTs trending down and improving, monitor.    Morbid obesity  Status post gastric sleave.      VTE Risk Mitigation         Ordered     Medium Risk of VTE  Once      05/02/17 0012          Gely Justin MD  Department of Hospital Medicine   [facility]

## 2017-05-08 NOTE — ASSESSMENT & PLAN NOTE
Seen on MRI, continue ASA, no statin at this time due to shock liver, Neurology following, PT/OT.  Hearing improved. Pt to start statin after LFTS normalize. Pt making progress with therapy and currently with recommendation for home health when ready for discharge.

## 2017-05-08 NOTE — ASSESSMENT & PLAN NOTE
Pt treated with heparin gtt and ASA, as per Cardiology, will need cath pending improvement of renal function or with coordination with Nephrology for HD.

## 2017-05-08 NOTE — PROGRESS NOTES
Dr. Justin informed patient was disconnected from heparin drip for transport to IR for IJ to be placed and will be going to HD within 30 mins.  order to hold heparin drip and resume at current rate post HD.

## 2017-05-09 LAB
ALBUMIN SERPL BCP-MCNC: 2.6 G/DL
ALP SERPL-CCNC: 72 U/L
ALT SERPL W/O P-5'-P-CCNC: 301 U/L
ANION GAP SERPL CALC-SCNC: 13 MMOL/L
APTT BLDCRRT: 50 SEC
AST SERPL-CCNC: 54 U/L
BACTERIA #/AREA URNS HPF: ABNORMAL /HPF
BILIRUB SERPL-MCNC: 0.6 MG/DL
BILIRUB UR QL STRIP: NEGATIVE
BUN SERPL-MCNC: 69 MG/DL
CALCIUM SERPL-MCNC: 8.2 MG/DL
CHLORIDE SERPL-SCNC: 95 MMOL/L
CK SERPL-CCNC: 1258 U/L
CLARITY UR: ABNORMAL
CO2 SERPL-SCNC: 25 MMOL/L
COLOR UR: ABNORMAL
CREAT SERPL-MCNC: 8.9 MG/DL
EST. GFR  (AFRICAN AMERICAN): 6 ML/MIN/1.73 M^2
EST. GFR  (NON AFRICAN AMERICAN): 5 ML/MIN/1.73 M^2
GLUCOSE SERPL-MCNC: 89 MG/DL
GLUCOSE UR QL STRIP: NEGATIVE
HBV CORE AB SERPL QL IA: NEGATIVE
HGB UR QL STRIP: ABNORMAL
HYALINE CASTS #/AREA URNS LPF: 0 /LPF
KETONES UR QL STRIP: NEGATIVE
LEUKOCYTE ESTERASE UR QL STRIP: ABNORMAL
MICROSCOPIC COMMENT: ABNORMAL
NITRITE UR QL STRIP: NEGATIVE
NON-SQ EPI CELLS #/AREA URNS HPF: 1 /HPF
PH UR STRIP: 7 [PH] (ref 5–8)
POTASSIUM SERPL-SCNC: 4 MMOL/L
PROT SERPL-MCNC: 5.9 G/DL
PROT UR QL STRIP: ABNORMAL
RBC #/AREA URNS HPF: 25 /HPF (ref 0–4)
SODIUM SERPL-SCNC: 133 MMOL/L
SP GR UR STRIP: 1.01 (ref 1–1.03)
SQUAMOUS #/AREA URNS HPF: 1 /HPF
URN SPEC COLLECT METH UR: ABNORMAL
UROBILINOGEN UR STRIP-ACNC: NEGATIVE EU/DL
WBC #/AREA URNS HPF: >100 /HPF (ref 0–5)
WBC CLUMPS URNS QL MICRO: ABNORMAL

## 2017-05-09 PROCEDURE — 85730 THROMBOPLASTIN TIME PARTIAL: CPT

## 2017-05-09 PROCEDURE — 25000003 PHARM REV CODE 250: Performed by: EMERGENCY MEDICINE

## 2017-05-09 PROCEDURE — 36415 COLL VENOUS BLD VENIPUNCTURE: CPT

## 2017-05-09 PROCEDURE — 82550 ASSAY OF CK (CPK): CPT

## 2017-05-09 PROCEDURE — 25000003 PHARM REV CODE 250: Performed by: INTERNAL MEDICINE

## 2017-05-09 PROCEDURE — 63600175 PHARM REV CODE 636 W HCPCS: Performed by: INTERNAL MEDICINE

## 2017-05-09 PROCEDURE — 97116 GAIT TRAINING THERAPY: CPT

## 2017-05-09 PROCEDURE — 97535 SELF CARE MNGMENT TRAINING: CPT

## 2017-05-09 PROCEDURE — 80053 COMPREHEN METABOLIC PANEL: CPT

## 2017-05-09 PROCEDURE — 25000003 PHARM REV CODE 250: Performed by: HOSPITALIST

## 2017-05-09 PROCEDURE — 27000221 HC OXYGEN, UP TO 24 HOURS

## 2017-05-09 PROCEDURE — 21400001 HC TELEMETRY ROOM

## 2017-05-09 PROCEDURE — 94761 N-INVAS EAR/PLS OXIMETRY MLT: CPT

## 2017-05-09 PROCEDURE — 81000 URINALYSIS NONAUTO W/SCOPE: CPT

## 2017-05-09 RX ORDER — HEPARIN SODIUM 5000 [USP'U]/ML
5000 INJECTION, SOLUTION INTRAVENOUS; SUBCUTANEOUS EVERY 12 HOURS
Status: DISCONTINUED | OUTPATIENT
Start: 2017-05-09 | End: 2017-05-16 | Stop reason: HOSPADM

## 2017-05-09 RX ORDER — HYDRALAZINE HYDROCHLORIDE 25 MG/1
25 TABLET, FILM COATED ORAL EVERY 8 HOURS
Status: DISCONTINUED | OUTPATIENT
Start: 2017-05-09 | End: 2017-05-13

## 2017-05-09 RX ORDER — MORPHINE SULFATE 10 MG/ML
5 INJECTION INTRAMUSCULAR; INTRAVENOUS; SUBCUTANEOUS EVERY 4 HOURS PRN
Status: DISCONTINUED | OUTPATIENT
Start: 2017-05-09 | End: 2017-05-16 | Stop reason: HOSPADM

## 2017-05-09 RX ADMIN — OXYCODONE HYDROCHLORIDE AND ACETAMINOPHEN 1 TABLET: 5; 325 TABLET ORAL at 12:05

## 2017-05-09 RX ADMIN — PANTOPRAZOLE SODIUM 40 MG: 40 TABLET, DELAYED RELEASE ORAL at 09:05

## 2017-05-09 RX ADMIN — OXYCODONE HYDROCHLORIDE AND ACETAMINOPHEN 1 TABLET: 10; 325 TABLET ORAL at 09:05

## 2017-05-09 RX ADMIN — HEPARIN SODIUM AND DEXTROSE 16 UNITS/KG/HR: 10000; 5 INJECTION INTRAVENOUS at 01:05

## 2017-05-09 RX ADMIN — ASPIRIN 81 MG: 81 TABLET, COATED ORAL at 09:05

## 2017-05-09 RX ADMIN — MORPHINE SULFATE 5 MG: 10 INJECTION INTRAVENOUS at 11:05

## 2017-05-09 RX ADMIN — HYDRALAZINE HYDROCHLORIDE 25 MG: 25 TABLET ORAL at 03:05

## 2017-05-09 RX ADMIN — METOPROLOL TARTRATE 25 MG: 25 TABLET, FILM COATED ORAL at 09:05

## 2017-05-09 RX ADMIN — HEPARIN SODIUM 5000 UNITS: 5000 INJECTION, SOLUTION INTRAVENOUS; SUBCUTANEOUS at 09:05

## 2017-05-09 RX ADMIN — POLYETHYLENE GLYCOL 3350 17 G: 17 POWDER, FOR SOLUTION ORAL at 09:05

## 2017-05-09 RX ADMIN — OXYCODONE HYDROCHLORIDE AND ACETAMINOPHEN 1 TABLET: 10; 325 TABLET ORAL at 06:05

## 2017-05-09 RX ADMIN — HYDRALAZINE HYDROCHLORIDE 25 MG: 25 TABLET ORAL at 09:05

## 2017-05-09 RX ADMIN — Medication 5 ML: at 10:05

## 2017-05-09 RX ADMIN — MORPHINE SULFATE 5 MG: 10 INJECTION INTRAVENOUS at 05:05

## 2017-05-09 RX ADMIN — HEPARIN SODIUM 5000 UNITS: 5000 INJECTION, SOLUTION INTRAVENOUS; SUBCUTANEOUS at 11:05

## 2017-05-09 NOTE — NURSING
Pt resting in bed quietly. Generalized pain 6/10. Family at bedside. Fall and safety precautions maintained. Bed locked in lowest position with side rails x 2. Call bell and personal items within reach. Shift report given to night rn. Chart check completed

## 2017-05-09 NOTE — PROGRESS NOTES
[facility] Medicine  Progress Note    Patient Name: Shireen Anton  MRN: [id number]  Patient Class: IP- Inpatient   Admission Date: 5/1/2017  Length of Stay: 8 days  Attending Physician: Cheo Wilks MD  Primary Care Provider: Jeancarlos Zamora MD        Subjective:     Principal Problem:Acute hearing loss of both ears    HPI:  Shireen Anton is a 47 y.o. female that (in part)  has a past medical history of Allergy; Depression; and GERD (gastroesophageal reflux disease). Presents to [facility] complaining of acute bilateral hearing loss.  Patient reports going to bed Sunday night and her last coherent memory was signing a check for her daughter's .  Monday morning her  reported that he had a hard time waking her up and said associated vigorously shake her because she appeared unresponsive.  She was disoriented and unable to hear.  She had a gastric sleeve on Thursday, April 27 that was uncomplicated.  During the perioperative period she reported to be normal and had no complaints other than some minor pain expected after surgery.  Throughout Friday, Saturday, and Sunday experienced no significant problems other than being increasingly thirsty.  She was given a liquid form of opioid medications which she was taking as prescribed, she states.  She continues to experience bilateral hearing loss that is constant.  Characterized by only hearing high-pitched sounds.  This is a first episode and a new problem for her.  No radiation of symptoms.  No relieving factors.  No exacerbating factors. In the emergency department routine laboratory studies, EKG, and cardiac enzymes were obtained.  There was concern for multiple organ dysfunction including markedly elevated troponin levels, acute renal failure, markedly elevated liver enzymes, and creatinine kinase levels greater than 40,000.  She denies significant nausea, vomiting, diarrhea,  or urination.  Denies any significant hypotensive episodes.  She does not recall anything occurring after Sunday night to the time of hospitalization.  She does report having some vaginal bleeding that started this afternoon.  She uses NuvaRing for contraception.    Hospital Course:  Ms. Anton was admitted for an acute CVA with hearing loss and MSOF with NSTEMI, acute renal failure, shock liver, and rhabdomyolysis likely from episode of prolonged hypotension possibly from narcotic use given post surgery for gastric sleave.  Pt seen by Neurology, Cardiology, Nephrology with slow clinical improvement except for continued climb in creatinine. Pt CPK trended down but ARF did not improve. Nephrology ordered tunneled catheter for 5/8 with plans to initiate HD.    Interval History:  No new issues.     Review of Systems   Constitutional: Negative for activity change.   HENT: Negative for congestion.    Respiratory: Negative for apnea and chest tightness.    Cardiovascular: Negative for chest pain.   Gastrointestinal: Negative for abdominal pain.     Objective:     Vital Signs (Most Recent):  Temp: 97.5 °F (36.4 °C) (05/09/17 0804)  Pulse: 69 (05/09/17 0804)  Resp: 20 (05/09/17 0804)  BP: (!) 182/88 (05/09/17 0804)  SpO2: 97 % (05/09/17 0804) Vital Signs (24h Range):  Temp:  [97.5 °F (36.4 °C)-98.4 °F (36.9 °C)] 97.5 °F (36.4 °C)  Pulse:  [53-82] 69  Resp:  [17-20] 20  SpO2:  [97 %-99 %] 97 %  BP: (148-192)/() 182/88     Weight: (!) 147 kg (324 lb)  Body mass index is 43.94 kg/(m^2).    Intake/Output Summary (Last 24 hours) at 05/09/17 1048  Last data filed at 05/09/17 0951   Gross per 24 hour   Intake           1679.8 ml   Output             3965 ml   Net          -2285.2 ml      Physical Exam   Constitutional: She is oriented to person, place, and time. She appears well-nourished.   Cardiovascular: Normal rate.    Pulmonary/Chest: Effort normal.   Abdominal: Soft.   Neurological: She is alert and oriented to person,  place, and time.   Skin: Skin is warm and dry.   Psychiatric: She has a normal mood and affect.   Vitals reviewed.      Significant Labs:   BMP:   Recent Labs  Lab 05/09/17  0530   GLU 89   *   K 4.0   CL 95   CO2 25   BUN 69*   CREATININE 8.9*   CALCIUM 8.2*     CBC: No results for input(s): WBC, HGB, HCT, PLT in the last 48 hours.    Significant Imaging:     Assessment/Plan:      * Acute hearing loss of both ears  Due to above, resolved.      NSTEMI (non-ST elevated myocardial infarction)  Pt treated with heparin gtt and ASA, as per Cardiology, will need cath pending improvement of renal function or with coordination with Nephrology for HD.    Shock liver  Due to shock liver, LFTs trending down and improving, monitor.      Acute renal failure  Creatinine continues to increase, but CPK trending down.  Pt to get tunneled catheter today and initiation of HD today, as per Nephrology. Notified  to look into setting up HD as outpatient for d/c planning.        Morbid obesity  Status post gastric sleave.      Non-traumatic rhabdomyolysis  Probable global hypotensive event causing MSOF, CPK now trending down, monitor.      Acute ischemic stroke  Seen on MRI, continue ASA, no statin at this time due to shock liver, Neurology following, PT/OT.  Hearing improved. Pt to start statin after LFTS normalize. Pt making progress with therapy and currently with recommendation for home health when ready for discharge.      Metabolic encephalopathy  Resolved, due to MSOF.      VTE Risk Mitigation         Ordered     heparin (porcine) injection 5,000 Units  As needed (PRN)     Route:  Intra-Catheter        05/08/17 1058     Medium Risk of VTE  Once      05/02/17 0012      continue dialysis.        Cheo Bond MD  Department of Hospital Medicine   [facility]

## 2017-05-09 NOTE — PLAN OF CARE
Problem: Occupational Therapy Goal  Goal: Occupational Therapy Goal  Goals to be met by: 5/9/17     Patient will increase functional independence with ADLs by performing:    UE Dressing with Supervision.  Grooming while standing with Minimal Assistance.  Toileting from toilet with Supervision for hygiene and clothing management.   Toilet transfer to toilet with Supervision.  Upper extremity exercise program x20 reps per handout, with independence.   Outcome: Ongoing (interventions implemented as appropriate)  Pt is progressing with edge of bed activity.

## 2017-05-09 NOTE — PROGRESS NOTES
[facility]  Nephrology  Progress Note    Patient Name: Shireen Anton  MRN: [id number]  Admission Date: 5/1/2017  Hospital Length of Stay: 8 days  Attending Provider: Cheo Wilks MD   Primary Care Physician: Jeancarlos Zamora MD  Principal Problem:Acute hearing loss of both ears    Consults  Subjective:   Notes urinating more      Review of patient's allergies indicates:  No Known Allergies  Current Facility-Administered Medications   Medication Frequency    aspirin EC tablet 81 mg Daily    cloNIDine tablet 0.3 mg TID PRN    heparin (porcine) injection 5,000 Units PRN    heparin 25,000 units in dextrose 5% 250 mL (100 units/mL) bolus from bag; PRN BOLUS PRN    heparin 25,000 units in dextrose 5% 250 mL (100 units/mL) bolus from bag; PRN BOLUS PRN    heparin 25,000 units in dextrose 5% 250 mL (100 units/mL) infusion; FEMALE Continuous    metoprolol tartrate (LOPRESSOR) tablet 25 mg BID    ondansetron injection 8 mg Q12H PRN    oxycodone-acetaminophen  mg per tablet 1 tablet Q6H PRN    oxycodone-acetaminophen 5-325 mg per tablet 1 tablet Q6H PRN    pantoprazole EC tablet 40 mg Daily    polyethylene glycol packet 17 g Daily       Objective:     Vital Signs (Most Recent):  Temp: 97.5 °F (36.4 °C) (05/09/17 0804)  Pulse: 69 (05/09/17 0804)  Resp: 20 (05/09/17 0804)  BP: (!) 182/88 (05/09/17 0804)  SpO2: 97 % (05/09/17 0804)  O2 Device (Oxygen Therapy): nasal cannula (05/09/17 0804) Vital Signs (24h Range):  Temp:  [97.5 °F (36.4 °C)-98.4 °F (36.9 °C)] 97.5 °F (36.4 °C)  Pulse:  [53-82] 69  Resp:  [16-20] 20  SpO2:  [95 %-99 %] 97 %  BP: (143-192)/() 182/88     Weight: (!) 147 kg (324 lb) (05/08/17 0400)  Body mass index is 43.94 kg/(m^2).  Body surface area is 2.73 meters squared.    I/O last 3 completed shifts:  In: 1919.6 [P.O.:940; I.V.:479.6; Other:500]  Out: 3841 [Urine:340; Other:3500; Stool:1]    Physical Exam   HENT:   Head: Normocephalic.   Eyes: EOM are normal.    Cardiovascular: Regular rhythm.    Pulmonary/Chest: Effort normal and breath sounds normal.   Abdominal: Soft. Bowel sounds are normal. There is no tenderness.   Neurological: She is alert.   Skin: Skin is warm and dry.   Psychiatric: She has a normal mood and affect.       Significant Labs:  Lab Results   Component Value Date    WBC 12.16 05/02/2017    HGB 12.5 05/02/2017    HCT 37.1 05/02/2017    MCV 92 05/02/2017     (L) 05/02/2017     (L) 05/02/2017     BMP  Lab Results   Component Value Date     (L) 05/09/2017    K 4.0 05/09/2017    CL 95 05/09/2017    CO2 25 05/09/2017    BUN 69 (H) 05/09/2017    CREATININE 8.9 (H) 05/09/2017    CALCIUM 8.2 (L) 05/09/2017    ANIONGAP 13 05/09/2017    ESTGFRAFRICA 6 (A) 05/09/2017    EGFRNONAA 5 (A) 05/09/2017           Assessment/Plan:     1.GASPER. 2nd to rhabdo/atn I suspect. Urinating more per pt. URine appears cloudy. Juve urine.  2.RHABDO. Juve cpk.   3.NSTEMI. High risk for lhc. Does she still need heparin?  4.CVA. Following recovery. Hearing better.  5.HYPONATREMIA. Following. Hd sodium at higher bath.  6.GASTRIC SLEEVE. Consult Dr. Pryor for post op care. Consult dietician to see dietary need with sleeve. Needs to add vitamins and nepro I suspect. Not sure if liquidcel here.  7.PROTEINURIA. Nonnephrotic. Following.  Ck cbc        Jeanine Saxena MD  Nephrology  [facility]

## 2017-05-09 NOTE — PLAN OF CARE
Problem: Patient Care Overview  Goal: Plan of Care Review  Outcome: Ongoing (interventions implemented as appropriate)  S/P gastric sleeve 4/27/17 possible multiple organ failure r/t gastric sleeve.   Gi consulted and signed off 5/5/17  Admit 5/2/17 AST 9,976/ ALT 3,430 as of 5/8/17 AST 82/   5/2/17 US of liver: Hepatomegaly with fatty change. Liver vasculature unremarkable.     5/1/17 non traumatic rhabdomyolysis   Admit 5/3/17 CPK greater 40,000 as of 5/8/17 CPK  2,172     Nephrology following  5/2/17 US of Kidney: Bilateral kidneys demonstrate abnormal elevated resistive indices which can be seen with acute or chronic medical kidney disease  Admit 5/1/17 BUN 34/ creatine 4.2 today 5/8/17 BUN 99/creatine 10.8  5/8/17 RIJ inserted for HD  5/8/17 HD 3L      Neurology consulted and following  Hearing loss noted however, hearing has improved.   5/2/17 MRI of brain: Findings concerning for small area of acute infarction involving the right cerebellar hemisphere.     Cardiology consulted  Highest troponin 16.176   5/2/17 EF 50%     Patient on heparin drip @ 21.8 ml/hr  ptt 65.4 will recheck in the a.m.

## 2017-05-09 NOTE — PLAN OF CARE
Problem: Patient Care Overview  Goal: Plan of Care Review  Outcome: Ongoing (interventions implemented as appropriate)    05/09/17 1843   Coping/Psychosocial   Plan Of Care Reviewed With patient;sibling;spouse       Goal: Discharge Needs Assessment  Outcome: Ongoing (interventions implemented as appropriate)    05/02/17 1649 05/04/17 1118   Activity/Self Care ROS   Equipment Currently Used at Home --  shower chair   Living Environment   Transportation Available --  car  (Pt was driving PTA.)   Social Work Plan   Patient's perception of discharge disposition home or selfcare --    Patient/Family In Agreement With Plan yes --    Living Environment   Able to Return to Prior Arrangements yes --    Discharge Needs Assessment   How many people do you have in your home that can help with your care after discharge? 1 --    Readmission Within The Last 30 Days current reason for admission unrelated to previous admission  (Gastric Bypass [facility]) --    OTHER   Communicated expected length of stay with patient/caregiver no --    Is patient able to care for self after discharge? Yes --    Who are your caregiver(s) and their phone number(s)? Rogr --    Patient currently receives home health services? No --        Goal: Interdisciplinary Rounds/Family Conf  Outcome: Ongoing (interventions implemented as appropriate)    05/09/17 1843   Interdisciplinary Rounds/Family Conf   Participants family;nursing;patient;physician         Problem: Fall Risk (Adult)  Intervention: Monitor/Assist with Self Care    05/08/17 1915 05/09/17 1745   Activity   Activity Assistance Provided --  independent   Functional Level Current   Ambulation 3 - assistive equipment and person --    Transferring 3 - assistive equipment and person --    Toileting 3 - assistive equipment and person --    Bathing 3 - assistive equipment and person --    Dressing 2 - assistive person --    Eating 0 - independent --    Communication 0 -  understands/communicates without difficulty --    Swallowing 0 - swallows foods/liquids without difficulty --    Daily Care Interventions   Self-Care Promotion independence encouraged;BADL personal objects within reach;BADL personal routines maintained --        Intervention: Reduce Risk/Promote Restraint Free Environment    17   Safety Interventions   Environmental Safety Modification assistive device/personal items within reach;clutter free environment maintained;lighting adjusted;room near unit station;mobility aid in reach;room organization consistent   Prevent [place] Drop/Fall   Safety/Security Measures bed alarm set       Intervention: Review Medications/Identify Contributors to Fall Risk    17   Safety Interventions   Medication Review/Management medications reviewed       Intervention: Patient Rounds    17   Safety Interventions   Patient Rounds bed in low position;bed wheels locked;call light in reach;clutter free environment maintained;ID band on;placement of personal items at bedside;toileting offered;visualized patient       Intervention: Safety Promotion/Fall Prevention    17   Safety Interventions   Safety Promotion/Fall Prevention assistive device/personal item within reach;bed alarm set;nonskid shoes/socks when out of bed;room near unit station;side rails raised x 2         Goal: Identify Related Risk Factors and Signs and Symptoms  Related risk factors and signs and symptoms are identified upon initiation of Human Response Clinical Practice Guideline (CPG)   Outcome: Ongoing (interventions implemented as appropriate)    17   Fall Risk   Related Risk Factors (Fall Risk) bladder function altered;fatigue/slow reaction;polypharmacy   Signs and Symptoms (Fall Risk) presence of risk factors       Goal: Absence of Falls  Patient will demonstrate the desired outcomes by discharge/transition of care.   Outcome: Ongoing (interventions implemented as  appropriate)    05/09/17 1843   Fall Risk (Adult)   Absence of Falls making progress toward outcome         Problem: Fluid Volume Deficit (Adult)  Intervention: Monitor/Manage Hypovolemia    05/09/17 1843   Coping/Psychosocial Interventions   Environmental Support calm environment promoted;comfort object encouraged;caregiver consistency promoted;environmental consistency promoted;personal routine supported;distractions minimized;rest periods encouraged   Nutrition Interventions   Fluid/Electrolyte Management intravenous fluids adjusted         Goal: Identify Related Risk Factors and Signs and Symptoms  Related risk factors and signs and symptoms are identified upon initiation of Human Response Clinical Practice Guideline (CPG)   Outcome: Ongoing (interventions implemented as appropriate)    05/09/17 1843   Fluid Volume Deficit   Related Risk Factors (Fluid Volume Deficit) bleeding/drain output;medication effects   Signs and Symptoms (Fluid Volume Deficit) lab value changes;intake/output negative;muscle weakness;oliguria/anuria       Goal: Fluid/Electrolyte Balance  Patient will demonstrate the desired outcomes by discharge/transition of care.   Outcome: Ongoing (interventions implemented as appropriate)    05/09/17 1843   Fluid Volume Deficit (Adult)   Fluid/Electrolyte Balance making progress toward outcome       Goal: Comfort/Well Being  Patient will demonstrate the desired outcomes by discharge/transition of care.   Outcome: Ongoing (interventions implemented as appropriate)    05/09/17 1843   Fluid Volume Deficit (Adult)   Comfort/Well Being making progress toward outcome         Problem: Pressure Ulcer Risk (Darwin Scale) (Adult,Obstetrics,Pediatric)  Intervention: Prevent/Manage Excess Moisture    05/09/17 1600 05/09/17 1843   Skin Interventions   Skin Protection --  electrode sites changed   Hygiene Care   Perineal Care absorbent pad changed --    Bathing/Skin Care shower --        Intervention: Maintain Head  of Bed Elevation Less Than 30 Degrees as Tolerated    05/09/17 1745   Positioning   Head of Bed (HOB) HOB at 30 degrees       Intervention: Prevent/Minimize Sheer/Friction Injuries    05/08/17 1915 05/09/17 1843   Skin Interventions   Pressure Reduction Devices pressure-redistributing mattress utilized --    Pressure Reduction Techniques --  frequent weight shift encouraged       Intervention: Turn/Reposition Often    05/09/17 1745 05/09/17 1843   Positioning   Body Position positioned/repositioned independently --    Skin Interventions   Pressure Reduction Techniques --  frequent weight shift encouraged         Goal: Identify Related Risk Factors and Signs and Symptoms  Related risk factors and signs and symptoms are identified upon initiation of Human Response Clinical Practice Guideline (CPG)   Outcome: Ongoing (interventions implemented as appropriate)    05/09/17 1843   Pressure Ulcer Risk (Darwin Scale)   Related Risk Factors (Pressure Ulcer Risk (Darwin Scale)) body weight extremes;hospitalization prolonged;mobility impaired       Goal: Skin Integrity  Patient will demonstrate the desired outcomes by discharge/transition of care.   Outcome: Ongoing (interventions implemented as appropriate)    05/09/17 1843   Pressure Ulcer Risk (Darwin Scale) (Adult,Obstetrics,Pediatric)   Skin Integrity making progress toward outcome         Problem: Infection, Risk/Actual (Adult)  Intervention: Manage Suspected/Actual Infection    05/05/17 2032 05/06/17 0938 05/09/17 1843   Safety Interventions   Isolation Precautions --  --  environmental surveillance   Infection Management --  aseptic technique maintained --    Prevent/Manage Colorectal Surgical Infection   Fever Reduction/Comfort Measures lightweight bedding;lightweight clothing;medication administered --  --        Intervention: Prevent Infection/Maximize Resistance    05/05/17 0452 05/09/17 1600   Respiratory Interventions   Airway/Ventilation Management airway  patency maintained;pulmonary hygiene promoted --    Pain/Comfort/Sleep Interventions   Sleep/Rest Enhancement relaxation techniques promoted --    Hygiene Care   Bathing/Skin Care --  shower         Goal: Identify Related Risk Factors and Signs and Symptoms  Related risk factors and signs and symptoms are identified upon initiation of Human Response Clinical Practice Guideline (CPG)   Outcome: Ongoing (interventions implemented as appropriate)    05/09/17 1843   Infection, Risk/Actual   Related Risk Factors (Infection, Risk/Actual) prolonged hospitalization;tissue perfusion altered   Signs and Symptoms (Infection, Risk/Actual) weakness       Goal: Infection Prevention/Resolution  Patient will demonstrate the desired outcomes by discharge/transition of care.   Outcome: Ongoing (interventions implemented as appropriate)    05/09/17 1843   Infection, Risk/Actual (Adult)   Infection Prevention/Resolution making progress toward outcome         Problem: Renal Failure/Kidney Injury, Acute (Adult)  Intervention: Prevent/Manage Infection    05/09/17 1843   Safety Interventions   Infection Prevention environmental surveillance;nutrition promoted;rest/sleep promoted   Infection Management aseptic technique maintained   Prevent/Manage Colorectal Surgical Infection   Fever Reduction/Comfort Measures lightweight clothing;lightweight bedding   Hygiene Care   Oral Care oral care provided       Intervention: Monitor/Manage Fluid, Acid Base Balance    05/09/17 1745 05/09/17 1843   Nutrition Interventions   Fluid/Electrolyte Management --  intravenous fluids adjusted   Positioning   Body Position positioned/repositioned independently --    Safety Interventions   Medication Review/Management --  medications reviewed       Intervention: Evaluate/Maintain Nutrition Support    05/09/17 1843   Coping/Psychosocial Interventions   Environmental Support calm environment promoted;comfort object encouraged;caregiver consistency  promoted;environmental consistency promoted;personal routine supported;distractions minimized;rest periods encouraged       Intervention: Monitor/Manage Anemia/Signs of Bleeding    05/09/17 1843   Safety Interventions   Bleeding Precautions blood pressure closely monitored;coagulation study results reviewed;monitored for signs of bleeding;gentle oral care promoted       Intervention: Promote Energy Conservation    05/05/17 0452   Pain/Comfort/Sleep Interventions   Sleep/Rest Enhancement relaxation techniques promoted       Intervention: Provide Oxygenation/Ventilation/Perfusion Support    05/05/17 0452 05/09/17 1745   Activity   Activity Type --  activity adjusted per tolerance   Positioning   Head of Bed (HOB) --  HOB at 30 degrees   Respiratory Interventions   Airway/Ventilation Management airway patency maintained;pulmonary hygiene promoted --          Goal: Signs and Symptoms of Listed Potential Problems Will be Absent, Minimized or Managed (Renal Failure/Kidney Injury, Acute)  Signs and symptoms of listed potential problems will be absent, minimized or managed by discharge/transition of care (reference Renal Failure/Kidney Injury, Acute (Adult) CPG).   Outcome: Ongoing (interventions implemented as appropriate)    05/09/17 0502   Renal Failure/Kidney Injury, Acute   Problems Assessed (Acute Renal Failure/Kidney Injury) electrolyte imbalance;fluid imbalance   Problems Present (Acute Renal Failure/Kidney Injury) fluid imbalance;electrolyte imbalance         Problem: Stroke (Ischemic) (Adult)  Intervention: Monitor/Assist with Self Care    05/08/17 1915 05/09/17 1745   Activity   Activity Assistance Provided --  independent   Functional Level Current   Ambulation 3 - assistive equipment and person --    Transferring 3 - assistive equipment and person --    Toileting 3 - assistive equipment and person --    Bathing 3 - assistive equipment and person --    Dressing 2 - assistive person --    Eating 0 - independent  --    Communication 0 - understands/communicates without difficulty --    Swallowing 0 - swallows foods/liquids without difficulty --    Daily Care Interventions   Self-Care Promotion independence encouraged;BADL personal objects within reach;BADL personal routines maintained --        Intervention: Provide Oxygenation/Ventilation/Perfusion Support    05/05/17 0452 05/09/17 1745   Activity   Activity Type --  activity adjusted per tolerance   Positioning   Head of Bed (HOB) --  HOB at 30 degrees   Respiratory Interventions   Airway/Ventilation Management airway patency maintained;pulmonary hygiene promoted --        Intervention: Promote Effective Elimination    05/09/17 1843   Gastrointestinal (GI) Interventions   Bowel Program maintenance program followed   Genitourinary () Interventions   Urinary Elimination Promotion catheter patency maintained;positioned for ease of voiding   Monitor/Manage Chemotherapy Gastrointestinal Effects   Bowel Dysfunction Management relaxation techniques promoted;hygiene measures promoted;sitting position facilitated       Intervention: Support Psychosocial Response to Stroke    05/08/17 1820 05/09/17 1843   Coping/Psychosocial Interventions   Supportive Measures active listening utilized --    Environmental Support --  calm environment promoted;comfort object encouraged;caregiver consistency promoted;environmental consistency promoted;personal routine supported;distractions minimized;rest periods encouraged       Intervention: Manage Hypertension/Promote Hemodynamic Stability    05/06/17 0830 05/09/17 1843   Safety Interventions   Bleeding Precautions --  blood pressure closely monitored;coagulation study results reviewed;monitored for signs of bleeding;gentle oral care promoted   Emergency Measures airway opened;legs elevated --    Safety Interventions   Medication Review/Management --  medications reviewed       Intervention: Protect/Optimize Cerebral Perfusion    05/09/17 1843    Neurological Interventions   Cerebral Edema Prevention/Management blood pressure monitored       Intervention: Protect Affected Joints/Extremities    05/09/17 1745   Positioning   Body Position positioned/repositioned independently         Goal: Signs and Symptoms of Listed Potential Problems Will be Absent, Minimized or Managed (Stroke)  Signs and symptoms of listed potential problems will be absent, minimized or managed by discharge/transition of care (reference Stroke (Ischemic) (Adult) CPG).   Outcome: Ongoing (interventions implemented as appropriate)    05/09/17 0502   Stroke (Ischemic)   Problems Assessed (Stroke (Ischemic)/TIA) bladder/bowel dysfunction   Problems Present (Stroke (Ischemic)/TIA) bladder/bowel dysfunction         Comments:   Chart check completed.

## 2017-05-09 NOTE — PLAN OF CARE
Problem: Physical Therapy Goal  Goal: Physical Therapy Goal  Goals to be met by: 17     Patient will increase functional independence with mobility by performin. Supine to sit with Modified [place]  2. Rolling to Left and Right with Modified [place]  3. Sit to stand transfer with Modified [place]  4. Bed to chair transfer with Modified [place]  4. Gait x 250 feet with Modified [place]   5. Lower extremity exercise program x 30 reps per handout, with independence   Outcome: Ongoing (interventions implemented as appropriate)  Pt progressing well with increased ambulation distance.

## 2017-05-09 NOTE — PLAN OF CARE
05/09/17 1446   Discharge Reassessment   Assessment Type Discharge Planning Reassessment   Can the patient answer the patient profile reliably? Yes, cognitively intact   How does the patient rate their overall health at the present time? Good   Describe the patient's ability to walk at the present time. Minor restrictions or changes   How often would a person be available to care for the patient? Whenever needed   During the past month, has the patient often been bothered by feeling down, depressed or hopeless? Yes  (Due to weight loss surgery which she had been advised causes hormonal changes which affects mood.  No thoughts of harming self or others.)   During the past month, has the patient often been bothered by little interest or pleasure in doing things? No   Discharge plan remains the same: Yes  (Home with significant other.)   Provided patient/caregiver education on the expected discharge date and the discharge plan Yes  (Discuss discharge plan- home health)   Discharge Plan A Home Health   Discharge Plan B Home Health   Change in patient condition or support system No   Patient choice form signed by patient/caregiver N/A   Involved the patient/caregiver in establishing a new discharge plan: Yes   SW met with patient for reassessment.  Plan remains for patient to discharge to home with significant other.  SW discussed with patient that home health had been recommended.  SW advised patient that f/u would be done to arrange HH as her discharge approaches.  Significant other will provide help at home.  Sandra garcia LMSw, ACRUPESH-FINN, CCM

## 2017-05-09 NOTE — CONSULTS
Right IJ Drake catheter placed by Dr. Salazar. Please see xray report for details.    Jason Rendon MD

## 2017-05-09 NOTE — PT/OT/SLP PROGRESS
Occupational Therapy  Treatment    Shireen Anton   MRN: [id number]   Admitting Diagnosis: Acute hearing loss of both ears    OT Date of Treatment: 17   OT Start Time: 1048  OT Stop Time: 1111  OT Total Time (min): 23 min    Billable Minutes:  Self Care/Home Management 23    General Precautions: Standard, fall  Orthopedic Precautions: N/A  Braces: N/A         Subjective:  Communicated with nurse  prior to session.    Pain Ratin/10   Pain Addressed: Reposition  Pain Rating Post-Intervention: 4/10    Objective:  Patient found with: peripheral IV, oxygen, telemetry     Functional Mobility:  Bed Mobility:  Rolling/Turning to Left: Supervision  Rolling/Turning Right: Supervision  Scooting/Bridging: Supervision  Supine to Sit: Supervision  Sit to Supine: Supervision    Transfers:   Sit <> Stand Assistance: Stand By Assistance  Sit <> Stand Assistive Device: No Assistive Device      Activities of Daily Living:  Feeding Level of Assistance: Modified independent  UE Dressing Level of Assistance: Contact guard  Grooming Position: Seated  Grooming Level of Assistance: Supervision      Balance:   Static Sit: GOOD-: Takes MODERATE challenges from all directions but inconsistently  Dynamic Sit: GOOD: Maintains balance through MODERATE excursions of active trunk movement    AM-PAC 6 CLICK ADL   How much help from another person does this patient currently need?   1 = Unable, Total/Dependent Assistance  2 = A lot, Maximum/Moderate Assistance  3 = A little, Minimum/Contact Guard/Supervision  4 = None, Modified Long/Independent    Putting on and taking off regular lower body clothing? : 3  Bathing (including washing, rinsing, drying)?: 3  Toileting, which includes using toilet, bedpan, or urinal? : 3  Putting on and taking off regular upper body clothing?: 3  Taking care of personal grooming such as brushing teeth?: 3  Eating meals?: 4  Total Score: 19     AM-PAC Raw Score CMS G-Code Modifier Level of Impairment  Assistance   6 % Total / Unable   7 - 9 CM 80 - 100% Maximal Assist   10 - 14 CL 60 - 80% Moderate Assist   15 - 19 CK 40 - 60% Moderate Assist   20 - 22 CJ 20 - 40% Minimal Assist   23 CI 1-20% SBA / CGA   24 CH 0% Independent/ Mod I     Patient left supine with all lines intact, call button in reach and [place] notified    ASSESSMENT:  Shireen Anton is a 47 y.o. female with a medical diagnosis of Acute hearing loss of both ears and presents with nose bleed during session. Pt has good bed mobility and self care skills. Will progress with patient with out of bed activity as able to tolerate    Rehab identified problem list/impairments: Rehab identified problem list/impairments: weakness, impaired endurance, impaired self care skills, impaired functional mobilty, gait instability, impaired cognition, pain    Rehab potential is good.    Activity tolerance: Good    Discharge recommendations: Discharge Facility/Level Of Care Needs: home health OT     Barriers to discharge: Barriers to Discharge: None    Equipment recommendations:  (TBD)     GOALS:   Occupational Therapy Goals        Problem: Occupational Therapy Goal    Goal Priority Disciplines Outcome Interventions   Occupational Therapy Goal     OT, PT/OT Ongoing (interventions implemented as appropriate)    Description:  Goals to be met by: 5/9/17     Patient will increase functional independence with ADLs by performing:    UE Dressing with Supervision.  Grooming while standing with Minimal Assistance.  Toileting from toilet with Supervision for hygiene and clothing management.   Toilet transfer to toilet with Supervision.  Upper extremity exercise program x20 reps per handout, with independence.                Plan:  Patient to be seen 3 x/week to address the above listed problems via self-care/home management, therapeutic activities, therapeutic exercises  Plan of Care expires: 05/17/17  Plan of Care reviewed with: patient         Denia Heard  OT  05/09/2017

## 2017-05-09 NOTE — PT/OT/SLP PROGRESS
Physical Therapy  Treatment    Shireen Anton   MRN: [id number]   Admitting Diagnosis: Acute hearing loss of both ears    PT Received On: 05/09/17  PT Start Time: 1646     PT Stop Time: 1659    PT Total Time (min): 13 min       Billable Minutes:  Gait Training 13 min    Treatment Type: Treatment  PT/PTA: PT     PTA Visit Number: 0       General Precautions: Standard, fall, respiratory  Orthopedic Precautions: N/A     Subjective:    Pt reported feeling better.    Pain Rating:  (yes)  Location - Side: Right     Location: hip (thigh and buttock)          Objective:   Patient found with: glover catheter, oxygen 2L, peripheral IV, telemetry, R neck HD access    Functional Mobility:  Bed Mobility: Pt found sitting up in bedside chair.  Scooting/Bridging: Modified Independent  Sit to Supine: Modified Independent (HOB)    Transfers:  Sit <> Stand Assistance: Modified Independent  Sit <> Stand Assistive Device: Rolling Walker    Gait:   Gait Distance: 200 ft and 2L O2 NC   Assistance 1: Stand by Assistance  Gait Assistive Device: Rolling walker  Gait Pattern: swing-through gait  Gait Deviation(s): decreased amish    Balance:   Static Sit: GOOD: Takes MODERATE challenges from all directions  Dynamic Sit: GOOD: Maintains balance through MODERATE excursions of active trunk movement  Static Stand: FAIR+: Takes MINIMAL challenges from all directions  Dynamic stand: FAIR+: Needs CLOSE SUPERVISION during gait and is able to right self with minor LOB     Therapeutic Activities and Exercises:  Pt declined further ambulation and activities.  Pt c/o fatigue, wanted to get back in bed and have dinner.     AM-PAC 6 CLICK MOBILITY  How much help from another person does this patient currently need?   1 = Unable, Total/Dependent Assistance  2 = A lot, Maximum/Moderate Assistance  3 = A little, Minimum/Contact Guard/Supervision  4 = None, Modified [place]/Independent    Turning over in bed (including adjusting bedclothes, sheets and  blankets)?: 4  Sitting down on and standing up from a chair with arms (e.g., wheelchair, bedside commode, etc.): 4  Moving from lying on back to sitting on the side of the bed?: 4  Moving to and from a bed to a chair (including a wheelchair)?: 4  Need to walk in hospital room?: 3  Climbing 3-5 steps with a railing?: 3  Total Score: 22    AM-PAC Raw Score CMS G-Code Modifier Level of Impairment Assistance   6 % Total / Unable   7 - 9 CM 80 - 100% Maximal Assist   10 - 14 CL 60 - 80% Moderate Assist   15 - 19 CK 40 - 60% Moderate Assist   20 - 22 CJ 20 - 40% Minimal Assist   23 CI 1-20% SBA / CGA   24 CH 0% Independent/ Mod I     Patient left with bed in chair position with all lines intact, nurse Sona notified and family present.  Nurse Magallanes notified that RW and portable O2 tank left in pt's room so she can ambulate in the hallway with nursing/family supervision while in the hospital.      Assessment:    Rehab identified problem list/impairments: Rehab identified problem list/impairments: weakness, impaired endurance, gait instability, impaired balance, impaired cardiopulmonary response to activity    Rehab potential is good.    Activity tolerance: Fair    Discharge recommendations: Discharge Facility/Level Of Care Needs: home health PT     Barriers to discharge: Barriers to Discharge: None    Equipment recommendations: Equipment Needed After Discharge:  (TBD)     GOALS:   Physical Therapy Goals        Problem: Physical Therapy Goal    Goal Priority Disciplines Outcome Goal Variances Interventions   Physical Therapy Goal     PT/OT, PT Ongoing (interventions implemented as appropriate)     Description:  Goals to be met by: 17     Patient will increase functional independence with mobility by performin. Supine to sit with Modified [place]  2. Rolling to Left and Right with Modified [place]  3. Sit to stand transfer with Modified [place]  4. Bed to chair transfer with Modified  [place]  4. Gait  x 250 feet with Modified [place]   5. Lower extremity exercise program x 30 reps per handout, with independence                PLAN:    Patient to be seen 5 x/week (Mon-Fri)  to address the above listed problems via gait training, therapeutic activities, therapeutic exercises  Plan of Care expires: 05/18/17  Plan of Care reviewed with: patient         Dafne MARY Ruano, PT  05/09/2017

## 2017-05-09 NOTE — PLAN OF CARE
Problem: Patient Care Overview  Goal: Plan of Care Review    05/09/17 0502   Coping/Psychosocial   Plan Of Care Reviewed With patient         Problem: Fall Risk (Adult)  Goal: Absence of Falls  Patient will demonstrate the desired outcomes by discharge/transition of care.   Outcome: Ongoing (interventions implemented as appropriate)    05/09/17 0502   Fall Risk (Adult)   Absence of Falls making progress toward outcome         Problem: Fluid Volume Deficit (Adult)  Goal: Fluid/Electrolyte Balance  Patient will demonstrate the desired outcomes by discharge/transition of care.   Outcome: Ongoing (interventions implemented as appropriate)    05/09/17 0502   Fluid Volume Deficit (Adult)   Fluid/Electrolyte Balance making progress toward outcome         Problem: Pressure Ulcer Risk (Darwin Scale) (Adult,Obstetrics,Pediatric)  Goal: Skin Integrity  Patient will demonstrate the desired outcomes by discharge/transition of care.   Outcome: Ongoing (interventions implemented as appropriate)    05/09/17 0502   Pressure Ulcer Risk (Darwin Scale) (Adult,Obstetrics,Pediatric)   Skin Integrity making progress toward outcome         Problem: Infection, Risk/Actual (Adult)  Goal: Infection Prevention/Resolution  Patient will demonstrate the desired outcomes by discharge/transition of care.   Outcome: Ongoing (interventions implemented as appropriate)    05/09/17 0502   Infection, Risk/Actual (Adult)   Infection Prevention/Resolution making progress toward outcome         Problem: Stroke (Ischemic) (Adult)  Goal: Signs and Symptoms of Listed Potential Problems Will be Absent, Minimized or Managed (Stroke)  Signs and symptoms of listed potential problems will be absent, minimized or managed by discharge/transition of care (reference Stroke (Ischemic) (Adult) CPG).   Outcome: Ongoing (interventions implemented as appropriate)    05/09/17 0502   Stroke (Ischemic)   Problems Assessed (Stroke (Ischemic)/TIA) bladder/bowel dysfunction    Problems Present (Stroke (Ischemic)/TIA) bladder/bowel dysfunction

## 2017-05-09 NOTE — SUBJECTIVE & OBJECTIVE
Interval History:  No new issues.     Review of Systems   Constitutional: Negative for activity change.   HENT: Negative for congestion.    Respiratory: Negative for apnea and chest tightness.    Cardiovascular: Negative for chest pain.   Gastrointestinal: Negative for abdominal pain.     Objective:     Vital Signs (Most Recent):  Temp: 97.5 °F (36.4 °C) (05/09/17 0804)  Pulse: 69 (05/09/17 0804)  Resp: 20 (05/09/17 0804)  BP: (!) 182/88 (05/09/17 0804)  SpO2: 97 % (05/09/17 0804) Vital Signs (24h Range):  Temp:  [97.5 °F (36.4 °C)-98.4 °F (36.9 °C)] 97.5 °F (36.4 °C)  Pulse:  [53-82] 69  Resp:  [17-20] 20  SpO2:  [97 %-99 %] 97 %  BP: (148-192)/() 182/88     Weight: (!) 147 kg (324 lb)  Body mass index is 43.94 kg/(m^2).    Intake/Output Summary (Last 24 hours) at 05/09/17 1048  Last data filed at 05/09/17 0951   Gross per 24 hour   Intake           1679.8 ml   Output             3965 ml   Net          -2285.2 ml      Physical Exam   Constitutional: She is oriented to person, place, and time. She appears well-nourished.   Cardiovascular: Normal rate.    Pulmonary/Chest: Effort normal.   Abdominal: Soft.   Neurological: She is alert and oriented to person, place, and time.   Skin: Skin is warm and dry.   Psychiatric: She has a normal mood and affect.   Vitals reviewed.      Significant Labs:   BMP:   Recent Labs  Lab 05/09/17  0530   GLU 89   *   K 4.0   CL 95   CO2 25   BUN 69*   CREATININE 8.9*   CALCIUM 8.2*     CBC: No results for input(s): WBC, HGB, HCT, PLT in the last 48 hours.    Significant Imaging:

## 2017-05-10 LAB
ALBUMIN SERPL BCP-MCNC: 2.6 G/DL
ALP SERPL-CCNC: 68 U/L
ALT SERPL W/O P-5'-P-CCNC: 229 U/L
ANION GAP SERPL CALC-SCNC: 14 MMOL/L
ANION GAP SERPL CALC-SCNC: 14 MMOL/L
APTT BLDCRRT: 28.4 SEC
AST SERPL-CCNC: 42 U/L
BASOPHILS # BLD AUTO: 0.02 K/UL
BASOPHILS NFR BLD: 0.3 %
BILIRUB SERPL-MCNC: 0.5 MG/DL
BUN SERPL-MCNC: 77 MG/DL
BUN SERPL-MCNC: 77 MG/DL
CALCIUM SERPL-MCNC: 8.4 MG/DL
CALCIUM SERPL-MCNC: 8.4 MG/DL
CHLORIDE SERPL-SCNC: 94 MMOL/L
CHLORIDE SERPL-SCNC: 94 MMOL/L
CK SERPL-CCNC: 826 U/L
CK SERPL-CCNC: 826 U/L
CO2 SERPL-SCNC: 21 MMOL/L
CO2 SERPL-SCNC: 21 MMOL/L
CREAT SERPL-MCNC: 10.5 MG/DL
CREAT SERPL-MCNC: 10.5 MG/DL
DIFFERENTIAL METHOD: ABNORMAL
EOSINOPHIL # BLD AUTO: 0.1 K/UL
EOSINOPHIL NFR BLD: 1.8 %
ERYTHROCYTE [DISTWIDTH] IN BLOOD BY AUTOMATED COUNT: 13.6 %
EST. GFR  (AFRICAN AMERICAN): 5 ML/MIN/1.73 M^2
EST. GFR  (AFRICAN AMERICAN): 5 ML/MIN/1.73 M^2
EST. GFR  (NON AFRICAN AMERICAN): 4 ML/MIN/1.73 M^2
EST. GFR  (NON AFRICAN AMERICAN): 4 ML/MIN/1.73 M^2
GLUCOSE SERPL-MCNC: 81 MG/DL
GLUCOSE SERPL-MCNC: 81 MG/DL
HCT VFR BLD AUTO: 27.6 %
HEP. B SURF AB, QUAL: POSITIVE
HEP. B SURF AB, QUANT.: 150 MIU/ML
HGB BLD-MCNC: 9.3 G/DL
LYMPHOCYTES # BLD AUTO: 1.1 K/UL
LYMPHOCYTES NFR BLD: 16.2 %
MAGNESIUM SERPL-MCNC: 2.5 MG/DL
MCH RBC QN AUTO: 30.2 PG
MCHC RBC AUTO-ENTMCNC: 33.7 %
MCV RBC AUTO: 90 FL
MONOCYTES # BLD AUTO: 0.9 K/UL
MONOCYTES NFR BLD: 13.4 %
NEUTROPHILS # BLD AUTO: 4.3 K/UL
NEUTROPHILS NFR BLD: 66.8 %
PHOSPHATE SERPL-MCNC: 9.3 MG/DL
PLATELET # BLD AUTO: 99 K/UL
PMV BLD AUTO: 11.4 FL
POTASSIUM SERPL-SCNC: 4.1 MMOL/L
POTASSIUM SERPL-SCNC: 4.1 MMOL/L
PROT SERPL-MCNC: 5.9 G/DL
RBC # BLD AUTO: 3.08 M/UL
SODIUM SERPL-SCNC: 129 MMOL/L
SODIUM SERPL-SCNC: 129 MMOL/L
WBC # BLD AUTO: 6.49 K/UL

## 2017-05-10 PROCEDURE — 25000003 PHARM REV CODE 250: Performed by: INTERNAL MEDICINE

## 2017-05-10 PROCEDURE — 87186 SC STD MICRODIL/AGAR DIL: CPT | Mod: 59

## 2017-05-10 PROCEDURE — 85730 THROMBOPLASTIN TIME PARTIAL: CPT

## 2017-05-10 PROCEDURE — 87088 URINE BACTERIA CULTURE: CPT

## 2017-05-10 PROCEDURE — 83735 ASSAY OF MAGNESIUM: CPT

## 2017-05-10 PROCEDURE — 85025 COMPLETE CBC W/AUTO DIFF WBC: CPT

## 2017-05-10 PROCEDURE — 94761 N-INVAS EAR/PLS OXIMETRY MLT: CPT

## 2017-05-10 PROCEDURE — 25000003 PHARM REV CODE 250: Performed by: HOSPITALIST

## 2017-05-10 PROCEDURE — 80053 COMPREHEN METABOLIC PANEL: CPT

## 2017-05-10 PROCEDURE — 63600175 PHARM REV CODE 636 W HCPCS: Performed by: INTERNAL MEDICINE

## 2017-05-10 PROCEDURE — 87077 CULTURE AEROBIC IDENTIFY: CPT | Mod: 59

## 2017-05-10 PROCEDURE — 21400001 HC TELEMETRY ROOM

## 2017-05-10 PROCEDURE — 36415 COLL VENOUS BLD VENIPUNCTURE: CPT

## 2017-05-10 PROCEDURE — 82550 ASSAY OF CK (CPK): CPT

## 2017-05-10 PROCEDURE — 25000003 PHARM REV CODE 250: Performed by: EMERGENCY MEDICINE

## 2017-05-10 PROCEDURE — 90935 HEMODIALYSIS ONE EVALUATION: CPT

## 2017-05-10 PROCEDURE — 87086 URINE CULTURE/COLONY COUNT: CPT

## 2017-05-10 PROCEDURE — 84100 ASSAY OF PHOSPHORUS: CPT

## 2017-05-10 PROCEDURE — 63600175 PHARM REV CODE 636 W HCPCS: Performed by: HOSPITALIST

## 2017-05-10 RX ORDER — ONDANSETRON 2 MG/ML
8 INJECTION INTRAMUSCULAR; INTRAVENOUS EVERY 8 HOURS PRN
Status: DISCONTINUED | OUTPATIENT
Start: 2017-05-10 | End: 2017-05-16 | Stop reason: HOSPADM

## 2017-05-10 RX ORDER — CYANOCOBALAMIN (VITAMIN B-12) 250 MCG
250 TABLET ORAL DAILY
Status: DISCONTINUED | OUTPATIENT
Start: 2017-05-10 | End: 2017-05-16 | Stop reason: HOSPADM

## 2017-05-10 RX ORDER — SODIUM CHLORIDE 9 MG/ML
INJECTION, SOLUTION INTRAVENOUS ONCE
Status: DISCONTINUED | OUTPATIENT
Start: 2017-05-10 | End: 2017-05-16 | Stop reason: HOSPADM

## 2017-05-10 RX ORDER — PARICALCITOL 5 UG/ML
1 INJECTION, SOLUTION INTRAVENOUS
Status: DISCONTINUED | OUTPATIENT
Start: 2017-05-10 | End: 2017-05-16 | Stop reason: HOSPADM

## 2017-05-10 RX ORDER — HEPARIN SODIUM 5000 [USP'U]/ML
5000 INJECTION, SOLUTION INTRAVENOUS; SUBCUTANEOUS
Status: DISCONTINUED | OUTPATIENT
Start: 2017-05-10 | End: 2017-05-16 | Stop reason: HOSPADM

## 2017-05-10 RX ORDER — SODIUM CHLORIDE 9 MG/ML
INJECTION, SOLUTION INTRAVENOUS
Status: DISCONTINUED | OUTPATIENT
Start: 2017-05-10 | End: 2017-05-16 | Stop reason: HOSPADM

## 2017-05-10 RX ORDER — IBUPROFEN 200 MG
200 CAPSULE ORAL 3 TIMES DAILY
Status: DISCONTINUED | OUTPATIENT
Start: 2017-05-10 | End: 2017-05-16 | Stop reason: HOSPADM

## 2017-05-10 RX ADMIN — OXYCODONE HYDROCHLORIDE AND ACETAMINOPHEN 1 TABLET: 5; 325 TABLET ORAL at 03:05

## 2017-05-10 RX ADMIN — ONDANSETRON 8 MG: 2 INJECTION INTRAMUSCULAR; INTRAVENOUS at 02:05

## 2017-05-10 RX ADMIN — OXYCODONE HYDROCHLORIDE AND ACETAMINOPHEN 1 TABLET: 10; 325 TABLET ORAL at 09:05

## 2017-05-10 RX ADMIN — HEPARIN SODIUM 5000 UNITS: 5000 INJECTION, SOLUTION INTRAVENOUS; SUBCUTANEOUS at 12:05

## 2017-05-10 RX ADMIN — HYDRALAZINE HYDROCHLORIDE 25 MG: 25 TABLET ORAL at 09:05

## 2017-05-10 RX ADMIN — CYANOCOBALAMIN TAB 250 MCG 250 MCG: 250 TAB at 02:05

## 2017-05-10 RX ADMIN — HYDRALAZINE HYDROCHLORIDE 25 MG: 25 TABLET ORAL at 05:05

## 2017-05-10 RX ADMIN — CALCIUM CITRATE 200 MG (950 MG) TABLET 200 MG: at 02:05

## 2017-05-10 RX ADMIN — PANTOPRAZOLE SODIUM 40 MG: 40 TABLET, DELAYED RELEASE ORAL at 02:05

## 2017-05-10 RX ADMIN — Medication 5 ML: at 09:05

## 2017-05-10 RX ADMIN — PARICALCITOL 1 MCG: 5 INJECTION, SOLUTION INTRAVENOUS at 07:05

## 2017-05-10 RX ADMIN — CALCIUM CITRATE 200 MG (950 MG) TABLET 200 MG: at 09:05

## 2017-05-10 RX ADMIN — HEPARIN SODIUM 5000 UNITS: 5000 INJECTION, SOLUTION INTRAVENOUS; SUBCUTANEOUS at 02:05

## 2017-05-10 RX ADMIN — OXYCODONE HYDROCHLORIDE AND ACETAMINOPHEN 1 TABLET: 10; 325 TABLET ORAL at 07:05

## 2017-05-10 RX ADMIN — POLYETHYLENE GLYCOL 3350 17 G: 17 POWDER, FOR SOLUTION ORAL at 02:05

## 2017-05-10 RX ADMIN — HYDRALAZINE HYDROCHLORIDE 25 MG: 25 TABLET ORAL at 02:05

## 2017-05-10 RX ADMIN — OXYCODONE HYDROCHLORIDE AND ACETAMINOPHEN 1 TABLET: 10; 325 TABLET ORAL at 02:05

## 2017-05-10 RX ADMIN — METOPROLOL TARTRATE 25 MG: 25 TABLET, FILM COATED ORAL at 09:05

## 2017-05-10 RX ADMIN — ONDANSETRON 8 MG: 2 INJECTION INTRAMUSCULAR; INTRAVENOUS at 10:05

## 2017-05-10 RX ADMIN — CEFTRIAXONE 1 G: 1 INJECTION, SOLUTION INTRAVENOUS at 02:05

## 2017-05-10 NOTE — SUBJECTIVE & OBJECTIVE
Interval History: no new issues. S/p dialysis.     Review of Systems   Constitutional: Negative for activity change.   HENT: Negative for congestion.    Respiratory: Negative for apnea and chest tightness.    Cardiovascular: Negative for chest pain.   Gastrointestinal: Negative for abdominal pain.     Objective:     Vital Signs (Most Recent):  Temp: 98.1 °F (36.7 °C) (05/10/17 0808)  Pulse: 70 (05/10/17 1215)  Resp: 18 (05/10/17 1215)  BP: (!) 147/76 (05/10/17 1215)  SpO2: 96 % (05/10/17 0839) Vital Signs (24h Range):  Temp:  [97.4 °F (36.3 °C)-98.6 °F (37 °C)] 98.1 °F (36.7 °C)  Pulse:  [59-73] 70  Resp:  [17-19] 18  SpO2:  [95 %-96 %] 96 %  BP: (129-190)/(53-90) 147/76     Weight: (!) 147 kg (324 lb)  Body mass index is 43.94 kg/(m^2).    Intake/Output Summary (Last 24 hours) at 05/10/17 1310  Last data filed at 05/10/17 1215   Gross per 24 hour   Intake             1300 ml   Output             2900 ml   Net            -1600 ml      Physical Exam   Constitutional: She is oriented to person, place, and time. She appears well-nourished.   Cardiovascular: Normal rate.    Pulmonary/Chest: Effort normal.   Abdominal: Soft.   Neurological: She is alert and oriented to person, place, and time.   Skin: Skin is warm and dry.   Psychiatric: She has a normal mood and affect.   Vitals reviewed.      Significant Labs:   BMP:   Recent Labs  Lab 05/10/17  0523   GLU 81  81   *  129*   K 4.1  4.1   CL 94*  94*   CO2 21*  21*   BUN 77*  77*   CREATININE 10.5*  10.5*   CALCIUM 8.4*  8.4*   MG 2.5     CBC:   Recent Labs  Lab 05/10/17  0523   WBC 6.49   HGB 9.3*   HCT 27.6*   PLT 99*       Significant Imaging:

## 2017-05-10 NOTE — PROGRESS NOTES
Voice message received from Gabriela at [facility] requesting a return call to discuss patient's discharge needs.  Call back number of [phone] left.  FINN returned call to , Gabriela, at [facility] and advised her of anticipated discharge needs, i.e., HH and possibly OP HD.  Gabriela stated that she would send  a list of in-network providers for both HH and OP HD.  Gabriela will fax list to [phone].  Sandra Schwartz LMSW, [facility], [facility]  05/10/2017

## 2017-05-10 NOTE — UM SECONDARY REVIEW
[facility] Medical [facility]    IP Extended Stay > 10  48 yo Female  Admitted 5/1/17  Hospital Day # 10    Ms. Anton was admitted for an acute CVA with hearing loss and MSOF with NSTEMI, acute renal failure, shock liver, and rhabdomyolysis likely from episode of prolonged hypotension possibly from narcotic use given post surgery for gastric sleave. Pt seen by Neurology, Cardiology, Nephrology with slow clinical improvement except for continued climb in creatinine. Pt CPK trended down but ARF did not improve. Nephrology ordered tunneled catheter on 5/8 and initiated HD.    HD today,    1.GASPER. 2nd to rhabdo/atn I suspect. Urinating more per pt. Low uf today. Hopefully hold hd soon. Seen on hd today. Ck us.  2.RHABDO. Juve cpk better.  3.NSTEMI. High risk for lhc. Spoke to cards. Suspect pt will recover.  4.CVA. Following recovery.   5.HYPONATREMIA. Following. Hd sodium at higher bath.  6.GASTRIC SLEEVE. Waiting for Cook. Dietician consulted. Last note 5/3. Does pt need advancing of diet? Pt's vitamins added back today. Beneprotein added today. Suspect pt needs more protein. ? Iv iron.   7.PROTEINURIA. Nonnephrotic. Following.  8.Anemia. CBC is noted. Hg dropping. Ck stores. May need epo.  9.UTI. Catheter associated. Dc belen. Ck cx. Start rocephin.  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 Phos up. Start binders once eating. Start zemplar for now.  11.Hyperphospatemia. Binders once eating. Awaiting dieitician recs.    LOS: approved w/o recommendations due to severity of clinical picture   approved

## 2017-05-10 NOTE — PLAN OF CARE
Problem: Hemodialysis (Adult)  Goal: Signs and Symptoms of Listed Potential Problems Will be Absent, Minimized or Managed (Hemodialysis)  Signs and symptoms of listed potential problems will be absent, minimized or managed by discharge/transition of care (reference Hemodialysis (Adult) CPG).  Outcome: Ongoing (interventions implemented as appropriate)    05/10/17 1040   Hemodialysis   Problems Assessed (Hemodialysis) cardiovascular complications;electrolyte imbalance;fluid imbalance   Problems Present (Hemodialysis) cardiovascular complications;electrolyte imbalance;fluid imbalance   Odilia Martin RN

## 2017-05-10 NOTE — PROGRESS NOTES
[facility] Hospital Medicine  Progress Note    Patient Name: Shireen Anton  MRN: [id number]  Patient Class: IP- Inpatient   Admission Date: 5/1/2017  Length of Stay: 9 days  Attending Physician: Cheo Wilks MD  Primary Care Provider: Jeancarlos Zamora MD        Subjective:     Principal Problem:Acute hearing loss of both ears    HPI:  Shireen Anton is a 47 y.o. female that (in part)  has a past medical history of Allergy; Depression; and GERD (gastroesophageal reflux disease). Presents to [facility] Emergency Department complaining of acute bilateral hearing loss.  Patient reports going to bed Sunday night and her last coherent memory was signing a check for her daughter's .  Monday morning her  reported that he had a hard time waking her up and said associated vigorously shake her because she appeared unresponsive.  She was disoriented and unable to hear.  She had a gastric sleeve on Thursday, April 27 that was uncomplicated.  During the perioperative period she reported to be normal and had no complaints other than some minor pain expected after surgery.  Throughout Friday, Saturday, and Sunday experienced no significant problems other than being increasingly thirsty.  She was given a liquid form of opioid medications which she was taking as prescribed, she states.  She continues to experience bilateral hearing loss that is constant.  Characterized by only hearing high-pitched sounds.  This is a first episode and a new problem for her.  No radiation of symptoms.  No relieving factors.  No exacerbating factors. In the emergency department routine laboratory studies, EKG, and cardiac enzymes were obtained.  There was concern for multiple organ dysfunction including markedly elevated troponin levels, acute renal failure, markedly elevated liver enzymes, and creatinine kinase levels greater than 40,000.  She denies significant nausea, vomiting, diarrhea,  or urination.  Denies any significant hypotensive episodes.  She does not recall anything occurring after Sunday night to the time of hospitalization.  She does report having some vaginal bleeding that started this afternoon.  She uses NuvaRing for contraception.    Hospital Course:  Ms. Antno was admitted for an acute CVA with hearing loss and MSOF with NSTEMI, acute renal failure, shock liver, and rhabdomyolysis likely from episode of prolonged hypotension possibly from narcotic use given post surgery for gastric sleave.  Pt seen by Neurology, Cardiology, Nephrology with slow clinical improvement except for continued climb in creatinine. Pt CPK trended down but ARF did not improve. Nephrology ordered tunneled catheter for 5/8 with plans to initiate HD.      Interval History: no new issues. S/p dialysis.     Review of Systems   Constitutional: Negative for activity change.   HENT: Negative for congestion.    Respiratory: Negative for apnea and chest tightness.    Cardiovascular: Negative for chest pain.   Gastrointestinal: Negative for abdominal pain.     Objective:     Vital Signs (Most Recent):  Temp: 98.1 °F (36.7 °C) (05/10/17 0808)  Pulse: 70 (05/10/17 1215)  Resp: 18 (05/10/17 1215)  BP: (!) 147/76 (05/10/17 1215)  SpO2: 96 % (05/10/17 0839) Vital Signs (24h Range):  Temp:  [97.4 °F (36.3 °C)-98.6 °F (37 °C)] 98.1 °F (36.7 °C)  Pulse:  [59-73] 70  Resp:  [17-19] 18  SpO2:  [95 %-96 %] 96 %  BP: (129-190)/(53-90) 147/76     Weight: (!) 147 kg (324 lb)  Body mass index is 43.94 kg/(m^2).    Intake/Output Summary (Last 24 hours) at 05/10/17 1310  Last data filed at 05/10/17 1215   Gross per 24 hour   Intake             1300 ml   Output             2900 ml   Net            -1600 ml      Physical Exam   Constitutional: She is oriented to person, place, and time. She appears well-nourished.   Cardiovascular: Normal rate.    Pulmonary/Chest: Effort normal.   Abdominal: Soft.   Neurological: She is alert and oriented  to person, place, and time.   Skin: Skin is warm and dry.   Psychiatric: She has a normal mood and affect.   Vitals reviewed.      Significant Labs:   BMP:   Recent Labs  Lab 05/10/17  0523   GLU 81  81   *  129*   K 4.1  4.1   CL 94*  94*   CO2 21*  21*   BUN 77*  77*   CREATININE 10.5*  10.5*   CALCIUM 8.4*  8.4*   MG 2.5     CBC:   Recent Labs  Lab 05/10/17  0523   WBC 6.49   HGB 9.3*   HCT 27.6*   PLT 99*       Significant Imaging:     Assessment/Plan:      * Acute hearing loss of both ears  Due to above, resolved.      NSTEMI (non-ST elevated myocardial infarction)  Pt treated with heparin gtt and ASA, as per Cardiology, will need cath pending improvement of renal function or with coordination with Nephrology for HD.    Shock liver  Due to shock liver, LFTs trending down and improving, monitor.      Acute renal failure  Creatinine continues to increase, but CPK trending down.  Pt to get tunneled catheter today and initiation of HD , as per Nephrology. Notified  to look into setting up HD as outpatient for d/c planning.        Morbid obesity  Status post gastric sleave.  Body mass index is 43.94 kg/(m^2).        Non-traumatic rhabdomyolysis  Probable global hypotensive event causing MSOF, CPK now trending down, monitor.      Acute ischemic stroke  Seen on MRI, continue ASA, no statin at this time due to shock liver, Neurology following, PT/OT.  Hearing improved. Pt to start statin after LFTS normalize. Pt making progress with therapy and currently with recommendation for home health when ready for discharge.      Metabolic encephalopathy  Resolved, due to MSOF.      VTE Risk Mitigation         Ordered     heparin (porcine) injection 5,000 Units  Use PRN     Route:  Intravenous        05/10/17 0759     heparin (porcine) injection 5,000 Units  As needed (PRN)     Route:  Intra-Catheter        05/10/17 0759     heparin (porcine) injection 5,000 Units  Every 12 hours     Route:   Subcutaneous        05/09/17 1051     Medium Risk of VTE  Once      05/02/17 0012        Continue to follow renal recs.  Clinically doing well otherwise. Continue with dialysis.     Cheo Bond MD  Department of Hospital Medicine   [facility]

## 2017-05-10 NOTE — PT/OT/SLP PROGRESS
Physical Therapy      Shireen Anton  MRN: [id number]    Patient not seen today secondary to Dialysis. Will follow-up as able today .    Virginia Fung, PTA

## 2017-05-10 NOTE — CONSULTS
[facility]  General Surgery  Consult Note    Inpatient consult to Infection Control  Consult performed by: CORNELIA RAYMUNDO  Consult ordered by: GREG LUGO        Subjective:     Chief Complaint/Reason for Admission: acute hearing loss    History of Present Illness: pt with recent lap sleeve, discharged the following day.  A couple of days after discharge, pt developed acute hearing loss and was brought to ED.  Found to have acute CVA, NSTEMI, and acute renal failure.    No current facility-administered medications on file prior to encounter.      Current Outpatient Prescriptions on File Prior to Encounter   Medication Sig    cetirizine (ZYRTEC) 10 MG tablet Take 10 mg by mouth once daily.    citalopram (CELEXA) 40 MG tablet Take 1 tablet (40 mg total) by mouth once daily.    ergocalciferol (VITAMIN D2) 50,000 unit Cap Take 50,000 Units by mouth every 7 days.    gabapentin (NEURONTIN) 300 MG capsule Take 1 capsule (300 mg total) by mouth 3 (three) times daily.    omeprazole (PRILOSEC OTC) 20 MG tablet Take 20 mg by mouth once daily.    trazodone (DESYREL) 50 MG tablet TAKE ONE TABLET BY MOUTH IN THE EVENING    etonogestrel-ethinyl estradiol (NUVARING) 0.12-0.015 mg/24 hr vaginal ring Place 1 each vaginally every 28 days.    FLUVIRIN [id number] 45 mcg (15 mcg x 3)/0.5 mL Susp     FLUVIRIN [id number] 45 mcg (15 mcg x 3)/0.5 mL Susp ADM 0.5ML IM UTD       Review of patient's allergies indicates:  No Known Allergies    Past Medical History:   Diagnosis Date    Allergy     Depression     GERD (gastroesophageal reflux disease)      Past Surgical History:   Procedure Laterality Date    breast augmentation       SECTION      CHOLECYSTECTOMY      LAPAROSCOPIC GASTRIC BANDING  2017    ORIF HUMERUS FRACTURE       Family History     Problem Relation (Age of Onset)    Breast cancer Maternal Grandmother (63)    Cancer Mother (71), Sister (48)    Diabetes Father    Heart  disease Father    Hypertension Father    Thyroid disease Mother        Social History Main Topics    Smoking status: Former Smoker     Packs/day: 1.00     Years: 16.00     Quit date: 11/25/2011    Smokeless tobacco: Never Used    Alcohol use Yes      Comment: occasional    Drug use: No    Sexual activity: Yes     Partners: Male     Birth control/ protection: Inserts     Review of Systems   Constitutional: Negative for chills and fever.   HENT:        Acute hearing loss bilaterally, now resolved   Eyes: Negative.    Respiratory: Negative for cough and shortness of breath.    Cardiovascular: Negative for chest pain and palpitations.   Gastrointestinal: Positive for nausea. Negative for abdominal pain and vomiting.   Musculoskeletal: Negative.    Skin: Negative.    Hematological: Negative.    Psychiatric/Behavioral: Negative.      Objective:     Vital Signs (Most Recent):  Temp: 98 °F (36.7 °C) (05/10/17 1658)  Pulse: 72 (05/10/17 1658)  Resp: 18 (05/10/17 1658)  BP: (!) 143/73 (05/10/17 1658)  SpO2: 96 % (05/10/17 0839) Vital Signs (24h Range):  Temp:  [97.4 °F (36.3 °C)-98.6 °F (37 °C)] 98 °F (36.7 °C)  Pulse:  [59-73] 72  Resp:  [17-19] 18  SpO2:  [95 %-96 %] 96 %  BP: (129-190)/(53-90) 143/73     Weight: (!) 147 kg (324 lb)  Body mass index is 43.94 kg/(m^2).      Intake/Output Summary (Last 24 hours) at 05/10/17 1824  Last data filed at 05/10/17 1500   Gross per 24 hour   Intake             1300 ml   Output             2850 ml   Net            -1550 ml       Physical Exam   Constitutional: She is oriented to person, place, and time. She appears well-developed and well-nourished.   HENT:   Head: Normocephalic and atraumatic.   Eyes: Pupils are equal, round, and reactive to light. No scleral icterus.   Neck: Normal range of motion. No thyromegaly present.   Cardiovascular: Normal rate and regular rhythm.    Pulmonary/Chest: Effort normal and breath sounds normal.   Abdominal: Soft. She exhibits no distension.  There is tenderness (appropriate postop tenderness). There is no rebound and no guarding.   Incisions well healed   Neurological: She is alert and oriented to person, place, and time.   Skin: Skin is warm and dry.   Psychiatric: She has a normal mood and affect. Thought content normal.       Significant Labs:  All pertinent labs from the last 24 hours have been reviewed.    Significant Diagnostics:  CT from admit reviewed    Assessment/Plan:     Active Diagnoses:    Diagnosis Date Noted POA    PRINCIPAL PROBLEM:  Acute hearing loss of both ears [H91.93] 05/01/2017 Yes    Acute ischemic stroke [I63.9] 05/03/2017 Yes    Metabolic encephalopathy [G93.41] 05/03/2017 Yes    NSTEMI (non-ST elevated myocardial infarction) [I21.4] 05/02/2017 Yes    Shock liver [K72.00] 05/02/2017 Yes    Acute renal failure [N17.9] 05/02/2017 Yes    Morbid obesity [E66.01] 05/02/2017 Yes    Non-traumatic rhabdomyolysis [M62.82] 05/02/2017 Yes      Problems Resolved During this Admission:    Diagnosis Date Noted Date Resolved POA   bariatric surgery status.    My greatest concern would be that this was precipitated by a leak from the staple line.  There is no evidence of this on admit CT although it was done without contrast.  In light of her renal failure, repeating the CT with contrast would not be worth the risk as she is improving.    Each program has slightly different diet advancement protocols, and I have encouraged her to contact her surgeon for his recommendations on discharge.  For now will start full liquid diet and advance to pureed diet tomorrow if she does ok.  The biggest challenge for these patients is getting enough protein, as it will be the majority of her diet.  This is in sharp contrast to a renal diet which tends to be low protein.  Nepro may be an acceptable short term solution.  Hopefully her renal failure will resolve and she can increase her protein intake as her function improves.    Daily liquid  multivitamin should be sufficient.    Ok to shower, no dressing needed.  steristrips will fall off on their own with time.  No heavy lifting for 6 weeks from surgery.    Will not plan on following her while inpatient, but would be happy to answer any additional questions or revisit with her as needed.    Thank you for your consult.     Kevin Pryor MD  General Surgery  [facility]

## 2017-05-10 NOTE — NURSING
Pt sitting up in bed watching TV.  Pt had a good night but did experience some pain to her upper thighs and abdomen d/t tightness from fluid.  Pt was given PRN pain medication.  Pt also had a small nose bleed which resolved with compression and elevation.  Pt continues to sat 95% on RA and denies sob.

## 2017-05-10 NOTE — PROGRESS NOTES
[facility]  Nephrology  Progress Note    Patient Name: Shireen Atnon  MRN: [id number]  Admission Date: 5/1/2017  Hospital Length of Stay: 9 days  Attending Provider: Cheo Wilks MD   Primary Care Physician: Jeancarlos Zamora MD  Principal Problem:Acute hearing loss of both ears    Consults  Subjective:   Notes urinating well      Review of patient's allergies indicates:  No Known Allergies  Current Facility-Administered Medications   Medication Frequency    aspirin EC tablet 81 mg Daily    cloNIDine tablet 0.3 mg TID PRN    heparin (porcine) injection 5,000 Units Q12H    heparin (porcine) injection 5,000 Units PRN    heparin (porcine) injection 5,000 Units PRN    hydrALAZINE tablet 25 mg Q8H    metoprolol tartrate (LOPRESSOR) tablet 25 mg BID    morphine injection 5 mg Q4H PRN    multivitamin liquid per dose 5 mL Daily    ondansetron injection 8 mg Q12H PRN    oxycodone-acetaminophen  mg per tablet 1 tablet Q6H PRN    oxycodone-acetaminophen 5-325 mg per tablet 1 tablet Q6H PRN    pantoprazole EC tablet 40 mg Daily    polyethylene glycol packet 17 g Daily       Objective:     Vital Signs (Most Recent):  Temp: 98.1 °F (36.7 °C) (05/10/17 0808)  Pulse: 68 (05/10/17 0945)  Resp: 18 (05/10/17 0945)  BP: (!) 156/77 (05/10/17 0945)  SpO2: 96 % (05/10/17 0839)  O2 Device (Oxygen Therapy): nasal cannula (05/10/17 0839) Vital Signs (24h Range):  Temp:  [97.4 °F (36.3 °C)-98.6 °F (37 °C)] 98.1 °F (36.7 °C)  Pulse:  [58-70] 68  Resp:  [18-20] 18  SpO2:  [95 %-96 %] 96 %  BP: (132-190)/(64-90) 156/77     Weight: (!) 147 kg (324 lb) (05/08/17 0400)  Body mass index is 43.94 kg/(m^2).  Body surface area is 2.73 meters squared.    I/O last 3 completed shifts:  In: 1719.8 [P.O.:1480; I.V.:239.8]  Out: 715 [Urine:715]    Physical Exam   Constitutional: She appears well-developed and well-nourished.   HENT:   Head: Normocephalic.   Nose: Nose normal.   Mouth/Throat: Mucous membranes are  normal.   Eyes: EOM are normal.   Neck:   tim in rt neck   Cardiovascular: Regular rhythm.    Pulmonary/Chest: Effort normal and breath sounds normal.   Abdominal: Soft. Bowel sounds are normal. There is no tenderness.   Musculoskeletal: She exhibits edema.   Neurological: She is alert.   Skin: Skin is warm and dry.   Psychiatric: She has a normal mood and affect.       Significant Labs:  Lab Results   Component Value Date    WBC 6.49 05/10/2017    HGB 9.3 (L) 05/10/2017    HCT 27.6 (L) 05/10/2017    MCV 90 05/10/2017    PLT 99 (L) 05/10/2017     BMP  Lab Results   Component Value Date     (L) 05/10/2017     (L) 05/10/2017    K 4.1 05/10/2017    K 4.1 05/10/2017    CL 94 (L) 05/10/2017    CL 94 (L) 05/10/2017    CO2 21 (L) 05/10/2017    CO2 21 (L) 05/10/2017    BUN 77 (H) 05/10/2017    BUN 77 (H) 05/10/2017    CREATININE 10.5 (H) 05/10/2017    CREATININE 10.5 (H) 05/10/2017    CALCIUM 8.4 (L) 05/10/2017    CALCIUM 8.4 (L) 05/10/2017    ANIONGAP 14 05/10/2017    ANIONGAP 14 05/10/2017    ESTGFRAFRICA 5 (A) 05/10/2017    ESTGFRAFRICA 5 (A) 05/10/2017    EGFRNONAA 4 (A) 05/10/2017    EGFRNONAA 4 (A) 05/10/2017           Assessment/Plan:     1.GASPER. 2nd to rhabdo/atn I suspect. Urinating more per pt. Low uf today. Hopefully hold hd soon. Seen on hd today. Ck us.  2.RHABDO. Juve cpk better.  3.NSTEMI. High risk for lhc. Spoke to cards. Suspect pt will recover.  4.CVA. Following recovery.   5.HYPONATREMIA. Following. Hd sodium at higher bath.  6.GASTRIC SLEEVE. Waiting for Cook. Dietician consulted.  Last note 5/3. Does pt need advancing of diet? Pt's vitamins added back today. Beneprotein added today. Suspect pt needs more protein. ? Iv iron.    7.PROTEINURIA. Nonnephrotic. Following.  8.Anemia. CBC is noted. Hg dropping.  Ck stores. May need epo.  9.UTI. Catheter associated. Dc glover. Ck cx. Start rocephin.  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 Phos up. Start binders once eating. Start zemplar for  now.  11.Hyperphospatemia. Binders once eating. Awaiting dieitician recs.    Jeanine Saxena MD  Nephrology  [facility]

## 2017-05-10 NOTE — PLAN OF CARE
Problem: Patient Care Overview  Goal: Plan of Care Review  Outcome: Ongoing (interventions implemented as appropriate)    05/09/17 2214   Coping/Psychosocial   Plan Of Care Reviewed With patient         Problem: Fall Risk (Adult)  Goal: Absence of Falls  Patient will demonstrate the desired outcomes by discharge/transition of care.   Outcome: Ongoing (interventions implemented as appropriate)    05/09/17 2214   Fall Risk (Adult)   Absence of Falls making progress toward outcome         Problem: Fluid Volume Deficit (Adult)  Goal: Fluid/Electrolyte Balance  Patient will demonstrate the desired outcomes by discharge/transition of care.   Outcome: Ongoing (interventions implemented as appropriate)    05/09/17 2214   Fluid Volume Deficit (Adult)   Fluid/Electrolyte Balance making progress toward outcome         Problem: Pressure Ulcer Risk (Darwin Scale) (Adult,Obstetrics,Pediatric)  Goal: Skin Integrity  Patient will demonstrate the desired outcomes by discharge/transition of care.   Outcome: Ongoing (interventions implemented as appropriate)    05/09/17 2214   Pressure Ulcer Risk (Darwin Scale) (Adult,Obstetrics,Pediatric)   Skin Integrity making progress toward outcome         Problem: Infection, Risk/Actual (Adult)  Goal: Infection Prevention/Resolution  Patient will demonstrate the desired outcomes by discharge/transition of care.   Outcome: Ongoing (interventions implemented as appropriate)    05/09/17 2214   Infection, Risk/Actual (Adult)   Infection Prevention/Resolution making progress toward outcome         Problem: Renal Failure/Kidney Injury, Acute (Adult)  Goal: Signs and Symptoms of Listed Potential Problems Will be Absent, Minimized or Managed (Renal Failure/Kidney Injury, Acute)  Signs and symptoms of listed potential problems will be absent, minimized or managed by discharge/transition of care (reference Renal Failure/Kidney Injury, Acute (Adult) CPG).   Outcome: Ongoing (interventions implemented as  appropriate)    05/09/17 2214   Renal Failure/Kidney Injury, Acute   Problems Assessed (Acute Renal Failure/Kidney Injury) all   Problems Present (Acute Renal Failure/Kidney Injury) electrolyte imbalance;fluid imbalance         Problem: Stroke (Ischemic) (Adult)  Goal: Signs and Symptoms of Listed Potential Problems Will be Absent, Minimized or Managed (Stroke)  Signs and symptoms of listed potential problems will be absent, minimized or managed by discharge/transition of care (reference Stroke (Ischemic) (Adult) CPG).   Outcome: Ongoing (interventions implemented as appropriate)    05/09/17 2214   Stroke (Ischemic)   Problems Assessed (Stroke (Ischemic)/TIA) all

## 2017-05-10 NOTE — PROGRESS NOTES
[facility]  Adult Nutrition  Progress Note    SUMMARY     Recommendations    Recommendation/Intervention: 1) Advance patient to sugar-free Full Liquid, Renal Diet with skim milk, soy or almond milk 2) No caffeine, carbonated beverages, or straws 3) Continue Beneprotein TID 4) Order Boost Glucose Control vanilla TID 5) B-complex 1x/day 6) Continue MVI 2x/day 7) Iron 36 mg/day 8) Order vitamin labs daily to determine whether patient has vitamin deficiencies       Goals: 1) Patient diet will tolerate diet 2) Patient will utilize oral supplement   Nutrition Goal Status: new  Communication of RD Recs: other (comment) (physician's sticky note)    Continuum of Care Plan    D/C Planning:  Too soon to determine. Will monitor/follow-up.     Diagnosis  1. Acute hearing loss of both ears    2. Altered mental status    3. Acute renal failure, unspecified acute renal failure type    4. NSTEMI (non-ST elevated myocardial infarction)    5. Non-traumatic rhabdomyolysis    6. Metabolic encephalopathy      Past Medical History:   Diagnosis Date    Allergy     Depression     GERD (gastroesophageal reflux disease)      Reason for Assessment    Reason for Assessment: RD follow-up  General Information Comments: Patient out of room for HD. Leak not suspected per RN.     Nutrition Prescription Ordered    Current Diet Order: Renal Clear Sugar Free Liquids  Oral Nutrition Supplement: Beneprotein TID     Evaluation of Received Nutrients/Fluid Intake       Tolerance: other (see comments) (eufemia)     Nutrition Risk Screen     Nutrition Risk Screen: other (see comments) (Post Gastric Bypass)    Nutrition/Diet History    Patient Reported Diet/Restrictions/Preferences:  (EUFEMIA)     Food Preferences: EUFEMIA    Factors Affecting Nutritional Intake: altered gastrointestinal function    Labs/Tests/Procedures/Meds    Diagnostic Test/Procedure Review: reviewed, pertinent  Pertinent Labs Reviewed: reviewed, pertinent  BMP  Lab Results    Component Value Date     (L) 05/10/2017     (L) 05/10/2017    K 4.1 05/10/2017    K 4.1 05/10/2017    CL 94 (L) 05/10/2017    CL 94 (L) 05/10/2017    CO2 21 (L) 05/10/2017    CO2 21 (L) 05/10/2017    BUN 77 (H) 05/10/2017    BUN 77 (H) 05/10/2017    CREATININE 10.5 (H) 05/10/2017    CREATININE 10.5 (H) 05/10/2017    CALCIUM 8.4 (L) 05/10/2017    CALCIUM 8.4 (L) 05/10/2017    ANIONGAP 14 05/10/2017    ANIONGAP 14 05/10/2017    ESTGFRAFRICA 5 (A) 05/10/2017    ESTGFRAFRICA 5 (A) 05/10/2017    EGFRNONAA 4 (A) 05/10/2017    EGFRNONAA 4 (A) 05/10/2017     Lab Results   Component Value Date    CALCIUM 8.4 (L) 05/10/2017    CALCIUM 8.4 (L) 05/10/2017    PHOS 9.3 (HH) 05/10/2017     Lab Results   Component Value Date    ALBUMIN 2.6 (L) 05/10/2017       Lab Results   Component Value Date    CHOL 125 05/03/2017    CHOL 136 05/02/2017    CHOL 167 01/28/2015     Lab Results   Component Value Date    HDL 25 (L) 05/03/2017    HDL 29 (L) 05/02/2017    HDL 59 01/28/2015     Lab Results   Component Value Date    LDLCALC 67.4 05/03/2017    LDLCALC 75.4 05/02/2017    LDLCALC 88.8 01/28/2015     Lab Results   Component Value Date    TRIG 163 (H) 05/03/2017    TRIG 158 (H) 05/02/2017    TRIG 96 01/28/2015     Lab Results   Component Value Date    CHOLHDL 20.0 05/03/2017    CHOLHDL 21.3 05/02/2017    CHOLHDL 35.3 01/28/2015     No results for input(s): POCTGLUCOSE in the last 24 hours.     Pertinent Medications Reviewed: reviewed, pertinent  Scheduled Meds:   aspirin  81 mg Oral Daily    calcium citrate  200 mg Oral TID    cefTRIAXone (ROCEPHIN) IVPB  1 g Intravenous Q24H    heparin (porcine)  5,000 Units Subcutaneous Q12H    hydrALAZINE  25 mg Oral Q8H    metoprolol tartrate  25 mg Oral BID    multivitamin liquid  5 mL Oral BID    pantoprazole  40 mg Oral Daily    paricalcitol  1 mcg Intravenous Every Mon, Wed, Fri    polyethylene glycol  17 g Oral Daily     Continuous Infusions:   PRN Meds:.cloNIDine, heparin  (porcine), heparin (porcine), morphine, ondansetron, oxycodone-acetaminophen, oxycodone-acetaminophen       Physical Findings    Overall Physical Appearance: obese, on oxygen therapy  Skin: intact    Anthropometrics       Height (inches): 72.01 in  Weight Method: Stated  Weight (kg): 135.9 kg  Ideal Body Weight (IBW), Female: 160.05 lb  % Ideal Body Weight, Female (lb): 187.2 lb  BMI (kg/m2): 40.62  BMI Grade: greater than 40 - morbid obesity    Estimated/Assessed Needs    Weight Used For Calorie Calculations: 135.9 kg (299 lb 9.7 oz)   Height (cm): 182.9 cm  Energy Need Method: Agu[place]-St Jeor (will not meet energy needs due to intentional weight loss, s/p gastric sleeve)  RMR (Aguada-St. Jeor Equation): 2118.37  Weight Used For Protein Calculations: 72.7 kg (160 lb 4.4 oz) ((Ideal Body Weight))  Protein Requirements: 87 - 109 g (on HD - 1.2 - 1.5 g/kg of IBW)  Fluid Need Method:  (1864 mls or per MD)    Assessment and Plan  Nutrition Diagnosis:   Problem- altered gastrointestinal function  Etiology: alterations in GI structure/function  Signs/Symptoms: S/p gastric sleeve   Status:  continues     Monitor and Evaluation    Food and Nutrient Intake: energy intake, food and beverage intake  Food and Nutrient Adminstration: diet order, other (specify) (supplement order)  Anthropometric Measurements: weight, weight change, body mass index  Biochemical Data, Medical Tests and Procedures: electrolyte and renal panel, gastrointestinal profile, glucose/endocrine profile, inflammatory profile, lipid profile, other (specify) (vitamin labs, iron)  Nutrition-Focused Physical Findings: skin, overall appearance    Nutrition Risk    Level of Risk:  (x2 weekly)    Nutrition Follow-Up    RD Follow-up?: Yes

## 2017-05-10 NOTE — PROGRESS NOTES
Patient sitting up in  Chair. Patient has family at bedside and is instructed on to call when patient is ready for bed. Family verbalized understanding of all instructions.

## 2017-05-10 NOTE — PROGRESS NOTES
Occupational Therapy  Pt is currently in dialysis and unable for treatment. This is second attempt for treatment.    OT will follow.      APOLINAR Mendoza

## 2017-05-10 NOTE — ASSESSMENT & PLAN NOTE
Creatinine continues to increase, but CPK trending down.  Pt to get tunneled catheter today and initiation of HD , as per Nephrology. Notified  to look into setting up HD as outpatient for d/c planning.

## 2017-05-10 NOTE — PLAN OF CARE
Problem: Patient Care Overview  Goal: Plan of Care Review  05/10/2017     Recommendations     Recommendation/Intervention: 1) Advance patient to sugar-free Full Liquid, Renal Diet with skim milk, soy or almond milk 2) No caffeine, carbonated beverages, or straws 3) Continue Beneprotein TID 4) Order Boost Glucose Control vanilla TID 5) B-complex 1x/day 6) Continue MVI 2x/day 7) Iron 36 mg/day 8) Order vitamin labs daily to determine whether patient has vitamin deficiencies       Goals: 1) Patient diet will tolerate diet 2) Patient will utilize oral supplement   Nutrition Goal Status: new  Communication of RD Recs: other (comment) (physician's sticky note)     Jovanna Hutton, MPH, RD, LDN

## 2017-05-10 NOTE — PROGRESS NOTES
Supine in bed with complaint of pain of 7/10. Patient has several areas on arm with brusing   with swelling.Patient prepared for dialysis. Assessment completed,

## 2017-05-10 NOTE — NURSING
Pt sitting up in bed AAOx3 with friends at BS.  Resp even NL on 2L NC.  Rt IJ tim dry and intact.  Campos cath intact draining cloudy yellow urine.  Pt c/o pain to her thighs and buttocks d/t tightness from fluid.  Pt instructed to turn and reposition in bed.  Pt has no ss of skin breakdown.  Edema noted to BLE's.  Plan of care discussed with pt.  Advised pt to call for assistance.  Call light in reach, bed alarm on, pt close to the nursing station.

## 2017-05-11 LAB
ALBUMIN SERPL BCP-MCNC: 2.8 G/DL
ALP SERPL-CCNC: 68 U/L
ALT SERPL W/O P-5'-P-CCNC: 187 U/L
ANION GAP SERPL CALC-SCNC: 14 MMOL/L
APTT BLDCRRT: 27.5 SEC
AST SERPL-CCNC: 35 U/L
BILIRUB SERPL-MCNC: 0.6 MG/DL
BUN SERPL-MCNC: 42 MG/DL
CALCIUM SERPL-MCNC: 8.6 MG/DL
CHLORIDE SERPL-SCNC: 95 MMOL/L
CK SERPL-CCNC: 519 U/L
CO2 SERPL-SCNC: 24 MMOL/L
CREAT SERPL-MCNC: 7.3 MG/DL
EST. GFR  (AFRICAN AMERICAN): 7 ML/MIN/1.73 M^2
EST. GFR  (NON AFRICAN AMERICAN): 6 ML/MIN/1.73 M^2
FERRITIN SERPL-MCNC: 282 NG/ML
GLUCOSE SERPL-MCNC: 89 MG/DL
IRON SERPL-MCNC: 30 UG/DL
PHOSPHATE SERPL-MCNC: 6.2 MG/DL
POTASSIUM SERPL-SCNC: 4.2 MMOL/L
PROT SERPL-MCNC: 6.3 G/DL
SATURATED IRON: 10 %
SODIUM SERPL-SCNC: 133 MMOL/L
TOTAL IRON BINDING CAPACITY: 308 UG/DL
TRANSFERRIN SERPL-MCNC: 208 MG/DL

## 2017-05-11 PROCEDURE — 25000003 PHARM REV CODE 250: Performed by: INTERNAL MEDICINE

## 2017-05-11 PROCEDURE — 94761 N-INVAS EAR/PLS OXIMETRY MLT: CPT

## 2017-05-11 PROCEDURE — 63600175 PHARM REV CODE 636 W HCPCS: Performed by: INTERNAL MEDICINE

## 2017-05-11 PROCEDURE — 36415 COLL VENOUS BLD VENIPUNCTURE: CPT

## 2017-05-11 PROCEDURE — 80053 COMPREHEN METABOLIC PANEL: CPT

## 2017-05-11 PROCEDURE — 86580 TB INTRADERMAL TEST: CPT | Performed by: INTERNAL MEDICINE

## 2017-05-11 PROCEDURE — 84100 ASSAY OF PHOSPHORUS: CPT

## 2017-05-11 PROCEDURE — 97110 THERAPEUTIC EXERCISES: CPT

## 2017-05-11 PROCEDURE — 82728 ASSAY OF FERRITIN: CPT

## 2017-05-11 PROCEDURE — 97535 SELF CARE MNGMENT TRAINING: CPT

## 2017-05-11 PROCEDURE — 25000003 PHARM REV CODE 250: Performed by: HOSPITALIST

## 2017-05-11 PROCEDURE — 82550 ASSAY OF CK (CPK): CPT

## 2017-05-11 PROCEDURE — 97116 GAIT TRAINING THERAPY: CPT

## 2017-05-11 PROCEDURE — 21400001 HC TELEMETRY ROOM

## 2017-05-11 PROCEDURE — 85730 THROMBOPLASTIN TIME PARTIAL: CPT

## 2017-05-11 PROCEDURE — 87350 HEPATITIS BE AG IA: CPT

## 2017-05-11 PROCEDURE — 83540 ASSAY OF IRON: CPT

## 2017-05-11 RX ADMIN — CYANOCOBALAMIN TAB 250 MCG 250 MCG: 250 TAB at 11:05

## 2017-05-11 RX ADMIN — OXYCODONE HYDROCHLORIDE AND ACETAMINOPHEN 1 TABLET: 5; 325 TABLET ORAL at 08:05

## 2017-05-11 RX ADMIN — ONDANSETRON 8 MG: 2 INJECTION INTRAMUSCULAR; INTRAVENOUS at 08:05

## 2017-05-11 RX ADMIN — IRON SUCROSE 100 MG: 20 INJECTION, SOLUTION INTRAVENOUS at 11:05

## 2017-05-11 RX ADMIN — HYDRALAZINE HYDROCHLORIDE 25 MG: 25 TABLET ORAL at 05:05

## 2017-05-11 RX ADMIN — Medication 5 UNITS: at 01:05

## 2017-05-11 RX ADMIN — ASPIRIN 81 MG: 81 TABLET, COATED ORAL at 11:05

## 2017-05-11 RX ADMIN — Medication 5 ML: at 11:05

## 2017-05-11 RX ADMIN — CALCIUM CITRATE 200 MG (950 MG) TABLET 200 MG: at 04:05

## 2017-05-11 RX ADMIN — CALCIUM CITRATE 200 MG (950 MG) TABLET 200 MG: at 06:05

## 2017-05-11 RX ADMIN — HYDRALAZINE HYDROCHLORIDE 25 MG: 25 TABLET ORAL at 09:05

## 2017-05-11 RX ADMIN — HEPARIN SODIUM 5000 UNITS: 5000 INJECTION, SOLUTION INTRAVENOUS; SUBCUTANEOUS at 08:05

## 2017-05-11 RX ADMIN — CLONIDINE HYDROCHLORIDE 0.3 MG: 0.1 TABLET ORAL at 06:05

## 2017-05-11 RX ADMIN — PANTOPRAZOLE SODIUM 40 MG: 40 TABLET, DELAYED RELEASE ORAL at 11:05

## 2017-05-11 RX ADMIN — METOPROLOL TARTRATE 25 MG: 25 TABLET, FILM COATED ORAL at 08:05

## 2017-05-11 RX ADMIN — CALCIUM CITRATE 200 MG (950 MG) TABLET 200 MG: at 09:05

## 2017-05-11 RX ADMIN — CEFTRIAXONE 1 G: 1 INJECTION, SOLUTION INTRAVENOUS at 11:05

## 2017-05-11 RX ADMIN — Medication 5 ML: at 08:05

## 2017-05-11 RX ADMIN — METOPROLOL TARTRATE 25 MG: 25 TABLET, FILM COATED ORAL at 11:05

## 2017-05-11 RX ADMIN — HYDRALAZINE HYDROCHLORIDE 25 MG: 25 TABLET ORAL at 04:05

## 2017-05-11 RX ADMIN — HEPARIN SODIUM 5000 UNITS: 5000 INJECTION, SOLUTION INTRAVENOUS; SUBCUTANEOUS at 11:05

## 2017-05-11 RX ADMIN — OXYCODONE HYDROCHLORIDE AND ACETAMINOPHEN 1 TABLET: 10; 325 TABLET ORAL at 04:05

## 2017-05-11 NOTE — PT/OT/SLP PROGRESS
Occupational Therapy  Treatment    Shireen Anton   MRN: [id number]   Admitting Diagnosis: Acute hearing loss of both ears    OT Date of Treatment: 17   OT Start Time: 1451  OT Stop Time: 1514  OT Total Time (min): 23 min    Billable Minutes:  Self Care/Home Management 23    General Precautions: Standard, fall  Orthopedic Precautions: N/A  Braces: N/A    Subjective:  Communicated with nurse prior to session.    Pain Ratin/10      Pain Rating Post-Intervention: 0/10    Objective:  Patient found with: telemetry     Functional Mobility:  Bed Mobility: Patient found OOB in BS chair     Transfers:   Sit <> Stand Assistance: Supervision  Sit <> Stand Assistive Device: Rolling Walker      Activities of Daily Living:   UE Dressing Level of Assistance: Set-up Assistance  LE Dressing Level of Assistance: Maximum assistance    Balance:   Static Sit: GOOD-: Takes MODERATE challenges from all directions but inconsistently  Dynamic Sit: FAIR+: Maintains balance through MINIMAL excursions of active trunk motion  Static Stand: GOOD-: Takes MODERATE challenges from all directions inconsistently  Dynamic stand: FAIR+: Needs CLOSE SUPERVISION during gait and is able to right self with minor LOB    Therapeutic Activities and Exercises:  Sit<>stand axs during dressing.  Patient left OOB in chair with LE elevated and ankle pumps suggested to aide circulation and edema management    AM-PAC 6 CLICK ADL   How much help from another person does this patient currently need?   1 = Unable, Total/Dependent Assistance  2 = A lot, Maximum/Moderate Assistance  3 = A little, Minimum/Contact Guard/Supervision  4 = None, Modified [place]/Independent    Putting on and taking off regular lower body clothing? : 2  Bathing (including washing, rinsing, drying)?: 2  Toileting, which includes using toilet, bedpan, or urinal? : 3  Putting on and taking off regular upper body clothing?: 3  Taking care of personal grooming such as brushing teeth?:  3  Eating meals?: 4  Total Score: 17     AM-PAC Raw Score CMS G-Code Modifier Level of Impairment Assistance   6 % Total / Unable   7 - 9 CM 80 - 100% Maximal Assist   10 - 14 CL 60 - 80% Moderate Assist   15 - 19 CK 40 - 60% Moderate Assist   20 - 22 CJ 20 - 40% Minimal Assist   23 CI 1-20% SBA / CGA   24 CH 0% Independent/ Mod I     Patient left up in chair with all lines intact, call button in reach and  present    ASSESSMENT:  Shireen Anton is a 47 y.o. female with a medical diagnosis of Acute hearing loss of both ears and presents with improving function with UB ADLs but edema and decreased ROM affecting LB ADLs.    Rehab identified problem list/impairments: Rehab identified problem list/impairments: weakness, decreased lower extremity function, edema, impaired cardiopulmonary response to activity    Rehab potential is good.    Activity tolerance: Good    Discharge recommendations: Discharge Facility/Level Of Care Needs: home health OT, home health PT     Barriers to discharge: Barriers to Discharge: None    Equipment recommendations:  (TBD)     GOALS:   Occupational Therapy Goals        Problem: Occupational Therapy Goal    Goal Priority Disciplines Outcome Interventions   Occupational Therapy Goal     OT, PT/OT Ongoing (interventions implemented as appropriate)    Description:  Goals to be met by: 5/9/17     Patient will increase functional independence with ADLs by performing:    UE Dressing with Supervision. (MET 5/11)  Grooming while standing with Minimal Assistance.  Toileting from toilet with Supervision for hygiene and clothing management.   Toilet transfer to toilet with Supervision.  Upper extremity exercise program x20 reps per handout, with independence.  New goal- Patient will be Min A with LB dressing with AE as needed.                 Plan:  Patient to be seen 3 x/week to address the above listed problems via self-care/home management, therapeutic activities, therapeutic  exercises  Plan of Care expires: 05/17/17  Plan of Care reviewed with: patient         Keerthi BRYAN Tiana, OT  05/11/2017

## 2017-05-11 NOTE — PLAN OF CARE
Problem: Physical Therapy Goal  Goal: Physical Therapy Goal  Goals to be met by: 17     Patient will increase functional independence with mobility by performin. Supine to sit with Modified [place]  2. Rolling to Left and Right with Modified [place]  3. Sit to stand transfer with Modified [place]  4. Bed to chair transfer with Modified [place]  4. Gait x 250 feet with Modified [place]   5. Lower extremity exercise program x 30 reps per handout, with independence   Outcome: Ongoing (interventions implemented as appropriate)  Pt progressing well toward treatment goals. Continue with POC .

## 2017-05-11 NOTE — PROGRESS NOTES
[facility] Hospital Medicine  Progress Note    Patient Name: Shireen Anton  MRN: [id number]  Patient Class: IP- Inpatient   Admission Date: 5/1/2017  Length of Stay: 10 days  Attending Physician: Cheo Wilks MD  Primary Care Provider: Jeancarlos Zamora MD        Subjective:     Principal Problem:Acute hearing loss of both ears    HPI:  Shireen Anton is a 47 y.o. female that (in part)  has a past medical history of Allergy; Depression; and GERD (gastroesophageal reflux disease). Presents to [facility] Emergency Department complaining of acute bilateral hearing loss.  Patient reports going to bed Sunday night and her last coherent memory was signing a check for her daughter's .  Monday morning her  reported that he had a hard time waking her up and said associated vigorously shake her because she appeared unresponsive.  She was disoriented and unable to hear.  She had a gastric sleeve on Thursday, April 27 that was uncomplicated.  During the perioperative period she reported to be normal and had no complaints other than some minor pain expected after surgery.  Throughout Friday, Saturday, and Sunday experienced no significant problems other than being increasingly thirsty.  She was given a liquid form of opioid medications which she was taking as prescribed, she states.  She continues to experience bilateral hearing loss that is constant.  Characterized by only hearing high-pitched sounds.  This is a first episode and a new problem for her.  No radiation of symptoms.  No relieving factors.  No exacerbating factors. In the emergency department routine laboratory studies, EKG, and cardiac enzymes were obtained.  There was concern for multiple organ dysfunction including markedly elevated troponin levels, acute renal failure, markedly elevated liver enzymes, and creatinine kinase levels greater than 40,000.  She denies significant nausea, vomiting, diarrhea,  or urination.  Denies any significant hypotensive episodes.  She does not recall anything occurring after Sunday night to the time of hospitalization.  She does report having some vaginal bleeding that started this afternoon.  She uses NuvaRing for contraception.    Hospital Course:  Ms. Anton was admitted for an acute CVA with hearing loss and MSOF with NSTEMI, acute renal failure, shock liver, and rhabdomyolysis likely from episode of prolonged hypotension possibly from narcotic use given post surgery for gastric sleave.  Pt seen by Neurology, Cardiology, Nephrology with slow clinical improvement except for continued climb in creatinine. Pt CPK trended down but ARF did not improve. Nephrology ordered tunneled catheter for 5/8 with plans to initiate HD.      Interval History:  No new issues     Review of Systems   Constitutional: Negative for activity change.   HENT: Negative for congestion.    Respiratory: Negative for apnea and chest tightness.    Cardiovascular: Negative for chest pain.   Gastrointestinal: Negative for abdominal pain.     Objective:     Vital Signs (Most Recent):  Temp: 99.3 °F (37.4 °C) (05/11/17 0800)  Pulse: 77 (05/11/17 0800)  Resp: 20 (05/11/17 0800)  BP: (!) 174/84 (05/11/17 0800)  SpO2: (!) 94 % (05/11/17 0800) Vital Signs (24h Range):  Temp:  [97.9 °F (36.6 °C)-99.3 °F (37.4 °C)] 99.3 °F (37.4 °C)  Pulse:  [64-79] 77  Resp:  [17-20] 20  SpO2:  [92 %-96 %] 94 %  BP: (129-190)/(53-92) 174/84     Weight: (!) 141.7 kg (312 lb 4.8 oz)  Body mass index is 42.36 kg/(m^2).    Intake/Output Summary (Last 24 hours) at 05/11/17 0849  Last data filed at 05/11/17 0015   Gross per 24 hour   Intake              980 ml   Output             2400 ml   Net            -1420 ml      Physical Exam   Constitutional: She is oriented to person, place, and time. She appears well-nourished.   Cardiovascular: Normal rate.    Pulmonary/Chest: Effort normal.   Abdominal: Soft.   Neurological: She is alert and  oriented to person, place, and time.   Skin: Skin is warm and dry.   Psychiatric: She has a normal mood and affect.   Vitals reviewed.      Significant Labs:   BMP:   Recent Labs  Lab 05/10/17  0523 05/11/17  0531   GLU 81  81 89   *  129* 133*   K 4.1  4.1 4.2   CL 94*  94* 95   CO2 21*  21* 24   BUN 77*  77* 42*   CREATININE 10.5*  10.5* 7.3*   CALCIUM 8.4*  8.4* 8.6*   MG 2.5  --      CBC:   Recent Labs  Lab 05/10/17  0523   WBC 6.49   HGB 9.3*   HCT 27.6*   PLT 99*       Significant Imaging:     Assessment/Plan:      * Acute hearing loss of both ears  Due to above, resolved.      NSTEMI (non-ST elevated myocardial infarction)  Pt treated with heparin gtt and ASA, as per Cardiology, will need cath pending improvement of renal function or with coordination with Nephrology for HD.    Shock liver  Due to shock liver, LFTs trending down and improving, monitor.      Acute renal failure  Creatinine continues to increase, but CPK trending down.  Pt to get tunneled catheter  and initiation of HD , as per Nephrology. Notified  to look into setting up HD as outpatient for d/c planning.  CRT improved.         Morbid obesity  Status post gastric sleave.  Body mass index is 42.36 kg/(m^2).        Non-traumatic rhabdomyolysis  Probable global hypotensive event causing MSOF, CPK now trending down, monitor.      Acute ischemic stroke  Seen on MRI, continue ASA, no statin at this time due to shock liver, Neurology following, PT/OT.  Hearing improved. Pt to start statin after LFTS normalize. Pt making progress with therapy and currently with recommendation for home health when ready for discharge.      Metabolic encephalopathy  Resolved, due to MSOF.      VTE Risk Mitigation         Ordered     heparin (porcine) injection 5,000 Units  Use PRN     Route:  Intravenous        05/10/17 0759     heparin (porcine) injection 5,000 Units  As needed (PRN)     Route:  Intra-Catheter        05/10/17 0753      heparin (porcine) injection 5,000 Units  Every 12 hours     Route:  Subcutaneous        05/09/17 1051     Medium Risk of VTE  Once      05/02/17 0012        CRT improved. Will follow renal recs from here.     Cheo Bond MD  Department of Hospital Medicine   [facility]

## 2017-05-11 NOTE — PLAN OF CARE
Problem: Fall Risk (Adult)  Intervention: Patient Rounds    05/11/17 0400   Safety Interventions   Patient Rounds bed in low position;bed wheels locked;call light in reach;clutter free environment maintained;ID band on;placement of personal items at bedside;toileting offered;visualized patient           Problem: Fluid Volume Deficit (Adult)  Goal: Fluid/Electrolyte Balance  Patient will demonstrate the desired outcomes by discharge/transition of care.     05/09/17 2214   Fluid Volume Deficit (Adult)   Fluid/Electrolyte Balance making progress toward outcome       Goal: Comfort/Well Being  Patient will demonstrate the desired outcomes by discharge/transition of care.     05/09/17 1843   Fluid Volume Deficit (Adult)   Comfort/Well Being making progress toward outcome         Problem: Renal Failure/Kidney Injury, Acute (Adult)  Intervention: Prevent/Manage Infection    05/09/17 1843 05/10/17 2109   Safety Interventions   Infection Prevention --  environmental surveillance;rest/sleep promoted   Infection Management --  aseptic technique maintained   Prevent/Manage Colorectal Surgical Infection   Fever Reduction/Comfort Measures lightweight clothing;lightweight bedding --    Hygiene Care   Oral Care oral care provided --        Intervention: Evaluate/Maintain Nutrition Support    05/10/17 2109   Coping/Psychosocial Interventions   Environmental Support calm environment promoted       Intervention: Monitor/Manage Anemia/Signs of Bleeding    05/10/17 2109   Safety Interventions   Bleeding Precautions blood pressure closely monitored       Intervention: Promote Energy Conservation    05/10/17 2109   Pain/Comfort/Sleep Interventions   Sleep/Rest Enhancement regular sleep/rest pattern promoted;relaxation techniques promoted           Comments:   Full liquid diet in effect. Small sips at a time tolerated well, but when had to give numerous meds became nauseated. Came to the conclusion together that she should take just a 2 or  3 pills at a time then wait a while before ingesting the rest. PRN nausea medication administered and was effective as well as pain medication. SR on tele monitor. Stable on feet when using walker to help get to bed side commode. Encouraged fluids periodically. No reports of SOB, rested without oxygen, O2 sat remain > 93%. Call light at side.

## 2017-05-11 NOTE — PROGRESS NOTES
Consult received for OP HD.  SW met with patient to discuss consult.  SW provided patient with a list of OP HD Units covered by her insurance provider.  SW also provided patient with a list of covered HH agencies.  SW also used [facility] to assist patient in determining which of the covered facilties were closer to her home.  Patient stated that she wanted to speak with her signficant other before making a decision.  SW advised patient to leave  a voice message with the name of the facility she wanted to us  if she spoke with her significant other later today after hours.  Sandra Schwartz, THOM, AMRITA-FINN, [facility]  05/11/2017

## 2017-05-11 NOTE — SUBJECTIVE & OBJECTIVE
Interval History:  No new issues     Review of Systems   Constitutional: Negative for activity change.   HENT: Negative for congestion.    Respiratory: Negative for apnea and chest tightness.    Cardiovascular: Negative for chest pain.   Gastrointestinal: Negative for abdominal pain.     Objective:     Vital Signs (Most Recent):  Temp: 99.3 °F (37.4 °C) (05/11/17 0800)  Pulse: 77 (05/11/17 0800)  Resp: 20 (05/11/17 0800)  BP: (!) 174/84 (05/11/17 0800)  SpO2: (!) 94 % (05/11/17 0800) Vital Signs (24h Range):  Temp:  [97.9 °F (36.6 °C)-99.3 °F (37.4 °C)] 99.3 °F (37.4 °C)  Pulse:  [64-79] 77  Resp:  [17-20] 20  SpO2:  [92 %-96 %] 94 %  BP: (129-190)/(53-92) 174/84     Weight: (!) 141.7 kg (312 lb 4.8 oz)  Body mass index is 42.36 kg/(m^2).    Intake/Output Summary (Last 24 hours) at 05/11/17 0849  Last data filed at 05/11/17 0015   Gross per 24 hour   Intake              980 ml   Output             2400 ml   Net            -1420 ml      Physical Exam   Constitutional: She is oriented to person, place, and time. She appears well-nourished.   Cardiovascular: Normal rate.    Pulmonary/Chest: Effort normal.   Abdominal: Soft.   Neurological: She is alert and oriented to person, place, and time.   Skin: Skin is warm and dry.   Psychiatric: She has a normal mood and affect.   Vitals reviewed.      Significant Labs:   BMP:   Recent Labs  Lab 05/10/17  0523 05/11/17  0531   GLU 81  81 89   *  129* 133*   K 4.1  4.1 4.2   CL 94*  94* 95   CO2 21*  21* 24   BUN 77*  77* 42*   CREATININE 10.5*  10.5* 7.3*   CALCIUM 8.4*  8.4* 8.6*   MG 2.5  --      CBC:   Recent Labs  Lab 05/10/17  0523   WBC 6.49   HGB 9.3*   HCT 27.6*   PLT 99*       Significant Imaging:

## 2017-05-11 NOTE — PROGRESS NOTES
[facility]  Nephrology  Progress Note    Patient Name: Shireen Anton  MRN: [id number]  Admission Date: 5/1/2017  Hospital Length of Stay: 10 days  Attending Provider: Cheo Wilks MD   Primary Care Physician: Jeancarlos Zamora MD  Principal Problem:Acute hearing loss of both ears    Consults  Subjective:   Notes getting up, got grits for breakfast.      Review of patient's allergies indicates:  No Known Allergies  Current Facility-Administered Medications   Medication Frequency    0.9%  NaCl infusion PRN    0.9%  NaCl infusion Once    aspirin EC tablet 81 mg Daily    calcium citrate tablet 200 mg TID    cefTRIAXone (ROCEPHIN) 1 g in dextrose 5 % 50 mL IVPB Q24H    cloNIDine tablet 0.3 mg TID PRN    cyanocobalamin tablet 250 mcg Daily    heparin (porcine) injection 5,000 Units Q12H    heparin (porcine) injection 5,000 Units PRN    heparin (porcine) injection 5,000 Units PRN    hydrALAZINE tablet 25 mg Q8H    iron sucrose injection 100 mg Once    metoprolol tartrate (LOPRESSOR) tablet 25 mg BID    morphine injection 5 mg Q4H PRN    multivitamin liquid per dose 5 mL BID    ondansetron injection 8 mg Q8H PRN    oxycodone-acetaminophen 5-325 mg per tablet 1 tablet Q6H PRN    pantoprazole EC tablet 40 mg Daily    paricalcitol injection 1 mcg Every Mon, Wed, Fri    polyethylene glycol packet 17 g Daily    tuberculin injection 5 Units Once       Objective:     Vital Signs (Most Recent):  Temp: 99.3 °F (37.4 °C) (05/11/17 0800)  Pulse: 77 (05/11/17 0800)  Resp: 20 (05/11/17 0800)  BP: (!) 174/84 (05/11/17 0800)  SpO2: (!) 94 % (05/11/17 0800)  O2 Device (Oxygen Therapy): room air (05/11/17 0800) Vital Signs (24h Range):  Temp:  [97.9 °F (36.6 °C)-99.3 °F (37.4 °C)] 99.3 °F (37.4 °C)  Pulse:  [70-79] 77  Resp:  [17-20] 20  SpO2:  [92 %-96 %] 94 %  BP: (139-190)/(73-92) 174/84     Weight: (!) 141.7 kg (312 lb 4.8 oz) (05/11/17 0400)  Body mass index is 42.36 kg/(m^2).  Body surface  area is 2.68 meters squared.    I/O last 3 completed shifts:  In: 1640 [P.O.:1240; Other:400]  Out: 2850 [Urine:450; Other:2400]    Physical Exam   Constitutional: She appears well-developed and well-nourished.   HENT:   Head: Normocephalic.   Nose: Nose normal.   Mouth/Throat: Mucous membranes are normal.   Eyes: EOM are normal.   Neck:   tim in rt neck   Cardiovascular: Regular rhythm.    Pulmonary/Chest: Effort normal and breath sounds normal.   Abdominal: Soft. Bowel sounds are normal. There is no tenderness.   Musculoskeletal: She exhibits edema.   Neurological: She is alert.   Skin: Skin is warm and dry.   Psychiatric: She has a normal mood and affect.       Significant Labs:  Lab Results   Component Value Date    WBC 6.49 05/10/2017    HGB 9.3 (L) 05/10/2017    HCT 27.6 (L) 05/10/2017    MCV 90 05/10/2017    PLT 99 (L) 05/10/2017     BMP  Lab Results   Component Value Date     (L) 05/11/2017    K 4.2 05/11/2017    CL 95 05/11/2017    CO2 24 05/11/2017    BUN 42 (H) 05/11/2017    CREATININE 7.3 (H) 05/11/2017    CALCIUM 8.6 (L) 05/11/2017    ANIONGAP 14 05/11/2017    ESTGFRAFRICA 7 (A) 05/11/2017    EGFRNONAA 6 (A) 05/11/2017           Assessment/Plan:     1.GASPER. 2nd to rhabdo/atn I suspect. Will plan for pc and outpt hd for Monday if not better after the weekend. Will have sw setup outpt hd. Ck ppd.   2.RHABDO. Juve cpk better.  3.NSTEMI. High risk for lhc.  Suspect pt will get renal wise better.  4.CVA. Following recovery.   5.HYPONATREMIA. Following. Hd sodium at higher bath.  6.GASTRIC SLEEVE. Saw by surgery. Starting nepro per surgey.  7.PROTEINURIA. Nonnephrotic. Following.  8.Anemia. Iron def. Start iv iron.  9.UTI. Catheter associated. Rocephin cont. +enterococci. Sensitivities pending.  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 Phos up. Start binders once eating. Start zemplar for now.  11.Hyperphospatemia. Juve phos.    Jeanine Saxena MD  Nephrology  [facility]

## 2017-05-11 NOTE — PT/OT/SLP PROGRESS
Physical Therapy  Treatment    Shireen Anton   MRN: [id number]   Admitting Diagnosis: Acute hearing loss of both ears    PT Received On: 17  PT Start Time: 1040     PT Stop Time: 1108    PT Total Time (min): 28 min       Billable Minutes:  Gait [id number] and Therapeutic Exercise 13    Treatment Type: Treatment  PT/PTA: PTA     PTA Visit Number: 1       General Precautions: Standard, fall, respiratory   Orthopedic Precautions: N/A   Braces: N/A         Subjective:  Communicated with nurse Dowell prior to session.  Pt agreeable to therapy.     Pain Ratin/10              Pain Rating Post-Intervention: 0/10    Objective:   Patient found with: telemetry, R neck HD access    Functional Mobility:  Bed Mobility:   Rolling/Turning Right: Supervision  Scooting/Bridging: Supervision  Supine to Sit: Stand by Assistance    Transfers: x 2 trials from bed. VC's for safety .    Sit <> Stand Assistance: Stand By Assistance  Sit <> Stand Assistive Device: No Assistive Device    Gait:   Gait Distance: ~ 180 ft  with 3 standing rest breaks 2* fatigue, no SOB or LOB during gait training.   Assistance 1: Contact Guard Assistance  Gait Assistive Device: No device, Hand held assist  Gait Pattern: reciprocal  Gait Deviation(s): decreased amish, decreased velocity of limb motion, decreased step length, decreased swing-to-stance ratio      Balance:   Static Sit: GOOD: Takes MODERATE challenges from all directions  Dynamic Sit: GOOD-: Maintains balance through MODERATE excursions of active trunk movement,     Static Stand: FAIR+: Takes MINIMAL challenges from all directions  Dynamic stand: FAIR: Needs CONTACT GUARD during gait     Therapeutic Activities and Exercises:  Pt performed seated BLE x 10 reps x 2 : heel/toes raises, LAQ, HS , hip flexion and pillow squeezes. Pt tolerated well.      AM-PAC 6 CLICK MOBILITY  How much help from another person does this patient currently need?   1 = Unable, Total/Dependent Assistance  2 = A  lot, Maximum/Moderate Assistance  3 = A little, Minimum/Contact Guard/Supervision  4 = None, Modified [place]/Independent    Turning over in bed (including adjusting bedclothes, sheets and blankets)?: 4  Sitting down on and standing up from a chair with arms (e.g., wheelchair, bedside commode, etc.): 4  Moving from lying on back to sitting on the side of the bed?: 4  Moving to and from a bed to a chair (including a wheelchair)?: 3  Need to walk in hospital room?: 3  Climbing 3-5 steps with a railing?: 3  Total Score: 21    AM-PAC Raw Score CMS G-Code Modifier Level of Impairment Assistance   6 % Total / Unable   7 - 9 CM 80 - 100% Maximal Assist   10 - 14 CL 60 - 80% Moderate Assist   15 - 19 CK 40 - 60% Moderate Assist   20 - 22 CJ 20 - 40% Minimal Assist   23 CI 1-20% SBA / CGA   24 CH 0% Independent/ Mod I     Patient left up in chair with all lines intact, call button in reach and nurse notified.    Assessment:  Shireen Anton is a 47 y.o. female with a medical diagnosis of Acute hearing loss of both ears . Pt progressing well toward treatment goal. Pt will benefit from further skilled therapy in order to get back to PLOF.    Rehab identified problem list/impairments: Rehab identified problem list/impairments: weakness, decreased lower extremity function, decreased upper extremity function, edema, impaired cardiopulmonary response to activity    Rehab potential is good.    Activity tolerance: Good    Discharge recommendations: Discharge Facility/Level Of Care Needs: home health PT     Barriers to discharge:  none     Equipment recommendations:   TBD    GOALS:   Physical Therapy Goals        Problem: Physical Therapy Goal    Goal Priority Disciplines Outcome Goal Variances Interventions   Physical Therapy Goal     PT/OT, PT Ongoing (interventions implemented as appropriate)     Description:  Goals to be met by: 5/18/17     Patient will increase functional independence with mobility by  performin. Supine to sit with Modified [place]  2. Rolling to Left and Right with Modified [place]  3. Sit to stand transfer with Modified [place]  4. Bed to chair transfer with Modified [place]  4. Gait  x 250 feet with Modified [place]   5. Lower extremity exercise program x 30 reps per handout, with independence                PLAN:    Patient to be seen 5 x/week (Mon-Fri)  to address the above listed problems via gait training, therapeutic activities, therapeutic exercises  Plan of Care expires: 17  Plan of Care reviewed with: patient         Virginia Fung, PTA  2017

## 2017-05-11 NOTE — PLAN OF CARE
Problem: Occupational Therapy Goal  Goal: Occupational Therapy Goal  Goals to be met by: 5/9/17     Patient will increase functional independence with ADLs by performing:    UE Dressing with Supervision. (MET 5/11)  Grooming while standing with Minimal Assistance.  Toileting from toilet with Supervision for hygiene and clothing management.   Toilet transfer to toilet with Supervision.  Upper extremity exercise program x20 reps per handout, with independence.  New goal- Patient will be Min A with LB dressing with AE as needed.   Outcome: Ongoing (interventions implemented as appropriate)  Patient completed OT session and displays good performance of UB dressing; AE recommended with training from OT to improve patient performance with LB ADLs.

## 2017-05-12 LAB
ALBUMIN SERPL BCP-MCNC: 2.8 G/DL
ALP SERPL-CCNC: 71 U/L
ALT SERPL W/O P-5'-P-CCNC: 137 U/L
ANION GAP SERPL CALC-SCNC: 13 MMOL/L
ANION GAP SERPL CALC-SCNC: 13 MMOL/L
APTT BLDCRRT: 28.9 SEC
AST SERPL-CCNC: 34 U/L
BILIRUB SERPL-MCNC: 0.5 MG/DL
BUN SERPL-MCNC: 47 MG/DL
BUN SERPL-MCNC: 47 MG/DL
CALCIUM SERPL-MCNC: 8.9 MG/DL
CALCIUM SERPL-MCNC: 8.9 MG/DL
CHLORIDE SERPL-SCNC: 94 MMOL/L
CHLORIDE SERPL-SCNC: 94 MMOL/L
CK SERPL-CCNC: 305 U/L
CO2 SERPL-SCNC: 25 MMOL/L
CO2 SERPL-SCNC: 25 MMOL/L
CREAT SERPL-MCNC: 9.5 MG/DL
CREAT SERPL-MCNC: 9.5 MG/DL
EST. GFR  (AFRICAN AMERICAN): 5 ML/MIN/1.73 M^2
EST. GFR  (AFRICAN AMERICAN): 5 ML/MIN/1.73 M^2
EST. GFR  (NON AFRICAN AMERICAN): 4 ML/MIN/1.73 M^2
EST. GFR  (NON AFRICAN AMERICAN): 4 ML/MIN/1.73 M^2
GLUCOSE SERPL-MCNC: 85 MG/DL
GLUCOSE SERPL-MCNC: 85 MG/DL
PHOSPHATE SERPL-MCNC: 6 MG/DL
POTASSIUM SERPL-SCNC: 4.1 MMOL/L
POTASSIUM SERPL-SCNC: 4.1 MMOL/L
PROT SERPL-MCNC: 6.2 G/DL
SODIUM SERPL-SCNC: 132 MMOL/L
SODIUM SERPL-SCNC: 132 MMOL/L

## 2017-05-12 PROCEDURE — 84100 ASSAY OF PHOSPHORUS: CPT

## 2017-05-12 PROCEDURE — 97535 SELF CARE MNGMENT TRAINING: CPT

## 2017-05-12 PROCEDURE — 82550 ASSAY OF CK (CPK): CPT

## 2017-05-12 PROCEDURE — 63600175 PHARM REV CODE 636 W HCPCS: Performed by: INTERNAL MEDICINE

## 2017-05-12 PROCEDURE — 25000003 PHARM REV CODE 250: Performed by: HOSPITALIST

## 2017-05-12 PROCEDURE — 94761 N-INVAS EAR/PLS OXIMETRY MLT: CPT

## 2017-05-12 PROCEDURE — 25000003 PHARM REV CODE 250: Performed by: INTERNAL MEDICINE

## 2017-05-12 PROCEDURE — 97530 THERAPEUTIC ACTIVITIES: CPT

## 2017-05-12 PROCEDURE — 36415 COLL VENOUS BLD VENIPUNCTURE: CPT

## 2017-05-12 PROCEDURE — 80053 COMPREHEN METABOLIC PANEL: CPT

## 2017-05-12 PROCEDURE — 63600175 PHARM REV CODE 636 W HCPCS: Performed by: HOSPITALIST

## 2017-05-12 PROCEDURE — 90935 HEMODIALYSIS ONE EVALUATION: CPT

## 2017-05-12 PROCEDURE — 85730 THROMBOPLASTIN TIME PARTIAL: CPT

## 2017-05-12 PROCEDURE — 21400001 HC TELEMETRY ROOM

## 2017-05-12 RX ORDER — METOCLOPRAMIDE HYDROCHLORIDE 5 MG/ML
10 INJECTION INTRAMUSCULAR; INTRAVENOUS EVERY 6 HOURS
Status: DISCONTINUED | OUTPATIENT
Start: 2017-05-12 | End: 2017-05-12

## 2017-05-12 RX ORDER — METOCLOPRAMIDE HYDROCHLORIDE 5 MG/ML
10 INJECTION INTRAMUSCULAR; INTRAVENOUS EVERY 6 HOURS PRN
Status: DISCONTINUED | OUTPATIENT
Start: 2017-05-12 | End: 2017-05-16 | Stop reason: HOSPADM

## 2017-05-12 RX ADMIN — HEPARIN SODIUM 5000 UNITS: 5000 INJECTION, SOLUTION INTRAVENOUS; SUBCUTANEOUS at 01:05

## 2017-05-12 RX ADMIN — Medication 5 ML: at 09:05

## 2017-05-12 RX ADMIN — OXYCODONE HYDROCHLORIDE AND ACETAMINOPHEN 1 TABLET: 5; 325 TABLET ORAL at 04:05

## 2017-05-12 RX ADMIN — HYDRALAZINE HYDROCHLORIDE 25 MG: 25 TABLET ORAL at 02:05

## 2017-05-12 RX ADMIN — METOPROLOL TARTRATE 25 MG: 25 TABLET, FILM COATED ORAL at 08:05

## 2017-05-12 RX ADMIN — CEFTRIAXONE 1 G: 1 INJECTION, SOLUTION INTRAVENOUS at 02:05

## 2017-05-12 RX ADMIN — IRON SUCROSE 100 MG: 20 INJECTION, SOLUTION INTRAVENOUS at 02:05

## 2017-05-12 RX ADMIN — PARICALCITOL 1 MCG: 5 INJECTION, SOLUTION INTRAVENOUS at 12:05

## 2017-05-12 RX ADMIN — METOPROLOL TARTRATE 25 MG: 25 TABLET, FILM COATED ORAL at 09:05

## 2017-05-12 RX ADMIN — ASPIRIN 81 MG: 81 TABLET, COATED ORAL at 02:05

## 2017-05-12 RX ADMIN — ONDANSETRON 8 MG: 2 INJECTION INTRAMUSCULAR; INTRAVENOUS at 12:05

## 2017-05-12 RX ADMIN — HEPARIN SODIUM 5000 UNITS: 5000 INJECTION, SOLUTION INTRAVENOUS; SUBCUTANEOUS at 09:05

## 2017-05-12 RX ADMIN — HYDRALAZINE HYDROCHLORIDE 25 MG: 25 TABLET ORAL at 09:05

## 2017-05-12 RX ADMIN — CYANOCOBALAMIN TAB 250 MCG 250 MCG: 250 TAB at 02:05

## 2017-05-12 RX ADMIN — CALCIUM CITRATE 200 MG (950 MG) TABLET 200 MG: at 09:05

## 2017-05-12 RX ADMIN — CALCIUM CITRATE 200 MG (950 MG) TABLET 200 MG: at 06:05

## 2017-05-12 RX ADMIN — CALCIUM CITRATE 200 MG (950 MG) TABLET 200 MG: at 02:05

## 2017-05-12 RX ADMIN — PANTOPRAZOLE SODIUM 40 MG: 40 TABLET, DELAYED RELEASE ORAL at 02:05

## 2017-05-12 RX ADMIN — METOCLOPRAMIDE 10 MG: 5 INJECTION, SOLUTION INTRAMUSCULAR; INTRAVENOUS at 05:05

## 2017-05-12 RX ADMIN — HYDRALAZINE HYDROCHLORIDE 25 MG: 25 TABLET ORAL at 05:05

## 2017-05-12 RX ADMIN — MORPHINE SULFATE 5 MG: 10 INJECTION INTRAVENOUS at 09:05

## 2017-05-12 NOTE — SUBJECTIVE & OBJECTIVE
Interval History: No new issues.     Review of Systems   Constitutional: Negative for activity change.   HENT: Negative for congestion.    Respiratory: Negative for chest tightness and shortness of breath.    Cardiovascular: Negative for chest pain.   Gastrointestinal: Negative for abdominal pain.     Objective:     Vital Signs (Most Recent):  Temp: 99 °F (37.2 °C) (05/12/17 0900)  Pulse: 82 (05/12/17 0900)  Resp: 18 (05/12/17 0900)  BP: (!) 183/90 (05/12/17 0900)  SpO2: (!) 93 % (05/12/17 0900) Vital Signs (24h Range):  Temp:  [98.3 °F (36.8 °C)-99.6 °F (37.6 °C)] 99 °F (37.2 °C)  Pulse:  [64-82] 82  Resp:  [18-19] 18  SpO2:  [93 %-96 %] 93 %  BP: (143-183)/(77-90) 183/90     Weight: (!) 142.2 kg (313 lb 8 oz)  Body mass index is 42.52 kg/(m^2).    Intake/Output Summary (Last 24 hours) at 05/12/17 1233  Last data filed at 05/12/17 0623   Gross per 24 hour   Intake              350 ml   Output              450 ml   Net             -100 ml      Physical Exam   Constitutional: She is oriented to person, place, and time. She appears well-nourished.   Cardiovascular: Normal rate.    Pulmonary/Chest: Effort normal.   Abdominal: Soft.   Neurological: She is alert and oriented to person, place, and time.   Skin: Skin is warm and dry.   Psychiatric: She has a normal mood and affect.   Vitals reviewed.      Significant Labs:   BMP:   Recent Labs  Lab 05/12/17  0505   GLU 85  85   *  132*   K 4.1  4.1   CL 94*  94*   CO2 25  25   BUN 47*  47*   CREATININE 9.5*  9.5*   CALCIUM 8.9  8.9     CBC: No results for input(s): WBC, HGB, HCT, PLT in the last 48 hours.    Significant Imaging:

## 2017-05-12 NOTE — PROGRESS NOTES
Voice message received from patient on 5/11/2017 at 9:49 p.m. advising SW that she had selected [facility] for her outpatient dialysis.  SW will submit patient's information to [facility] Admissions department.  Sandra Schwartz LMSW, AMRITA-FINN, CCM  5/12/2017

## 2017-05-12 NOTE — PLAN OF CARE
Problem: Fluid Volume Deficit (Adult)  Intervention: Monitor/Manage Hypovolemia    05/10/17 2109 05/11/17 0720   Coping/Psychosocial Interventions   Environmental Support calm environment promoted --    Nutrition Interventions   Fluid/Electrolyte Management --  intravenous fluids adjusted           Problem: Pressure Ulcer Risk (Darwin Scale) (Adult,Obstetrics,Pediatric)  Goal: Skin Integrity  Patient will demonstrate the desired outcomes by discharge/transition of care.     05/09/17 2214   Pressure Ulcer Risk (Darwin Scale) (Adult,Obstetrics,Pediatric)   Skin Integrity making progress toward outcome         Problem: Hemodialysis (Adult)  Intervention: Monitor/Manage Fluid Electrolyte/Acid Base Balance    05/11/17 0720 05/11/17 2030 05/12/17 0359   Nutrition Interventions   Fluid/Electrolyte Management intravenous fluids adjusted --  --    Positioning   Body Position --  --  positioned/repositioned independently   Edema   Generalized Edema --  3+ (Moderate) --

## 2017-05-12 NOTE — PT/OT/SLP PROGRESS
"Occupational Therapy  Treatment    Shireen Anton   MRN: [id number]   Admitting Diagnosis: Acute hearing loss of both ears    OT Date of Treatment: 17   OT Start Time: 155  OT Stop Time: 1620  OT Total Time (min): 23 min    Billable Minutes:  Self Care/Home Management 15 and Therapeutic Activity 8    General Precautions: Standard, fall  Orthopedic Precautions: N/A  Braces: N/A    Subjective:  Communicated with nurse prior to session.  "I'd like to walk in the amador"    Pain Ratin/10         Pain Rating Post-Intervention: 0/10    Objective:  Patient found with: telemetry     Functional Mobility:  Bed Mobility:  Scooting/Bridging: Modified Independent  Supine to Sit: Modified Independent  Sit to Supine: Modified Independent    Transfers:   Sit <> Stand Assistance: Modified Independent  Toilet Transfer Technique: Stand Pivot  Toilet Transfer Assistance: Modified Independent  Toilet Transfer Assistive Device: bedside commode, grab bar    Functional Ambulation: Supervision ambulation into amador to middle of the floor amador and back to room.    Activities of Daily Living:    Toilet Transfer: Mod I; patient found on BSC OT returned after 15 mins and patient completed toilet transfer on regular toilet in bathroom.   *Patient sat EOB for AE training with the reacher to doff socks    Balance:   Static Sit: NORMAL: No deviations seen in posture held statically  Dynamic Sit: GOOD-: Maintains balance through MODERATE excursions of active trunk movement,     Static Stand: GOOD+: Takes MAXIMAL challenges from all directions  Dynamic stand: GOOD: Needs SUPERVISION only during gait and able to self right with moderate     Therapeutic Activities and Exercises:  Patient completed bed mobility, ambulation into     AM-PAC 6 CLICK ADL   How much help from another person does this patient currently need?   1 = Unable, Total/Dependent Assistance  2 = A lot, Maximum/Moderate Assistance  3 = A little, Minimum/Contact " Guard/Supervision  4 = None, Modified [place]/Independent    Putting on and taking off regular lower body clothing? : 2  Bathing (including washing, rinsing, drying)?: 2  Toileting, which includes using toilet, bedpan, or urinal? : 3  Putting on and taking off regular upper body clothing?: 3  Taking care of personal grooming such as brushing teeth?: 3  Eating meals?: 4  Total Score: 17     AM-PAC Raw Score CMS G-Code Modifier Level of Impairment Assistance   6 % Total / Unable   7 - 9 CM 80 - 100% Maximal Assist   10 - 14 CL 60 - 80% Moderate Assist   15 - 19 CK 40 - 60% Moderate Assist   20 - 22 CJ 20 - 40% Minimal Assist   23 CI 1-20% SBA / CGA   24 CH 0% Independent/ Mod I     Patient left HOB elevated with all lines intact, call button in reach and nurse present    ASSESSMENT:  Shireen Anton is a 47 y.o. female with a medical diagnosis of Acute hearing loss of both ears and presents with good progress with goals.  Ambulation with nursing to toilet for BM suggested secondary to good mobility and safety noted with mobility this date.  AE introduced; demonstration training required.g.    Rehab identified problem list/impairments: Rehab identified problem list/impairments: weakness, impaired self care skills, impaired functional mobilty, gait instability, impaired balance, decreased coordination, decreased upper extremity function, decreased lower extremity function, decreased ROM, edema    Rehab potential is excellent.    Activity tolerance: Excellent    Discharge recommendations: Discharge Facility/Level Of Care Needs: home health OT, home health PT     Barriers to discharge: Barriers to Discharge: None    Equipment recommendations:  (TBD)     GOALS:   Occupational Therapy Goals        Problem: Occupational Therapy Goal    Goal Priority Disciplines Outcome Interventions   Occupational Therapy Goal     OT, PT/OT Ongoing (interventions implemented as appropriate)    Description:  Goals to be met by:  5/9/17     Patient will increase functional independence with ADLs by performing:    UE Dressing with Supervision. (MET 5/11)  Grooming while standing with Minimal Assistance.  Toileting from toilet with Supervision for hygiene and clothing management.   Toilet transfer to toilet with Supervision. (Goal MET 5/12)  Upper extremity exercise program x20 reps per handout, with independence.  New goal- Patient will be Min A with LB dressing with AE as needed.               Problem: Occupational Therapy Goal    Goal Priority Disciplines Outcome Interventions   Occupational Therapy Goal     OT, PT/OT               Plan:  Patient to be seen 3 x/week to address the above listed problems via self-care/home management, therapeutic activities, therapeutic exercises  Plan of Care expires: 06/09/17  Plan of Care reviewed with: patient         Keerthi Montiel OT  05/12/2017

## 2017-05-12 NOTE — PLAN OF CARE
Problem: Occupational Therapy Goal  Goal: Occupational Therapy Goal  Goals to be met by: 5/9/17     Patient will increase functional independence with ADLs by performing:    UE Dressing with Supervision. (MET 5/11)  Grooming while standing with Minimal Assistance.  Toileting from toilet with Supervision for hygiene and clothing management.   Toilet transfer to toilet with Supervision. (Goal MET 5/12)  Upper extremity exercise program x20 reps per handout, with independence.  New goal- Patient will be Min A with LB dressing with AE as needed.   Outcome: Ongoing (interventions implemented as appropriate)  Patient making good progress with goals.  Ambulation with nursing to toilet for BM suggested secondary to good mobility and safety noted with mobility this date.  AE introduced; demonstration training required.

## 2017-05-12 NOTE — PROGRESS NOTES
[facility] Hospital Medicine  Progress Note    Patient Name: Shireen Anton  MRN: [id number]  Patient Class: IP- Inpatient   Admission Date: 5/1/2017  Length of Stay: 11 days  Attending Physician: Cheo Wilks MD  Primary Care Provider: Jeancarlos Zamora MD        Subjective:     Principal Problem:Acute hearing loss of both ears    HPI:  Shireen Anton is a 47 y.o. female that (in part)  has a past medical history of Allergy; Depression; and GERD (gastroesophageal reflux disease). Presents to [facility] Emergency Department complaining of acute bilateral hearing loss.  Patient reports going to bed Sunday night and her last coherent memory was signing a check for her daughter's .  Monday morning her  reported that he had a hard time waking her up and said associated vigorously shake her because she appeared unresponsive.  She was disoriented and unable to hear.  She had a gastric sleeve on Thursday, April 27 that was uncomplicated.  During the perioperative period she reported to be normal and had no complaints other than some minor pain expected after surgery.  Throughout Friday, Saturday, and Sunday experienced no significant problems other than being increasingly thirsty.  She was given a liquid form of opioid medications which she was taking as prescribed, she states.  She continues to experience bilateral hearing loss that is constant.  Characterized by only hearing high-pitched sounds.  This is a first episode and a new problem for her.  No radiation of symptoms.  No relieving factors.  No exacerbating factors. In the emergency department routine laboratory studies, EKG, and cardiac enzymes were obtained.  There was concern for multiple organ dysfunction including markedly elevated troponin levels, acute renal failure, markedly elevated liver enzymes, and creatinine kinase levels greater than 40,000.  She denies significant nausea, vomiting, diarrhea,  or urination.  Denies any significant hypotensive episodes.  She does not recall anything occurring after Sunday night to the time of hospitalization.  She does report having some vaginal bleeding that started this afternoon.  She uses NuvaRing for contraception.    Hospital Course:  Ms. Anton was admitted for an acute CVA with hearing loss and MSOF with NSTEMI, acute renal failure, shock liver, and rhabdomyolysis likely from episode of prolonged hypotension possibly from narcotic use given post surgery for gastric sleave.  Pt seen by Neurology, Cardiology, Nephrology with slow clinical improvement except for continued climb in creatinine. Pt CPK trended down but ARF did not improve. Nephrology ordered tunneled catheter for 5/8 and the patient initiated HD.   has been consulted to arrange out patient HD.  LFT's and CPK's normalized.     Interval History: No new issues.     Review of Systems   Constitutional: Negative for activity change.   HENT: Negative for congestion.    Respiratory: Negative for chest tightness and shortness of breath.    Cardiovascular: Negative for chest pain.   Gastrointestinal: Negative for abdominal pain.     Objective:     Vital Signs (Most Recent):  Temp: 99 °F (37.2 °C) (05/12/17 0900)  Pulse: 82 (05/12/17 0900)  Resp: 18 (05/12/17 0900)  BP: (!) 183/90 (05/12/17 0900)  SpO2: (!) 93 % (05/12/17 0900) Vital Signs (24h Range):  Temp:  [98.3 °F (36.8 °C)-99.6 °F (37.6 °C)] 99 °F (37.2 °C)  Pulse:  [64-82] 82  Resp:  [18-19] 18  SpO2:  [93 %-96 %] 93 %  BP: (143-183)/(77-90) 183/90     Weight: (!) 142.2 kg (313 lb 8 oz)  Body mass index is 42.52 kg/(m^2).    Intake/Output Summary (Last 24 hours) at 05/12/17 1233  Last data filed at 05/12/17 0623   Gross per 24 hour   Intake              350 ml   Output              450 ml   Net             -100 ml      Physical Exam   Constitutional: She is oriented to person, place, and time. She appears well-nourished.   Cardiovascular:  Normal rate.    Pulmonary/Chest: Effort normal.   Abdominal: Soft.   Neurological: She is alert and oriented to person, place, and time.   Skin: Skin is warm and dry.   Psychiatric: She has a normal mood and affect.   Vitals reviewed.      Significant Labs:   BMP:   Recent Labs  Lab 05/12/17  0505   GLU 85  85   *  132*   K 4.1  4.1   CL 94*  94*   CO2 25  25   BUN 47*  47*   CREATININE 9.5*  9.5*   CALCIUM 8.9  8.9     CBC: No results for input(s): WBC, HGB, HCT, PLT in the last 48 hours.    Significant Imaging:    Assessment/Plan:      * Acute hearing loss of both ears  Due to above, resolved.      NSTEMI (non-ST elevated myocardial infarction)  Pt treated with heparin gtt and ASA, as per Cardiology, will need cath pending improvement of renal function or with coordination with Nephrology for HD.    Shock liver  Due to shock liver, LFTs trending down and improving, monitor.      Acute renal failure  Creatinine continues to increase, but CPK trending down.  Pt to get tunneled catheter  and initiation of HD , as per Nephrology. Notified  to look into setting up HD as outpatient for d/c planning.         Morbid obesity  Status post gastric sleave.  Body mass index is 42.52 kg/(m^2).        Non-traumatic rhabdomyolysis  Probable global hypotensive event causing MSOF, CPK now trending down, monitor.      Acute ischemic stroke  Seen on MRI, continue ASA, no statin at this time due to shock liver, Neurology following, PT/OT.  Hearing improved. Pt to start statin after LFTS normalize. Pt making progress with therapy and currently with recommendation for home health when ready for discharge.      Metabolic encephalopathy  Resolved, due to MSOF.      VTE Risk Mitigation         Ordered     heparin (porcine) injection 5,000 Units  Use PRN     Route:  Intravenous        05/10/17 0758     heparin (porcine) injection 5,000 Units  As needed (PRN)     Route:  Intra-Catheter        05/10/17 0753      heparin (porcine) injection 5,000 Units  Every 12 hours     Route:  Subcutaneous        05/09/17 1051     Medium Risk of VTE  Once      05/02/17 0012      will continue HD per nephrology. Patient will be here over weekend. Discharge likely Mon or Tues.       Cheo Bond MD  Department of Hospital Medicine   [facility]

## 2017-05-12 NOTE — PT/OT/SLP PROGRESS
Physical Therapy      Shireen Anton  MRN: [id number]    Patient not seen today secondary to Dialysis. Will follow-up as able.    Virginia Fung, PTA

## 2017-05-12 NOTE — PROGRESS NOTES
[facility]  Adult Nutrition  Progress Note    SUMMARY     Recommendations     Recommendation/Intervention: 1) Advance patient to sugar-free Full Liquid, Renal Diet with skim milk, soy or almond milk 2) No caffeine, carbonated beverages, or straws 3) Continue Beneprotein TID 4) Order Boost Glucose Control vanilla TID 5) B-complex 1x/day 6) Continue MVI 2x/day 7) Iron 36 mg/day 8) Order vitamin labs daily to determine whether patient has vitamin deficiencies 9) Add renal restriction to current diet  Goals: 1) Patient diet will tolerate diet 2) Patient will utilize oral supplement   Nutrition Goal Status: progressing towards goal; new   Communication of RD Recs: other (comment) (physician's sticky note); second sign    Continuum of Care Plan     D/C Planning:  Too soon to determine. Will monitor/follow-up.     Reason for Assessment    Reason for Assessment: RD follow-up  General Information Comments: Patient out of room. Diet advanced to Full Liquid. RN states has a good appetite and tolerating well. Oral supplement ordered.     Nutrition Prescription Ordered    Current Diet Order: Full Liquid  Oral Nutrition Supplement: Novasource Renal TID, Beneprotein TID     Evaluation of Received Nutrients/Fluid Intake    Energy Calories Required: meeting needs  Protein Required: meeting needs  Fluid Required: other (see comments) (Net I/O -250)     Tolerance: tolerating     Nutrition Risk Screen     Nutrition Risk Screen: other (see comments) (Post Gastric Bypass)    Nutrition/Diet History    Patient Reported Diet/Restrictions/Preferences:  (ALVA)     Food Preferences: ALVA     Factors Affecting Nutritional Intake: altered gastrointestinal function    Labs/Tests/Procedures/Meds    Diagnostic Test/Procedure Review: reviewed, pertinent  Pertinent Labs Reviewed: reviewed, pertinent  BMP  Lab Results   Component Value Date     (L) 05/12/2017     (L) 05/12/2017    K 4.1 05/12/2017    K 4.1 05/12/2017    CL  94 (L) 05/12/2017    CL 94 (L) 05/12/2017    CO2 25 05/12/2017    CO2 25 05/12/2017    BUN 47 (H) 05/12/2017    BUN 47 (H) 05/12/2017    CREATININE 9.5 (H) 05/12/2017    CREATININE 9.5 (H) 05/12/2017    CALCIUM 8.9 05/12/2017    CALCIUM 8.9 05/12/2017    ANIONGAP 13 05/12/2017    ANIONGAP 13 05/12/2017    ESTGFRAFRICA 5 (A) 05/12/2017    ESTGFRAFRICA 5 (A) 05/12/2017    EGFRNONAA 4 (A) 05/12/2017    EGFRNONAA 4 (A) 05/12/2017     Lab Results   Component Value Date    CALCIUM 8.9 05/12/2017    CALCIUM 8.9 05/12/2017    PHOS 6.0 (H) 05/12/2017     Lab Results   Component Value Date    ALBUMIN 2.8 (L) 05/12/2017     Lab Results   Component Value Date    IRON 30 05/11/2017    TIBC 308 05/11/2017    FERRITIN 282 05/11/2017     Lab Results   Component Value Date    CHOL 125 05/03/2017    CHOL 136 05/02/2017    CHOL 167 01/28/2015     Lab Results   Component Value Date    HDL 25 (L) 05/03/2017    HDL 29 (L) 05/02/2017    HDL 59 01/28/2015     Lab Results   Component Value Date    LDLCALC 67.4 05/03/2017    LDLCALC 75.4 05/02/2017    LDLCALC 88.8 01/28/2015     Lab Results   Component Value Date    TRIG 163 (H) 05/03/2017    TRIG 158 (H) 05/02/2017    TRIG 96 01/28/2015     Lab Results   Component Value Date    CHOLHDL 20.0 05/03/2017    CHOLHDL 21.3 05/02/2017    CHOLHDL 35.3 01/28/2015     Pertinent Medications Reviewed: reviewed, pertinent  Scheduled Meds:   sodium chloride 0.9%   Intravenous Once    aspirin  81 mg Oral Daily    calcium citrate  200 mg Oral TID    cefTRIAXone (ROCEPHIN) IVPB  1 g Intravenous Q24H    cyanocobalamin  250 mcg Oral Daily    heparin (porcine)  5,000 Units Subcutaneous Q12H    hydrALAZINE  25 mg Oral Q8H    iron sucrose (VENOFER) IVPB  100 mg Intravenous 1 time in Clinic/HOD    metoprolol tartrate  25 mg Oral BID    multivitamin liquid  5 mL Oral BID    pantoprazole  40 mg Oral Daily    paricalcitol  1 mcg Intravenous Every Mon, Wed, Fri    polyethylene glycol  17 g Oral Daily      Continuous Infusions:   PRN Meds:.sodium chloride 0.9%, cloNIDine, heparin (porcine), heparin (porcine), metoclopramide HCl, morphine, ondansetron    Physical Findings    Overall Physical Appearance: obese  Tubes:  (-)  Skin: intact    Anthropometrics    Height (inches): 72.01 in  Weight Method: Standard Scale  Weight (kg): 135.9 kg  Ideal Body Weight (IBW), Female: 160.05 lb  % Ideal Body Weight, Female (lb): 187.2 lb  BMI (kg/m2): 40.62  BMI Grade: greater than 40 - morbid obesity    Estimated/Assessed Needs    Weight Used For Calorie Calculations: 135.9 kg (299 lb 9.7 oz)   Height (cm): 182.9 cm  Energy Need Method: Cooper-St Jeor (2118.37 (will not meet energy needs due to intentional loss, s/p gastric sleeve)  RMR (Cooper-St. Jeor Equation): 2118.37  Weight Used For Protein Calculations: 72.7 kg (160 lb 4.4 oz) ((Ideal Body Weight))  Protein Requirements: 87 - 109 g (on HD)  Fluid Need Method:  (1864 mls or per MD)    Assessment and Plan    Nutrition Diagnosis:   Problem: altered gastrointestinal function  Etiology: alterations in GI structure/function  Signs/Symptoms: S/p gastric sleeve   Status: continues    Monitor and Evaluation    Food and Nutrient Intake: energy intake, food and beverage intake  Food and Nutrient Adminstration: diet order, other (specify) (supplement order)  Anthropometric Measurements: weight, weight change, body mass index  Biochemical Data, Medical Tests and Procedures: electrolyte and renal panel, gastrointestinal profile, glucose/endocrine profile, inflammatory profile, lipid profile, other (specify) (vitamin labs, iron)  Nutrition-Focused Physical Findings: skin, overall appearance  % Intake of Estimated Energy Needs: 50 - 75 %  % Meal Intake: 75%    Nutrition Risk    Level of Risk: other (see comments) (f/u 1x weekly)    Nutrition Follow-Up    RD Follow-up?: Yes

## 2017-05-12 NOTE — PLAN OF CARE
Problem: Hemodialysis (Adult)  Goal: Signs and Symptoms of Listed Potential Problems Will be Absent, Minimized or Managed (Hemodialysis)  Signs and symptoms of listed potential problems will be absent, minimized or managed by discharge/transition of care (reference Hemodialysis (Adult) CPG).   Outcome: Ongoing (interventions implemented as appropriate)  Patient received hemodialysis treatment today, 2 L safely removed, patient tolerated well, no complaints. Continue to monitor blood pressure and fluid electrolyte balance. Follow p oc

## 2017-05-12 NOTE — PROGRESS NOTES
[facility]  Nephrology  Progress Note    Patient Name: Shireen Anton  MRN: [id number]  Admission Date: 5/1/2017  Hospital Length of Stay: 11 days  Attending Provider: Cheo Wilks MD   Primary Care Physician: Jeancarlos Zamora MD  Principal Problem:Acute hearing loss of both ears    Consults  Subjective:   Notes feeling ok.    Review of patient's allergies indicates:  No Known Allergies  Current Facility-Administered Medications   Medication Frequency    0.9%  NaCl infusion PRN    0.9%  NaCl infusion Once    aspirin EC tablet 81 mg Daily    calcium citrate tablet 200 mg TID    cefTRIAXone (ROCEPHIN) 1 g in dextrose 5 % 50 mL IVPB Q24H    cloNIDine tablet 0.3 mg TID PRN    cyanocobalamin tablet 250 mcg Daily    heparin (porcine) injection 5,000 Units Q12H    heparin (porcine) injection 5,000 Units PRN    heparin (porcine) injection 5,000 Units PRN    hydrALAZINE tablet 25 mg Q8H    metoclopramide HCl injection 10 mg Q6H PRN    metoprolol tartrate (LOPRESSOR) tablet 25 mg BID    morphine injection 5 mg Q4H PRN    multivitamin liquid per dose 5 mL BID    ondansetron injection 8 mg Q8H PRN    pantoprazole EC tablet 40 mg Daily    paricalcitol injection 1 mcg Every Mon, Wed, Fri    polyethylene glycol packet 17 g Daily       Objective:     Vital Signs (Most Recent):  Temp: 99 °F (37.2 °C) (05/12/17 0900)  Pulse: 82 (05/12/17 0900)  Resp: 18 (05/12/17 0900)  BP: (!) 183/90 (05/12/17 0900)  SpO2: (!) 93 % (05/12/17 0900)  O2 Device (Oxygen Therapy): room air (05/11/17 2030) Vital Signs (24h Range):  Temp:  [98.3 °F (36.8 °C)-99.6 °F (37.6 °C)] 99 °F (37.2 °C)  Pulse:  [64-82] 82  Resp:  [18-20] 18  SpO2:  [93 %-96 %] 93 %  BP: (143-189)/(77-90) 183/90     Weight: (!) 142.2 kg (313 lb 8 oz) (05/12/17 0623)  Body mass index is 42.52 kg/(m^2).  Body surface area is 2.69 meters squared.    I/O last 3 completed shifts:  In: 690 [P.O.:690]  Out: 600 [Urine:600]    Physical Exam    Constitutional: She appears well-developed and well-nourished.   HENT:   Head: Normocephalic.   Nose: Nose normal.   Mouth/Throat: Mucous membranes are normal.   Eyes: EOM are normal.   Neck:   tim in rt neck   Cardiovascular: Regular rhythm.    Pulmonary/Chest: Effort normal and breath sounds normal.   Abdominal: Soft. Bowel sounds are normal. There is no tenderness.   Musculoskeletal: She exhibits edema.   Neurological: She is alert.   Skin: Skin is warm and dry.   Psychiatric: She has a normal mood and affect.       Significant Labs:  Lab Results   Component Value Date    WBC 6.49 05/10/2017    HGB 9.3 (L) 05/10/2017    HCT 27.6 (L) 05/10/2017    MCV 90 05/10/2017    PLT 99 (L) 05/10/2017     BMP  Lab Results   Component Value Date     (L) 05/12/2017     (L) 05/12/2017    K 4.1 05/12/2017    K 4.1 05/12/2017    CL 94 (L) 05/12/2017    CL 94 (L) 05/12/2017    CO2 25 05/12/2017    CO2 25 05/12/2017    BUN 47 (H) 05/12/2017    BUN 47 (H) 05/12/2017    CREATININE 9.5 (H) 05/12/2017    CREATININE 9.5 (H) 05/12/2017    CALCIUM 8.9 05/12/2017    CALCIUM 8.9 05/12/2017    ANIONGAP 13 05/12/2017    ANIONGAP 13 05/12/2017    ESTGFRAFRICA 5 (A) 05/12/2017    ESTGFRAFRICA 5 (A) 05/12/2017    EGFRNONAA 4 (A) 05/12/2017    EGFRNONAA 4 (A) 05/12/2017           Assessment/Plan:     1.GASPER. 2nd to rhabdo/atn I suspect. Will plan for pc and outpt hd for Monday if not better after the weekend. HD today with creatinine up.  2.RHABDO. Juve cpk better. Resolved.  3.NSTEMI. High risk for lhc.  Suspect pt will get renal wise better.  4.CVA. Following recovery.   5.HYPONATREMIA. Following. Hd sodium at higher bath. Pt on liquid diet.  6.GASTRIC SLEEVE. Saw by surgery. Cont nepro.  7.PROTEINURIA. Nonnephrotic. Following.  8.Anemia. Iron def. IV iron with hd today.  9.UTI. Catheter associated. Rocephin cont. +enterococci. Sensitivities pending. +klebsiella too.  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 Phos up. Start binders once  eating. Start zemplar for now.  11.Hyperphospatemia. Juve phos ok. No extra binders with limited diet.    Jeanine Saxena MD  Nephrology  [facility]

## 2017-05-12 NOTE — ASSESSMENT & PLAN NOTE
Creatinine continues to increase, but CPK trending down.  Pt to get tunneled catheter  and initiation of HD , as per Nephrology. Notified  to look into setting up HD as outpatient for d/c planning.

## 2017-05-13 PROBLEM — N39.0 UTI (URINARY TRACT INFECTION): Status: ACTIVE | Noted: 2017-05-13

## 2017-05-13 LAB
ANION GAP SERPL CALC-SCNC: 9 MMOL/L
APTT BLDCRRT: 27.8 SEC
BACTERIA UR CULT: NORMAL
BACTERIA UR CULT: NORMAL
BUN SERPL-MCNC: 29 MG/DL
CALCIUM SERPL-MCNC: 9 MG/DL
CHLORIDE SERPL-SCNC: 100 MMOL/L
CO2 SERPL-SCNC: 29 MMOL/L
CREAT SERPL-MCNC: 7.2 MG/DL
EST. GFR  (AFRICAN AMERICAN): 7 ML/MIN/1.73 M^2
EST. GFR  (NON AFRICAN AMERICAN): 6 ML/MIN/1.73 M^2
GLUCOSE SERPL-MCNC: 90 MG/DL
POTASSIUM SERPL-SCNC: 4.5 MMOL/L
SODIUM SERPL-SCNC: 138 MMOL/L
TB INDURATION 48 - 72 HR READ: 0 MM

## 2017-05-13 PROCEDURE — 25000003 PHARM REV CODE 250: Performed by: HOSPITALIST

## 2017-05-13 PROCEDURE — 85730 THROMBOPLASTIN TIME PARTIAL: CPT

## 2017-05-13 PROCEDURE — 25000003 PHARM REV CODE 250: Performed by: INTERNAL MEDICINE

## 2017-05-13 PROCEDURE — 80048 BASIC METABOLIC PNL TOTAL CA: CPT

## 2017-05-13 PROCEDURE — 63600175 PHARM REV CODE 636 W HCPCS: Performed by: INTERNAL MEDICINE

## 2017-05-13 PROCEDURE — 21400001 HC TELEMETRY ROOM

## 2017-05-13 PROCEDURE — 36415 COLL VENOUS BLD VENIPUNCTURE: CPT

## 2017-05-13 RX ORDER — HYDRALAZINE HYDROCHLORIDE 25 MG/1
50 TABLET, FILM COATED ORAL EVERY 8 HOURS
Status: DISCONTINUED | OUTPATIENT
Start: 2017-05-13 | End: 2017-05-16 | Stop reason: HOSPADM

## 2017-05-13 RX ADMIN — HEPARIN SODIUM 5000 UNITS: 5000 INJECTION, SOLUTION INTRAVENOUS; SUBCUTANEOUS at 09:05

## 2017-05-13 RX ADMIN — ASPIRIN 81 MG: 81 TABLET, COATED ORAL at 08:05

## 2017-05-13 RX ADMIN — HYDRALAZINE HYDROCHLORIDE 25 MG: 25 TABLET ORAL at 06:05

## 2017-05-13 RX ADMIN — METOPROLOL TARTRATE 25 MG: 25 TABLET, FILM COATED ORAL at 08:05

## 2017-05-13 RX ADMIN — CALCIUM CITRATE 200 MG (950 MG) TABLET 200 MG: at 10:05

## 2017-05-13 RX ADMIN — HYDRALAZINE HYDROCHLORIDE 50 MG: 25 TABLET ORAL at 10:05

## 2017-05-13 RX ADMIN — Medication 5 ML: at 09:05

## 2017-05-13 RX ADMIN — CALCIUM CITRATE 200 MG (950 MG) TABLET 200 MG: at 06:05

## 2017-05-13 RX ADMIN — CALCIUM CITRATE 200 MG (950 MG) TABLET 200 MG: at 02:05

## 2017-05-13 RX ADMIN — CYANOCOBALAMIN TAB 250 MCG 250 MCG: 250 TAB at 08:05

## 2017-05-13 RX ADMIN — PANTOPRAZOLE SODIUM 40 MG: 40 TABLET, DELAYED RELEASE ORAL at 08:05

## 2017-05-13 RX ADMIN — HYDRALAZINE HYDROCHLORIDE 50 MG: 25 TABLET ORAL at 02:05

## 2017-05-13 RX ADMIN — CEFTRIAXONE 1 G: 1 INJECTION, SOLUTION INTRAVENOUS at 12:05

## 2017-05-13 RX ADMIN — METOPROLOL TARTRATE 25 MG: 25 TABLET, FILM COATED ORAL at 09:05

## 2017-05-13 RX ADMIN — Medication 5 ML: at 08:05

## 2017-05-13 NOTE — PROGRESS NOTES
Report given to Antionette.  Patient is AAOx4. Patient is resting comfortably. Patient has no denies signs of SOB, difficulty breathing. Patient IV is clean dry intact no sign of redness.

## 2017-05-13 NOTE — PLAN OF CARE
Problem: Infection, Risk/Actual (Adult)  Intervention: Manage Suspected/Actual Infection    05/13/17 1413   Prevent/Manage Colorectal Surgical Infection   Fever Reduction/Comfort Measures lightweight clothing;medication administered         Goal: Identify Related Risk Factors and Signs and Symptoms  Related risk factors and signs and symptoms are identified upon initiation of Human Response Clinical Practice Guideline (CPG)   Outcome: Ongoing (interventions implemented as appropriate)    05/13/17 1413   Infection, Risk/Actual   Related Risk Factors (Infection, Risk/Actual) chronic illness/condition       Goal: Infection Prevention/Resolution  Patient will demonstrate the desired outcomes by discharge/transition of care.   Outcome: Ongoing (interventions implemented as appropriate)    05/13/17 1413   Infection, Risk/Actual (Adult)   Infection Prevention/Resolution making progress toward outcome

## 2017-05-13 NOTE — SUBJECTIVE & OBJECTIVE
Interval History: No new issues.     Review of Systems   Constitutional: Negative for activity change.   HENT: Negative for congestion.    Respiratory: Negative for chest tightness and shortness of breath.    Cardiovascular: Negative for chest pain.   Gastrointestinal: Negative for abdominal pain.     Objective:     Vital Signs (Most Recent):  Temp: 98.4 °F (36.9 °C) (05/13/17 0717)  Pulse: 73 (05/13/17 0717)  Resp: 18 (05/13/17 0717)  BP: (!) 154/74 (05/13/17 0717)  SpO2: 95 % (05/13/17 0717) Vital Signs (24h Range):  Temp:  [97.8 °F (36.6 °C)-98.9 °F (37.2 °C)] 98.4 °F (36.9 °C)  Pulse:  [55-86] 73  Resp:  [17-20] 18  SpO2:  [91 %-98 %] 95 %  BP: (120-178)/(58-84) 154/74     Weight: (!) 142.2 kg (313 lb 8 oz)  Body mass index is 42.52 kg/(m^2).    Intake/Output Summary (Last 24 hours) at 05/13/17 1124  Last data filed at 05/13/17 0619   Gross per 24 hour   Intake              940 ml   Output             3600 ml   Net            -2660 ml      Physical Exam   Constitutional: She is oriented to person, place, and time. She appears well-nourished.   Cardiovascular: Normal rate.    Pulmonary/Chest: Effort normal.   Abdominal: Soft.   Neurological: She is alert and oriented to person, place, and time.   Skin: Skin is warm and dry.   Psychiatric: She has a normal mood and affect.   Vitals reviewed.      Significant Labs:   BMP:   Recent Labs  Lab 05/13/17  0506   GLU 90      K 4.5      CO2 29   BUN 29*   CREATININE 7.2*   CALCIUM 9.0     CBC: No results for input(s): WBC, HGB, HCT, PLT in the last 48 hours.    Significant Imaging:

## 2017-05-13 NOTE — ASSESSMENT & PLAN NOTE
Klebsiella and enterococcus. Currently on ceftriaxone. ID consulted to help direct abx choice.    No sepsis.

## 2017-05-13 NOTE — PLAN OF CARE
Problem: Infection, Risk/Actual (Adult)  Goal: Infection Prevention/Resolution  Patient will demonstrate the desired outcomes by discharge/transition of care.   Outcome: Ongoing (interventions implemented as appropriate)  Teaching to keep frequent hand hygiene and aseptic technique when doing wound care.

## 2017-05-13 NOTE — PROGRESS NOTES
[facility] Hospital Medicine  Progress Note    Patient Name: Shireen Anton  MRN: [id number]  Patient Class: IP- Inpatient   Admission Date: 5/1/2017  Length of Stay: 12 days  Attending Physician: Cheo Wilks MD  Primary Care Provider: Jeancarlos Zamora MD        Subjective:     Principal Problem:Acute hearing loss of both ears    HPI:  Shireen Anton is a 47 y.o. female that (in part)  has a past medical history of Allergy; Depression; and GERD (gastroesophageal reflux disease). Presents to [facility] Emergency Department complaining of acute bilateral hearing loss.  Patient reports going to bed Sunday night and her last coherent memory was signing a check for her daughter's .  Monday morning her  reported that he had a hard time waking her up and said associated vigorously shake her because she appeared unresponsive.  She was disoriented and unable to hear.  She had a gastric sleeve on Thursday, April 27 that was uncomplicated.  During the perioperative period she reported to be normal and had no complaints other than some minor pain expected after surgery.  Throughout Friday, Saturday, and Sunday experienced no significant problems other than being increasingly thirsty.  She was given a liquid form of opioid medications which she was taking as prescribed, she states.  She continues to experience bilateral hearing loss that is constant.  Characterized by only hearing high-pitched sounds.  This is a first episode and a new problem for her.  No radiation of symptoms.  No relieving factors.  No exacerbating factors. In the emergency department routine laboratory studies, EKG, and cardiac enzymes were obtained.  There was concern for multiple organ dysfunction including markedly elevated troponin levels, acute renal failure, markedly elevated liver enzymes, and creatinine kinase levels greater than 40,000.  She denies significant nausea, vomiting, diarrhea,  or urination.  Denies any significant hypotensive episodes.  She does not recall anything occurring after Sunday night to the time of hospitalization.  She does report having some vaginal bleeding that started this afternoon.  She uses NuvaRing for contraception.    Hospital Course:  Ms. Anton was admitted for an acute CVA with hearing loss and MSOF with NSTEMI, acute renal failure, shock liver, and rhabdomyolysis likely from episode of prolonged hypotension possibly from narcotic use given post surgery for gastric sleave.  Pt seen by Neurology, Cardiology, Nephrology with slow clinical improvement except for continued climb in creatinine. Pt CPK trended down but ARF did not improve. Nephrology ordered tunneled catheter for 5/8 and the patient initiated HD.   has been consulted to arrange out patient HD.  LFT's and CPK's normalized. The patient was found to have a enterococcus/Klebsiella UTI and ID was consulted.     Interval History: No new issues.     Review of Systems   Constitutional: Negative for activity change.   HENT: Negative for congestion.    Respiratory: Negative for chest tightness and shortness of breath.    Cardiovascular: Negative for chest pain.   Gastrointestinal: Negative for abdominal pain.     Objective:     Vital Signs (Most Recent):  Temp: 98.4 °F (36.9 °C) (05/13/17 0717)  Pulse: 73 (05/13/17 0717)  Resp: 18 (05/13/17 0717)  BP: (!) 154/74 (05/13/17 0717)  SpO2: 95 % (05/13/17 0717) Vital Signs (24h Range):  Temp:  [97.8 °F (36.6 °C)-98.9 °F (37.2 °C)] 98.4 °F (36.9 °C)  Pulse:  [55-86] 73  Resp:  [17-20] 18  SpO2:  [91 %-98 %] 95 %  BP: (120-178)/(58-84) 154/74     Weight: (!) 142.2 kg (313 lb 8 oz)  Body mass index is 42.52 kg/(m^2).    Intake/Output Summary (Last 24 hours) at 05/13/17 1124  Last data filed at 05/13/17 0619   Gross per 24 hour   Intake              940 ml   Output             3600 ml   Net            -2660 ml      Physical Exam   Constitutional: She is  oriented to person, place, and time. She appears well-nourished.   Cardiovascular: Normal rate.    Pulmonary/Chest: Effort normal.   Abdominal: Soft.   Neurological: She is alert and oriented to person, place, and time.   Skin: Skin is warm and dry.   Psychiatric: She has a normal mood and affect.   Vitals reviewed.      Significant Labs:   BMP:   Recent Labs  Lab 05/13/17  0506   GLU 90      K 4.5      CO2 29   BUN 29*   CREATININE 7.2*   CALCIUM 9.0     CBC: No results for input(s): WBC, HGB, HCT, PLT in the last 48 hours.    Significant Imaging:    Assessment/Plan:      * Acute hearing loss of both ears  Due to above, resolved.      NSTEMI (non-ST elevated myocardial infarction)  Pt treated with heparin gtt and ASA, as per Cardiology, will need cath pending improvement of renal function or with coordination with Nephrology for HD.    Shock liver  Due to shock liver, LFTs trending down and improving, monitor.      Acute renal failure  Creatinine continues to increase, but CPK trending down.  Pt to get tunneled catheter  and initiation of HD , as per Nephrology. Notified  to look into setting up HD as outpatient for d/c planning.         Morbid obesity  Status post gastric sleave.  Body mass index is 42.52 kg/(m^2).        Non-traumatic rhabdomyolysis  Probable global hypotensive event causing MSOF- resolved problem       Acute ischemic stroke  Seen on MRI, continue ASA, no statin at this time due to shock liver, Neurology following, PT/OT.  Hearing improved. Pt to start statin after LFTS normalize. Pt making progress with therapy and currently with recommendation for home health when ready for discharge.      Metabolic encephalopathy  Resolved, due to MSOF.      UTI (urinary tract infection)  Klebsiella and enterococcus. Currently on ceftriaxone. ID consulted to help direct abx choice.    No sepsis.       VTE Risk Mitigation         Ordered     heparin (porcine) injection 5,000 Units   Use PRN     Route:  Intravenous        05/10/17 0759     heparin (porcine) injection 5,000 Units  As needed (PRN)     Route:  Intra-Catheter        05/10/17 0759     heparin (porcine) injection 5,000 Units  Every 12 hours     Route:  Subcutaneous        05/09/17 1051     Medium Risk of VTE  Once      05/02/17 0012        Continue supportive care. Appreciate ID and nephrology help.     Cheo Bond MD  Department of Hospital Medicine   [facility]

## 2017-05-13 NOTE — PROGRESS NOTES
[facility]  Nephrology  Progress Note    Patient Name: Shireen Anton  MRN: [id number]  Admission Date: 5/1/2017  Hospital Length of Stay: 12 days  Attending Provider: Cheo Wilks MD   Primary Care Physician: Jeancarlos Zamora MD  Principal Problem:Acute hearing loss of both ears    Consults  Subjective:   Notes  Urinating better; feeling better    Review of patient's allergies indicates:  No Known Allergies  Current Facility-Administered Medications   Medication Frequency    0.9%  NaCl infusion PRN    0.9%  NaCl infusion Once    aspirin EC tablet 81 mg Daily    calcium citrate tablet 200 mg TID    cefTRIAXone (ROCEPHIN) 1 g in dextrose 5 % 50 mL IVPB Q24H    cloNIDine tablet 0.3 mg TID PRN    cyanocobalamin tablet 250 mcg Daily    heparin (porcine) injection 5,000 Units Q12H    heparin (porcine) injection 5,000 Units PRN    heparin (porcine) injection 5,000 Units PRN    hydrALAZINE tablet 50 mg Q8H    metoclopramide HCl injection 10 mg Q6H PRN    metoprolol tartrate (LOPRESSOR) tablet 25 mg BID    morphine injection 5 mg Q4H PRN    multivitamin liquid per dose 5 mL BID    ondansetron injection 8 mg Q8H PRN    pantoprazole EC tablet 40 mg Daily    paricalcitol injection 1 mcg Every Mon, Wed, Fri    polyethylene glycol packet 17 g Daily       Objective:     Vital Signs (Most Recent):  Temp: 98.4 °F (36.9 °C) (05/13/17 1235)  Pulse: (!) 59 (05/13/17 1235)  Resp: 18 (05/13/17 1235)  BP: (!) 144/66 (05/13/17 1235)  SpO2: 96 % (05/13/17 1235)  O2 Device (Oxygen Therapy): room air (05/13/17 0617) Vital Signs (24h Range):  Temp:  [97.8 °F (36.6 °C)-98.9 °F (37.2 °C)] 98.4 °F (36.9 °C)  Pulse:  [59-82] 59  Resp:  [17-20] 18  SpO2:  [91 %-98 %] 96 %  BP: (141-169)/(58-84) 144/66     Weight: (!) 142.2 kg (313 lb 8 oz) (05/12/17 0623)  Body mass index is 42.52 kg/(m^2).  Body surface area is 2.69 meters squared.    I/O last 3 completed shifts:  In: 1290 [P.O.:790; Other:500]  Out: 3950  [Urine:1450; Other:2500]    Physical Exam   Constitutional: She appears well-developed and well-nourished.   HENT:   Head: Normocephalic.   Nose: Nose normal.   Mouth/Throat: Mucous membranes are normal.   Eyes: EOM are normal.   Neck:   tim in rt neck   Cardiovascular: Regular rhythm.    Pulmonary/Chest: Effort normal and breath sounds normal.   Abdominal: Soft. Bowel sounds are normal. There is no tenderness.   Musculoskeletal: She exhibits edema.   Neurological: She is alert.   Skin: Skin is warm and dry.   Psychiatric: She has a normal mood and affect.       Significant Labs:  Lab Results   Component Value Date    WBC 6.49 05/10/2017    HGB 9.3 (L) 05/10/2017    HCT 27.6 (L) 05/10/2017    MCV 90 05/10/2017    PLT 99 (L) 05/10/2017     BMP  Lab Results   Component Value Date     05/13/2017    K 4.5 05/13/2017     05/13/2017    CO2 29 05/13/2017    BUN 29 (H) 05/13/2017    CREATININE 7.2 (H) 05/13/2017    CALCIUM 9.0 05/13/2017    ANIONGAP 9 05/13/2017    ESTGFRAFRICA 7 (A) 05/13/2017    EGFRNONAA 6 (A) 05/13/2017           Assessment/Plan:     1.GASPER. 2nd to rhabdo/atn I suspect. Will plan for pc and outpt hd for Monday if not better after the weekend. Pt notes urinating more. Will follow.  2.RHABDO. Resolved.  3.NSTEMI. High risk for lhc.  Suspect pt will get renal wise better.  4.CVA. Following recovery.   5.HYPONATREMIA. Following. Hd sodium at higher bath. Pt on liquid diet.  6.GASTRIC SLEEVE. Saw by surgery. Cont nepro.  7.PROTEINURIA. Nonnephrotic. Following.  8.Anemia. Iron def. IV iron with hd today.  9.UTI. Catheter associated. Rocephin cont. +enterococci. Sensitivities pending. +klebsiella too. Probably can be tx with amoxil.  Defer to id now being consulted.  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 Phos up. Start binders once eating. Start zemplar for now.  11.Hyperphospatemia. Juve phos ok. No extra binders with limited diet.    Jeanine Saxena MD  Nephrology  [facility]

## 2017-05-13 NOTE — PLAN OF CARE
Problem: Fall Risk (Adult)  Goal: Absence of Falls  Patient will demonstrate the desired outcomes by discharge/transition of care.   Outcome: Ongoing (interventions implemented as appropriate)  Pt instructed to use call light for assistance and she verbalizes understanding,

## 2017-05-14 LAB
ANION GAP SERPL CALC-SCNC: 10 MMOL/L
APTT BLDCRRT: 26.1 SEC
BUN SERPL-MCNC: 35 MG/DL
CALCIUM SERPL-MCNC: 9 MG/DL
CHLORIDE SERPL-SCNC: 101 MMOL/L
CO2 SERPL-SCNC: 27 MMOL/L
CREAT SERPL-MCNC: 8.5 MG/DL
EST. GFR  (AFRICAN AMERICAN): 6 ML/MIN/1.73 M^2
EST. GFR  (NON AFRICAN AMERICAN): 5 ML/MIN/1.73 M^2
GLUCOSE SERPL-MCNC: 89 MG/DL
POTASSIUM SERPL-SCNC: 3.7 MMOL/L
SODIUM SERPL-SCNC: 138 MMOL/L

## 2017-05-14 PROCEDURE — 63600175 PHARM REV CODE 636 W HCPCS: Performed by: INTERNAL MEDICINE

## 2017-05-14 PROCEDURE — 25000003 PHARM REV CODE 250: Performed by: INTERNAL MEDICINE

## 2017-05-14 PROCEDURE — 36415 COLL VENOUS BLD VENIPUNCTURE: CPT

## 2017-05-14 PROCEDURE — 25000003 PHARM REV CODE 250: Performed by: HOSPITALIST

## 2017-05-14 PROCEDURE — 21400001 HC TELEMETRY ROOM

## 2017-05-14 PROCEDURE — 80048 BASIC METABOLIC PNL TOTAL CA: CPT

## 2017-05-14 PROCEDURE — 25000003 PHARM REV CODE 250: Performed by: EMERGENCY MEDICINE

## 2017-05-14 PROCEDURE — 85730 THROMBOPLASTIN TIME PARTIAL: CPT

## 2017-05-14 RX ORDER — OXYCODONE AND ACETAMINOPHEN 5; 325 MG/1; MG/1
1 TABLET ORAL EVERY 6 HOURS PRN
Status: DISCONTINUED | OUTPATIENT
Start: 2017-05-14 | End: 2017-05-16 | Stop reason: HOSPADM

## 2017-05-14 RX ORDER — AMLODIPINE BESYLATE 5 MG/1
10 TABLET ORAL DAILY
Status: DISCONTINUED | OUTPATIENT
Start: 2017-05-14 | End: 2017-05-16 | Stop reason: HOSPADM

## 2017-05-14 RX ADMIN — POLYETHYLENE GLYCOL 3350 17 G: 17 POWDER, FOR SOLUTION ORAL at 08:05

## 2017-05-14 RX ADMIN — ASPIRIN 81 MG: 81 TABLET, COATED ORAL at 08:05

## 2017-05-14 RX ADMIN — CALCIUM CITRATE 200 MG (950 MG) TABLET 200 MG: at 01:05

## 2017-05-14 RX ADMIN — HYDRALAZINE HYDROCHLORIDE 50 MG: 25 TABLET ORAL at 11:05

## 2017-05-14 RX ADMIN — Medication 5 ML: at 08:05

## 2017-05-14 RX ADMIN — ONDANSETRON 8 MG: 2 INJECTION INTRAMUSCULAR; INTRAVENOUS at 09:05

## 2017-05-14 RX ADMIN — CALCIUM CITRATE 200 MG (950 MG) TABLET 200 MG: at 11:05

## 2017-05-14 RX ADMIN — CALCIUM CITRATE 200 MG (950 MG) TABLET 200 MG: at 05:05

## 2017-05-14 RX ADMIN — AMLODIPINE BESYLATE 10 MG: 5 TABLET ORAL at 01:05

## 2017-05-14 RX ADMIN — METOPROLOL TARTRATE 25 MG: 25 TABLET, FILM COATED ORAL at 08:05

## 2017-05-14 RX ADMIN — CYANOCOBALAMIN TAB 250 MCG 250 MCG: 250 TAB at 08:05

## 2017-05-14 RX ADMIN — OXYCODONE HYDROCHLORIDE AND ACETAMINOPHEN 1 TABLET: 5; 325 TABLET ORAL at 03:05

## 2017-05-14 RX ADMIN — HYDRALAZINE HYDROCHLORIDE 50 MG: 25 TABLET ORAL at 01:05

## 2017-05-14 RX ADMIN — PANTOPRAZOLE SODIUM 40 MG: 40 TABLET, DELAYED RELEASE ORAL at 08:05

## 2017-05-14 RX ADMIN — CLONIDINE HYDROCHLORIDE 0.3 MG: 0.1 TABLET ORAL at 11:05

## 2017-05-14 RX ADMIN — OXYCODONE HYDROCHLORIDE AND ACETAMINOPHEN 1 TABLET: 5; 325 TABLET ORAL at 08:05

## 2017-05-14 RX ADMIN — HEPARIN SODIUM 5000 UNITS: 5000 INJECTION, SOLUTION INTRAVENOUS; SUBCUTANEOUS at 08:05

## 2017-05-14 RX ADMIN — CEFTRIAXONE 1 G: 1 INJECTION, SOLUTION INTRAVENOUS at 10:05

## 2017-05-14 RX ADMIN — OXYCODONE HYDROCHLORIDE AND ACETAMINOPHEN 1 TABLET: 5; 325 TABLET ORAL at 12:05

## 2017-05-14 RX ADMIN — HYDRALAZINE HYDROCHLORIDE 50 MG: 25 TABLET ORAL at 05:05

## 2017-05-14 NOTE — PROGRESS NOTES
[facility]  Nephrology  Progress Note    Patient Name: Shireen Anton  MRN: [id number]  Admission Date: 5/1/2017  Hospital Length of Stay: 13 days  Attending Provider: Cheo Wilks MD   Primary Care Physician: Jeancarlos Zamora MD  Principal Problem:Acute renal failure    Consults  Subjective:   Notes feeling better    Review of patient's allergies indicates:  No Known Allergies  Current Facility-Administered Medications   Medication Frequency    0.9%  NaCl infusion PRN    0.9%  NaCl infusion Once    aspirin EC tablet 81 mg Daily    calcium citrate tablet 200 mg TID    cefTRIAXone (ROCEPHIN) 1 g in dextrose 5 % 50 mL IVPB Q24H    cloNIDine tablet 0.3 mg TID PRN    cyanocobalamin tablet 250 mcg Daily    heparin (porcine) injection 5,000 Units Q12H    heparin (porcine) injection 5,000 Units PRN    heparin (porcine) injection 5,000 Units PRN    hydrALAZINE tablet 50 mg Q8H    metoclopramide HCl injection 10 mg Q6H PRN    metoprolol tartrate (LOPRESSOR) tablet 25 mg BID    morphine injection 5 mg Q4H PRN    multivitamin liquid per dose 5 mL BID    ondansetron injection 8 mg Q8H PRN    oxycodone-acetaminophen 5-325 mg per tablet 1 tablet Q6H PRN    pantoprazole EC tablet 40 mg Daily    paricalcitol injection 1 mcg Every Mon, Wed, Fri    polyethylene glycol packet 17 g Daily       Objective:     Vital Signs (Most Recent):  Temp: 98 °F (36.7 °C) (05/14/17 1128)  Pulse: 63 (05/14/17 1128)  Resp: 18 (05/14/17 1128)  BP: (!) 183/87 (05/14/17 1128)  SpO2: 97 % (05/14/17 0501)  O2 Device (Oxygen Therapy): room air (05/14/17 0707) Vital Signs (24h Range):  Temp:  [97.5 °F (36.4 °C)-98.4 °F (36.9 °C)] 98 °F (36.7 °C)  Pulse:  [59-76] 63  Resp:  [18] 18  SpO2:  [95 %-97 %] 97 %  BP: (144-183)/(66-87) 183/87     Weight: 136.1 kg (300 lb) (05/14/17 0654)  Body mass index is 40.69 kg/(m^2).  Body surface area is 2.63 meters squared.    I/O last 3 completed shifts:  In: 1390 [P.O.:1190; IV  Piggyback:200]  Out: 3300 [Urine:3300]    Physical Exam   Constitutional: She appears well-developed and well-nourished.   HENT:   Head: Normocephalic.   Nose: Nose normal.   Mouth/Throat: Mucous membranes are normal.   Eyes: EOM are normal.   Neck:   tim in rt neck   Cardiovascular: Regular rhythm.    Pulmonary/Chest: Effort normal and breath sounds normal.   Abdominal: Soft. Bowel sounds are normal. There is no tenderness.   Musculoskeletal: She exhibits edema.   Neurological: She is alert.   Skin: Skin is warm and dry.   Psychiatric: She has a normal mood and affect.       Significant Labs:  Lab Results   Component Value Date    WBC 6.49 05/10/2017    HGB 9.3 (L) 05/10/2017    HCT 27.6 (L) 05/10/2017    MCV 90 05/10/2017    PLT 99 (L) 05/10/2017     BMP  Lab Results   Component Value Date     05/14/2017    K 3.7 05/14/2017     05/14/2017    CO2 27 05/14/2017    BUN 35 (H) 05/14/2017    CREATININE 8.5 (H) 05/14/2017    CALCIUM 9.0 05/14/2017    ANIONGAP 10 05/14/2017    ESTGFRAFRICA 6 (A) 05/14/2017    EGFRNONAA 5 (A) 05/14/2017           Assessment/Plan:     1.GASPER. 2nd to rhabdo/atn I suspect. Will plan for pc and outpt hd for Monday. Hd Monday here and can be dc after hd setup.   2.RHABDO. Resolved.  3.NSTEMI. High risk for lhc.  Suspect pt will get renal wise better.  4.CVA. Following recovery.   5.HYPONATREMIA. Following. Better with po intake and hd.  6.GASTRIC SLEEVE. Saw by surgery. Cont nepro. Wt better.  7.PROTEINURIA. Nonnephrotic. Following.  8.Anemia. Iron def. IV iron with hd today.  9.UTI. Catheter associated. Rocephin cont. +enterococci. Sensitivities pending. +klebsiella too. Probably can be tx with amoxil.  Defer to id now being consulted.  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 Cont tx.  11.Hyperphospatemia. Juve phos in am.  12.HTN. Add norvasc.  Jeanine Saxena MD  Nephrology  [facility]

## 2017-05-14 NOTE — SUBJECTIVE & OBJECTIVE
Interval History: No new issues.     Review of Systems   Constitutional: Negative for activity change.   HENT: Negative for congestion.    Respiratory: Negative for chest tightness and shortness of breath.    Cardiovascular: Negative for chest pain.   Gastrointestinal: Negative for abdominal pain.     Objective:     Vital Signs (Most Recent):  Temp: 97.7 °F (36.5 °C) (05/14/17 0707)  Pulse: 66 (05/14/17 0707)  Resp: 18 (05/14/17 0707)  BP: (!) 160/70 (05/14/17 0707)  SpO2: 97 % (05/14/17 0501) Vital Signs (24h Range):  Temp:  [97.5 °F (36.4 °C)-98.4 °F (36.9 °C)] 97.7 °F (36.5 °C)  Pulse:  [59-76] 66  Resp:  [18] 18  SpO2:  [95 %-97 %] 97 %  BP: (144-162)/(66-79) 160/70     Weight: 136.1 kg (300 lb)  Body mass index is 40.69 kg/(m^2).    Intake/Output Summary (Last 24 hours) at 05/14/17 1027  Last data filed at 05/14/17 0939   Gross per 24 hour   Intake             1070 ml   Output             2750 ml   Net            -1680 ml      Physical Exam   Constitutional: She is oriented to person, place, and time. She appears well-nourished.   Cardiovascular: Normal rate.    Pulmonary/Chest: Effort normal.   Abdominal: Soft.   Neurological: She is alert and oriented to person, place, and time.   Skin: Skin is warm and dry.   Psychiatric: She has a normal mood and affect.   Vitals reviewed.      Significant Labs:   CBC: No results for input(s): WBC, HGB, HCT, PLT in the last 48 hours.  CMP:   Recent Labs  Lab 05/13/17  0506 05/14/17  0431    138   K 4.5 3.7    101   CO2 29 27   GLU 90 89   BUN 29* 35*   CREATININE 7.2* 8.5*   CALCIUM 9.0 9.0   ANIONGAP 9 10   EGFRNONAA 6* 5*       Significant Imaging:

## 2017-05-14 NOTE — PROGRESS NOTES
[facility] Hospital Medicine  Progress Note    Patient Name: Shireen Anton  MRN: [id number]  Patient Class: IP- Inpatient   Admission Date: 5/1/2017  Length of Stay: 13 days  Attending Physician: Cheo Wilks MD  Primary Care Provider: Jeancarlos Zamora MD        Subjective:     Principal Problem:Acute renal failure    HPI:  Shireen Anton is a 47 y.o. female that (in part)  has a past medical history of Allergy; Depression; and GERD (gastroesophageal reflux disease). Presents to [facility] Emergency Department complaining of acute bilateral hearing loss.  Patient reports going to bed Sunday night and her last coherent memory was signing a check for her daughter's .  Monday morning her  reported that he had a hard time waking her up and said associated vigorously shake her because she appeared unresponsive.  She was disoriented and unable to hear.  She had a gastric sleeve on Thursday, April 27 that was uncomplicated.  During the perioperative period she reported to be normal and had no complaints other than some minor pain expected after surgery.  Throughout Friday, Saturday, and Sunday experienced no significant problems other than being increasingly thirsty.  She was given a liquid form of opioid medications which she was taking as prescribed, she states.  She continues to experience bilateral hearing loss that is constant.  Characterized by only hearing high-pitched sounds.  This is a first episode and a new problem for her.  No radiation of symptoms.  No relieving factors.  No exacerbating factors. In the emergency department routine laboratory studies, EKG, and cardiac enzymes were obtained.  There was concern for multiple organ dysfunction including markedly elevated troponin levels, acute renal failure, markedly elevated liver enzymes, and creatinine kinase levels greater than 40,000.  She denies significant nausea, vomiting, diarrhea, or  urination.  Denies any significant hypotensive episodes.  She does not recall anything occurring after Sunday night to the time of hospitalization.  She does report having some vaginal bleeding that started this afternoon.  She uses NuvaRing for contraception.    Hospital Course:  Ms. Anton was admitted for an acute CVA with hearing loss and MSOF with NSTEMI, acute renal failure, shock liver, and rhabdomyolysis likely from episode of prolonged hypotension possibly from narcotic use given post surgery for gastric sleave.  Pt seen by Neurology, Cardiology, Nephrology with slow clinical improvement except for continued climb in creatinine. Pt CPK trended down but ARF did not improve. Nephrology ordered tunneled catheter for 5/8 and the patient initiated HD.   has been consulted to arrange out patient HD.  LFT's and CPK's normalized. The patient was found to have a enterococcus/Klebsiella UTI and ID was consulted. The patient continued with almost daily dialysis.     Interval History: No new issues.     Review of Systems   Constitutional: Negative for activity change.   HENT: Negative for congestion.    Respiratory: Negative for chest tightness and shortness of breath.    Cardiovascular: Negative for chest pain.   Gastrointestinal: Negative for abdominal pain.     Objective:     Vital Signs (Most Recent):  Temp: 97.7 °F (36.5 °C) (05/14/17 0707)  Pulse: 66 (05/14/17 0707)  Resp: 18 (05/14/17 0707)  BP: (!) 160/70 (05/14/17 0707)  SpO2: 97 % (05/14/17 0501) Vital Signs (24h Range):  Temp:  [97.5 °F (36.4 °C)-98.4 °F (36.9 °C)] 97.7 °F (36.5 °C)  Pulse:  [59-76] 66  Resp:  [18] 18  SpO2:  [95 %-97 %] 97 %  BP: (144-162)/(66-79) 160/70     Weight: 136.1 kg (300 lb)  Body mass index is 40.69 kg/(m^2).    Intake/Output Summary (Last 24 hours) at 05/14/17 1027  Last data filed at 05/14/17 0939   Gross per 24 hour   Intake             1070 ml   Output             2750 ml   Net            -1680 ml      Physical  Exam   Constitutional: She is oriented to person, place, and time. She appears well-nourished.   Cardiovascular: Normal rate.    Pulmonary/Chest: Effort normal.   Abdominal: Soft.   Neurological: She is alert and oriented to person, place, and time.   Skin: Skin is warm and dry.   Psychiatric: She has a normal mood and affect.   Vitals reviewed.      Significant Labs:   CBC: No results for input(s): WBC, HGB, HCT, PLT in the last 48 hours.  CMP:   Recent Labs  Lab 05/13/17  0506 05/14/17  0431    138   K 4.5 3.7    101   CO2 29 27   GLU 90 89   BUN 29* 35*   CREATININE 7.2* 8.5*   CALCIUM 9.0 9.0   ANIONGAP 9 10   EGFRNONAA 6* 5*       Significant Imaging:     Assessment/Plan:      * Acute renal failure  Creatinine continues to increase, but CPK trending down.  Pt to get tunneled catheter  and initiation of HD , as per Nephrology. Notified  to look into setting up HD as outpatient for d/c planning.         Acute hearing loss of both ears  Due to above, resolved.      NSTEMI (non-ST elevated myocardial infarction)  Pt treated with heparin gtt and ASA, as per Cardiology, will need cath pending improvement of renal function or with coordination with Nephrology for HD. Can follow up with cards when discharged. Doubt CAD     Shock liver  Due to shock liver, LFTs trending down and improving, monitor.      Morbid obesity  Status post gastric sleave.  Body mass index is 40.69 kg/(m^2).        Non-traumatic rhabdomyolysis  Probable global hypotensive event causing MSOF- resolved problem       Acute ischemic stroke  Seen on MRI, continue ASA, no statin at this time due to shock liver, Neurology following, PT/OT.  Hearing improved. Pt to start statin after LFTS normalize. Pt making progress with therapy and currently with recommendation for home health when ready for discharge.      Metabolic encephalopathy  Resolved, due to MSOF.      UTI (urinary tract infection)  Klebsiella and enterococcus.  Currently on ceftriaxone. ID consulted to help direct abx choice.    No sepsis. ID consult pending.       VTE Risk Mitigation         Ordered     heparin (porcine) injection 5,000 Units  Use PRN     Route:  Intravenous        05/10/17 0759     heparin (porcine) injection 5,000 Units  As needed (PRN)     Route:  Intra-Catheter        05/10/17 0759     heparin (porcine) injection 5,000 Units  Every 12 hours     Route:  Subcutaneous        05/09/17 1051     Medium Risk of VTE  Once      05/02/17 0012      will follow renal recs. Home early this week with out patient dialysis?       Cheo Bond MD  Department of Hospital Medicine   [facility]

## 2017-05-14 NOTE — PLAN OF CARE
Problem: Patient Care Overview  Goal: Plan of Care Review  Outcome: Ongoing (interventions implemented as appropriate)  No falls or injury. Fall and safety precautions maintained. Bun/CT 35/8.5. Pt scheduled for HD in am. IR consulted for Tunnel Cath in am prior to discharge. Pt tolerated therapy well. Pt independent of ADLs with some generalized weakness noted. Continue plan of care.

## 2017-05-14 NOTE — NURSING
8mg Zofran IV given for complaints of nausea. Emesis present: food particles noted. Will continue to monitor and provide care.

## 2017-05-14 NOTE — ASSESSMENT & PLAN NOTE
Klebsiella and enterococcus. Currently on ceftriaxone. ID consulted to help direct abx choice.    No sepsis. ID consult pending.

## 2017-05-14 NOTE — ASSESSMENT & PLAN NOTE
Pt treated with heparin gtt and ASA, as per Cardiology, will need cath pending improvement of renal function or with coordination with Nephrology for HD. Can follow up with cards when discharged. Doubt CAD

## 2017-05-14 NOTE — PLAN OF CARE
Problem: Fluid Volume Deficit (Adult)  Intervention: Monitor/Manage Hypovolemia    05/13/17 2120   Coping/Psychosocial Interventions   Environmental Support calm environment promoted;rest periods encouraged         Goal: Identify Related Risk Factors and Signs and Symptoms  Related risk factors and signs and symptoms are identified upon initiation of Human Response Clinical Practice Guideline (CPG)     05/09/17 1843   Fluid Volume Deficit   Related Risk Factors (Fluid Volume Deficit) bleeding/drain output;medication effects   Signs and Symptoms (Fluid Volume Deficit) lab value changes;intake/output negative;muscle weakness;oliguria/anuria         Problem: Infection, Risk/Actual (Adult)  Intervention: Manage Suspected/Actual Infection    05/13/17 1413 05/13/17 2120   Safety Interventions   Isolation Precautions --  environmental surveillance   Infection Management --  aseptic technique maintained   Prevent/Manage Colorectal Surgical Infection   Fever Reduction/Comfort Measures lightweight clothing;medication administered --            Comments:   Tolerating full liquid diet well. No c/o nausea. Medicated for pain and was effective. Stated that she hurts most while lying in the bed. Seems to be in good spirits. No new skin issues. No injury during shift. SR on tele monitor. Non-skid sock on when up to bed side commode. Encouraged to keep feet up while in bed to decrease swelling. Call bell at side.

## 2017-05-15 LAB
ANION GAP SERPL CALC-SCNC: 14 MMOL/L
APTT BLDCRRT: 23.1 SEC
BUN SERPL-MCNC: 43 MG/DL
CALCIUM SERPL-MCNC: 9.5 MG/DL
CHLORIDE SERPL-SCNC: 102 MMOL/L
CO2 SERPL-SCNC: 28 MMOL/L
CREAT SERPL-MCNC: 9.5 MG/DL
EST. GFR  (AFRICAN AMERICAN): 5 ML/MIN/1.73 M^2
EST. GFR  (NON AFRICAN AMERICAN): 4 ML/MIN/1.73 M^2
GLUCOSE SERPL-MCNC: 84 MG/DL
INR PPP: 1
POTASSIUM SERPL-SCNC: 3.7 MMOL/L
PROTHROMBIN TIME: 10.8 SEC
SODIUM SERPL-SCNC: 144 MMOL/L

## 2017-05-15 PROCEDURE — 25000003 PHARM REV CODE 250: Performed by: INTERNAL MEDICINE

## 2017-05-15 PROCEDURE — 0JH63XZ INSERTION OF TUNNELED VASCULAR ACCESS DEVICE INTO CHEST SUBCUTANEOUS TISSUE AND FASCIA, PERCUTANEOUS APPROACH: ICD-10-PCS | Performed by: RADIOLOGY

## 2017-05-15 PROCEDURE — 85610 PROTHROMBIN TIME: CPT

## 2017-05-15 PROCEDURE — 63600175 PHARM REV CODE 636 W HCPCS: Performed by: RADIOLOGY

## 2017-05-15 PROCEDURE — 80048 BASIC METABOLIC PNL TOTAL CA: CPT

## 2017-05-15 PROCEDURE — 63600175 PHARM REV CODE 636 W HCPCS: Performed by: INTERNAL MEDICINE

## 2017-05-15 PROCEDURE — 25000003 PHARM REV CODE 250: Performed by: RADIOLOGY

## 2017-05-15 PROCEDURE — 85730 THROMBOPLASTIN TIME PARTIAL: CPT

## 2017-05-15 PROCEDURE — 06H033Z INSERTION OF INFUSION DEVICE INTO INFERIOR VENA CAVA, PERCUTANEOUS APPROACH: ICD-10-PCS | Performed by: RADIOLOGY

## 2017-05-15 PROCEDURE — 90935 HEMODIALYSIS ONE EVALUATION: CPT

## 2017-05-15 PROCEDURE — 36415 COLL VENOUS BLD VENIPUNCTURE: CPT

## 2017-05-15 PROCEDURE — 21400001 HC TELEMETRY ROOM

## 2017-05-15 PROCEDURE — 02PYX3Z REMOVAL OF INFUSION DEVICE FROM GREAT VESSEL, EXTERNAL APPROACH: ICD-10-PCS | Performed by: RADIOLOGY

## 2017-05-15 RX ORDER — MIDAZOLAM HYDROCHLORIDE 1 MG/ML
INJECTION, SOLUTION INTRAMUSCULAR; INTRAVENOUS CODE/TRAUMA/SEDATION MEDICATION
Status: COMPLETED | OUTPATIENT
Start: 2017-05-15 | End: 2017-05-15

## 2017-05-15 RX ORDER — CEFAZOLIN SODIUM 1 G/50ML
SOLUTION INTRAVENOUS
Status: COMPLETED | OUTPATIENT
Start: 2017-05-15 | End: 2017-05-15

## 2017-05-15 RX ORDER — AMOXICILLIN AND CLAVULANATE POTASSIUM 250; 125 MG/1; MG/1
1 TABLET, FILM COATED ORAL EVERY 12 HOURS
Status: DISCONTINUED | OUTPATIENT
Start: 2017-05-15 | End: 2017-05-16 | Stop reason: HOSPADM

## 2017-05-15 RX ORDER — HEPARIN SODIUM 5000 [USP'U]/ML
INJECTION, SOLUTION INTRAVENOUS; SUBCUTANEOUS CODE/TRAUMA/SEDATION MEDICATION
Status: COMPLETED | OUTPATIENT
Start: 2017-05-15 | End: 2017-05-15

## 2017-05-15 RX ORDER — FENTANYL CITRATE 50 UG/ML
INJECTION, SOLUTION INTRAMUSCULAR; INTRAVENOUS CODE/TRAUMA/SEDATION MEDICATION
Status: COMPLETED | OUTPATIENT
Start: 2017-05-15 | End: 2017-05-15

## 2017-05-15 RX ADMIN — FENTANYL CITRATE 50 MCG: 50 INJECTION, SOLUTION INTRAMUSCULAR; INTRAVENOUS at 10:05

## 2017-05-15 RX ADMIN — Medication 5 ML: at 09:05

## 2017-05-15 RX ADMIN — CALCIUM CITRATE 200 MG (950 MG) TABLET 200 MG: at 05:05

## 2017-05-15 RX ADMIN — HYDRALAZINE HYDROCHLORIDE 50 MG: 25 TABLET ORAL at 09:05

## 2017-05-15 RX ADMIN — CEFAZOLIN SODIUM 1 G: 1 INJECTION, SOLUTION INTRAVENOUS at 10:05

## 2017-05-15 RX ADMIN — AMOXICILLIN AND CLAVULANATE POTASSIUM 1 TABLET: 250; 125 TABLET, FILM COATED ORAL at 09:05

## 2017-05-15 RX ADMIN — CALCIUM CITRATE 200 MG (950 MG) TABLET 200 MG: at 09:05

## 2017-05-15 RX ADMIN — MORPHINE SULFATE 5 MG: 10 INJECTION INTRAVENOUS at 07:05

## 2017-05-15 RX ADMIN — PARICALCITOL 1 MCG: 5 INJECTION, SOLUTION INTRAVENOUS at 03:05

## 2017-05-15 RX ADMIN — HEPARIN SODIUM 10000 UNITS: 5000 INJECTION, SOLUTION INTRAVENOUS; SUBCUTANEOUS at 11:05

## 2017-05-15 RX ADMIN — METOPROLOL TARTRATE 25 MG: 25 TABLET, FILM COATED ORAL at 09:05

## 2017-05-15 RX ADMIN — MORPHINE SULFATE 5 MG: 10 INJECTION INTRAVENOUS at 11:05

## 2017-05-15 RX ADMIN — HEPARIN SODIUM 5000 UNITS: 5000 INJECTION, SOLUTION INTRAVENOUS; SUBCUTANEOUS at 09:05

## 2017-05-15 RX ADMIN — HEPARIN SODIUM 5000 UNITS: 5000 INJECTION, SOLUTION INTRAVENOUS; SUBCUTANEOUS at 03:05

## 2017-05-15 RX ADMIN — HYDRALAZINE HYDROCHLORIDE 50 MG: 25 TABLET ORAL at 05:05

## 2017-05-15 RX ADMIN — MIDAZOLAM HYDROCHLORIDE 1 MG: 1 INJECTION INTRAMUSCULAR; INTRAVENOUS at 10:05

## 2017-05-15 NOTE — PROGRESS NOTES
[facility]  Nephrology  Progress Note    Patient Name: Shireen Anton  MRN: [id number]  Admission Date: 5/1/2017  Hospital Length of Stay: 14 days  Attending Provider: Cheo Wilks MD   Primary Care Physician: Jeancarlos Zamora MD  Principal Problem:Acute renal failure    Consults  Subjective:   Notes feeling ok    Review of patient's allergies indicates:  No Known Allergies  Current Facility-Administered Medications   Medication Frequency    0.9%  NaCl infusion PRN    0.9%  NaCl infusion Once    amlodipine tablet 10 mg Daily    aspirin EC tablet 81 mg Daily    calcium citrate tablet 200 mg TID    cefTRIAXone (ROCEPHIN) 1 g in dextrose 5 % 50 mL IVPB Q24H    cloNIDine tablet 0.3 mg TID PRN    cyanocobalamin tablet 250 mcg Daily    heparin (porcine) injection 5,000 Units Q12H    heparin (porcine) injection 5,000 Units PRN    heparin (porcine) injection 5,000 Units PRN    hydrALAZINE tablet 50 mg Q8H    metoclopramide HCl injection 10 mg Q6H PRN    metoprolol tartrate (LOPRESSOR) tablet 25 mg BID    morphine injection 5 mg Q4H PRN    multivitamin liquid per dose 5 mL BID    ondansetron injection 8 mg Q8H PRN    oxycodone-acetaminophen 5-325 mg per tablet 1 tablet Q6H PRN    pantoprazole EC tablet 40 mg Daily    paricalcitol injection 1 mcg Every Mon, Wed, Fri    polyethylene glycol packet 17 g Daily       Objective:     Vital Signs (Most Recent):  Temp: 98.4 °F (36.9 °C) (05/15/17 1138)  Pulse: 64 (05/15/17 1345)  Resp: 18 (05/15/17 1345)  BP: (!) 144/80 (05/15/17 1345)  SpO2: 98 % (05/15/17 1117)  O2 Device (Oxygen Therapy): nasal cannula (05/15/17 1048) Vital Signs (24h Range):  Temp:  [97.8 °F (36.6 °C)-98.9 °F (37.2 °C)] 98.4 °F (36.9 °C)  Pulse:  [56-86] 64  Resp:  [16-18] 18  SpO2:  [94 %-99 %] 98 %  BP: (128-186)/(68-96) 144/80     Weight: 134.1 kg (295 lb 11.2 oz) (05/15/17 0513)  Body mass index is 40.1 kg/(m^2).  Body surface area is 2.61 meters squared.    I/O last  3 completed shifts:  In: 1150 [P.O.:1100; IV Piggyback:50]  Out: 4451 [Urine:4200; Emesis/NG output:250; Stool:1]    Physical Exam   Constitutional: She appears well-developed and well-nourished.   HENT:   Head: Normocephalic.   Nose: Nose normal.   Mouth/Throat: Mucous membranes are normal.   Eyes: EOM are normal.   Neck:   tim in rt neck   Cardiovascular: Regular rhythm.    Pulmonary/Chest: Effort normal and breath sounds normal.   Abdominal: Soft. Bowel sounds are normal. There is no tenderness.   Musculoskeletal: She exhibits edema.   Neurological: She is alert.   Skin: Skin is warm and dry.   Psychiatric: She has a normal mood and affect.       Significant Labs:  Lab Results   Component Value Date    WBC 6.49 05/10/2017    HGB 9.3 (L) 05/10/2017    HCT 27.6 (L) 05/10/2017    MCV 90 05/10/2017    PLT 99 (L) 05/10/2017     BMP  Lab Results   Component Value Date     05/15/2017    K 3.7 05/15/2017     05/15/2017    CO2 28 05/15/2017    BUN 43 (H) 05/15/2017    CREATININE 9.5 (H) 05/15/2017    CALCIUM 9.5 05/15/2017    ANIONGAP 14 05/15/2017    ESTGFRAFRICA 5 (A) 05/15/2017    EGFRNONAA 4 (A) 05/15/2017           Assessment/Plan:     1.GASPER. 2nd to rhabdo/atn I suspect. HD today. SEtup outpt done.  Still needing hd. Pt can be dc today after hd with po antibiotics.  2.RHABDO. Resolved.  3.NSTEMI. High risk for lhc.  Suspect pt will get renal wise better.  4.CVA. Following recovery.   5.HYPONATREMIA. Doing better.  6.GASTRIC SLEEVE. Saw by surgery. Cont nepro for now. Needs to followup with her surgeon at [place].  7.PROTEINURIA. Nonnephrotic. Following.  8.Anemia. Iron def. IV iron with hd today.  9.UTI. Catheter associated. Rocephin cont. +enterococci. Sensitivities pending. +klebsiella too. Probably can be tx with amoxil.  Defer to id now being consulted.   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 Cont tx.  11.Hyperphospatemia. Juve at hd unit..  12.HTN. Better Cont tx.  Jeanine Saxena MD  Nephrology  [facility]

## 2017-05-15 NOTE — PLAN OF CARE
Problem: Fluid Volume Deficit (Adult)  Intervention: Monitor/Manage Hypovolemia    05/11/17 0720 05/13/17 2120 05/14/17 0939   Coping/Psychosocial Interventions   Environmental Support --  calm environment promoted;rest periods encouraged --    Nutrition Interventions   Fluid/Electrolyte Management intravenous fluids adjusted --  --    Monitor/Manage Hypovolemia   Nausea/Vomiting Interventions --  --  (antiemetic)         Goal: Fluid/Electrolyte Balance  Patient will demonstrate the desired outcomes by discharge/transition of care.     05/09/17 2214   Fluid Volume Deficit (Adult)   Fluid/Electrolyte Balance making progress toward outcome         Problem: Hemodialysis (Adult)  Goal: Signs and Symptoms of Listed Potential Problems Will be Absent, Minimized or Managed (Hemodialysis)  Signs and symptoms of listed potential problems will be absent, minimized or managed by discharge/transition of care (reference Hemodialysis (Adult) CPG).     05/12/17 1847   Hemodialysis   Problems Assessed (Hemodialysis) all   Problems Present (Hemodialysis) fluid imbalance;nausea and vomiting         Comments:   Savanna Chavez LPN Licensed Nurse Signed Med/Surg Plan of Care       xpand Allollapse All   Problem: Fluid Volume Deficit (Adult)  Intervention: Monitor/Manage Hypovolemia     05/13/17 2120   Coping/Psychosocial Interventions   Environmental Support calm environment promoted;rest periods encouraged          Goal: Identify Related Risk Factors and Signs and Symptoms  Related risk factors and signs and symptoms are identified upon initiation of Human Response Clinical Practice Guideline (CPG)      05/09/17 1843   Fluid Volume Deficit   Related Risk Factors (Fluid Volume Deficit) bleeding/drain output;medication effects   Signs and Symptoms (Fluid Volume Deficit) lab value changes;intake/output negative;muscle weakness;oliguria/anuria          Problem: Infection, Risk/Actual (Adult)  Intervention: Manage Suspected/Actual  Infection     05/13/17 1413 05/13/17 2821   Safety Interventions   Isolation Precautions --  environmental surveillance   Infection Management --  aseptic technique maintained   Prevent/Manage Colorectal Surgical Infection   Fever Reduction/Comfort Measures lightweight clothing;medication administered --              Comments:   Tolerating full liquid diet well. No c/o nausea. Stated that she hurts most while lying in the bed, so stayed up in chair for most of shift. Seems to be in good spirits. No new skin issues. No injury during shift. SR on tele monitor. Non-skid sock on when up to bathroom. Encouraged to keep feet up while in bed to decrease swelling. NPO since MN for permenant HD cath placement. Call bell at side.

## 2017-05-15 NOTE — PROGRESS NOTES
Dr Wilks advised that patient ready for discharge from nephrology standpoint.    Dr. Wilks advised case management that patient currently has a UTI and waiting to be seen by ID for med. recommendation.  Sandra Schwartz LMSW, AMRITA-FINN,[facility]  05/15/2017

## 2017-05-15 NOTE — SUBJECTIVE & OBJECTIVE
Interval History: No new issues.     Review of Systems   Constitutional: Negative for activity change.   HENT: Negative for congestion.    Respiratory: Negative for chest tightness and shortness of breath.    Cardiovascular: Negative for chest pain.   Gastrointestinal: Negative for abdominal pain.     Objective:     Vital Signs (Most Recent):  Temp: 97.8 °F (36.6 °C) (05/15/17 0513)  Pulse: 86 (05/15/17 0513)  Resp: 16 (05/15/17 0513)  BP: (!) 179/84 (05/15/17 0516)  SpO2: 95 % (05/15/17 0513) Vital Signs (24h Range):  Temp:  [97.8 °F (36.6 °C)-98.5 °F (36.9 °C)] 97.8 °F (36.6 °C)  Pulse:  [56-86] 86  Resp:  [16-18] 16  SpO2:  [95 %-97 %] 95 %  BP: (120-183)/(68-87) 179/84     Weight: 134.1 kg (295 lb 11.2 oz)  Body mass index is 40.1 kg/(m^2).    Intake/Output Summary (Last 24 hours) at 05/15/17 0729  Last data filed at 05/15/17 0513   Gross per 24 hour   Intake              700 ml   Output             2851 ml   Net            -2151 ml      Physical Exam   Constitutional: She is oriented to person, place, and time. She appears well-nourished.   Cardiovascular: Normal rate.    Pulmonary/Chest: Effort normal.   Abdominal: Soft.   Neurological: She is alert and oriented to person, place, and time.   Skin: Skin is warm and dry.   Psychiatric: She has a normal mood and affect.   Vitals reviewed.      Significant Labs:   BMP:   Recent Labs  Lab 05/15/17  0505   GLU 84      K 3.7      CO2 28   BUN 43*   CREATININE 9.5*   CALCIUM 9.5     CBC: No results for input(s): WBC, HGB, HCT, PLT in the last 48 hours.    Significant Imaging:

## 2017-05-15 NOTE — PROGRESS NOTES
[facility] Hospital Medicine  Progress Note    Patient Name: Shireen Anton  MRN: [id number]  Patient Class: IP- Inpatient   Admission Date: 5/1/2017  Length of Stay: 14 days  Attending Physician: Cheo Wilks MD  Primary Care Provider: Jeancarlos Zamora MD        Subjective:     Principal Problem:Acute renal failure    HPI:  Shireen Anton is a 47 y.o. female that (in part)  has a past medical history of Allergy; Depression; and GERD (gastroesophageal reflux disease). Presents to [facility] Emergency Department complaining of acute bilateral hearing loss.  Patient reports going to bed Sunday night and her last coherent memory was signing a check for her daughter's .  Monday morning her  reported that he had a hard time waking her up and said associated vigorously shake her because she appeared unresponsive.  She was disoriented and unable to hear.  She had a gastric sleeve on Thursday, April 27 that was uncomplicated.  During the perioperative period she reported to be normal and had no complaints other than some minor pain expected after surgery.  Throughout Friday, Saturday, and Sunday experienced no significant problems other than being increasingly thirsty.  She was given a liquid form of opioid medications which she was taking as prescribed, she states.  She continues to experience bilateral hearing loss that is constant.  Characterized by only hearing high-pitched sounds.  This is a first episode and a new problem for her.  No radiation of symptoms.  No relieving factors.  No exacerbating factors. In the emergency department routine laboratory studies, EKG, and cardiac enzymes were obtained.  There was concern for multiple organ dysfunction including markedly elevated troponin levels, acute renal failure, markedly elevated liver enzymes, and creatinine kinase levels greater than 40,000.  She denies significant nausea, vomiting, diarrhea, or  urination.  Denies any significant hypotensive episodes.  She does not recall anything occurring after Sunday night to the time of hospitalization.  She does report having some vaginal bleeding that started this afternoon.  She uses NuvaRing for contraception.    Hospital Course:  Ms. Anton was admitted for an acute CVA with hearing loss and MSOF with NSTEMI, acute renal failure, shock liver, and rhabdomyolysis likely from episode of prolonged hypotension possibly from narcotic use given post surgery for gastric sleave.  Pt seen by Neurology, Cardiology, Nephrology with slow clinical improvement except for continued climb in creatinine. Pt CPK trended down but ARF did not improve. Nephrology ordered tunneled catheter for 5/8 and the patient initiated HD.   has been consulted to arrange out patient HD.  LFT's and CPK's normalized. The patient was found to have a enterococcus/Klebsiella UTI and ID was consulted. The patient continued with almost daily dialysis. The patient went to IR on 5/15 for placement of tunneled dialysis catheter.  arranged out patient dialysis.      Interval History: No new issues.     Review of Systems   Constitutional: Negative for activity change.   HENT: Negative for congestion.    Respiratory: Negative for chest tightness and shortness of breath.    Cardiovascular: Negative for chest pain.   Gastrointestinal: Negative for abdominal pain.     Objective:     Vital Signs (Most Recent):  Temp: 97.8 °F (36.6 °C) (05/15/17 0513)  Pulse: 86 (05/15/17 0513)  Resp: 16 (05/15/17 0513)  BP: (!) 179/84 (05/15/17 0516)  SpO2: 95 % (05/15/17 0513) Vital Signs (24h Range):  Temp:  [97.8 °F (36.6 °C)-98.5 °F (36.9 °C)] 97.8 °F (36.6 °C)  Pulse:  [56-86] 86  Resp:  [16-18] 16  SpO2:  [95 %-97 %] 95 %  BP: (120-183)/(68-87) 179/84     Weight: 134.1 kg (295 lb 11.2 oz)  Body mass index is 40.1 kg/(m^2).    Intake/Output Summary (Last 24 hours) at 05/15/17 0790  Last data filed at  05/15/17 0513   Gross per 24 hour   Intake              700 ml   Output             2851 ml   Net            -2151 ml      Physical Exam   Constitutional: She is oriented to person, place, and time. She appears well-nourished.   Cardiovascular: Normal rate.    Pulmonary/Chest: Effort normal.   Abdominal: Soft.   Neurological: She is alert and oriented to person, place, and time.   Skin: Skin is warm and dry.   Psychiatric: She has a normal mood and affect.   Vitals reviewed.      Significant Labs:   BMP:   Recent Labs  Lab 05/15/17  0505   GLU 84      K 3.7      CO2 28   BUN 43*   CREATININE 9.5*   CALCIUM 9.5     CBC: No results for input(s): WBC, HGB, HCT, PLT in the last 48 hours.    Significant Imaging:     Assessment/Plan:      * Acute renal failure  Creatinine continues to increase, but CPK trending down.  Pt to get tunneled catheter  and initiation of HD , as per Nephrology. Notified  to look into setting up HD as outpatient for d/c planning.  Tunneled cath today.          Acute hearing loss of both ears  Due to above, resolved.      NSTEMI (non-ST elevated myocardial infarction)  Pt treated with heparin gtt and ASA, as per Cardiology, will need cath pending improvement of renal function or with coordination with Nephrology for HD. Can follow up with cards when discharged. Doubt CAD     Shock liver  Due to shock liver, LFTs normal. Resolved. Problem       Morbid obesity  Status post gastric sleave.  Body mass index is 40.1 kg/(m^2).        Non-traumatic rhabdomyolysis  Probable global hypotensive event causing MSOF- resolved problem       Acute ischemic stroke  Seen on MRI, continue ASA- this was from global hypotensive event. Resolved.     Metabolic encephalopathy  Resolved, due to MSOF.      UTI (urinary tract infection)  Klebsiella and enterococcus. Currently on ceftriaxone. ID consulted to help direct abx choice.    No sepsis. ID consult pending.       VTE Risk Mitigation          Ordered     heparin (porcine) injection 5,000 Units  Use PRN     Route:  Intravenous        05/10/17 0759     heparin (porcine) injection 5,000 Units  As needed (PRN)     Route:  Intra-Catheter        05/10/17 0759     heparin (porcine) injection 5,000 Units  Every 12 hours     Route:  Subcutaneous        05/09/17 1051     Medium Risk of VTE  Once      05/02/17 0012        Awaiting ID consult. Tunneled cath today.  Home possibly later?   Will discuss with  later.     Cheo Bond MD  Department of Hospital Medicine   [facility]

## 2017-05-15 NOTE — PT/OT/SLP PROGRESS
Occupational Therapy      Shireen Carvajalups  MRN: [id number]        2:15p Pt is currently off the unit receiving dialysis.      12:02  Patient not seen today secondary to Unavailable (off floor for a procedure). Will follow-up at another time.    Denia Heard OT  5/15/2017

## 2017-05-15 NOTE — CONSULTS
Consult Note  Infectious Disease    Consult Requested By: Cheo Wilks MD    Reason for Consult: treatment of uti    SUBJECTIVE:     History of Present Illness:  Patient is a 47 y.o. female presents with  Ams. She had a gastric sleeve 17 and was discharged home the day after. She then started to become more obtunded and was admitted here 17 with a high cpk and nilda.it is felt that she had a combination of dehydration and excessive pain med use that may have precipitated these events. She is currently on hd though she is startin to urinate. Her creatinine is 9.5. A ua  On 17 has many wbc and rbc  And has grown out > 100,000 klebsiella and enterococci.This was obtained when she was in full blown nilda.she has no fever or leucocytosis and is currently on rocephin. Us is with no obstruction.  Past Medical History:   Diagnosis Date    Allergy     Depression     GERD (gastroesophageal reflux disease)      Past Surgical History:   Procedure Laterality Date    breast augmentation       SECTION      CHOLECYSTECTOMY      LAPAROSCOPIC GASTRIC BANDING  2017    ORIF HUMERUS FRACTURE  1987     Family History   Problem Relation Age of Onset    Breast cancer Maternal Grandmother 63    Diabetes Father     Hypertension Father     Heart disease Father     Thyroid disease Mother     Cancer Mother 71     SCC of lung    Cancer Sister 48     pharyngeal cancer     Social History   Substance Use Topics    Smoking status: Former Smoker     Packs/day: 1.00     Years: 16.00     Quit date: 2011    Smokeless tobacco: Never Used    Alcohol use Yes      Comment: occasional       Review of patient's allergies indicates:  No Known Allergies     Antibiotics     Start     Stop Route Frequency Ordered    05/10/17 1115  cefTRIAXone (ROCEPHIN) 1 g in dextrose 5 % 50 mL IVPB      -- IV Every 24 hours (non-standard times) 05/10/17 1014          Review of Systems:  Constitutional: no fever or  chills  Eyes: no visual changes  ENT: no nasal congestion or sore throat  Respiratory: no cough or shortness of breath  Cardiovascular: no chest pain or palpitations  Gastrointestinal: no nausea or vomiting, no abdominal pain or change in bowel habits  Genitourinary: no hematuria or dysuria  Musculoskeletal: no arthralgias or myalgias  Neurological: no seizures or tremors    OBJECTIVE:     Vital Signs (Most Recent)  Temp: 97.7 °F (36.5 °C) (05/15/17 1552)  Pulse: 73 (05/15/17 1552)  Resp: 18 (05/15/17 1552)  BP: (!) 151/77 (05/15/17 1552)  SpO2: 98 % (05/15/17 1117)    Temperature Range Min/Max (Last 24H):  Temp:  [97.7 °F (36.5 °C)-98.9 °F (37.2 °C)]     Physical Exam:  General: well developed, well nourished  Eyes: conjunctivae/corneas clear. PERRL..  HENT: Head:normocephalic, atraumatic. Ears:not examined. Nose: Nares normal. Septum midline. Mucosa normal. No drainage or sinus tenderness., no discharge. Throat: lips, mucosa, and tongue normal; teeth and gums normal and no throat erythema.  Neck: supple, symmetrical, trachea midline, no JVD and thyroid not enlarged, symmetric, no tenderness/mass/nodules  Lungs:  clear to auscultation bilaterally and normal respiratory effort  Cardiovascular: Heart: regular rate and rhythm, S1, S2 normal, no murmur, click, rub or gallop. Chest Wall: no tenderness. Extremities: no cyanosis or edema, or clubbing. Pulses: 2+ and symmetric.  Abdomen/Rectal: Abdomen: soft, non-tender non-distented; bowel sounds normal; no masses,  no organomegaly. Rectal: not examined  Skin: Skin color, texture, turgor normal. No rashes or lesions  Musculoskeletal:no clubbing, cyanosis    Laboratory:  CBC  No results for input(s): WBC, RBC, HGB, HCT, PLT in the last 24 hours.  BMP    Recent Labs  Lab 05/15/17  0505   CO2 28   BUN 43*   CREATININE 9.5*   CALCIUM 9.5       Recent Labs  Lab 05/09/17  1643   COLORU Christina   SPECGRAV 1.010   PHUR 7.0   PROTEINUA 2+*   BACTERIA Moderate*   NITRITE Negative    LEUKOCYTESUR 3+*   UROBILINOGEN Negative   HYALINECASTS 0     Microbiology Results (last 7 days)     Procedure Component Value Units Date/Time    Urine culture [[id number]]  (Susceptibility) Collected:  05/10/17 1549    Order Status:  Completed Specimen:  Urine from Urine, Catheterized Updated:  05/13/17 1124     Urine Culture, Routine --     KLEBSIELLA PNEUMONIAE  >100,000 cfu/ml       Urine Culture, Routine --     ENTEROCOCCUS FAECALIS  > 100,000 cfu/ml            Diagnostic Results:  Labs: Reviewed  X-Ray: Reviewed  US: Reviewed    ASSESSMENT/PLAN:     Active Hospital Problems    Diagnosis  POA    *Acute renal failure [N17.9]  Yes    UTI (urinary tract infection) [N39.0]  No    Acute ischemic stroke [I63.9]  Yes     Found on imaging. In water-shed area. Likely from prolonged hypotension. Neuro following.       Metabolic encephalopathy [G93.41]  Yes    NSTEMI (non-ST elevated myocardial infarction) [I21.4]  Yes    Shock liver [K72.00]  Yes    Morbid obesity [E66.01]  Yes     Body mass index is 40.69 kg/(m^2).        Non-traumatic rhabdomyolysis [M62.82]  Yes     Recent Labs      05/01/17   1736  05/02/17   0023   CPK  >12886*   --    TROPONINI  16.176*  16.117*           Acute hearing loss of both ears [H91.93]  Yes      Resolved Hospital Problems    Diagnosis Date Resolved POA   No resolved problems to display.       1.uti?   ua and culture obtained after nilda established and not responsible for nilda- the rhabdo obviously is implicated in uti  i will give her a 7 day course of augmentin for what it is worth  2. nilda  3. Gastric sleeve

## 2017-05-15 NOTE — PROGRESS NOTES
Per Dr. Rodriguez, patient is cleared for discharge from her standpoint.  Dr. Rodriguez wants SW to f/u with patient to find out what chair time she wants and f/u with the unit at [phone].  SW advised Dr. Rodriguez that Admissions requested a copy of patient's insurance card because her insurance information was coming back as invalid.  Dr. rodriguez advised SW that if patient's insurance was not approved she would not have been able to accept.  SW will f/u and provide information requested by Admission and obtain chairtime patient wants.  Sandra Schwartz, THOM, ACM-Sw, CCM

## 2017-05-15 NOTE — PROCEDURES
Radiology Post-Procedure Note    Pre Op Diagnosis: GASPER  Post Op Diagnosis: Same    Procedure: tunneled dialysis cathter placement    Procedure performed by: [place]    Written Informed Consent Obtained: Yes  Specimen Removed: NO  Estimated Blood Loss: Minimal    Findings:   Successful tunneled HD catheter placement via R IJ.  Nontunneled catheter removed.    Patient tolerated procedure well.    @SIG@

## 2017-05-15 NOTE — PLAN OF CARE
Problem: Hemodialysis (Adult)  Goal: Signs and Symptoms of Listed Potential Problems Will be Absent, Minimized or Managed (Hemodialysis)  Signs and symptoms of listed potential problems will be absent, minimized or managed by discharge/transition of care (reference Hemodialysis (Adult) CPG).   Outcome: Ongoing (interventions implemented as appropriate)    05/15/17 1307   Hemodialysis   Problems Assessed (Hemodialysis) cardiovascular complications;electrolyte imbalance;fluid imbalance   Problems Present (Hemodialysis) cardiovascular complications;electrolyte imbalance;fluid imbalance   Odilia Martin RN

## 2017-05-15 NOTE — PT/OT/SLP PROGRESS
Physical Therapy      Shireen Anton  MRN: [id number]    Patient not seen today for PT tx secondary to Unavailable (pt off unit for medical procedures). Will follow-up as able.    Dafne Ruano, PT

## 2017-05-15 NOTE — PROGRESS NOTES
Nori CAMPA, advised SW to f/u with patient to advise that [facility] is closer to her home than [facility] per request from Dr. Saxena. Lab still pending for Hep B Surface Antigen.  Patient in procedure.  SW will f/u.  Sandra Schwartz LMSW, AMRITA-Renan, [facility]  05/15/2017

## 2017-05-15 NOTE — ASSESSMENT & PLAN NOTE
Creatinine continues to increase, but CPK trending down.  Pt to get tunneled catheter  and initiation of HD , as per Nephrology. Notified  to look into setting up HD as outpatient for d/c planning.  Tunneled cath today.

## 2017-05-15 NOTE — CONSULTS
Consult/H&P Note  Interventional Radiology    Reason for Consult: HD access    SUBJECTIVE:     Chief Complaint: GASPER requiring dialysis    History of Present Illness: 46 yo F with recent GASPER, as well as MSOF, somewhat recent complicated course following hospital admission.    Past Medical History:   Diagnosis Date    Allergy     Depression     GERD (gastroesophageal reflux disease)      Past Surgical History:   Procedure Laterality Date    breast augmentation       SECTION      CHOLECYSTECTOMY      LAPAROSCOPIC GASTRIC BANDING  2017    ORIF HUMERUS FRACTURE       Family History   Problem Relation Age of Onset    Breast cancer Maternal Grandmother 63    Diabetes Father     Hypertension Father     Heart disease Father     Thyroid disease Mother     Cancer Mother 71     SCC of lung    Cancer Sister 48     pharyngeal cancer     Social History   Substance Use Topics    Smoking status: Former Smoker     Packs/day: 1.00     Years: 16.00     Quit date: 2011    Smokeless tobacco: Never Used    Alcohol use Yes      Comment: occasional       Review of Systems:  As per H&P      OBJECTIVE:     Vital Signs Range (Last 24H):  Temp:  [97.8 °F (36.6 °C)-98.9 °F (37.2 °C)]   Pulse:  [56-86]   Resp:  [16-18]   BP: (120-183)/(68-87)   SpO2:  [94 %-97 %]     Physical Exam:  General- Patient alert and oriented x3 in NAD  ENT- PERRLA,  Neck- No masses  CV- Regular rate and rhythm  Resp-  No increased WOB  GI- Non tender/non-distended  Extrem- No cyanosis, clubbing, edema.       Physical Exam  Body mass index is 40.1 kg/(m^2).    Scheduled Meds:    sodium chloride 0.9%   Intravenous Once    amlodipine  10 mg Oral Daily    aspirin  81 mg Oral Daily    calcium citrate  200 mg Oral TID    cefTRIAXone (ROCEPHIN) IVPB  1 g Intravenous Q24H    cyanocobalamin  250 mcg Oral Daily    heparin (porcine)  5,000 Units Subcutaneous Q12H    hydrALAZINE  50 mg Oral Q8H    metoprolol tartrate  25 mg  Oral BID    multivitamin liquid  5 mL Oral BID    pantoprazole  40 mg Oral Daily    paricalcitol  1 mcg Intravenous Every Mon, Wed, Fri    polyethylene glycol  17 g Oral Daily     Continuous Infusions:    PRN Meds:sodium chloride 0.9%, cloNIDine, heparin (porcine), heparin (porcine), metoclopramide HCl, morphine, ondansetron, oxycodone-acetaminophen    Allergies: Review of patient's allergies indicates:  No Known Allergies    Labs:    Recent Labs  Lab 05/15/17  0505   INR 1.0       Recent Labs  Lab 05/10/17  0523   WBC 6.49   HGB 9.3*   HCT 27.6*   MCV 90   PLT 99*      Recent Labs  Lab 05/10/17  0523 05/12/17  0505  05/15/17  0505   GLU 81  81  < > 85  85  < > 84   *  129*  < > 132*  132*  < > 144   K 4.1  4.1  < > 4.1  4.1  < > 3.7   CL 94*  94*  < > 94*  94*  < > 102   CO2 21*  21*  < > 25  25  < > 28   BUN 77*  77*  < > 47*  47*  < > 43*   CREATININE 10.5*  10.5*  < > 9.5*  9.5*  < > 9.5*   CALCIUM 8.4*  8.4*  < > 8.9  8.9  < > 9.5   MG 2.5  --   --   --   --    *  < > 137*  --   --    AST 42*  < > 34  --   --    ALBUMIN 2.6*  < > 2.8*  --   --    BILITOT 0.5  < > 0.5  --   --    < > = values in this interval not displayed.    Vitals (Most Recent):  Temp: 98.9 °F (37.2 °C) (05/15/17 0710)  Pulse: 73 (05/15/17 0710)  Resp: 16 (05/15/17 0710)  BP: (!) 153/72 (05/15/17 0710)  SpO2: (!) 94 % (05/15/17 0710)    ASA: 3  Mallampati: 2    Consent obtained    ASSESSMENT/PLAN:     Tunneled HD catheter placement.  Will remove nontunneled catheter.    Active Hospital Problems    Diagnosis  POA    *Acute renal failure [N17.9]  Yes    UTI (urinary tract infection) [N39.0]  No    Acute ischemic stroke [I63.9]  Yes     Found on imaging. In water-shed area. Likely from prolonged hypotension. Neuro following.       Metabolic encephalopathy [G93.41]  Yes    NSTEMI (non-ST elevated myocardial infarction) [I21.4]  Yes    Shock liver [K72.00]  Yes    Morbid obesity [E66.01]  Yes     Body mass  index is 40.69 kg/(m^2).        Non-traumatic rhabdomyolysis [M62.82]  Yes     Recent Labs      05/01/17   1736  05/02/17   0023   CPK  >90790*   --    TROPONINI  16.176*  16.117*           Acute hearing loss of both ears [H91.93]  Yes      Resolved Hospital Problems    Diagnosis Date Resolved POA   No resolved problems to display.           Cheo Goff MD

## 2017-05-15 NOTE — PROGRESS NOTES
FINN f/u with patient to determine which chairtime she wanted.  SW at bedside when speaking with Selvin Navarro from [facility].  Patient's choice between TTS 11:00 a.m. and MWF 3:00 p.m.  Patient prefers, MWF 3:00 p.m.  Per Ms. Navarro, patient to report for dialysis Wednesday, May 17, 2017 at  2:30 p.m.  Copy of patient's insurance card faxed to Mercedes at [facility] ([phone]).  Sandra garcia, THOM, ACM-SW, CCM

## 2017-05-16 VITALS
RESPIRATION RATE: 18 BRPM | WEIGHT: 293 LBS | HEIGHT: 72 IN | OXYGEN SATURATION: 96 % | SYSTOLIC BLOOD PRESSURE: 153 MMHG | BODY MASS INDEX: 39.68 KG/M2 | DIASTOLIC BLOOD PRESSURE: 73 MMHG | HEART RATE: 71 BPM | TEMPERATURE: 98 F

## 2017-05-16 PROBLEM — G93.41 METABOLIC ENCEPHALOPATHY: Status: RESOLVED | Noted: 2017-05-03 | Resolved: 2017-05-16

## 2017-05-16 PROBLEM — N39.0 UTI (URINARY TRACT INFECTION): Status: RESOLVED | Noted: 2017-05-13 | Resolved: 2017-05-16

## 2017-05-16 PROBLEM — M62.82 NON-TRAUMATIC RHABDOMYOLYSIS: Status: RESOLVED | Noted: 2017-05-02 | Resolved: 2017-05-16

## 2017-05-16 PROBLEM — I63.9 ACUTE ISCHEMIC STROKE: Status: RESOLVED | Noted: 2017-05-03 | Resolved: 2017-05-16

## 2017-05-16 PROBLEM — I21.4 NSTEMI (NON-ST ELEVATED MYOCARDIAL INFARCTION): Status: RESOLVED | Noted: 2017-05-02 | Resolved: 2017-05-16

## 2017-05-16 PROBLEM — K72.00 SHOCK LIVER: Status: RESOLVED | Noted: 2017-05-02 | Resolved: 2017-05-16

## 2017-05-16 PROBLEM — H91.93 ACUTE HEARING LOSS OF BOTH EARS: Status: RESOLVED | Noted: 2017-05-01 | Resolved: 2017-05-16

## 2017-05-16 LAB
ANION GAP SERPL CALC-SCNC: 11 MMOL/L
APTT BLDCRRT: 25.6 SEC
BUN SERPL-MCNC: 24 MG/DL
CALCIUM SERPL-MCNC: 8.7 MG/DL
CHLORIDE SERPL-SCNC: 102 MMOL/L
CO2 SERPL-SCNC: 28 MMOL/L
CREAT SERPL-MCNC: 6.2 MG/DL
EST. GFR  (AFRICAN AMERICAN): 9 ML/MIN/1.73 M^2
EST. GFR  (NON AFRICAN AMERICAN): 8 ML/MIN/1.73 M^2
GLUCOSE SERPL-MCNC: 83 MG/DL
HBV E AG SPEC QL: NORMAL
POTASSIUM SERPL-SCNC: 3.5 MMOL/L
SODIUM SERPL-SCNC: 141 MMOL/L

## 2017-05-16 PROCEDURE — 25000003 PHARM REV CODE 250: Performed by: INTERNAL MEDICINE

## 2017-05-16 PROCEDURE — 97110 THERAPEUTIC EXERCISES: CPT

## 2017-05-16 PROCEDURE — 80048 BASIC METABOLIC PNL TOTAL CA: CPT

## 2017-05-16 PROCEDURE — 25000003 PHARM REV CODE 250: Performed by: HOSPITALIST

## 2017-05-16 PROCEDURE — 85730 THROMBOPLASTIN TIME PARTIAL: CPT

## 2017-05-16 PROCEDURE — G8980 MOBILITY D/C STATUS: HCPCS | Mod: CJ

## 2017-05-16 PROCEDURE — 25000003 PHARM REV CODE 250: Performed by: EMERGENCY MEDICINE

## 2017-05-16 PROCEDURE — 97116 GAIT TRAINING THERAPY: CPT

## 2017-05-16 PROCEDURE — 36415 COLL VENOUS BLD VENIPUNCTURE: CPT

## 2017-05-16 PROCEDURE — G8978 MOBILITY CURRENT STATUS: HCPCS | Mod: CJ

## 2017-05-16 PROCEDURE — G8979 MOBILITY GOAL STATUS: HCPCS | Mod: CH

## 2017-05-16 RX ORDER — METOPROLOL TARTRATE 25 MG/1
25 TABLET, FILM COATED ORAL 2 TIMES DAILY
Qty: 60 TABLET | Refills: 5 | Status: SHIPPED | OUTPATIENT
Start: 2017-05-16 | End: 2017-09-20

## 2017-05-16 RX ORDER — HYDRALAZINE HYDROCHLORIDE 50 MG/1
50 TABLET, FILM COATED ORAL EVERY 8 HOURS
Qty: 90 TABLET | Refills: 5 | Status: SHIPPED | OUTPATIENT
Start: 2017-05-16 | End: 2017-09-20

## 2017-05-16 RX ORDER — AMOXICILLIN AND CLAVULANATE POTASSIUM 250; 125 MG/1; MG/1
1 TABLET, FILM COATED ORAL EVERY 12 HOURS
Qty: 14 TABLET | Refills: 0 | Status: SHIPPED | OUTPATIENT
Start: 2017-05-16 | End: 2017-05-23

## 2017-05-16 RX ORDER — IBUPROFEN 200 MG
200 CAPSULE ORAL 3 TIMES DAILY
Qty: 90 TABLET | Refills: 5 | Status: SHIPPED | OUTPATIENT
Start: 2017-05-16 | End: 2023-04-27

## 2017-05-16 RX ORDER — ASPIRIN 81 MG/1
81 TABLET ORAL DAILY
Refills: 0
Start: 2017-05-16 | End: 2023-04-27

## 2017-05-16 RX ORDER — CYANOCOBALAMIN (VITAMIN B-12) 250 MCG
250 TABLET ORAL DAILY
Qty: 30 TABLET | Refills: 5 | Status: SHIPPED | OUTPATIENT
Start: 2017-05-16

## 2017-05-16 RX ORDER — AMLODIPINE BESYLATE 10 MG/1
10 TABLET ORAL DAILY
Qty: 30 TABLET | Refills: 5 | Status: SHIPPED | OUTPATIENT
Start: 2017-05-16 | End: 2017-09-20

## 2017-05-16 RX ADMIN — AMLODIPINE BESYLATE 10 MG: 5 TABLET ORAL at 09:05

## 2017-05-16 RX ADMIN — OXYCODONE HYDROCHLORIDE AND ACETAMINOPHEN 1 TABLET: 5; 325 TABLET ORAL at 11:05

## 2017-05-16 RX ADMIN — AMOXICILLIN AND CLAVULANATE POTASSIUM 1 TABLET: 250; 125 TABLET, FILM COATED ORAL at 09:05

## 2017-05-16 RX ADMIN — PANTOPRAZOLE SODIUM 40 MG: 40 TABLET, DELAYED RELEASE ORAL at 09:05

## 2017-05-16 RX ADMIN — CALCIUM CITRATE 200 MG (950 MG) TABLET 200 MG: at 02:05

## 2017-05-16 RX ADMIN — HEPARIN SODIUM 5000 UNITS: 5000 INJECTION, SOLUTION INTRAVENOUS; SUBCUTANEOUS at 09:05

## 2017-05-16 RX ADMIN — CALCIUM CITRATE 200 MG (950 MG) TABLET 200 MG: at 05:05

## 2017-05-16 RX ADMIN — POLYETHYLENE GLYCOL 3350 17 G: 17 POWDER, FOR SOLUTION ORAL at 09:05

## 2017-05-16 RX ADMIN — HYDRALAZINE HYDROCHLORIDE 50 MG: 25 TABLET ORAL at 05:05

## 2017-05-16 RX ADMIN — ASPIRIN 81 MG: 81 TABLET, COATED ORAL at 09:05

## 2017-05-16 RX ADMIN — CYANOCOBALAMIN TAB 250 MCG 250 MCG: 250 TAB at 09:05

## 2017-05-16 RX ADMIN — METOPROLOL TARTRATE 25 MG: 25 TABLET, FILM COATED ORAL at 09:05

## 2017-05-16 RX ADMIN — Medication 5 ML: at 09:05

## 2017-05-16 RX ADMIN — OXYCODONE HYDROCHLORIDE AND ACETAMINOPHEN 1 TABLET: 5; 325 TABLET ORAL at 05:05

## 2017-05-16 RX ADMIN — HYDRALAZINE HYDROCHLORIDE 50 MG: 25 TABLET ORAL at 02:05

## 2017-05-16 NOTE — PROGRESS NOTES
Pt in bed resting; no distress noted; no s/s of pain or discomfort; bed in lowest position; call light within reach; side rails up x 2; will continue to monitor.

## 2017-05-16 NOTE — PLAN OF CARE
[facility]    HOME HEALTH ORDERS  FACE TO FACE ENCOUNTER    Patient Name: Shireen Anton  YOB: 1970    PCP: Jeancarlos Zamora MD   PCP Address: [address] [place] [address]  PCP Phone Number: [phone]  PCP Fax: [phone]    Encounter Date: 05/16/2017    Admit to Home Health    Diagnoses:  Active Hospital Problems    Diagnosis  POA    *Acute renal failure [N17.9]  Yes     Priority: 1 - High    Morbid obesity [E66.01]  Yes     Body mass index is 40.69 kg/(m^2).          Resolved Hospital Problems    Diagnosis Date Resolved POA    Acute hearing loss of both ears [H91.93] 05/16/2017 Yes     Priority: 2     UTI (urinary tract infection) [N39.0] 05/16/2017 No    Acute ischemic stroke [I63.9] 05/16/2017 Yes     Found on imaging. In water-shed area. Likely from prolonged hypotension. Neuro following.       Metabolic encephalopathy [G93.41] 05/16/2017 Yes    NSTEMI (non-ST elevated myocardial infarction) [I21.4] 05/16/2017 Yes    Shock liver [K72.00] 05/16/2017 Yes    Non-traumatic rhabdomyolysis [M62.82] 05/16/2017 Yes     Recent Labs      05/01/17   1736  05/02/17   0023   CPK  >52689*   --    TROPONINI  16.176*  16.117*              Future Appointments  Date Time Provider Department Center   5/23/2017 1:00 PM Jeancarlos Zamora MD [facility]     Follow-up Information     Follow up with [facility] On 5/17/2017.    Why:  Outpatient Dialysis:  Report for your first dialysis on Wednesday, May 17, 2017 at 2:30 p.m.  Bring your ID and insurance card.    Contact information:    [facility] [address] [place] [address] [phone]        Follow up with Jeancarlos Zamora MD On 5/23/2017.    Specialty:  Family Medicine    Why:  1:00 p.m. on Tuesday, May 23, 2017 see Dr. Zamora for your hospital followup visit.  The new address of the provider is [facility] [place] [address]    Contact information:    [address]  [address] [phone]          Follow up with Jeancarlos Zamora MD In 1 week.    Specialty:  Family Medicine    Contact information:    [address] [facility]  [place] [address] [phone]          Follow up with Jeanine Saxena MD In 1 week.    Specialty:  Nephrology    Contact information:    [address] [place] [address] [address]  [place] [address] [phone]          Follow up with Kamaljit Sierra MD In 2 weeks.    Specialty:  Cardiology    Contact information:    [address] [facility] [id number] [phone]              I have seen and examined this patient face to face today. My clinical findings that support the need for the home health skilled services and home bound status are the following:  Weakness/numbness causing balance and gait disturbance due to Weakness/Debility making it taxing to leave home.    Allergies:Review of patient's allergies indicates:  No Known Allergies    Diet: 2 gram sodium diet    Activities: activity as tolerated    Nursing:   SN to complete comprehensive assessment including routine vital signs. Instruct on disease process and s/s of complications to report to MD. Review/verify medication list sent home with the patient at time of discharge  and instruct patient/caregiver as needed. Frequency may be adjusted depending on start of care date.    Notify MD if SBP > 160 or < 90; DBP > 90 or < 50; HR > 120 or < 50; Temp > 101; Other:        CONSULTS:    Physical Therapy to evaluate and treat. Evaluate for home safety and equipment needs; Establish/upgrade home exercise program. Perform / instruct on therapeutic exercises, gait training, transfer training, and Range of Motion.  Occupational Therapy to evaluate and treat. Evaluate home environment for safety and equipment needs. Perform/Instruct on transfers, ADL training, ROM, and therapeutic exercises.  Aide to provide assistance with personal care, ADLs, and vital signs.      Medications: Review discharge medications with patient and family and  provide education.      Current Discharge Medication List      START taking these medications    Details   amlodipine (NORVASC) 10 MG tablet Take 1 tablet (10 mg total) by mouth once daily.  Qty: 30 tablet, Refills: 5      amoxicillin-clavulanate 250-125mg (AUGMENTIN) 250-125 mg Tab Take 1 tablet by mouth every 12 (twelve) hours.  Qty: 14 tablet, Refills: 0      aspirin (ECOTRIN) 81 MG EC tablet Take 1 tablet (81 mg total) by mouth once daily.  Refills: 0      calcium citrate (CALCITRATE) 200 mg (950 mg) tablet Take 1 tablet (200 mg total) by mouth 3 (three) times daily.  Qty: 90 tablet, Refills: 5      cyanocobalamin (VITAMIN B-12) 250 MCG tablet Take 1 tablet (250 mcg total) by mouth once daily.  Qty: 30 tablet, Refills: 5      hydrALAZINE (APRESOLINE) 50 MG tablet Take 1 tablet (50 mg total) by mouth every 8 (eight) hours.  Qty: 90 tablet, Refills: 5      metoprolol tartrate (LOPRESSOR) 25 MG tablet Take 1 tablet (25 mg total) by mouth 2 (two) times daily.  Qty: 60 tablet, Refills: 5         CONTINUE these medications which have NOT CHANGED    Details   cetirizine (ZYRTEC) 10 MG tablet Take 10 mg by mouth once daily.      citalopram (CELEXA) 40 MG tablet Take 1 tablet (40 mg total) by mouth once daily.  Qty: 30 tablet, Refills: 6    Associated Diagnoses: Anxiety      ergocalciferol (VITAMIN D2) 50,000 unit Cap Take 50,000 Units by mouth every 7 days.      gabapentin (NEURONTIN) 300 MG capsule Take 1 capsule (300 mg total) by mouth 3 (three) times daily.  Qty: 90 capsule, Refills: 11    Associated Diagnoses: Paresthesia of left arm      omeprazole (PRILOSEC OTC) 20 MG tablet Take 20 mg by mouth once daily.      trazodone (DESYREL) 50 MG tablet TAKE ONE TABLET BY MOUTH IN THE EVENING  Qty: 30 tablet, Refills: 0    Associated Diagnoses: Insomnia      etonogestrel-ethinyl estradiol (NUVARING) 0.12-0.015 mg/24 hr vaginal ring Place 1 each vaginally every 28 days.  Qty: 3 each, Refills: 4    Associated Diagnoses:  Family planning      FLUVIRIN 8468-6640 45 mcg (15 mcg x 3)/0.5 mL Susp Refills: 0      FLUVIRIN [id number] 45 mcg (15 mcg x 3)/0.5 mL Susp ADM 0.5ML IM UTD  Refills: 0             I certify that this patient is confined to her home and needs intermittent skilled nursing care, physical therapy and occupational therapy.

## 2017-05-16 NOTE — DISCHARGE SUMMARY
[facility] Hospital Medicine  Discharge Summary      Patient Name: Shireen Anton  MRN: [id number]  Admission Date: 5/1/2017  Hospital Length of Stay: 15 days  Discharge Date and Time:  05/16/2017 10:10 AM  Attending Physician: Cheo Wilks MD   Discharging Provider: Cheo Wilks MD  Primary Care Provider: Jeancarlos Zamora MD      HPI:   Shireen Anton is a 47 y.o. female that (in part)  has a past medical history of Allergy; Depression; and GERD (gastroesophageal reflux disease). Presents to [facility] Emergency Department complaining of acute bilateral hearing loss.  Patient reports going to bed Sunday night and her last coherent memory was signing a check for her daughter's .  Monday morning her  reported that he had a hard time waking her up and said associated vigorously shake her because she appeared unresponsive.  She was disoriented and unable to hear.  She had a gastric sleeve on Thursday, April 27 that was uncomplicated.  During the perioperative period she reported to be normal and had no complaints other than some minor pain expected after surgery.  Throughout Friday, Saturday, and Sunday experienced no significant problems other than being increasingly thirsty.  She was given a liquid form of opioid medications which she was taking as prescribed, she states.  She continues to experience bilateral hearing loss that is constant.  Characterized by only hearing high-pitched sounds.  This is a first episode and a new problem for her.  No radiation of symptoms.  No relieving factors.  No exacerbating factors. In the emergency department routine laboratory studies, EKG, and cardiac enzymes were obtained.  There was concern for multiple organ dysfunction including markedly elevated troponin levels, acute renal failure, markedly elevated liver enzymes, and creatinine kinase levels greater than 40,000.  She denies significant nausea, vomiting,  diarrhea, or urination.  Denies any significant hypotensive episodes.  She does not recall anything occurring after Sunday night to the time of hospitalization.  She does report having some vaginal bleeding that started this afternoon.  She uses NuvaRing for contraception.    * No surgery found *      Indwelling Lines/Drains at time of discharge:   Lines/Drains/Airways     Central Venous Catheter Line                 Hemodialysis Catheter -- days         Hemodialysis Catheter 05/15/17 1110 right internal jugular less than 1 day              Hospital Course:   Ms. Anton was admitted for an acute CVA with hearing loss and MSOF with NSTEMI, acute renal failure, shock liver, and rhabdomyolysis likely from episode of prolonged hypotension possibly from narcotic use given post surgery for gastric sleave.  Pt seen by Neurology, Cardiology, Nephrology with slow clinical improvement except for continued climb in creatinine. Pt CPK trended down but ARF did not improve. Nephrology ordered tunneled catheter for 5/8 and the patient initiated HD.   has been consulted to arrange out patient HD.  LFT's and CPK's normalized. The patient was found to have a enterococcus/Klebsiella UTI and ID was consulted. The patient continued with almost daily dialysis. The patient went to IR on 5/15 for placement of tunneled dialysis catheter.  arranged out patient dialysis on a M, W, F schedule. ID was consulted for recs for treatment of a UTI and will be sent home on 7 days of Augmentin. The rest of the hospital course was unremarkable. H/H will be arranged. The patient will follow up with PCP, renal and cards in 1-2 weeks. Diet- low NA.            Consults:   Consults         Status Ordering Provider     Inpatient consult to Bariatric Surgery  Once     Provider:  Kevin Pryor MD    Completed GREG LUGO     Inpatient consult to Cardiology  Once     Provider:  Kevin Sánchez MD    Completed GRACIELA  CORNELIA BASILIO     Inpatient consult to Gastroenterology  Once     Provider:  Edson Reeder MD    Completed CORNELIA OWENS     Inpatient consult to Infectious Diseases  Once     Provider:  (Not yet assigned)    Completed LATONYA MCCURDY     Inpatient consult to Interventional Radiology  Once     Provider:  (Not yet assigned)    Completed LIZABETH JARAMILLO     Inpatient consult to Interventional Radiology  Once     Provider:  (Not yet assigned)    Completed GREG LUGO     Inpatient consult to Nephrology  Once     Provider:  Mateo Villanueva MD    Completed ANKUR ALEJO     Inpatient consult to Neurology  Once     Provider:  Abhishek Harvey MD    Completed CORNELIA OWENS     Inpatient consult to Social Work  Once     Provider:  (Not yet assigned)    Completed GREG LUGO     IP consult to dietary  Once     Provider:  (Not yet assigned)    Completed LATONYA MCCURDY          Significant Diagnostic Studies:     Pending Diagnostic Studies:     Procedure Component Value Units Date/Time    Hepatitis B e antigen [[id number]] Collected:  05/11/17 1514    Order Status:  Sent Lab Status:  In process Updated:  05/11/17 1632    Specimen:  Blood from Blood     IR Fluoroscopy Guided Central Venous Access Device Placement [[id number]] Updated:  05/08/17 0951    Order Status:  Sent Lab Status:  In process     IR Ultrasound Guidance [[id number]] Updated:  05/08/17 0951    Order Status:  Sent Lab Status:  In process         Final Active Diagnoses:    Diagnosis Date Noted POA    PRINCIPAL PROBLEM:  Acute renal failure [N17.9] 05/02/2017 Yes    Morbid obesity [E66.01] 05/02/2017 Yes      Problems Resolved During this Admission:    Diagnosis Date Noted Date Resolved POA    Acute hearing loss of both ears [H91.93] 05/01/2017 05/16/2017 Yes    UTI (urinary tract infection) [N39.0] 05/13/2017 05/16/2017 No    Acute ischemic stroke [I63.9] 05/03/2017 05/16/2017 Yes    Metabolic encephalopathy [G93.41] 05/03/2017  05/16/2017 Yes    NSTEMI (non-ST elevated myocardial infarction) [I21.4] 05/02/2017 05/16/2017 Yes    Shock liver [K72.00] 05/02/2017 05/16/2017 Yes    Non-traumatic rhabdomyolysis [M62.82] 05/02/2017 05/16/2017 Yes      * Acute renal failure  Creatinine continues to increase, but CPK trending down.  Pt to get tunneled catheter  and initiation of HD , as per Nephrology. Notified  to look into setting up HD as outpatient for d/c planning.  Tunneled cath 5/14.  Dialysis on M, W, F.           Acute hearing loss of both ears, resolved as of 5/16/2017  Due to above, resolved.      Morbid obesity  Status post gastric sleave.  Body mass index is 40.96 kg/(m^2).        NSTEMI (non-ST elevated myocardial infarction), resolved as of 5/16/2017  Pt treated with heparin gtt and ASA, as per Cardiology, will need cath pending improvement of renal function or with coordination with Nephrology for HD. Can follow up with cards when discharged. Doubt CAD     Shock liver, resolved as of 5/16/2017  Due to shock liver, LFTs normal. Resolved. Problem       Non-traumatic rhabdomyolysis, resolved as of 5/16/2017  Probable global hypotensive event causing MSOF- resolved problem       Acute ischemic stroke, resolved as of 5/16/2017  Seen on MRI, continue ASA- this was from global hypotensive event. Resolved.     Metabolic encephalopathy, resolved as of 5/16/2017  Resolved, due to MSOF.      UTI (urinary tract infection), resolved as of 5/16/2017  Klebsiella and enterococcus. Currently on ceftriaxone. ID consulted to help direct abx choice.    No sepsis. ID consult p- recommend 7 days of Augmentin         Discharged Condition: good    Disposition: Home or Self Care    Follow Up:  Follow-up Information     Follow up with [facility] On 5/17/2017.    Why:  Outpatient Dialysis:  Report for your first dialysis on Wednesday, May 17, 2017 at 2:30 p.m.  Bring your ID and insurance card.    Contact information:     [address] [place] [address]  [phone]        Follow up with Jeancarlos Zamora MD On 5/23/2017.    Specialty:  Family Medicine    Why:  1:00 p.m. on Tuesday, May 23, 2017 see Dr. Zamora for your hospital followup visit.  The new address of the provider is [address] [place] [address], [place] [address]    Contact information:    [address] [id number] [phone]          Follow up with Jeancarlos Zamora MD In 1 week.    Specialty:  Family Medicine    Contact information:    [address] [facility] [id number] [phone]          Follow up with Jeanine Saxena MD In 1 week.    Specialty:  Nephrology    Contact information:    [address] [place] [address] [address]  [facility] [id number] [phone]          Follow up with Kamaljit Sierra MD In 2 weeks.    Specialty:  Cardiology    Contact information:    [address] [facility] [id number] [phone]          Patient Instructions:     Diet general   Order Specific Question Answer Comments   Na restriction, if any: 2gNa      Activity as tolerated       Medications:  Reconciled Home Medications:   Current Discharge Medication List      START taking these medications    Details   amlodipine (NORVASC) 10 MG tablet Take 1 tablet (10 mg total) by mouth once daily.  Qty: 30 tablet, Refills: 5      amoxicillin-clavulanate 250-125mg (AUGMENTIN) 250-125 mg Tab Take 1 tablet by mouth every 12 (twelve) hours.  Qty: 14 tablet, Refills: 0      aspirin (ECOTRIN) 81 MG EC tablet Take 1 tablet (81 mg total) by mouth once daily.  Refills: 0      calcium citrate (CALCITRATE) 200 mg (950 mg) tablet Take 1 tablet (200 mg total) by mouth 3 (three) times daily.  Qty: 90 tablet, Refills: 5      cyanocobalamin (VITAMIN B-12) 250 MCG tablet Take 1 tablet (250 mcg total) by mouth once daily.  Qty: 30 tablet, Refills: 5      hydrALAZINE (APRESOLINE) 50 MG tablet Take 1 tablet (50 mg total) by mouth every 8 (eight) hours.  Qty: 90 tablet, Refills: 5      metoprolol tartrate (LOPRESSOR) 25 MG  tablet Take 1 tablet (25 mg total) by mouth 2 (two) times daily.  Qty: 60 tablet, Refills: 5         CONTINUE these medications which have NOT CHANGED    Details   cetirizine (ZYRTEC) 10 MG tablet Take 10 mg by mouth once daily.      citalopram (CELEXA) 40 MG tablet Take 1 tablet (40 mg total) by mouth once daily.  Qty: 30 tablet, Refills: 6    Associated Diagnoses: Anxiety      ergocalciferol (VITAMIN D2) 50,000 unit Cap Take 50,000 Units by mouth every 7 days.      gabapentin (NEURONTIN) 300 MG capsule Take 1 capsule (300 mg total) by mouth 3 (three) times daily.  Qty: 90 capsule, Refills: 11    Associated Diagnoses: Paresthesia of left arm      omeprazole (PRILOSEC OTC) 20 MG tablet Take 20 mg by mouth once daily.      trazodone (DESYREL) 50 MG tablet TAKE ONE TABLET BY MOUTH IN THE EVENING  Qty: 30 tablet, Refills: 0    Associated Diagnoses: Insomnia      etonogestrel-ethinyl estradiol (NUVARING) 0.12-0.015 mg/24 hr vaginal ring Place 1 each vaginally every 28 days.  Qty: 3 each, Refills: 4    Associated Diagnoses: Family planning      FLUVIRIN [id number] 45 mcg (15 mcg x 3)/0.5 mL Susp Refills: 0      FLUVIRIN [id number] 45 mcg (15 mcg x 3)/0.5 mL Susp ADM 0.5ML IM UTD  Refills: 0           Time spent on the discharge of patient:  > 30  minutes    Cheo Bond MD  Department of Hospital Medicine  [facility]

## 2017-05-16 NOTE — PROGRESS NOTES
[employer] response received from [facility].  Patient denied:  not a provider.  SW f/u with patient and obtained an alternate provider:  [facility].  Patient's information sent to [facility] via [employer].  If [facility] not in-network, SW will f/u with [facility] , Gabriela, at [phone].     Sandra Schwartz, THOM, ACM-SW, CCM

## 2017-05-16 NOTE — NURSING
Discharge instructions given to patient at bedside. Patient verbalized understanding of instructions. Patient states willingness to comply. Saline lock removed. Tele monitoring removed.

## 2017-05-16 NOTE — SUBJECTIVE & OBJECTIVE
Interval History:  No  New issues.   Would like to go home.       Review of Systems   Constitutional: Negative for activity change.   HENT: Negative for congestion.    Respiratory: Negative for chest tightness and shortness of breath.    Cardiovascular: Negative for chest pain.   Gastrointestinal: Negative for abdominal pain.     Objective:     Vital Signs (Most Recent):  Temp: 98.2 °F (36.8 °C) (05/16/17 0801)  Pulse: 75 (05/16/17 0801)  Resp: 18 (05/16/17 0801)  BP: (!) 143/87 (05/16/17 0801)  SpO2: 95 % (05/16/17 0801) Vital Signs (24h Range):  Temp:  [97.7 °F (36.5 °C)-98.4 °F (36.9 °C)] 98.2 °F (36.8 °C)  Pulse:  [49-80] 75  Resp:  [18-20] 18  SpO2:  [95 %-99 %] 95 %  BP: (137-186)/(69-96) 143/87     Weight: (!) 137 kg (302 lb)  Body mass index is 40.96 kg/(m^2).    Intake/Output Summary (Last 24 hours) at 05/16/17 0952  Last data filed at 05/15/17 1800   Gross per 24 hour   Intake              920 ml   Output             1601 ml   Net             -681 ml      Physical Exam   Constitutional: She is oriented to person, place, and time. She appears well-nourished.   Cardiovascular: Normal rate.    Pulmonary/Chest: Effort normal.   Abdominal: Soft.   Neurological: She is alert and oriented to person, place, and time.   Skin: Skin is warm and dry.   Psychiatric: She has a normal mood and affect.   Vitals reviewed.      Significant Labs:   BMP:   Recent Labs  Lab 05/16/17  0514   GLU 83      K 3.5      CO2 28   BUN 24*   CREATININE 6.2*   CALCIUM 8.7     CBC: No results for input(s): WBC, HGB, HCT, PLT in the last 48 hours.    Significant Imaging:

## 2017-05-16 NOTE — PROGRESS NOTES
Message left for Bisi at [facility] ([phone]) advising that patient had been denied HH because insurance provider was not in-network.  SW advised [facility]  that we just wanted to make sure they know that patient's insurance is [employer]. Awaiting return call from Bisi.    Packets also sent to [facility], [facility], and [facility].    Sandra Schwartz LMSW, AMRITA-Renan, CCM    05/16/2017

## 2017-05-16 NOTE — PROGRESS NOTES
Patient's home health confirmed, post discharge appointments arranged, written healthcare information reviewed with patient, CP signs and symptoms reviewed with patient, managing your health at home covered.  Patient's nurse, Cheryle, advised that patient is ready for discharge from  standpoint.  Sandra Schwartz LMSW, AMRITA-FINN, [facility]  05/16/2017

## 2017-05-16 NOTE — PLAN OF CARE
05/16/17 1543   Final Note   Assessment Type Final Discharge Note   Discharge Disposition Home-Health   Discharge planning education complete? Yes   Hospital Follow Up  Appt(s) scheduled? Yes   Discharge plans and expectations educations in teach back method with documentation complete? Yes   Offered [facility] Bed[facility] Delivery? n/a   Discharge/Hospital Encounter Summary to (non-[facility]) PCP No   Referral to Outpatient Case Management complete? No   Referral to / orders for Home Health Complete? Yes   30 day supply of medicines given at discharge, if documented non-compliance / non-adherence? No   Any social issues identified prior to discharge? No   Did you assess the readiness or willingness of the family or caregiver to support self management of care? Yes   [facility] Referral Info   Post Acute Recommendation Home-care   Sandra Schwartz, THOM, AMRITA-FINN, CCM  05/16/2017

## 2017-05-16 NOTE — PT/OT/SLP PROGRESS
"Physical Therapy  Treatment    Shireen Anton   MRN: [id number]   Admitting Diagnosis: Acute renal failure    PT Received On: 17  PT Start Time: 1033     PT Stop Time: 1042    PT Total Time (min): 9 min       Billable Minutes:  Gait Training9    Treatment Type: Treatment  PT/PTA: PTA     PTA Visit Number: 2       General Precautions: Standard, fall  Orthopedic Precautions: N/A   Braces: N/A         Subjective:  Communicated with nurse Lobato prior to session.  Pt agreeable to gait training. " I can't wait to go home "     Pain Ratin/10              Pain Rating Post-Intervention: 0/10    Objective:   Patient found with: telemetry, R neck HD access    Functional Mobility:  Bed Mobility:   Supine to Sit: Modified Independent    Transfers:  Sit <> Stand Assistance: Supervision  Sit <> Stand Assistive Device: No Assistive Device    Gait:   Gait Distance: 250 ft . No LOB or SOB. Pt ambulated with steady gait.   Assistance 1: Stand by Assistance  Gait Assistive Device: No device  Gait Pattern: reciprocal  Gait Deviation(s): decreased amish, decreased step length, decreased velocity of limb motion, decreased swing-to-stance ratio    Balance:   Static Sit: GOOD: Takes MODERATE challenges from all directions  Dynamic Sit: GOOD-: Maintains balance through MODERATE excursions of active trunk movement,     Static Stand: FAIR+: Takes MINIMAL challenges from all directions  Dynamic stand: FAIR+: Needs CLOSE SUPERVISION during gait and is able to right self with minor LOB     Therapeutic Activities and Exercises:  Pt performed bed mobility, transfer and gait training as above.     AM-PAC 6 CLICK MOBILITY  How much help from another person does this patient currently need?   1 = Unable, Total/Dependent Assistance  2 = A lot, Maximum/Moderate Assistance  3 = A little, Minimum/Contact Guard/Supervision  4 = None, Modified [place]/Independent    Turning over in bed (including adjusting bedclothes, sheets and blankets)?: " 4  Sitting down on and standing up from a chair with arms (e.g., wheelchair, bedside commode, etc.): 4  Moving from lying on back to sitting on the side of the bed?: 4  Moving to and from a bed to a chair (including a wheelchair)?: 4  Need to walk in hospital room?: 3  Climbing 3-5 steps with a railing?: 3  Total Score: 22    AM-PAC Raw Score CMS G-Code Modifier Level of Impairment Assistance   6 % Total / Unable   7 - 9 CM 80 - 100% Maximal Assist   10 - 14 CL 60 - 80% Moderate Assist   15 - 19 CK 40 - 60% Moderate Assist   20 - 22 CJ 20 - 40% Minimal Assist   23 CI 1-20% SBA / CGA   24 CH 0% Independent/ Mod I     Patient left seated EOB with all lines intact, call button in reach and nurse notified.    Assessment:  Shireen Anton is a 47 y.o. female with a medical diagnosis of Acute renal failure . Pt increased distance with gait training today. Pt progressing well toward treatment goals. Pt will benefit from family assistance upon discharge from hospital . Pt will benefit from further skilled therapy in order to get back to PLOF .    Rehab identified problem list/impairments: Rehab identified problem list/impairments: weakness, decreased lower extremity function, decreased upper extremity function, decreased safety awareness, edema    Rehab potential is good.    Activity tolerance: Good    Discharge recommendations: Discharge Facility/Level Of Care Needs: home health PT     Barriers to discharge:  none     Equipment recommendations:  TBD     GOALS:   Physical Therapy Goals        Problem: Physical Therapy Goal    Goal Priority Disciplines Outcome Goal Variances Interventions   Physical Therapy Goal     PT/OT, PT Ongoing (interventions implemented as appropriate)     Description:  Goals to be met by: 17     Patient will increase functional independence with mobility by performin. Supine to sit with Modified [place]  2. Rolling to Left and Right with Modified [place]  3. Sit to stand  transfer with Modified [place]  4. Bed to chair transfer with Modified [place]  4. Gait  x 250 feet with Modified [place]   5. Lower extremity exercise program x 30 reps per handout, with independence                PLAN:    Patient to be seen 5 x/week (Mon-Fri)  to address the above listed problems via gait training, therapeutic activities, therapeutic exercises  Plan of Care expires: 05/18/17  Plan of Care reviewed with: patient         Virginia Fung, PTA  05/16/2017

## 2017-05-16 NOTE — PROGRESS NOTES
Call back received from Gabriela at [facility] advising SW to f/u with patient's preferred provider to advise them that [facility] only processes claims for patient's insurance provider which is [employer] for employees.  FINN f/u with Giovanna at [facility] to advise her of the above.  Giovanna will review patient's paperwork and call SW back.  Return call received from Bisi at [facility].  FINN advised Bisi that contact had been made with Ms. Heredia and she is reviewing patient's information.  Awaiting confirmation of HH from [facility].  Sandra Schwartz LMSW, AMRITA-Finn, CCM  05/16/2017

## 2017-05-16 NOTE — PROGRESS NOTES
Patient's choice of HH ([facility]) obtain.  Patient's Choice Form signed and filed in chart.    Patient's f/u with Dr. Sierra scheduled for 5/30/2017 @ 1:00 p.m.  F/u with PCP scheduled for 5/23/2017 @ 1:00 p.m.  SW spoke with Giovanna with [facility] to advise her of home health referral.  Sandra Schwartz, THOM, AMRITA-FINN, [facility]  05/15/2017

## 2017-05-16 NOTE — PT/OT/SLP PROGRESS
Occupational Therapy  Treatment/Discharge    Shireen Anton   MRN: [id number]   Admitting Diagnosis: Acute renal failure    OT Date of Treatment: 17   OT Start Time: 1130  OT Stop Time: 1140  OT Total Time (min): 10 min    Billable Minutes:  Therapeutic Exercise 10    General Precautions: Standard, fall  Orthopedic Precautions: N/A  Braces: N/A         Subjective:    Pain Ratin/10   Pain Rating Post-Intervention: 0/10    Objective:  Patient found with: telemetry, peripheral IV     Functional Mobility:  Bed Mobility:  Rolling/Turning to Left: Modified independent  Rolling/Turning Right: Modified independent  Scooting/Bridging: Modified Independent  Supine to Sit: Modified Independent  Sit to Supine: Modified Independent    Transfers:   Sit <> Stand Assistance: Modified Independent  Sit <> Stand Assistive Device: No Assistive Device      Activities of Daily Living:  Feeding Level of Assistance: Modified independent      Balance:   Static Sit: GOOD: Takes MODERATE challenges from all directions  Dynamic Sit: GOOD+: Maintains balance through MAXIMAL excursions of active trunk motion    AM-PAC 6 CLICK ADL   How much help from another person does this patient currently need?   1 = Unable, Total/Dependent Assistance  2 = A lot, Maximum/Moderate Assistance  3 = A little, Minimum/Contact Guard/Supervision  4 = None, Modified [place]/Independent    Putting on and taking off regular lower body clothing? : 2  Bathing (including washing, rinsing, drying)?: 2  Toileting, which includes using toilet, bedpan, or urinal? : 3  Putting on and taking off regular upper body clothing?: 4  Taking care of personal grooming such as brushing teeth?: 4  Eating meals?: 4  Total Score: 19     AM-PAC Raw Score CMS G-Code Modifier Level of Impairment Assistance   6 % Total / Unable   7 - 9 CM 80 - 100% Maximal Assist   10 - 14 CL 60 - 80% Moderate Assist   15 - 19 CK 40 - 60% Moderate Assist   20 - 22 CJ 20 - 40% Minimal  Assist   23 CI 1-20% SBA / CGA   24 CH 0% Independent/ Mod I     Patient left edge of bed eating soup with all lines intact and call button in reach    ASSESSMENT:  Shireen Anton is a 47 y.o. female with a medical diagnosis of Acute renal failure and presents with good functional mobility. Home health OT is recommended to provide assistance in the home.    Rehab identified problem list/impairments: Rehab identified problem list/impairments: weakness, impaired endurance    Rehab potential is good.    Activity tolerance: Good    Discharge recommendations: Discharge Facility/Level Of Care Needs: home health OT     Barriers to discharge: Barriers to Discharge: Decreased caregiver support (aunt will be available some during the day to provide assistance)    Equipment recommendations: none     GOALS:   Occupational Therapy Goals        Problem: Occupational Therapy Goal    Goal Priority Disciplines Outcome Interventions   Occupational Therapy Goal     OT, PT/OT Ongoing (interventions implemented as appropriate)    Description:  Goals to be met by: 5/9/17     Patient will increase functional independence with ADLs by performing:    UE Dressing with Supervision. (MET 5/11)  Grooming while standing with Minimal Assistance.  Toileting from toilet with Supervision for hygiene and clothing management.   Toilet transfer to toilet with Supervision. (Goal MET 5/12)  Upper extremity exercise program x20 reps per handout, with independence.  New goal- Patient will be Min A with LB dressing with AE as needed.               Problem: Occupational Therapy Goal    Goal Priority Disciplines Outcome Interventions   Occupational Therapy Goal     OT, PT/OT               Plan:  Patient to be seen 3 x/week to address the above listed problems via self-care/home management, therapeutic activities, therapeutic exercises  Plan of Care expires: 06/09/17  Plan of Care reviewed with: patient         Denia Heard OT  05/16/2017

## 2017-05-16 NOTE — PROGRESS NOTES
Written Healthcare Information:    Follow-up Information     Follow up with [facility] On 5/17/2017.    Why:  Outpatient Dialysis:  Report for your first dialysis on Wednesday, May 17, 2017 at 2:30 p.m.  Bring your ID and insurance card.    Contact information:    [address] [place] [address]  [phone]        Follow up with Jeanacrlos Zamora MD On 5/23/2017.    Specialty:  Family Medicine    Why:  1:00 p.m. on Tuesday, May 23, 2017 see Dr. Zamora for your hospital followup visit.  The new address of the provider is [address] [place] [address]    Contact information:    [address] [id number]  [phone]          Follow up with Jeanine Saxena MD In 1 week.    Specialty:  Nephrology    Contact information:    [address] [place] [address] [address]  [facility] [address]  [phone]          Follow up with Kamaljit Sierra MD On 5/30/2017.    Specialty:  Cardiology    Why:  1:00 p.m. on Tuesday, May 30, 2017 see Dr. Sierra for your hospital followup visit.      Contact information:    [address] [facility] [address]  [phone]          Follow up with [facility].    Specialties:  Home Health Services, Physical Therapy, Occupational Therapy    Why:  Home Health    Contact information:    [address] [id number]  [phone]          [facility]  Unless otherwise directed by your provider, please   contact [facility], our nurse care line   that is available for 24/7 assistance.      [phone] (toll-free)      Registered nurses in the [facility]   provide: appointment scheduling, clinical advisement, health education, and other advisory services.    Thank you for choosing [facility] for your care.  Within 48-72 hours after leaving the hospital you will receive a call from [facility] nurses following up to see how you are doing.  The team will ask you a few questions and the call  will last approximately 20 minutes.    Please answer any calls you may receive from [facility].  We want to continue to support you as you manage your healthcare needs.  [facility] is happy to have the opportunity to serve you.    Sincerely      Sandra-  Your [facility] Team  [phone]

## 2017-05-16 NOTE — ASSESSMENT & PLAN NOTE
Creatinine continues to increase, but CPK trending down.  Pt to get tunneled catheter  and initiation of HD , as per Nephrology. Notified  to look into setting up HD as outpatient for d/c planning.  Tunneled cath 5/14.  Dialysis on M, W, F.

## 2017-05-16 NOTE — PLAN OF CARE
Problem: Physical Therapy Goal  Goal: Physical Therapy Goal  Goals to be met by: 17     Patient will increase functional independence with mobility by performin. Supine to sit with Modified [place]  2. Rolling to Left and Right with Modified [place]  3. Sit to stand transfer with Modified [place]  4. Bed to chair transfer with Modified [place]  4. Gait x 250 feet with Modified [place]   5. Lower extremity exercise program x 30 reps per handout, with independence   Outcome: Ongoing (interventions implemented as appropriate)  Pt progressing well toward treatment goals.

## 2017-05-16 NOTE — PLAN OF CARE
Problem: Occupational Therapy Goal  Goal: Occupational Therapy Goal  Goals to be met by: 5/9/17     Patient will increase functional independence with ADLs by performing:    UE Dressing with Supervision. (MET 5/11)  Grooming while standing with Minimal Assistance.  Toileting from toilet with Supervision for hygiene and clothing management.   Toilet transfer to toilet with Supervision. (Goal MET 5/12)  Upper extremity exercise program x20 reps per handout, with independence.  New goal- Patient will be Min A with LB dressing with AE as needed.   Outcome: Outcome(s) achieved Date Met:  05/16/17  Pt is discharging to home with home health OT recommended.

## 2017-05-16 NOTE — ASSESSMENT & PLAN NOTE
Klebsiella and enterococcus. Currently on ceftriaxone. ID consulted to help direct abx choice.    No sepsis. ID consult p- recommend 7 days of Augmentin

## 2017-05-17 NOTE — PT/OT/SLP DISCHARGE
Physical Therapy Discharge Summary    Shireen Anton  MRN: [id number]   Acute renal failure   Patient Discharged from acute Physical Therapy on 17.  Please refer to prior PT notes for functional status.     Assessment:   Patient has not met goals.  GOALS:   Physical Therapy Goals        Problem: Physical Therapy Goal    Goal Priority Disciplines Outcome Goal Variances Interventions   Physical Therapy Goal     PT/OT, PT Ongoing (interventions implemented as appropriate)     Description:  Goals to be met by: 17     Patient will increase functional independence with mobility by performin. Supine to sit with Modified [place]  2. Rolling to Left and Right with Modified [place]  3. Sit to stand transfer with Modified [place]  4. Bed to chair transfer with Modified [place]  4. Gait  x 250 feet with Modified [place]   5. Lower extremity exercise program x 30 reps per handout, with independence              Reasons for Discontinuation of Therapy Services  Transfer to alternate level of care.      Plan:  Patient Discharged to: Home with Home Health Service.

## 2017-05-18 ENCOUNTER — PATIENT OUTREACH (OUTPATIENT)
Dept: ADMINISTRATIVE | Facility: CLINIC | Age: 47
End: 2017-05-18
Payer: COMMERCIAL

## 2017-05-18 RX ORDER — HYOSCYAMINE SULFATE 0.12 MG/1
0.12 TABLET SUBLINGUAL EVERY 4 HOURS PRN
COMMUNITY
End: 2020-02-19

## 2017-05-18 NOTE — Clinical Note
Please forward this important TCC information to your provider in order to maximize the post discharge care delivery of this patient.  C3 nurse spoke with Shireen Anton  for a TCC post hospital discharge follow up call. The patient has a scheduled HOSFU appointment with Jeancarlos Zamora MD on 5/23 @ 1300. Respectfully, Regine Montano RN  [facility] C[facility]   [email or web address]     Please do not reply to this message, as this inbox is not routinely monitored.   (2) assistive person

## 2017-05-18 NOTE — PATIENT INSTRUCTIONS
Renal Insufficiency  The role of the kidneys is to remove waste products and excess water from the body. When the kidneys do not function normally, waste products build up in the blood. The early stage of this process is called renal insufficiency. If renal insufficiency worsens it can lead to chronic renal failure. This allows excess water, waste and toxic substances to build up in the body. This can become a threat to life, requiring dialysis or a kidney transplant to stay alive.  Diabetes is the leading causes of renal insufficiency. Other causes include high blood pressure, hardening of the arteries, lupus, inflammation of the blood vessels (vasculitis), prior viral and bacterial infections, and others. Certain over-the-counter pain medicines can cause renal failure when taken often over a long period of time. These include aspirin, ibuprofen (Advil, Motrin) and related anti-inflammatory medicines.  Home Care:  If you have diabetes, talk to your doctor about the quality of your blood sugar control. Ask about any changes needed to your diet or medicines.  If you have high blood pressure:  Take your blood pressure medicine.  Take up a regular exercise program that you enjoy. Check with your doctor to be sure your planned exercise program is right for you.  Reduce your salt (sodium) intake. Your doctor can tell you how much salt per day is safe for you.  If you are overweight, talk to your doctor about a weight loss plan.  If you smoke, you must quit. Smoking worsens kidney disease. Talk to your doctor about ways to help you quit. For more information, visit the following links:  www.smokefree.gov/pubs/clearing_the_air.pdf  www.smokefree.gov  www.quitnet.com  Talk to your doctor about any dietary restrictions advised. In general, it is advisable to limit protein, salt, potassium and phosphorus. Avoid excess fluids. Do not add salt at the table and avoid salty foods. A calcium supplement may be prescribed to  protect your bones from osteoporosis.  Avoid the following over the counter medicines, or consult your doctor before using:  Aspirin and anti-inflammatory drugs such as ibuprofen (Advil, Motrin), naprosyn (Aleve); [Short term use of acetaminophen (Tylenol) for fever or pain is okay.]  Laxatives and antacids containing magnesium or aluminum (Mylanta, Maalox)  Avoid Fleet or phosphosoda enemas which contain phosphorus  Certain stomach acid-blocking medicine such as cimetidine (Tagamet), ranitidine (Zantac)  Decongestants containing pseudoephedrine (such as some forms of Sudafed or Actifed)  Herbal supplements  Follow Up  with your doctor or as advised by our staff. Contact one of the following for more information.  American Association of Kidney Patients (393) 737-7226 www.aakp.org  National Kidney Foundation (750) 208-9971 www.kidney.org  Return Promptly  or contact your doctor if any of the following occurs:  Nausea or vomiting  Severe weakness, dizziness, fainting, drowsiness or confusion  Chest pain or shortness of breath  Unexpected weight gain or swelling in the legs, ankles or around the eyes  Heart beating fast, slow or irregularly  Decrease or loss in urine output  © 5621-6366 [employer], [address] [place] [address], [place] [address]. All rights reserved. This information is not intended as a substitute for professional medical care. Always follow your healthcare professional's instructions.

## 2017-05-23 ENCOUNTER — OFFICE VISIT (OUTPATIENT)
Dept: FAMILY MEDICINE | Facility: CLINIC | Age: 47
End: 2017-05-23
Payer: COMMERCIAL

## 2017-05-23 VITALS
TEMPERATURE: 98 F | HEIGHT: 72 IN | BODY MASS INDEX: 36.52 KG/M2 | RESPIRATION RATE: 16 BRPM | WEIGHT: 269.63 LBS | OXYGEN SATURATION: 97 % | DIASTOLIC BLOOD PRESSURE: 86 MMHG | HEART RATE: 82 BPM | SYSTOLIC BLOOD PRESSURE: 126 MMHG

## 2017-05-23 DIAGNOSIS — I63.9 ACUTE ISCHEMIC STROKE: Primary | ICD-10-CM

## 2017-05-23 DIAGNOSIS — N17.9 ACUTE RENAL FAILURE, UNSPECIFIED ACUTE RENAL FAILURE TYPE: ICD-10-CM

## 2017-05-23 DIAGNOSIS — I21.4 NSTEMI (NON-ST ELEVATED MYOCARDIAL INFARCTION): ICD-10-CM

## 2017-05-23 DIAGNOSIS — M62.82 NON-TRAUMATIC RHABDOMYOLYSIS: ICD-10-CM

## 2017-05-23 PROCEDURE — 99999 PR PBB SHADOW E&M-EST. PATIENT-LVL IV: CPT | Mod: PBBFAC,,, | Performed by: FAMILY MEDICINE

## 2017-05-23 RX ORDER — AMOXICILLIN AND CLAVULANATE POTASSIUM 500; 125 MG/1; MG/1
TABLET, FILM COATED ORAL
COMMUNITY
Start: 2017-05-16 | End: 2017-08-23 | Stop reason: ALTCHOICE

## 2017-05-23 RX ORDER — OMEPRAZOLE 20 MG/1
20 CAPSULE, DELAYED RELEASE ORAL DAILY
COMMUNITY
Start: 2017-04-24 | End: 2024-02-14 | Stop reason: SDUPTHER

## 2017-05-23 RX ORDER — HYDROCODONE BITARTRATE AND ACETAMINOPHEN 7.5; 325 MG/15ML; MG/15ML
SOLUTION ORAL
COMMUNITY
Start: 2017-04-27 | End: 2017-08-23

## 2017-05-23 NOTE — PROGRESS NOTES
Transitional Care Note  Subjective:       Patient ID: Shireen Anton is a 47 y.o. female.  Chief Complaint: Hospital Follow Up (Renal failure, mini stroke)    Family and/or Caretaker present at visit?  Yes.  Diagnostic tests reviewed/disposition: I have reviewed all completed as well as pending diagnostic tests at the time of discharge.  Disease/illness education: CKD, CVA, and MI discussed in full  Home health/community services discussion/referrals: Patient does not have home health established from hospital visit.  They do not need home health.  If needed, we will set up home health for the patient.   Establishment or re-establishment of referral orders for community resources: No other necessary community resources.   Discussion with other health care providers: No discussion with other health care providers necessary.   HPI   See not below  Review of Systems    See note below  Objective:      Physical Exam      See note below  Assessment:       1. Acute ischemic stroke    2. NSTEMI (non-ST elevated myocardial infarction)    3. Acute renal failure, unspecified acute renal failure type    4. Non-traumatic rhabdomyolysis        Plan:       Follow up as needed or in 3 months    Jeancarlos Zamora MD

## 2017-05-23 NOTE — PROGRESS NOTES
Routine Office Visit    Patient Name: Shireen Anton    : 1970  MRN: [id number]    Subjective:  Shireen is a 47 y.o. female who presents today for:    1. Hospital follow up  Patient presenting today for follow up after being discharged post CVA, MI, and GASPER.  Patient is currently on HD and being followed by Dr. Saxena (nephrology).  She is scheduled to see Dr. Sierra with cardiology for follow up of MI.  She is currently having HD three times a week for 4 hours each time.  She is currently urinating and reports that she was told she should come off dialysis.  Initially she had lost hearing, but that has returned.  She does not have a follow up with neurology set up as of yet.  No chest pain and no weakness at this time.      Past Medical History  Past Medical History:   Diagnosis Date    Allergy     Depression     GERD (gastroesophageal reflux disease)        Past Surgical History  Past Surgical History:   Procedure Laterality Date    breast augmentation       SECTION      CHOLECYSTECTOMY      LAPAROSCOPIC GASTRIC BANDING  2017    ORIF HUMERUS FRACTURE  1987       Family History  Family History   Problem Relation Age of Onset    Breast cancer Maternal Grandmother 63    Diabetes Father     Hypertension Father     Heart disease Father     Thyroid disease Mother     Cancer Mother 71     SCC of lung    Cancer Sister 48     pharyngeal cancer       Social History  Social History     Social History    Marital status: Single     Spouse name: N/A    Number of children: N/A    Years of education: N/A     Occupational History     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  Playing [email or web address].She is a registered nurse for [employer]       Current Medications  Current Outpatient Prescriptions on File Prior to Visit   Medication Sig Dispense Refill    amlodipine (NORVASC) 10 MG tablet Take 1 tablet (10 mg total) by mouth once daily. 30 tablet 5    aspirin (ECOTRIN) 81 MG EC tablet Take 1 tablet (81 mg total) by mouth once daily.  0    calcium citrate (CALCITRATE) 200 mg (950 mg) tablet Take 1 tablet (200 mg total) by mouth 3 (three) times daily. 90 tablet 5    cetirizine (ZYRTEC) 10 MG tablet Take 10 mg by mouth once daily.      citalopram (CELEXA) 40 MG tablet Take 1 tablet (40 mg total) by mouth once daily. 30 tablet 6    cyanocobalamin (VITAMIN B-12) 250 MCG tablet Take 1 tablet (250 mcg total) by mouth once daily. 30 tablet 5    ergocalciferol (VITAMIN D2) 50,000 unit Cap Take 50,000 Units by mouth every 7 days.      gabapentin (NEURONTIN) 300 MG capsule Take 1 capsule (300 mg total) by mouth 3 (three) times daily. 90 capsule 11    hydrALAZINE (APRESOLINE) 50 MG tablet Take 1 tablet (50 mg total) by mouth every 8 (eight) hours. 90 tablet 5    hyoscyamine (LEVSIN/SL) 0.125 mg Subl Place 0.125 mg under the tongue every 4 (four) hours as needed.      metoprolol tartrate (LOPRESSOR) 25 MG tablet Take 1 tablet (25 mg total) by mouth 2 (two) times daily. 60 tablet 5    trazodone (DESYREL) 50 MG tablet TAKE ONE TABLET BY MOUTH IN THE EVENING 30 tablet 0    etonogestrel-ethinyl estradiol (NUVARING) 0.12-0.015 mg/24 hr vaginal ring Place 1 each vaginally every 28 days. 3 each 4    [DISCONTINUED] amoxicillin-clavulanate 250-125mg (AUGMENTIN) 250-125 mg Tab Take 1 tablet by mouth every 12 (twelve) hours. 14 tablet 0    [DISCONTINUED] omeprazole (PRILOSEC OTC) 20 MG tablet Take 20 mg by mouth once daily.       No current facility-administered medications on file prior to visit.        Allergies   Review of  patient's allergies indicates:   Allergen Reactions    Hydrocodone        Review of Systems (Pertinent positives)  Review of Systems   Constitutional: Positive for malaise/fatigue.   HENT: Negative.    Eyes: Negative.    Respiratory: Negative.    Cardiovascular: Negative.    Gastrointestinal: Negative.    Genitourinary: Negative.    Musculoskeletal: Positive for myalgias.   Skin: Negative.    Neurological: Negative.          /86   Pulse 82   Temp 98.1 °F (36.7 °C) (Oral)   Resp 16   Ht 6' (1.829 m)   Wt 122.3 kg (269 lb 10 oz)   LMP 05/08/2017   SpO2 97%   BMI 36.57 kg/m²     GENERAL APPEARANCE: in no apparent distress and well developed and well nourished  HEENT: PERRL, EOMI, Sclera clear, anicteric, Oropharynx clear, no lesions, Neck supple with midline trachea  NECK: normal, supple, no adenopathy, thyroid normal in size  RESPIRATORY: appears well, vitals normal, no respiratory distress, acyanotic, normal RR, chest clear, no wheezing, crepitations, rhonchi, normal symmetric air entry  HEART: regular rate and rhythm, S1, S2 normal, no murmur, click, rub or gallop.    ABDOMEN: abdomen is soft without tenderness, no masses, no hernias, no organomegaly, no rebound, no guarding. Suprapubic tenderness absent. No CVA tenderness.  NEUROLOGIC: normal without focal findings, CN II-XII are intact. Strength 5/5 in all extremities  Extremities: warm/well perfused.  No abnormal hair patterns.  No clubbing, cyanosis or edema.  no muscular tenderness noted, full range of motion without pain.  no joint tenderness, deformity or swelling noted.    SKIN: no rashes, no wounds, no other lesions  PSYCH: Alert, oriented x 3, thought content appropriate, speech normal, pleasant and cooperative, good eye contact, well groomed    Assessment/Plan:  Shireen Anton is a 47 y.o. female who presents today for :    Shireen was seen today for hospital follow up.    Diagnoses and all orders for this visit:    Acute ischemic stroke  -      Ambulatory referral to Neurology    NSTEMI (non-ST elevated myocardial infarction)    Acute renal failure, unspecified acute renal failure type    Non-traumatic rhabdomyolysis      1.  Patient referred to neurology for follow up from ischemic stroke  2.  Has appointment with cardiology next week for follow up form MI  3.  Nephrology following HD  4.  rhabdo has resolved  5.  Patient to follow up as needed    Jeancarlos Zamora MD

## 2017-05-25 NOTE — PHYSICIAN QUERY
PT Name: Shireen Anton  MR #: [id number]     Physician Query Form - Documentation Clarification      CDS/: Milady Campa               Contact information:  [email or web address]    This form is a permanent document in the medical record.     Query Date: May 25, 2017    By submitting this query, we are merely seeking further clarification of documentation. Please utilize your independent clinical judgment when addressing the question(s) below.    The Medical record reflects the following:    Supporting Clinical Findings Location in Medical Record   UTI. Catheter associated. Rocephin cont. +enterococci. Sensitivities pending.     5/11 Nephrology Progress note   The patient was found to have a enterococcus/Klebsiella UTI and ID was consulted.        Discharge summary                                                                            Doctor, Please specify diagnosis or diagnoses associated with above clinical findings.                  UTI is           [  x  ] Due to the catheter      [    ] Not due to the catheter      [    ] Other ______________________________      [    ] Unable to determine    Provider Use Only

## 2017-05-30 ENCOUNTER — OFFICE VISIT (OUTPATIENT)
Dept: CARDIOLOGY | Facility: CLINIC | Age: 47
End: 2017-05-30
Payer: COMMERCIAL

## 2017-05-30 VITALS
SYSTOLIC BLOOD PRESSURE: 121 MMHG | DIASTOLIC BLOOD PRESSURE: 61 MMHG | HEART RATE: 86 BPM | HEIGHT: 72 IN | OXYGEN SATURATION: 95 % | BODY MASS INDEX: 35.8 KG/M2 | WEIGHT: 264.31 LBS

## 2017-05-30 DIAGNOSIS — E66.01 MORBID OBESITY DUE TO EXCESS CALORIES: Primary | ICD-10-CM

## 2017-05-30 DIAGNOSIS — R07.89 CHEST PAIN, ATYPICAL: ICD-10-CM

## 2017-05-30 PROCEDURE — 99214 OFFICE O/P EST MOD 30 MIN: CPT | Mod: S$GLB,,, | Performed by: INTERNAL MEDICINE

## 2017-05-30 PROCEDURE — 99999 PR PBB SHADOW E&M-EST. PATIENT-LVL III: CPT | Mod: PBBFAC,,, | Performed by: INTERNAL MEDICINE

## 2017-05-30 NOTE — PROGRESS NOTES
Subjective:    Patient ID:  Shireen Anton is a 47 y.o. female who presents for follow-up of Hospital Follow Up      HPI     Recently admitted  Shireen Anton is a 47 y.o. female that (in part)  has a past medical history of Allergy; Depression; and GERD (gastroesophageal reflux disease). Presents to [facility] complaining of acute bilateral hearing loss.  Patient reports going to bed Sunday night and her last coherent memory was signing a check for her daughter's .  Monday morning her  reported that he had a hard time waking her up and said associated vigorously shake her because she appeared unresponsive.  She was disoriented and unable to hear.  She had a gastric sleeve on Thursday, April 27 that was uncomplicated.  During the perioperative period she reported to be normal and had no complaints other than some minor pain expected after surgery.  Throughout Friday, Saturday, and Sunday experienced no significant problems other than being increasingly thirsty.  She was given a liquid form of opioid medications which she was taking as prescribed, she states.  She continues to experience bilateral hearing loss that is constant.  Characterized by only hearing high-pitched sounds.  This is a first episode and a new problem for her.  No radiation of symptoms.  No relieving factors.  No exacerbating factors. In the emergency department routine laboratory studies, EKG, and cardiac enzymes were obtained.  There was concern for multiple organ dysfunction including markedly elevated troponin levels, acute renal failure, markedly elevated liver enzymes, and creatinine kinase levels greater than 40,000.  She denies significant nausea, vomiting, diarrhea, or urination.  Denies any significant hypotensive episodes.  She does not recall anything occurring after Sunday night to the time of hospitalization.  She does report having some vaginal bleeding that started this  afternoon.  She uses NuvaRing for contraception.    Ms. Anton was admitted for an acute CVA with hearing loss and MSOF with NSTEMI, acute renal failure, shock liver, and rhabdomyolysis likely from episode of prolonged hypotension possibly from narcotic use given post surgery for gastric sleave.  Pt seen by Neurology, Cardiology, Nephrology with slow clinical improvement except for continued climb in creatinine. Pt CPK trended down but ARF did not improve. Nephrology ordered tunneled catheter for 5/8 and the patient initiated HD.   has been consulted to arrange out patient HD.  LFT's and CPK's normalized. The patient was found to have a enterococcus/Klebsiella UTI and ID was consulted. The patient continued with almost daily dialysis. The patient went to IR on 5/15 for placement of tunneled dialysis catheter.  arranged out patient dialysis on a M, W, F schedule. ID was consulted for recs for treatment of a UTI and will be sent home on 7 days of Augmentin. The rest of the hospital course was unremarkable. H/H will be arranged. The patient will follow up with PCP, renal and cards in 1-2 weeks. Diet- low NA.       Echo 5/2/17    1 - TDS, poor endocardial definition.     2 - Low normal to mildly depressed left ventricular systolic function (EF 50-55%).     3 - No diagnostic wall motion abnormalities.     4 - Trivial tricuspid regurgitation.     5 - The estimated PA systolic pressure is greater than 27 mmHg.     Had elevated troponin  Denies significant CP or SOB  Kidney function is recovering and she may be coming off of dialysis soon         Review of Systems   Constitution: Negative for decreased appetite.   HENT: Negative for ear discharge.    Eyes: Negative for blurred vision.   Respiratory: Negative for hemoptysis.    Endocrine: Negative for polyphagia.   Hematologic/Lymphatic: Negative for adenopathy.   Skin: Negative for color change.   Musculoskeletal: Negative for joint swelling.    Neurological: Negative for brief paralysis.   Psychiatric/Behavioral: Negative for hallucinations.        Objective:    Physical Exam   Constitutional: She is oriented to person, place, and time. She appears well-developed and well-nourished.   HENT:   Head: Normocephalic and atraumatic.   Eyes: Conjunctivae are normal. Pupils are equal, round, and reactive to light.   Neck: Normal range of motion. Neck supple.   Cardiovascular: Normal rate, normal heart sounds and intact distal pulses.    Pulmonary/Chest: Effort normal and breath sounds normal.   Abdominal: Soft. Bowel sounds are normal.   Musculoskeletal: Normal range of motion.   Neurological: She is alert and oriented to person, place, and time.   Skin: Skin is warm and dry.         Assessment:       1. Morbid obesity due to excess calories    2. Chest pain, atypical         Plan:       Needs ischemic evaluation - high risk for worsening renal function with [facility]  Will check a lexiscan myoview first  Cleared to drive from a cardiac standpoint

## 2017-06-06 ENCOUNTER — HOSPITAL ENCOUNTER (OUTPATIENT)
Dept: CARDIOLOGY | Facility: HOSPITAL | Age: 47
Discharge: HOME | End: 2017-06-06
Attending: INTERNAL MEDICINE
Payer: COMMERCIAL

## 2017-06-06 ENCOUNTER — HOSPITAL ENCOUNTER (OUTPATIENT)
Dept: RADIOLOGY | Facility: HOSPITAL | Age: 47
Discharge: HOME | End: 2017-06-06
Attending: INTERNAL MEDICINE
Payer: COMMERCIAL

## 2017-06-06 DIAGNOSIS — R07.89 CHEST PAIN, ATYPICAL: ICD-10-CM

## 2017-06-06 DIAGNOSIS — E66.01 MORBID OBESITY DUE TO EXCESS CALORIES: ICD-10-CM

## 2017-06-06 LAB — DIASTOLIC DYSFUNCTION: NO

## 2017-06-06 PROCEDURE — 93018 CV STRESS TEST I&R ONLY: CPT | Mod: ,,, | Performed by: INTERNAL MEDICINE

## 2017-06-06 PROCEDURE — 78452 HT MUSCLE IMAGE SPECT MULT: CPT | Mod: 26,,, | Performed by: INTERNAL MEDICINE

## 2017-06-06 PROCEDURE — 78452 HT MUSCLE IMAGE SPECT MULT: CPT | Mod: TC

## 2017-06-06 PROCEDURE — 93016 CV STRESS TEST SUPVJ ONLY: CPT | Mod: ,,, | Performed by: INTERNAL MEDICINE

## 2017-06-06 PROCEDURE — 93017 CV STRESS TEST TRACING ONLY: CPT

## 2017-06-06 PROCEDURE — 63600175 PHARM REV CODE 636 W HCPCS

## 2017-06-06 RX ORDER — REGADENOSON 0.08 MG/ML
INJECTION, SOLUTION INTRAVENOUS
Status: DISPENSED
Start: 2017-06-06 | End: 2017-06-06

## 2017-06-14 ENCOUNTER — OFFICE VISIT (OUTPATIENT)
Dept: NEUROLOGY | Facility: CLINIC | Age: 47
End: 2017-06-14
Payer: COMMERCIAL

## 2017-06-14 VITALS
HEART RATE: 85 BPM | WEIGHT: 261 LBS | BODY MASS INDEX: 35.4 KG/M2 | SYSTOLIC BLOOD PRESSURE: 108 MMHG | DIASTOLIC BLOOD PRESSURE: 80 MMHG

## 2017-06-14 DIAGNOSIS — H91.92 HEARING DECREASED, LEFT: Primary | ICD-10-CM

## 2017-06-14 PROCEDURE — 99496 TRANSJ CARE MGMT HIGH F2F 7D: CPT | Mod: S$GLB,,, | Performed by: FAMILY MEDICINE

## 2017-06-14 PROCEDURE — 99214 OFFICE O/P EST MOD 30 MIN: CPT | Mod: S$GLB,,, | Performed by: PSYCHIATRY & NEUROLOGY

## 2017-06-14 PROCEDURE — 99999 PR PBB SHADOW E&M-EST. PATIENT-LVL III: CPT | Mod: PBBFAC,,, | Performed by: PSYCHIATRY & NEUROLOGY

## 2017-06-14 NOTE — PROGRESS NOTES
Patient Name: Shireen Anton  MRN: [id number]    CC: Post stroke follow up - Driving clearance     Interval History:  The only deficit is decrease hearing from left ear with little ringing sensation which is intermittent. No other complains. She is here for driving clearance from neurological perspective.     HPI: Shireen Anton is a 47 y.o. female  With a past medical history of Allergy; Depression; and GERD (gastroesophageal reflux disease). Presents to [facility] complaining of acute bilateral hearing loss.  Patient reports going to bed Sunday night and her last coherent memory was signing a check for her daughter's .  Monday morning her  reported that he had a hard time waking her up and said associated vigorously shake her because she appeared unresponsive.  She was disoriented and unable to hear.  She had a gastric sleeve on Thursday, April 27 that was uncomplicated.  During the perioperative period she reported to be normal and had no complaints other than some minor pain expected after surgery.  Throughout Friday, Saturday, and Sunday experienced no significant problems other than being increasingly thirsty.  She was given a liquid form of opioid medications which she was taking as prescribed, she states.  She continues to experience bilateral hearing loss that is constant.  Characterized by only hearing high-pitched sounds. This is a first episode and a new problem for her.  No radiation of symptoms.  No relieving factors.  No exacerbating factors. In the emergency department routine laboratory studies, EKG, and cardiac enzymes were obtained.  There was concern for multiple organ dysfunction including markedly elevated troponin levels, acute renal failure, markedly elevated liver enzymes, and creatinine kinase levels greater than 40,000.  She denies significant nausea, vomiting, diarrhea, or urination.  Denies any significant hypotensive episodes.  She  does not recall anything occurring after Sunday night to the time of hospitalization.  She does report having some vaginal bleeding that started this afternoon. She uses NuvaRing for contraception.     Hospital Course:   Ms. Anton was admitted for an acute CVA with hearing loss and MSOF with NSTEMI, acute renal failure, shock liver, and rhabdomyolysis likely from episode of prolonged hypotension possibly from narcotic use given post surgery for gastric sleave.  Pt seen by Neurology, Cardiology, Nephrology with slow clinical improvement except for continued climb in creatinine. Pt CPK trended down but ARF did not improve. Nephrology ordered tunneled catheter for 5/8 and the patient initiated HD.   has been consulted to arrange out patient HD.  LFT's and CPK's normalized. The patient was found to have a enterococcus/Klebsiella UTI and ID was consulted. The patient continued with almost daily dialysis. The patient went to IR on 5/15 for placement of tunneled dialysis catheter.  arranged out patient dialysis on a M, W, F schedule. ID was consulted for recs for treatment of a UTI and will be sent home on 7 days of Augmentin. The rest of the hospital course was unremarkable. H/H will be arranged. The patient will follow up with PCP, renal and cards in 1-2 weeks. Diet- low NA.       ROS:   Constitutional: Negative for malaise/fatigue. Negative for weight loss.   HENT: Negative for hearing loss.   Eyes: Negative for blurred vision and double vision.   Respiratory: Negative for shortness of breath and stridor.   Cardiovascular: Negative for chest pain and palpitations.   Gastrointestinal: Negative for nausea, vomiting and constipation.   Genitourinary: Negative for frequency. Negative for urgency.   Musculoskeletal: Negative for joint pain. Negative for myalgias and falls.   Skin: Negative for rash.   Neurological: Negative for dizziness and tremors. Negative for focal weakness and seizures.    Endo/Heme/Allergies: Does not bruise/bleed easily.   Psychiatric/Behavioral: Negative for memory loss. Negative for depression and hallucinations. The patient is not nervous/anxious.      Past Medical History  Past Medical History:   Diagnosis Date    Allergy     Depression     GERD (gastroesophageal reflux disease)        Medications    Current Outpatient Prescriptions:     amlodipine (NORVASC) 10 MG tablet, Take 1 tablet (10 mg total) by mouth once daily., Disp: 30 tablet, Rfl: 5    amoxicillin-clavulanate 500-125mg (AUGMENTIN) 500-125 mg Tab, , Disp: , Rfl:     aspirin (ECOTRIN) 81 MG EC tablet, Take 1 tablet (81 mg total) by mouth once daily., Disp: , Rfl: 0    calcium citrate (CALCITRATE) 200 mg (950 mg) tablet, Take 1 tablet (200 mg total) by mouth 3 (three) times daily., Disp: 90 tablet, Rfl: 5    cetirizine (ZYRTEC) 10 MG tablet, Take 10 mg by mouth once daily., Disp: , Rfl:     citalopram (CELEXA) 40 MG tablet, Take 1 tablet (40 mg total) by mouth once daily., Disp: 30 tablet, Rfl: 6    cyanocobalamin (VITAMIN B-12) 250 MCG tablet, Take 1 tablet (250 mcg total) by mouth once daily., Disp: 30 tablet, Rfl: 5    ergocalciferol (VITAMIN D2) 50,000 unit Cap, Take 50,000 Units by mouth every 7 days., Disp: , Rfl:     etonogestrel-ethinyl estradiol (NUVARING) 0.12-0.015 mg/24 hr vaginal ring, Place 1 each vaginally every 28 days., Disp: 3 each, Rfl: 4    gabapentin (NEURONTIN) 300 MG capsule, Take 1 capsule (300 mg total) by mouth 3 (three) times daily., Disp: 90 capsule, Rfl: 11    hydrALAZINE (APRESOLINE) 50 MG tablet, Take 1 tablet (50 mg total) by mouth every 8 (eight) hours., Disp: 90 tablet, Rfl: 5    hydrocodone-acetaminophen (HYCET) solution 7.5-325 mg/15mL, , Disp: , Rfl:     hyoscyamine (LEVSIN/SL) 0.125 mg Subl, Place 0.125 mg under the tongue every 4 (four) hours as needed., Disp: , Rfl:     metoprolol tartrate (LOPRESSOR) 25 MG tablet, Take 1 tablet (25 mg total) by mouth 2 (two)  times daily., Disp: 60 tablet, Rfl: 5    omeprazole (PRILOSEC) 20 MG capsule, , Disp: , Rfl:     trazodone (DESYREL) 50 MG tablet, TAKE ONE TABLET BY MOUTH IN THE EVENING, Disp: 30 tablet, Rfl: 0    Allergies  Review of patient's allergies indicates:   Allergen Reactions    Hydrocodone        Social History  Social History     Social History    Marital status: Single     Spouse name: N/A    Number of children: N/A    Years of education: N/A     Occupational History     [employer]     Social History Main Topics    Smoking status: Former Smoker     Packs/day: 1.00     Years: 16.00     Quit date: 11/25/2011    Smokeless tobacco: Never Used    Alcohol use Yes      Comment: occasional    Drug use: No    Sexual activity: Yes     Partners: Male     Birth control/ protection: Inserts     Other Topics Concern    Not on file     Social History Narrative    Together since 7/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  Playing [employer].She is a registered nurse for [employer]       Family History  Family History   Problem Relation Age of Onset    Breast cancer Maternal Grandmother 63    Diabetes Father     Hypertension Father     Heart disease Father     Thyroid disease Mother     Cancer Mother 71     SCC of lung    Cancer Sister 48     pharyngeal cancer       Physical Exam  /80   Pulse 85   Wt 118.4 kg (261 lb 0.4 oz)   LMP 05/08/2017   BMI 35.40 kg/m²     General appearance: Well-developed, well-groomed.     Neurologic Exam: The patient is awake, alert and oriented. Language is fluent. Recent and remote memory are normal. Attention span and concentration are normal. Fund of knowledge is appropriate.     Cranial nerves: pupils are round and reactive to light and accommodation. Visual fields are full to confrontation. Fundoscopic exam reveals sharp discs bilaterally, with good venous pulsations. Ocular motility is full in all cardinal positions of gaze. Facial sensation is normal to pinprick and light touch.  Corneal reflexes are present bilaterally. Facial activation is symmetric. Hearing is normal bilaterally. Palate elevates symmetrically and gag reflex is intact bilaterally. Shoulder elevation is symmetric and full strength bilaterally. Tongue is midline and neck rotation strength is normal bilaterally. Neck range of motion is normal.     Motor examination of all extremities demonstrates normal bulk and tone in all four limbs. There are no atrophy or fasciculations. Strength is 5/5 in the upper and lower extremities bilaterally without pronator drift.     Sensory examination is normal to pinprick, vibration and proprioception in the upper and lower extremities bilaterally. Romberg is negative.    Deep tendon reflexes are 2+ and symmetric in the upper and lower extremities bilaterally. Toes are mute bilaterally.     Gait: Normal heel, toe, tandem, and casual gait.    Coordination: Finger to nose and heel to shin testing is normal in both upper and lower extremities. Rapid alternating movements are normal in both upper and lower extremities.     General exam  Cardiovascular: regular rate and rhythm with no murmurs, rubs or gallops. There are no carotid or vertebral artery bruits. Pulses in both upper and lower extremities are symmetric. There is no peripheral edema.   Head and neck: no cervical lymphadenopathy      Lab and Test Results    WBC   Date Value Ref Range Status   05/10/2017 6.49 3.90 - 12.70 K/uL Final   05/02/2017 12.16 3.90 - 12.70 K/uL Final   05/01/2017 11.20 3.90 - 12.70 K/uL Final     Hemoglobin   Date Value Ref Range Status   05/10/2017 9.3 (L) 12.0 - 16.0 g/dL Final   05/02/2017 12.5 12.0 - 16.0 g/dL Final   05/01/2017 13.9 12.0 - 16.0 g/dL Final     Hematocrit   Date Value Ref Range Status   05/10/2017 27.6 (L) 37.0 - 48.5 % Final   05/02/2017 37.1 37.0 - 48.5 % Final   05/01/2017 41.9 37.0 - 48.5 % Final     Platelets   Date Value Ref Range Status   05/10/2017 99 (L) 150 - 350 K/uL Final    05/02/2017 121 (L) 150 - 350 K/uL Final   05/02/2017 121 (L) 150 - 350 K/uL Final     Glucose   Date Value Ref Range Status   05/16/2017 83 70 - 110 mg/dL Final   05/15/2017 84 70 - 110 mg/dL Final   05/14/2017 89 70 - 110 mg/dL Final     Sodium   Date Value Ref Range Status   05/16/2017 141 136 - 145 mmol/L Final   05/15/2017 144 136 - 145 mmol/L Final   05/14/2017 138 136 - 145 mmol/L Final     Potassium   Date Value Ref Range Status   05/16/2017 3.5 3.5 - 5.1 mmol/L Final   05/15/2017 3.7 3.5 - 5.1 mmol/L Final   05/14/2017 3.7 3.5 - 5.1 mmol/L Final     Chloride   Date Value Ref Range Status   05/16/2017 102 95 - 110 mmol/L Final   05/15/2017 102 95 - 110 mmol/L Final   05/14/2017 101 95 - 110 mmol/L Final     CO2   Date Value Ref Range Status   05/16/2017 28 23 - 29 mmol/L Final   05/15/2017 28 23 - 29 mmol/L Final   05/14/2017 27 23 - 29 mmol/L Final     BUN, Bld   Date Value Ref Range Status   05/16/2017 24 (H) 6 - 20 mg/dL Final   05/15/2017 43 (H) 6 - 20 mg/dL Final   05/14/2017 35 (H) 6 - 20 mg/dL Final     Creatinine   Date Value Ref Range Status   05/16/2017 6.2 (H) 0.5 - 1.4 mg/dL Final   05/15/2017 9.5 (H) 0.5 - 1.4 mg/dL Final   05/14/2017 8.5 (H) 0.5 - 1.4 mg/dL Final     Calcium   Date Value Ref Range Status   05/16/2017 8.7 8.7 - 10.5 mg/dL Final   05/15/2017 9.5 8.7 - 10.5 mg/dL Final   05/14/2017 9.0 8.7 - 10.5 mg/dL Final     Magnesium   Date Value Ref Range Status   05/10/2017 2.5 1.6 - 2.6 mg/dL Final   05/03/2017 2.4 1.6 - 2.6 mg/dL Final   05/01/2017 2.7 (H) 1.6 - 2.6 mg/dL Final     Phosphorus   Date Value Ref Range Status   05/12/2017 6.0 (H) 2.7 - 4.5 mg/dL Final   05/11/2017 6.2 (H) 2.7 - 4.5 mg/dL Final   05/10/2017 9.3 (HH) 2.7 - 4.5 mg/dL Final     Comment:     Phosphorus critical result(s) called and verbal readback obtained   from Angela Murray, 05/10/2017 07:08       Alkaline Phosphatase   Date Value Ref Range Status   05/12/2017 71 55 - 135 U/L Final   05/11/2017 68 55 - 135  U/L Final   05/10/2017 68 55 - 135 U/L Final     ALT   Date Value Ref Range Status   05/12/2017 137 (H) 10 - 44 U/L Final   05/11/2017 187 (H) 10 - 44 U/L Final   05/10/2017 229 (H) 10 - 44 U/L Final     AST   Date Value Ref Range Status   05/12/2017 34 10 - 40 U/L Final   05/11/2017 35 10 - 40 U/L Final   05/10/2017 42 (H) 10 - 40 U/L Final         Images:   MRI Brain:   Findings concerning for small area of acute infarction involving the right cerebellar hemisphere.  Otherwise, no acute intracranial abnormality identified.      Assessment and Plan  Shireen Anton is a 47 y.o. female presented to my clinic after a complicated hospital stay with MSOF inclusind acute infarction involving the right cerebellar hemisphere. She is here for driving clearance from neurological perspective.    Plan:     -- ENT consultation for Audiogram to assess her hearing   -- She can drive from our perspective   -- Case discussed with Dr Schmitz   -- Follow up MAX Kebede MD  Neurology Resident   [facility] [address] [place] [address]  Pager: [phone]

## 2017-06-20 ENCOUNTER — OFFICE VISIT (OUTPATIENT)
Dept: CARDIOLOGY | Facility: CLINIC | Age: 47
End: 2017-06-20
Payer: COMMERCIAL

## 2017-06-20 VITALS
HEIGHT: 72 IN | HEART RATE: 72 BPM | SYSTOLIC BLOOD PRESSURE: 110 MMHG | OXYGEN SATURATION: 99 % | BODY MASS INDEX: 35.17 KG/M2 | WEIGHT: 259.69 LBS | DIASTOLIC BLOOD PRESSURE: 70 MMHG

## 2017-06-20 DIAGNOSIS — E66.01 MORBID OBESITY DUE TO EXCESS CALORIES: ICD-10-CM

## 2017-06-20 DIAGNOSIS — R07.89 CHEST PAIN, ATYPICAL: Primary | ICD-10-CM

## 2017-06-20 PROCEDURE — 99999 PR PBB SHADOW E&M-EST. PATIENT-LVL III: CPT | Mod: PBBFAC,,, | Performed by: INTERNAL MEDICINE

## 2017-06-20 PROCEDURE — 99213 OFFICE O/P EST LOW 20 MIN: CPT | Mod: S$GLB,,, | Performed by: INTERNAL MEDICINE

## 2017-06-20 NOTE — PROGRESS NOTES
Subjective:    Patient ID:  Shireen Anton is a 47 y.o. female who presents for follow-up of Chest Pain and Results      HPI     Recently admitted  Shireen Anton is a 47 y.o. female that (in part)  has a past medical history of Allergy; Depression; and GERD (gastroesophageal reflux disease). Presents to [facility] complaining of acute bilateral hearing loss.  Patient reports going to bed Sunday night and her last coherent memory was signing a check for her daughter's .  Monday morning her  reported that he had a hard time waking her up and said associated vigorously shake her because she appeared unresponsive.  She was disoriented and unable to hear.  She had a gastric sleeve on Thursday, April 27 that was uncomplicated.  During the perioperative period she reported to be normal and had no complaints other than some minor pain expected after surgery.  Throughout Friday, Saturday, and Sunday experienced no significant problems other than being increasingly thirsty.  She was given a liquid form of opioid medications which she was taking as prescribed, she states.  She continues to experience bilateral hearing loss that is constant.  Characterized by only hearing high-pitched sounds.  This is a first episode and a new problem for her.  No radiation of symptoms.  No relieving factors.  No exacerbating factors. In the emergency department routine laboratory studies, EKG, and cardiac enzymes were obtained.  There was concern for multiple organ dysfunction including markedly elevated troponin levels, acute renal failure, markedly elevated liver enzymes, and creatinine kinase levels greater than 40,000.  She denies significant nausea, vomiting, diarrhea, or urination.  Denies any significant hypotensive episodes.  She does not recall anything occurring after Sunday night to the time of hospitalization.  She does report having some vaginal bleeding that started this  afternoon.  She uses NuvaRing for contraception.     Ms. Anton was admitted for an acute CVA with hearing loss and MSOF with NSTEMI, acute renal failure, shock liver, and rhabdomyolysis likely from episode of prolonged hypotension possibly from narcotic use given post surgery for gastric sleave.  Pt seen by Neurology, Cardiology, Nephrology with slow clinical improvement except for continued climb in creatinine. Pt CPK trended down but ARF did not improve. Nephrology ordered tunneled catheter for 5/8 and the patient initiated HD.   has been consulted to arrange out patient HD.  LFT's and CPK's normalized. The patient was found to have a enterococcus/Klebsiella UTI and ID was consulted. The patient continued with almost daily dialysis. The patient went to IR on 5/15 for placement of tunneled dialysis catheter.  arranged out patient dialysis on a M, W, F schedule. ID was consulted for recs for treatment of a UTI and will be sent home on 7 days of Augmentin. The rest of the hospital course was unremarkable. H/H will be arranged. The patient will follow up with PCP, renal and cards in 1-2 weeks. Diet- low NA.        Echo 5/2/17    1 - TDS, poor endocardial definition.     2 - Low normal to mildly depressed left ventricular systolic function (EF 50-55%).     3 - No diagnostic wall motion abnormalities.     4 - Trivial tricuspid regurgitation.     5 - The estimated PA systolic pressure is greater than 27 mmHg.      Had elevated troponin but also with rhabdo and CK > 40,000 for several days  Denies significant CP or SOB  Kidney function is recovering and she has been off of dialysis for 2 weeks    Stress test 6/6/17  LVEF: 61 %  Impression: NORMAL MYOCARDIAL PERFUSION  1. The perfusion scan is free of evidence for myocardial ischemia or injury.   2. There is a moderate intensity fixed defect in the anterior wall of the left ventricle, secondary to breast attenuation.   3. Resting wall motion  is physiologic.   4. Resting LV function is normal.           Review of Systems   Constitution: Negative for decreased appetite.   HENT: Negative for ear discharge.    Eyes: Negative for blurred vision.   Respiratory: Negative for hemoptysis.    Endocrine: Negative for polyphagia.   Hematologic/Lymphatic: Negative for adenopathy.   Skin: Negative for color change.   Musculoskeletal: Negative for joint swelling.   Neurological: Negative for brief paralysis.   Psychiatric/Behavioral: Negative for hallucinations.        Objective:    Physical Exam   Constitutional: She is oriented to person, place, and time. She appears well-developed and well-nourished.   HENT:   Head: Normocephalic and atraumatic.   Eyes: Conjunctivae are normal. Pupils are equal, round, and reactive to light.   Neck: Normal range of motion. Neck supple.   Cardiovascular: Normal rate, normal heart sounds and intact distal pulses.    Pulmonary/Chest: Effort normal and breath sounds normal.   Abdominal: Soft. Bowel sounds are normal.   Musculoskeletal: Normal range of motion.   Neurological: She is alert and oriented to person, place, and time.   Skin: Skin is warm and dry.         Assessment:       1. Chest pain, atypical    2. Morbid obesity due to excess calories         Plan:       Cardiac stable  OV 3 months

## 2017-06-30 ENCOUNTER — TELEPHONE (OUTPATIENT)
Dept: FAMILY MEDICINE | Facility: CLINIC | Age: 47
End: 2017-06-30

## 2017-06-30 NOTE — TELEPHONE ENCOUNTER
Per nurseConcepción, pt is to call nephrologist and have him do letter with release date before Dr. Zamora can do [facility] papers. Pt. Notified.

## 2017-06-30 NOTE — TELEPHONE ENCOUNTER
----- Message from Amber Hunt sent at 6/30/2017 12:10 PM CDT -----  Contact: SELF  Checking on the status of [facility] paper work .       [phone]    LL

## 2017-07-05 ENCOUNTER — PATIENT MESSAGE (OUTPATIENT)
Dept: FAMILY MEDICINE | Facility: CLINIC | Age: 47
End: 2017-07-05

## 2017-07-11 ENCOUNTER — PATIENT MESSAGE (OUTPATIENT)
Dept: FAMILY MEDICINE | Facility: CLINIC | Age: 47
End: 2017-07-11

## 2017-07-12 NOTE — TELEPHONE ENCOUNTER
Lmovm re: letter from Dr. Saxena faxed  to [employer] stating pt can return to work. Also told pt. FMLA papers will be finished on Friday by Dr. Zamora since he is out of the office until then.

## 2017-07-12 NOTE — TELEPHONE ENCOUNTER
Contacted Namrata w/Dr. Saxena's office re: clearance to return to work. She will fax office notes. If note is not sufficient I will call back to request letter.

## 2017-07-12 NOTE — TELEPHONE ENCOUNTER
Office notes reviewed by Dr. Pacheco. Per Dr. Pacheco, notes does not state pt can return back to work.

## 2017-07-14 ENCOUNTER — TELEPHONE (OUTPATIENT)
Dept: FAMILY MEDICINE | Facility: CLINIC | Age: 47
End: 2017-07-14

## 2017-07-17 ENCOUNTER — TELEPHONE (OUTPATIENT)
Dept: FAMILY MEDICINE | Facility: CLINIC | Age: 47
End: 2017-07-17

## 2017-07-17 NOTE — TELEPHONE ENCOUNTER
----- Message from Mars Solis sent at 7/14/2017 11:25 AM CDT -----  Contact: Self  Pt requesting to speak with Viviane regarding [place] paperwork.  Pt can be reached @ [phone].

## 2017-07-18 ENCOUNTER — OFFICE VISIT (OUTPATIENT)
Dept: FAMILY MEDICINE | Facility: CLINIC | Age: 47
End: 2017-07-18
Payer: COMMERCIAL

## 2017-07-18 VITALS
TEMPERATURE: 98 F | DIASTOLIC BLOOD PRESSURE: 76 MMHG | BODY MASS INDEX: 34.13 KG/M2 | HEIGHT: 72 IN | OXYGEN SATURATION: 96 % | RESPIRATION RATE: 14 BRPM | WEIGHT: 252 LBS | HEART RATE: 71 BPM | SYSTOLIC BLOOD PRESSURE: 120 MMHG

## 2017-07-18 DIAGNOSIS — E66.01 MORBID OBESITY DUE TO EXCESS CALORIES: ICD-10-CM

## 2017-07-18 DIAGNOSIS — I25.2 HISTORY OF MI (MYOCARDIAL INFARCTION): ICD-10-CM

## 2017-07-18 DIAGNOSIS — N18.4 STAGE 4 CHRONIC KIDNEY DISEASE: Primary | ICD-10-CM

## 2017-07-18 DIAGNOSIS — Z86.73 HISTORY OF CVA IN ADULTHOOD: ICD-10-CM

## 2017-07-18 PROBLEM — R07.89 CHEST PAIN, ATYPICAL: Status: RESOLVED | Noted: 2017-05-30 | Resolved: 2017-07-18

## 2017-07-18 PROCEDURE — 99213 OFFICE O/P EST LOW 20 MIN: CPT | Mod: S$GLB,,, | Performed by: FAMILY MEDICINE

## 2017-07-18 PROCEDURE — 99999 PR PBB SHADOW E&M-EST. PATIENT-LVL III: CPT | Mod: PBBFAC,,, | Performed by: FAMILY MEDICINE

## 2017-07-18 NOTE — TELEPHONE ENCOUNTER
Contacted pt. Re: [place] papers. She states she has an appt today with Dr. Zamora to have 1 more page filled out.

## 2017-08-14 DIAGNOSIS — G47.00 INSOMNIA: ICD-10-CM

## 2017-08-14 RX ORDER — TRAZODONE HYDROCHLORIDE 50 MG/1
TABLET ORAL
Qty: 30 TABLET | Refills: 0 | Status: SHIPPED | OUTPATIENT
Start: 2017-08-14 | End: 2017-10-01 | Stop reason: SDUPTHER

## 2017-08-23 ENCOUNTER — OFFICE VISIT (OUTPATIENT)
Dept: FAMILY MEDICINE | Facility: CLINIC | Age: 47
End: 2017-08-23
Payer: COMMERCIAL

## 2017-08-23 VITALS
HEIGHT: 72 IN | TEMPERATURE: 98 F | OXYGEN SATURATION: 98 % | SYSTOLIC BLOOD PRESSURE: 110 MMHG | HEART RATE: 58 BPM | WEIGHT: 243.63 LBS | DIASTOLIC BLOOD PRESSURE: 72 MMHG | RESPIRATION RATE: 16 BRPM | BODY MASS INDEX: 33 KG/M2

## 2017-08-23 DIAGNOSIS — Z12.39 SCREENING FOR BREAST CANCER: ICD-10-CM

## 2017-08-23 DIAGNOSIS — Z23 NEED FOR VACCINATION: ICD-10-CM

## 2017-08-23 DIAGNOSIS — Z23 NEED FOR TDAP VACCINATION: ICD-10-CM

## 2017-08-23 DIAGNOSIS — Z00.00 WELL WOMAN EXAM (NO GYNECOLOGICAL EXAM): Primary | ICD-10-CM

## 2017-08-23 PROCEDURE — 90471 IMMUNIZATION ADMIN: CPT | Mod: S$GLB,,, | Performed by: FAMILY MEDICINE

## 2017-08-23 PROCEDURE — 99999 PR PBB SHADOW E&M-EST. PATIENT-LVL IV: CPT | Mod: PBBFAC,,, | Performed by: FAMILY MEDICINE

## 2017-08-23 PROCEDURE — 99396 PREV VISIT EST AGE 40-64: CPT | Mod: 25,S$GLB,, | Performed by: FAMILY MEDICINE

## 2017-08-23 PROCEDURE — 90715 TDAP VACCINE 7 YRS/> IM: CPT | Mod: S$GLB,,, | Performed by: FAMILY MEDICINE

## 2017-08-23 NOTE — PROGRESS NOTES
"Well Woman VISIT      CHIEF COMPLAINT  Chief Complaint   Patient presents with    Annual Exam     wellness check        HPI  Shireen Anton is a 47 y.o. female who presents for physical.     Social Factors  Tobacco use: No  Ready to Quit: No  Alcohol: Yes rarely  Intimate partner violence screening  "Do you feel safe in your current relationship?" Yes   "Have you ever been in a relationship in which your partner frightened you or hurt you?" No  Living Will/POA: No  Regular Exercise: No    Depression  Over the past two weeks, have you felt down, depressed, or hopeless? No  Over the past two weeks, have you felt little interest or pleasure in doing things? No    Reproductive Health  Followed by OBGYN    CHD, HTN, DM2  CHD Risk Factors: obesity (BMI >= 30 kg/m2) and recently had CVA and MI  Women 45 years and older should be screened for dyslipidemia if at increased risk of CHD  Women 20 to 45 years of age should be screened for dyslipidemia if at increased risk of CHD  Asymptomatic adults with sustained blood pressure greater than 135/80 mm Hg (treated or untreated) should be screened for type 2 diabetes mellitus    Estimated body mass index is 33.04 kg/m² as calculated from the following:    Height as of this encounter: 6' (1.829 m).    Weight as of this encounter: 110.5 kg (243 lb 9.7 oz).      Screening  Mammogram needed: order 8/23/17  Colonoscopy needed: n/a  Osteoporosis screen needed: n/a     Women 50 to 74 years of age should be screened for breast cancer with mammography biennially.  Women should be screened for cervical cancer with Pap tests beginning at 21 years of age. Low-risk women should receive Pap testing every three years. Co-testing for human papillomavirus is an option beginning at 30 years of age, and can extend the screening interval to five years. Cervical cancer screening should be discontinued at 65 years of age or after total hysterectomy if the woman has a benign gynecologic " history  Adults 50 to 75 years of age should be screened for colorectal cancer with an FOBT annually, sigmoidoscopy every five years with an FOBT every three years, or colonoscopy every 10 years.  Women 65 years and older should be screened for osteoporosis. Women younger than 65 years should be screened if the risk of fracture is greater than or equal to that of a 65-year-old white woman without additional risk factors.      Immunizations  delayed    ALLERGIES and MEDS were verified.   PMHx, PSHx, FHx, SOCIALHx were updated as pertinent.    REVIEW OF SYSTEMS  Review of Systems   HENT: Negative for hearing loss.    Eyes: Negative for discharge.   Respiratory: Negative for wheezing.    Cardiovascular: Negative for chest pain and palpitations.   Gastrointestinal: Negative for blood in stool, constipation, diarrhea and vomiting.   Genitourinary: Negative for dysuria and hematuria.   Musculoskeletal: Negative for neck pain.   Neurological: Negative.  Negative for weakness and headaches.   Endo/Heme/Allergies: Negative for polydipsia.         PHYSICAL EXAM  VITAL SIGNS: /72 (BP Location: Right arm, Patient Position: Sitting, BP Method: Medium (Automatic))   Pulse (!) 58   Temp 98.2 °F (36.8 °C) (Oral)   Resp 16   Ht 6' (1.829 m)   Wt 110.5 kg (243 lb 9.7 oz)   SpO2 98%   BMI 33.04 kg/m²   GEN: Well developed, Well nourished, No acute distress.  HENT: Normocephalic, Atraumatic, Bilateral external ears normal, Nose normal, Oropharynx moist, No oral exudates.   Eyes: PERRL, EOMI, Conjunctiva normal, No discharge.   Neck: Supple, No tenderness.  Lymphatic: No cervical or supraclavicular lymphadenopathy noted.   Cardiovascular: Normal heart rate, Normal rhythm, No murmurs, No rubs, No gallops.   Thorax & Lungs: Normal breath sounds, No respiratory distress, No wheezing.  Abdomen: Soft, No tenderness, Bowel sounds normal.  Breast: deferred  Genital: deferred  Skin: Warm, Dry, No erythema, No rash.   Extremities:  No edema, No tenderness.       ASSESSMENT/PLAN    Shireen was seen today for annual exam.    Diagnoses and all orders for this visit:    Well woman exam (no gynecological exam)  -     Mammo Digital Screening Bilateral With CAD; Future  -     (In Office Administered) Tdap Vaccine  -     Hemoglobin A1c; Future    Screening for breast cancer  -     Mammo Digital Screening Bilateral With CAD; Future    Need for vaccination  -     (In Office Administered) Tdap Vaccine    Need for Tdap vaccination         FOLLOW UP: 1 year or sooner if needed      Jeancarlos Zamora MD

## 2017-08-23 NOTE — PROGRESS NOTES
Patient tolerated Tdap with no complication. Patient received VIS information. Advise 15 min wait for any possible reactions.

## 2017-08-24 ENCOUNTER — HOSPITAL ENCOUNTER (OUTPATIENT)
Dept: RADIOLOGY | Facility: HOSPITAL | Age: 47
Discharge: HOME | End: 2017-08-24
Attending: FAMILY MEDICINE
Payer: COMMERCIAL

## 2017-08-24 DIAGNOSIS — Z00.00 WELL WOMAN EXAM (NO GYNECOLOGICAL EXAM): ICD-10-CM

## 2017-08-24 DIAGNOSIS — Z12.31 ENCOUNTER FOR SCREENING MAMMOGRAM FOR BREAST CANCER: ICD-10-CM

## 2017-08-24 PROCEDURE — 77067 SCR MAMMO BI INCL CAD: CPT | Mod: TC

## 2017-08-24 PROCEDURE — 77067 SCR MAMMO BI INCL CAD: CPT | Mod: 26,,, | Performed by: RADIOLOGY

## 2017-08-30 ENCOUNTER — OFFICE VISIT (OUTPATIENT)
Dept: OBSTETRICS AND GYNECOLOGY | Facility: CLINIC | Age: 47
End: 2017-08-30
Payer: COMMERCIAL

## 2017-08-30 VITALS
BODY MASS INDEX: 32.85 KG/M2 | DIASTOLIC BLOOD PRESSURE: 80 MMHG | SYSTOLIC BLOOD PRESSURE: 108 MMHG | HEIGHT: 72 IN | WEIGHT: 242.5 LBS

## 2017-08-30 DIAGNOSIS — Z30.09 FAMILY PLANNING COUNSELING: ICD-10-CM

## 2017-08-30 DIAGNOSIS — Z01.419 VISIT FOR GYNECOLOGIC EXAMINATION: Primary | ICD-10-CM

## 2017-08-30 DIAGNOSIS — N88.2 CERVICAL STENOSIS (UTERINE CERVIX): ICD-10-CM

## 2017-08-30 DIAGNOSIS — Z12.4 CERVICAL CANCER SCREENING: ICD-10-CM

## 2017-08-30 PROCEDURE — 88175 CYTOPATH C/V AUTO FLUID REDO: CPT

## 2017-08-30 PROCEDURE — 87624 HPV HI-RISK TYP POOLED RSLT: CPT

## 2017-08-30 PROCEDURE — 99396 PREV VISIT EST AGE 40-64: CPT | Mod: S$GLB,,, | Performed by: OBSTETRICS & GYNECOLOGY

## 2017-08-30 PROCEDURE — 99999 PR PBB SHADOW E&M-EST. PATIENT-LVL III: CPT | Mod: PBBFAC,,, | Performed by: OBSTETRICS & GYNECOLOGY

## 2017-08-30 PROCEDURE — 3008F BODY MASS INDEX DOCD: CPT | Mod: S$GLB,,, | Performed by: OBSTETRICS & GYNECOLOGY

## 2017-08-30 RX ORDER — MISOPROSTOL 200 UG/1
400 TABLET ORAL ONCE
Qty: 2 TABLET | Refills: 0 | Status: SHIPPED | OUTPATIENT
Start: 2017-08-30 | End: 2020-02-19

## 2017-08-30 NOTE — PROGRESS NOTES
[facility]  Ambulatory Clinic  Obstetrics & Gynecology    Visit Date:  2017    Chief Complaint:  Annual GYN exam    Subjective:      Shireen Anton is a 47 y.o.  here for a gynecologic exam.  Pt has no major complaints today.  Periods are regular, not heavy or painful.  Her current method of family planning is natural family planning.  Pt is considering Copper IUD.  Last pap  normal.  Last mammogram 2017 normal per pt.  Pt performs monthly self breast examination, former smoker, uses seat belts, and denies abuse.   Pt denies any abnormal vaginal bleeding, vaginal discharge, dysmenorrhea, dyspareunia, pelvic pain, breast mass/skin changes, GI or urinary complaints.      Review of Systems:      GENERAL:  No fever, fatigue, excessive weight gain or loss  HEENT:  No head injury, headaches, hearing changes, visual disturbance  RESPIRATORY:  No cough, shortness of breath  CARDIOVASCULAR:  No chest pain, heart palpitations, leg swelling  BREAST:  No lump, pain, nipple discharge, skin changes  GASTROINTESTINAL:  No abd pain, rectal bleeding   REPRODUCTIVE:  See HPI    Objective:     /80 (BP Location: Left arm, Patient Position: Sitting, BP Method: Large (Manual))   Ht 6' (1.829 m)   Wt 110 kg (242 lb 8.1 oz)   LMP 2017   BMI 32.89 kg/m²      GENERAL:  NAD, well-nourished  HEENT:  Anicteric, moist mucus membranes, neck supple.  BREAST:  Symmetric, nontender, no obvious masses, adenopathy, skin changes or nipple discharge.  LUNGS:  CTA-B  HEART:  RRR, no murmurs, gallops, or rubs  ABDOMEN:  Soft, non-tender, non-distended, no obvious organomegaly, no CVA or suprapubic tenderness.  EXT:  Symmetric w/o cramping, claudication, or edema. +2 distal pulses.  SKIN:  No rashes or bruising  NEURO:  Grossly intact bilaterally  PSYCH:  Mood and affect appropriate  PELVIC:  NFEG no lesion. No vaginal or cervical lesion. No bleeding or discharge. No CMT. Uterus and ovaries small, NT.  Wet prep negative.     Chaperone present for exam.    Assessment:     47 y.o. :    1. Routine gynecologic exam  2. Birth control    Plan:    A routine gynecologic health assessment was performed with age appropriate counseling.  Cervical cancer screening - pap obtained.  Screening mammogram up to date.  We discuss her contraceptive options.  Pt is requesting Copper IUD.  Risks, benefits, and alternatives to IUD reviewed.  Will schedule pt for IUD insertion on received pending insurance verification.  In meantime time, pt will use condoms.    Encourage healthy lifestyle modifications, monthly self breast exams, Ca/Vit D, f/u with PCP for health maintenance.  Pt will call clinic to schedule her IUD insertion within 7 days at start of her next cycle once IUD received.  Return sooner as needed.    Pt voiced understanding.        Antione Jung MD

## 2017-09-07 LAB
HPV HR 12 DNA CVX QL NAA+PROBE: NEGATIVE
HPV16 DNA SPEC QL NAA+PROBE: NEGATIVE
HPV18 DNA SPEC QL NAA+PROBE: NEGATIVE

## 2017-09-18 ENCOUNTER — TELEPHONE (OUTPATIENT)
Dept: OBSTETRICS AND GYNECOLOGY | Facility: CLINIC | Age: 47
End: 2017-09-18

## 2017-09-18 NOTE — TELEPHONE ENCOUNTER
----- Message from Natalia Garcia sent at 9/18/2017  3:56 PM CDT -----  Contact: [phone] ext [phone] April   [employer] company is needing to set up for delivery for the pt IUD Thanks !

## 2017-09-20 ENCOUNTER — OFFICE VISIT (OUTPATIENT)
Dept: CARDIOLOGY | Facility: CLINIC | Age: 47
End: 2017-09-20
Payer: COMMERCIAL

## 2017-09-20 VITALS
OXYGEN SATURATION: 95 % | DIASTOLIC BLOOD PRESSURE: 70 MMHG | WEIGHT: 235.25 LBS | HEART RATE: 75 BPM | BODY MASS INDEX: 31.86 KG/M2 | SYSTOLIC BLOOD PRESSURE: 126 MMHG | HEIGHT: 72 IN

## 2017-09-20 DIAGNOSIS — E66.01 MORBID OBESITY DUE TO EXCESS CALORIES: ICD-10-CM

## 2017-09-20 DIAGNOSIS — I25.2 HISTORY OF MI (MYOCARDIAL INFARCTION): ICD-10-CM

## 2017-09-20 DIAGNOSIS — Z86.73 HISTORY OF CVA IN ADULTHOOD: Primary | ICD-10-CM

## 2017-09-20 DIAGNOSIS — N18.4 STAGE 4 CHRONIC KIDNEY DISEASE: ICD-10-CM

## 2017-09-20 PROCEDURE — 99213 OFFICE O/P EST LOW 20 MIN: CPT | Mod: S$GLB,,, | Performed by: INTERNAL MEDICINE

## 2017-09-20 PROCEDURE — 99999 PR PBB SHADOW E&M-EST. PATIENT-LVL III: CPT | Mod: PBBFAC,,, | Performed by: INTERNAL MEDICINE

## 2017-09-20 PROCEDURE — 93000 ELECTROCARDIOGRAM COMPLETE: CPT | Mod: S$GLB,,, | Performed by: INTERNAL MEDICINE

## 2017-09-20 PROCEDURE — 3008F BODY MASS INDEX DOCD: CPT | Mod: S$GLB,,, | Performed by: INTERNAL MEDICINE

## 2017-09-20 RX ORDER — METOPROLOL SUCCINATE 25 MG/1
25 TABLET, EXTENDED RELEASE ORAL DAILY
Qty: 30 TABLET | Refills: 11 | Status: ON HOLD | OUTPATIENT
Start: 2017-09-20 | End: 2020-03-29 | Stop reason: HOSPADM

## 2017-09-20 NOTE — PROGRESS NOTES
Subjective:    Patient ID:  Shireen Anton is a 47 y.o. female who presents for follow-up of Coronary Artery Disease      HPI     CVA, rhabdomyolysis with ARF, Non-STEMI - medical Rx with ARF      Echo 5/2/17    1 - TDS, poor endocardial definition.     2 - Low normal to mildly depressed left ventricular systolic function (EF 50-55%).     3 - No diagnostic wall motion abnormalities.     4 - Trivial tricuspid regurgitation.     5 - The estimated PA systolic pressure is greater than 27 mmHg.      Stress test 6/6/17  LVEF: 61 %  Impression: NORMAL MYOCARDIAL PERFUSION  1. The perfusion scan is free of evidence for myocardial ischemia or injury.   2. There is a moderate intensity fixed defect in the anterior wall of the left ventricle, secondary to breast attenuation.   3. Resting wall motion is physiologic.   4. Resting LV function is normal.     Kidney function has returned to normal  Off of all BP medications except metoprolol which she only takes at night  EKG NSR - ok  Denies CP or SOB    Review of Systems   Constitution: Negative for decreased appetite.   HENT: Negative for ear discharge.    Eyes: Negative for blurred vision.   Respiratory: Negative for hemoptysis.    Endocrine: Negative for polyphagia.   Hematologic/Lymphatic: Negative for adenopathy.   Skin: Negative for color change.   Musculoskeletal: Negative for joint swelling.   Neurological: Negative for brief paralysis.   Psychiatric/Behavioral: Negative for hallucinations.        Objective:    Physical Exam   Constitutional: She is oriented to person, place, and time. She appears well-developed and well-nourished.   HENT:   Head: Normocephalic and atraumatic.   Eyes: Conjunctivae are normal. Pupils are equal, round, and reactive to light.   Neck: Normal range of motion. Neck supple.   Cardiovascular: Normal rate, normal heart sounds and intact distal pulses.    Pulmonary/Chest: Effort normal and breath sounds normal.   Abdominal: Soft. Bowel sounds are  normal.   Musculoskeletal: Normal range of motion.   Neurological: She is alert and oriented to person, place, and time.   Skin: Skin is warm and dry.         Assessment:       1. History of CVA in adulthood    2. History of MI (myocardial infarction)    3. Stage 4 chronic kidney disease    4. Morbid obesity due to excess calories         Plan:       Change metoprol to toprol XL 25 qhs  OV 1 year

## 2017-09-26 ENCOUNTER — TELEPHONE (OUTPATIENT)
Dept: OBSTETRICS AND GYNECOLOGY | Facility: CLINIC | Age: 47
End: 2017-09-26

## 2017-09-26 NOTE — TELEPHONE ENCOUNTER
----- Message from Natalia Garcia sent at 9/25/2017  4:17 PM CDT -----  Contact: Lily @[facility] [phone]  [employer] is needing to set up for delivery Please call at your earliest convenience.  Thanks !

## 2017-10-01 DIAGNOSIS — G47.00 INSOMNIA: ICD-10-CM

## 2017-10-02 RX ORDER — TRAZODONE HYDROCHLORIDE 50 MG/1
TABLET ORAL
Qty: 30 TABLET | Refills: 0 | Status: SHIPPED | OUTPATIENT
Start: 2017-10-02 | End: 2017-10-25 | Stop reason: SDUPTHER

## 2017-10-03 ENCOUNTER — TELEPHONE (OUTPATIENT)
Dept: OBSTETRICS AND GYNECOLOGY | Facility: CLINIC | Age: 47
End: 2017-10-03

## 2017-10-03 NOTE — TELEPHONE ENCOUNTER
Spoke to pt and informed pt the Paragard is in the office and to call on day 1 of her next cycle. Pt voiced her understanding. kt

## 2017-10-06 ENCOUNTER — TELEPHONE (OUTPATIENT)
Dept: OBSTETRICS AND GYNECOLOGY | Facility: CLINIC | Age: 47
End: 2017-10-06

## 2017-10-06 NOTE — TELEPHONE ENCOUNTER
----- Message from Chani Elizabeth sent at 10/6/2017  2:53 PM CDT -----  Contact: self  Today is patient first of her period. Please call patient at [phone].        Thanks,

## 2017-10-12 ENCOUNTER — PROCEDURE VISIT (OUTPATIENT)
Dept: OBSTETRICS AND GYNECOLOGY | Facility: CLINIC | Age: 47
End: 2017-10-12
Payer: COMMERCIAL

## 2017-10-12 VITALS
HEIGHT: 72 IN | WEIGHT: 229.25 LBS | BODY MASS INDEX: 31.05 KG/M2 | DIASTOLIC BLOOD PRESSURE: 68 MMHG | SYSTOLIC BLOOD PRESSURE: 100 MMHG

## 2017-10-12 DIAGNOSIS — Z30.430 ENCOUNTER FOR INSERTION OF PARAGARD IUD: Primary | ICD-10-CM

## 2017-10-12 DIAGNOSIS — Z32.02 NEGATIVE PREGNANCY TEST: ICD-10-CM

## 2017-10-12 LAB
B-HCG UR QL: NEGATIVE
CTP QC/QA: YES

## 2017-10-12 PROCEDURE — 81025 URINE PREGNANCY TEST: CPT | Mod: S$GLB,,, | Performed by: OBSTETRICS & GYNECOLOGY

## 2017-10-12 PROCEDURE — 58300 INSERT INTRAUTERINE DEVICE: CPT | Mod: S$GLB,,, | Performed by: OBSTETRICS & GYNECOLOGY

## 2017-10-12 RX ORDER — COPPER 313.4 MG/1
INTRAUTERINE DEVICE INTRAUTERINE
COMMUNITY
Start: 2017-09-25

## 2017-10-12 NOTE — PROCEDURES
[facility]  Ambulatory Clinic   Obstetrics & Gynecology    Date:  10/12/2017    Procedure:  ParaGard IUD insertion (CPT 03834)    LMP:  Patient's last menstrual period was 10/06/2017 (exact date).    UPT:  Negative    Indication:  IUD contraception    History:  Shireen Anton is a 47 y.o. , here for ParaGard IUD insertion.  Pt has no major complaints today.       Consents:  We discussed the risks, benefits, indications, and alternatives to IUD use. She understands that with IUD insertion there is a risk of bleeding, infection, uterine perforation, and expulsion of device. All of her questions were answered to her satisfaction. Pt voiced understanding, Informed consents obtained.     Vitals:  /68 (BP Location: Left arm, Patient Position: Sitting, BP Method: Large (Manual))   Ht 6' (1.829 m)   Wt 104 kg (229 lb 4.5 oz)   LMP 10/06/2017 (Exact Date)   BMI 31.10 kg/m²     Physical Exam:  Abdomen soft, non-tender, no masses. External genitalia, vaginal wall and cervix without gross abnormality.  Bimanual exam reveals a small week size, non-tender, mid-plain uterus, without adnexal mass or tenderness.     Procedure Details:      A time out was performed to confirmed the correct patient and procedure.    The cervix was visualized with a speculum.     Cervix was cleaned with betadine.      A single tooth tenaculum was placed on the anterior lip of the cervix.     The uterus sounds to ~7 cm using sterile technique.     The IUD was loaded and placed high in the uterine fundus without difficulty using sterile technique.     The string was then cut with a ~3 cm tail.    The tenaculum and speculum were removed.      The patient tolerated the procedure well.  Sterile technique was maintained.  Hemostasis noted.  VSSAF, pain scale 0/10 at end of procedure.    Assessment:      Encounter for insertion of intrauterine contraceptive device (ParaGARD (NDC 88370-009-14), Lot: [id number] EXP:   2024)    Plan:      Post IUD placement counseling and precautions discussed.    Manage post IUD placement pain with NSAIDS, Tylenol prn.    Pt advised on how to check for IUD strings.    Pt was clearly reminded that the ParaGARD IUD will need to be removed within 10 years from date of insertion.    Return 4 weeks for IUD check, or sooner for any concerns.  All questions answered, pt voiced understanding.  Go to ER for any emergencies.        Antione Jung MD

## 2017-10-25 ENCOUNTER — OFFICE VISIT (OUTPATIENT)
Dept: FAMILY MEDICINE | Facility: CLINIC | Age: 47
End: 2017-10-25
Payer: COMMERCIAL

## 2017-10-25 VITALS
BODY MASS INDEX: 31.36 KG/M2 | DIASTOLIC BLOOD PRESSURE: 70 MMHG | SYSTOLIC BLOOD PRESSURE: 106 MMHG | TEMPERATURE: 98 F | HEIGHT: 72 IN | OXYGEN SATURATION: 97 % | WEIGHT: 231.5 LBS | RESPIRATION RATE: 12 BRPM | HEART RATE: 54 BPM

## 2017-10-25 DIAGNOSIS — G47.00 INSOMNIA, UNSPECIFIED TYPE: ICD-10-CM

## 2017-10-25 DIAGNOSIS — N30.01 ACUTE CYSTITIS WITH HEMATURIA: Primary | ICD-10-CM

## 2017-10-25 LAB
BILIRUB SERPL-MCNC: NEGATIVE MG/DL
BLOOD URINE, POC: ABNORMAL
COLOR, POC UA: YELLOW
GLUCOSE UR QL STRIP: NORMAL
KETONES UR QL STRIP: NEGATIVE
LEUKOCYTE ESTERASE URINE, POC: ABNORMAL
NITRITE, POC UA: POSITIVE
PH, POC UA: 6
PROTEIN, POC: POSITIVE
SPECIFIC GRAVITY, POC UA: 1.02
UROBILINOGEN, POC UA: NORMAL

## 2017-10-25 PROCEDURE — 87088 URINE BACTERIA CULTURE: CPT

## 2017-10-25 PROCEDURE — 99999 PR PBB SHADOW E&M-EST. PATIENT-LVL IV: CPT | Mod: PBBFAC,,, | Performed by: FAMILY MEDICINE

## 2017-10-25 PROCEDURE — 87186 SC STD MICRODIL/AGAR DIL: CPT

## 2017-10-25 PROCEDURE — 87077 CULTURE AEROBIC IDENTIFY: CPT

## 2017-10-25 PROCEDURE — 87086 URINE CULTURE/COLONY COUNT: CPT

## 2017-10-25 PROCEDURE — 99214 OFFICE O/P EST MOD 30 MIN: CPT | Mod: 25,S$GLB,, | Performed by: FAMILY MEDICINE

## 2017-10-25 PROCEDURE — 81002 URINALYSIS NONAUTO W/O SCOPE: CPT | Mod: S$GLB,,, | Performed by: FAMILY MEDICINE

## 2017-10-25 RX ORDER — NITROFURANTOIN 25; 75 MG/1; MG/1
100 CAPSULE ORAL 2 TIMES DAILY
Qty: 10 CAPSULE | Refills: 0 | Status: SHIPPED | OUTPATIENT
Start: 2017-10-25 | End: 2017-11-01

## 2017-10-25 RX ORDER — TRAZODONE HYDROCHLORIDE 50 MG/1
50 TABLET ORAL NIGHTLY
Qty: 90 TABLET | Refills: 1 | Status: SHIPPED | OUTPATIENT
Start: 2017-10-25 | End: 2018-11-06 | Stop reason: SDUPTHER

## 2017-10-25 NOTE — PROGRESS NOTES
Routine Office Visit    Patient Name: Shireen Anton    : 1970  MRN: [id number]    Subjective:  Shireen is a 47 y.o. female who presents today for:    1. Increased frequency of urination  Patient presenting with 2 days of increased frequency and urgency of urination.  She has had UTI's in the past and states she knew she had another one.  She has not had any fever, chills, back pain, abdominal pain, n/v/d.  There has been blood seen in urine.      Past Medical History  Past Medical History:   Diagnosis Date    Allergy     Depression     GERD (gastroesophageal reflux disease)        Past Surgical History  Past Surgical History:   Procedure Laterality Date    breast augmentation      BREAST SURGERY  1998     SECTION      CHOLECYSTECTOMY      LAPAROSCOPIC GASTRIC BANDING  2017    ORIF HUMERUS FRACTURE  1987       Family History  Family History   Problem Relation Age of Onset    Breast cancer Maternal Grandmother 63    Diabetes Father     Hypertension Father     Heart disease Father     Thyroid disease Mother     Cancer Mother 71     SCC of lung    Cancer Sister 48     pharyngeal cancer    Breast cancer Maternal Aunt        Social History  Social History     Social History    Marital status: Single     Spouse name: N/A    Number of children: N/A    Years of education: N/A     Occupational History     [employer]     Social History Main Topics    Smoking status: Former Smoker     Packs/day: 1.00     Years: 16.00     Quit date: 2011    Smokeless tobacco: Never Used    Alcohol use Yes      Comment: occasional    Drug use: No    Sexual activity: Yes     Partners: Male     Birth control/ protection: Inserts     Other Topics Concern    Not on file     Social History Narrative    Together since 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  Playing [employer].She is a registered nurse for [employer]       Current Medications  Current Outpatient Prescriptions on File Prior to Visit    Medication Sig Dispense Refill    aspirin (ECOTRIN) 81 MG EC tablet Take 1 tablet (81 mg total) by mouth once daily.  0    calcium citrate (CALCITRATE) 200 mg (950 mg) tablet Take 1 tablet (200 mg total) by mouth 3 (three) times daily. 90 tablet 5    cetirizine (ZYRTEC) 10 MG tablet Take 10 mg by mouth once daily.      cyanocobalamin (VITAMIN B-12) 250 MCG tablet Take 1 tablet (250 mcg total) by mouth once daily. 30 tablet 5    metoprolol succinate (TOPROL-XL) 25 MG 24 hr tablet Take 1 tablet (25 mg total) by mouth once daily. 30 tablet 11    omeprazole (PRILOSEC) 20 MG capsule       PARAGARD T 380A 380 square mm IUD       [DISCONTINUED] trazodone (DESYREL) 50 MG tablet TAKE ONE TABLET BY MOUTH IN THE EVENING 30 tablet 0    citalopram (CELEXA) 40 MG tablet Take 1 tablet (40 mg total) by mouth once daily. 30 tablet 6    ergocalciferol (VITAMIN D2) 50,000 unit Cap Take 50,000 Units by mouth every 7 days.      etonogestrel-ethinyl estradiol (NUVARING) 0.12-0.015 mg/24 hr vaginal ring Place 1 each vaginally every 28 days. 3 each 4    gabapentin (NEURONTIN) 300 MG capsule Take 1 capsule (300 mg total) by mouth 3 (three) times daily. 90 capsule 11    hyoscyamine (LEVSIN/SL) 0.125 mg Subl Place 0.125 mg under the tongue every 4 (four) hours as needed.      misoprostol (CYTOTEC) 200 MCG Tab Place 2 tablets (400 mcg total) vaginally once. 2 tablet 0     No current facility-administered medications on file prior to visit.        Allergies   Review of patient's allergies indicates:   Allergen Reactions    Hydrocodone        Review of Systems (Pertinent positives)  Review of Systems   Constitutional: Positive for weight loss. Negative for chills.   Gastrointestinal: Negative for constipation, nausea and vomiting.   Genitourinary: Positive for dysuria, flank pain, frequency, hematuria and urgency.   Skin: Negative for rash.         /70 (BP Location: Right arm, Patient Position: Sitting, BP Method: Medium  (Manual))   Pulse (!) 54   Temp 98 °F (36.7 °C) (Oral)   Resp 12   Ht 6' (1.829 m)   Wt 105 kg (231 lb 7.7 oz)   LMP 10/06/2017 (Exact Date)   SpO2 97%   BMI 31.39 kg/m²     GENERAL APPEARANCE: in no apparent distress and well developed and well nourished  HEENT: PERRLA, EOMI, Sclera clear, anicteric, Oropharynx clear, no lesions, Neck supple with midline trachea  NECK: normal, supple, no adenopathy, thyroid normal in size  RESPIRATORY: appears well, vitals normal, no respiratory distress, acyanotic, normal RR, chest clear, no wheezing, crepitations, rhonchi, normal symmetric air entry  HEART: regular rate and rhythm, S1, S2 normal, no murmur, click, rub or gallop.    ABDOMEN: abdomen is soft without tenderness, no masses, no hernias, no organomegaly, no rebound, no guarding. Suprapubic tenderness absent. No CVA tenderness.  SKIN: no rashes, no wounds, no other lesions  PSYCH: Alert, oriented x 3, thought content appropriate, speech normal, pleasant and cooperative, good eye contact, well groomed, recall good, concentration/judgement good and apparently average intelligence.    Assessment/Plan:  Shireen Anton is a 47 y.o. female who presents today for :    Shireen was seen today for urinary tract infection.    Diagnoses and all orders for this visit:    Acute cystitis with hematuria  -     Urine culture  -     nitrofurantoin, macrocrystal-monohydrate, (MACROBID) 100 MG capsule; Take 1 capsule (100 mg total) by mouth 2 (two) times daily.  -     POCT urine dipstick without microscope    Insomnia, unspecified type  -     traZODone (DESYREL) 50 MG tablet; Take 1 tablet (50 mg total) by mouth every evening.      1.  Patient to take medication as prescribed  2.  Urine dip showed evidence of infection  3.  macrobid prescribed as her last culture showed sensitivity  4.  Urine cx ordered  5.  She is to call with any new concerns      Jeancarlos Zamora MD

## 2017-10-27 LAB — BACTERIA UR CULT: NORMAL

## 2017-11-08 ENCOUNTER — OFFICE VISIT (OUTPATIENT)
Dept: OBSTETRICS AND GYNECOLOGY | Facility: CLINIC | Age: 47
End: 2017-11-08
Payer: COMMERCIAL

## 2017-11-08 VITALS
DIASTOLIC BLOOD PRESSURE: 72 MMHG | WEIGHT: 227.06 LBS | BODY MASS INDEX: 30.75 KG/M2 | HEIGHT: 72 IN | SYSTOLIC BLOOD PRESSURE: 122 MMHG

## 2017-11-08 DIAGNOSIS — Z30.431 IUD CHECK UP: Primary | ICD-10-CM

## 2017-11-08 PROCEDURE — 99213 OFFICE O/P EST LOW 20 MIN: CPT | Mod: S$GLB,,, | Performed by: OBSTETRICS & GYNECOLOGY

## 2017-11-08 PROCEDURE — 99999 PR PBB SHADOW E&M-EST. PATIENT-LVL III: CPT | Mod: PBBFAC,,, | Performed by: OBSTETRICS & GYNECOLOGY

## 2017-11-08 NOTE — PROGRESS NOTES
[facility]  Ambulatory Clinic  Obstetrics & Gynecology    Visit Date:  2017    Chief Complaint:  ParaGARD IUD check      Subjective:      Shireen Anton is a 47 y.o. , LMP Patient's last menstrual period was 10/29/2017 (approximate)., here for ParaGARD IUD check.  Pt is very pleased with her IUD.    She denies any abnormal vaginal bleeding, vaginal discharge, dysmenorrhea, dyspareunia, pelvic pain, breast mass/skin changes, GI or urinary complaints.     Otherwise, pt is in her usual state of health and has good follow-up with her PCP.    Review of Systems:     Constitutional:  No fever, fatigue  Respiratory:  No shortness of breath  Cardiovascular:  No chest pain, leg swelling  Gastrointestinal:  No abdominal pain  Genitourinary:  No dysuria, frequency  Reproductive:  See HPI     Objective:    /72 (BP Location: Left arm, Patient Position: Sitting, BP Method: Large (Manual))   Ht 6' (1.829 m)   Wt 103 kg (227 lb 1.2 oz)   LMP 10/29/2017 (Approximate)   BMI 30.80 kg/m²      GENERAL:  NAD. Well-nourished.  HEENT:  NCAT, moist mucus membranes. Neck supple.  LUNGS:  CTA-B.  HEART:  RRR, no m/g/r.  ABDOMEN:  Soft, non-tender, non-distended. Normoactive BS. No obvious organomegaly.   EXT:  Symmetric w/o cramping, claudication, or edema. +2 distal pulses.   SKIN:  No rashes or bruising.  NEURO:  Grossly intact bilaterally.  PSYCH:  Mood & affect appropriate.      PELVIC:  Female external genitalia w/o any obvious lesions. Female hair distribution. Adequate perineal body. Normal urethral meatus. No gross lymphadenopathy.   Vagina:  Pink, moist, well-rugated. Adequate support. No obvious lesion. No discharge.   Cervix:  No cervical motion tenderness, discharge, or obvious lesions. IUD string visualized ~3 cm.  Uterus:  Small, non-tender, normal contour.  Adnexa:  No masses, non-tender.   Rectal:  Declined. No obvious external lesions.   Wet prep:  Negative.    Chaperone present  for exam.    Assessment:     47 y.o. :    1. ParaGARD IUD check     Plan:    Risks, benefits, and alternatives to ParaGARD reviewed.  Pt tolerating it well and would like to continue.    Pt advised on how to check for IUD strings.    Pt was clearly reminded that the ParaGARD IUD will need to be removed within 10 years from date of insertion.    Encourage healthy lifestyle modifications.      F/u with PCP for health maintenance.    Return 1 year for well woman exam, or sooner as needed.  All questions answered, pt voiced understanding.       Antione Jung MD

## 2018-09-10 ENCOUNTER — OFFICE VISIT (OUTPATIENT)
Dept: FAMILY MEDICINE | Facility: CLINIC | Age: 48
End: 2018-09-10
Payer: COMMERCIAL

## 2018-09-10 VITALS
HEART RATE: 64 BPM | RESPIRATION RATE: 14 BRPM | DIASTOLIC BLOOD PRESSURE: 72 MMHG | BODY MASS INDEX: 30.8 KG/M2 | TEMPERATURE: 98 F | SYSTOLIC BLOOD PRESSURE: 104 MMHG | OXYGEN SATURATION: 97 % | HEIGHT: 72 IN

## 2018-09-10 DIAGNOSIS — N30.01 ACUTE CYSTITIS WITH HEMATURIA: Primary | ICD-10-CM

## 2018-09-10 PROBLEM — N18.4 STAGE 4 CHRONIC KIDNEY DISEASE: Status: RESOLVED | Noted: 2017-05-02 | Resolved: 2018-09-10

## 2018-09-10 PROCEDURE — 87186 SC STD MICRODIL/AGAR DIL: CPT

## 2018-09-10 PROCEDURE — 87077 CULTURE AEROBIC IDENTIFY: CPT

## 2018-09-10 PROCEDURE — 3008F BODY MASS INDEX DOCD: CPT | Mod: CPTII,S$GLB,, | Performed by: FAMILY MEDICINE

## 2018-09-10 PROCEDURE — 99999 PR PBB SHADOW E&M-EST. PATIENT-LVL III: CPT | Mod: PBBFAC,,, | Performed by: FAMILY MEDICINE

## 2018-09-10 PROCEDURE — 87086 URINE CULTURE/COLONY COUNT: CPT

## 2018-09-10 PROCEDURE — 81002 URINALYSIS NONAUTO W/O SCOPE: CPT | Mod: S$GLB,,, | Performed by: FAMILY MEDICINE

## 2018-09-10 PROCEDURE — 99214 OFFICE O/P EST MOD 30 MIN: CPT | Mod: 25,S$GLB,, | Performed by: FAMILY MEDICINE

## 2018-09-10 PROCEDURE — 87088 URINE BACTERIA CULTURE: CPT

## 2018-09-10 RX ORDER — AMOXICILLIN AND CLAVULANATE POTASSIUM 875; 125 MG/1; MG/1
1 TABLET, FILM COATED ORAL 2 TIMES DAILY
Qty: 20 TABLET | Refills: 0 | Status: SHIPPED | OUTPATIENT
Start: 2018-09-10 | End: 2018-09-20

## 2018-09-10 RX ORDER — PHENAZOPYRIDINE HYDROCHLORIDE 200 MG/1
200 TABLET, FILM COATED ORAL 3 TIMES DAILY PRN
Qty: 30 TABLET | Refills: 1 | Status: SHIPPED | OUTPATIENT
Start: 2018-09-10 | End: 2018-09-20

## 2018-09-10 NOTE — PROGRESS NOTES
Routine Office Visit    Patient Name: Shireen Anton    : 1970  MRN: [id number]    Subjective:  Shireen is a 48 y.o. female who presents today for:    1. Increased frequency of urination  Patient presenting with 2 days of increased frequency and urgency of urination.  She has had UTI's in the past and states she knew she had another one.  She has not had any fever, chills, back pain, abdominal pain, n/v/d.  There has been blood seen in urine and states that it feels as if she is peeing razor blades.  She has not taken anything over the counter for UTI's.      Past Medical History  Past Medical History:   Diagnosis Date    Allergy     Depression     GERD (gastroesophageal reflux disease)        Past Surgical History  Past Surgical History:   Procedure Laterality Date    breast augmentation      BREAST SURGERY  1998     SECTION      CHOLECYSTECTOMY      LAPAROSCOPIC GASTRIC BANDING  2017    ORIF HUMERUS FRACTURE  1987       Family History  Family History   Problem Relation Age of Onset    Breast cancer Maternal Grandmother 63    Diabetes Father     Hypertension Father     Heart disease Father     Thyroid disease Mother     Cancer Mother 71        SCC of lung    Cancer Sister 48        pharyngeal cancer    Breast cancer Maternal Aunt        Social History  Social History     Socioeconomic History    Marital status: Single     Spouse name: Not on file    Number of children: Not on file    Years of education: Not on file    Highest education level: Not on file   Social Needs    Financial resource strain: Not on file    Food insecurity - worry: Not on file    Food insecurity - inability: Not on file    Transportation needs - medical: Not on file    Transportation needs - non-medical: Not on file   Occupational History     Employer: [employer]   Tobacco Use    Smoking status: Former Smoker     Packs/day: 1.00     Years: 16.00     Pack years: 16.00     Last attempt to  quit: 2011     Years since quittin.7    Smokeless tobacco: Never Used   Substance and Sexual Activity    Alcohol use: Yes     Comment: occasional    Drug use: No    Sexual activity: Yes     Partners: Male     Birth control/protection: Inserts   Other Topics Concern    Not on file   Social History Narrative    Together since 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  Playing [employer].She is a registered nurse for [employer]       Current Medications  Current Outpatient Medications on File Prior to Visit   Medication Sig Dispense Refill    cetirizine (ZYRTEC) 10 MG tablet Take 10 mg by mouth once daily.      cyanocobalamin (VITAMIN B-12) 250 MCG tablet Take 1 tablet (250 mcg total) by mouth once daily. 30 tablet 5    metoprolol succinate (TOPROL-XL) 25 MG 24 hr tablet Take 1 tablet (25 mg total) by mouth once daily. 30 tablet 11    omeprazole (PRILOSEC) 20 MG capsule       traZODone (DESYREL) 50 MG tablet Take 1 tablet (50 mg total) by mouth every evening. 90 tablet 1    aspirin (ECOTRIN) 81 MG EC tablet Take 1 tablet (81 mg total) by mouth once daily.  0    calcium citrate (CALCITRATE) 200 mg (950 mg) tablet Take 1 tablet (200 mg total) by mouth 3 (three) times daily. 90 tablet 5    citalopram (CELEXA) 40 MG tablet Take 1 tablet (40 mg total) by mouth once daily. 30 tablet 6    ergocalciferol (VITAMIN D2) 50,000 unit Cap Take 50,000 Units by mouth every 7 days.      etonogestrel-ethinyl estradiol (NUVARING) 0.12-0.015 mg/24 hr vaginal ring Place 1 each vaginally every 28 days. 3 each 4    gabapentin (NEURONTIN) 300 MG capsule Take 1 capsule (300 mg total) by mouth 3 (three) times daily. 90 capsule 11    hyoscyamine (LEVSIN/SL) 0.125 mg Subl Place 0.125 mg under the tongue every 4 (four) hours as needed.      misoprostol (CYTOTEC) 200 MCG Tab Place 2 tablets (400 mcg total) vaginally once. 2 tablet 0    PARAGARD T 380A 380 square mm IUD        No current facility-administered medications on file prior to  visit.        Allergies   Review of patient's allergies indicates:   Allergen Reactions    Hydrocodone        Review of Systems (Pertinent positives)  Review of Systems   Constitutional: Positive for weight loss. Negative for chills.   HENT: Negative.    Eyes: Negative.    Respiratory: Negative.    Cardiovascular: Negative.    Gastrointestinal: Negative for constipation, nausea and vomiting.   Genitourinary: Positive for dysuria, frequency, hematuria and urgency. Negative for flank pain.   Skin: Negative for rash.         /72 (BP Location: Right arm, Patient Position: Sitting, BP Method: Medium (Manual))   Pulse 64   Temp 97.8 °F (36.6 °C) (Oral)   Resp 14   Ht 6' (1.829 m)   LMP 08/09/2018 (Exact Date)   SpO2 97%   BMI 30.80 kg/m²     GENERAL APPEARANCE: in no apparent distress and well developed and well nourished  HEENT: PERRL, EOMI, Sclera clear, anicteric, Oropharynx clear, no lesions, Neck supple with midline trachea  NECK: normal, supple, no adenopathy, thyroid normal in size  RESPIRATORY: appears well, vitals normal, no respiratory distress, acyanotic, normal RR, chest clear, no wheezing, crepitations, rhonchi, normal symmetric air entry  HEART: regular rate and rhythm, S1, S2 normal, no murmur, click, rub or gallop.    ABDOMEN: abdomen is soft without tenderness, no masses, no hernias, no organomegaly, no rebound, no guarding. Suprapubic tenderness absent. No CVA tenderness.  SKIN: no rashes, no wounds, no other lesions  PSYCH: Alert, oriented x 3, thought content appropriate, speech normal, pleasant and cooperative, good eye contact, well groomed, recall good, concentration/judgement good and apparently average intelligence.    Assessment/Plan:  Shireen Anton is a 48 y.o. female who presents today for :    Shireen was seen today for dysuria.    Diagnoses and all orders for this visit:    Acute cystitis with hematuria  -     POCT urine dipstick without microscope  -     Urine culture  -      amoxicillin-clavulanate 875-125mg (AUGMENTIN) 875-125 mg per tablet; Take 1 tablet by mouth 2 (two) times daily. for 10 days  -     phenazopyridine (PYRIDIUM) 200 MG tablet; Take 1 tablet (200 mg total) by mouth 3 (three) times daily as needed for Pain.      1.  Patient to take medication as prescribed  2.  Urine dip showed evidence of infection  3.  Augmentin prescribed based off previous 2 cultures  4.  Urine cx ordered  5.  She is to call with any new concerns      Jeancarlos Zamora MD

## 2018-09-11 LAB
BILIRUB SERPL-MCNC: ABNORMAL MG/DL
BLOOD URINE, POC: 250
COLOR, POC UA: ABNORMAL
GLUCOSE UR QL STRIP: NORMAL
KETONES UR QL STRIP: ABNORMAL
LEUKOCYTE ESTERASE URINE, POC: ABNORMAL
NITRITE, POC UA: ABNORMAL
PH, POC UA: 7
PROTEIN, POC: ABNORMAL
SPECIFIC GRAVITY, POC UA: 1000
UROBILINOGEN, POC UA: ABNORMAL

## 2018-09-13 LAB — BACTERIA UR CULT: NORMAL

## 2018-11-06 DIAGNOSIS — G47.00 INSOMNIA, UNSPECIFIED TYPE: ICD-10-CM

## 2018-11-06 RX ORDER — TRAZODONE HYDROCHLORIDE 50 MG/1
50 TABLET ORAL NIGHTLY
Qty: 90 TABLET | Refills: 1 | Status: SHIPPED | OUTPATIENT
Start: 2018-11-06 | End: 2020-11-09 | Stop reason: SDUPTHER

## 2018-11-24 ENCOUNTER — OFFICE VISIT (OUTPATIENT)
Dept: URGENT CARE | Facility: CLINIC | Age: 48
End: 2018-11-24
Payer: COMMERCIAL

## 2018-11-24 ENCOUNTER — PATIENT MESSAGE (OUTPATIENT)
Dept: FAMILY MEDICINE | Facility: CLINIC | Age: 48
End: 2018-11-24

## 2018-11-24 VITALS
BODY MASS INDEX: 30.75 KG/M2 | SYSTOLIC BLOOD PRESSURE: 115 MMHG | OXYGEN SATURATION: 98 % | RESPIRATION RATE: 18 BRPM | WEIGHT: 227 LBS | DIASTOLIC BLOOD PRESSURE: 65 MMHG | HEIGHT: 72 IN | TEMPERATURE: 99 F | HEART RATE: 77 BPM

## 2018-11-24 DIAGNOSIS — N39.0 URINARY TRACT INFECTION WITHOUT HEMATURIA, SITE UNSPECIFIED: Primary | ICD-10-CM

## 2018-11-24 LAB
BILIRUB UR QL STRIP: NEGATIVE
GLUCOSE UR QL STRIP: NEGATIVE
KETONES UR QL STRIP: NEGATIVE
LEUKOCYTE ESTERASE UR QL STRIP: POSITIVE
PH, POC UA: 6 (ref 5–8)
POC BLOOD, URINE: POSITIVE
POC NITRATES, URINE: POSITIVE
PROT UR QL STRIP: POSITIVE
SP GR UR STRIP: 1.01 (ref 1–1.03)
UROBILINOGEN UR STRIP-ACNC: ABNORMAL (ref 0.1–1.1)

## 2018-11-24 PROCEDURE — 99214 OFFICE O/P EST MOD 30 MIN: CPT | Mod: 25,S$GLB,, | Performed by: NURSE PRACTITIONER

## 2018-11-24 PROCEDURE — 3008F BODY MASS INDEX DOCD: CPT | Mod: CPTII,S$GLB,, | Performed by: NURSE PRACTITIONER

## 2018-11-24 PROCEDURE — 81003 URINALYSIS AUTO W/O SCOPE: CPT | Mod: QW,S$GLB,, | Performed by: NURSE PRACTITIONER

## 2018-11-24 RX ORDER — NITROFURANTOIN 25; 75 MG/1; MG/1
100 CAPSULE ORAL 2 TIMES DAILY
Qty: 14 CAPSULE | Refills: 0 | Status: SHIPPED | OUTPATIENT
Start: 2018-11-24 | End: 2018-12-01

## 2018-11-24 RX ORDER — PHENAZOPYRIDINE HYDROCHLORIDE 200 MG/1
200 TABLET, FILM COATED ORAL 3 TIMES DAILY PRN
Qty: 20 TABLET | Refills: 0 | Status: SHIPPED | OUTPATIENT
Start: 2018-11-24 | End: 2019-11-24

## 2018-11-24 NOTE — PROGRESS NOTES
Subjective:       Patient ID: Shireen Anton is a 48 y.o. female.    Vitals:  height is 6' (1.829 m) and weight is 103 kg (227 lb). Her temperature is 98.9 °F (37.2 °C). Her blood pressure is 115/65 and her pulse is 77. Her respiration is 18 and oxygen saturation is 98%.     Chief Complaint: Urinary Tract Infection    Patient presents with complaints of urinary frequency, urgency, and burning that started on yesterday. Patient denies any fever, chills, chest pain, flank pains, nausea, or vomiting. Patient denies taking any medications for her symptoms.       Urinary Tract Infection    This is a new problem. The current episode started yesterday. The problem occurs every urination. The problem has been gradually worsening. The quality of the pain is described as burning. The pain is mild. There has been no fever. She is sexually active. There is no history of pyelonephritis. Associated symptoms include frequency and urgency. Pertinent negatives include no chills, hematuria, nausea, vomiting or rash. She has tried nothing for the symptoms. The treatment provided no relief.       Constitution: Negative for chills and fever.   Neck: Negative for painful lymph nodes.   Gastrointestinal: Positive for abdominal pain. Negative for nausea and vomiting.   Genitourinary: Positive for dysuria, frequency, urgency and urine decreased. Negative for hematuria, history of kidney stones, painful menstruation, irregular menstruation, missed menses, heavy menstrual bleeding, ovarian cysts, genital trauma, vaginal pain, vaginal discharge, vaginal bleeding, vaginal sores, vaginal odor, painful intercourse, genital sore, painful ejaculation and pelvic pain.   Musculoskeletal: Negative for back pain.   Skin: Negative for rash and lesion.   Hematologic/Lymphatic: Negative for swollen lymph nodes.       Objective:      Physical Exam   Constitutional: She is oriented to person, place, and time. She appears well-developed and well-nourished.  She is cooperative.  Non-toxic appearance. She does not appear ill. No distress.   HENT:   Head: Normocephalic and atraumatic.   Right Ear: Hearing, tympanic membrane, external ear and ear canal normal.   Left Ear: Hearing, tympanic membrane, external ear and ear canal normal.   Nose: Nose normal. No mucosal edema, rhinorrhea or nasal deformity. No epistaxis. Right sinus exhibits no maxillary sinus tenderness and no frontal sinus tenderness. Left sinus exhibits no maxillary sinus tenderness and no frontal sinus tenderness.   Mouth/Throat: Uvula is midline, oropharynx is clear and moist and mucous membranes are normal. No trismus in the jaw. Normal dentition. No uvula swelling. No posterior oropharyngeal erythema.   Eyes: Conjunctivae and lids are normal. Right eye exhibits no discharge. Left eye exhibits no discharge. No scleral icterus.   Sclera clear bilat   Neck: Trachea normal, normal range of motion, full passive range of motion without pain and phonation normal. Neck supple.   Cardiovascular: Normal rate, regular rhythm, normal heart sounds, intact distal pulses and normal pulses.   Pulmonary/Chest: Effort normal and breath sounds normal. No respiratory distress.   Abdominal: Soft. Normal appearance and bowel sounds are normal. She exhibits no distension, no pulsatile midline mass and no mass. There is tenderness in the suprapubic area.   Musculoskeletal: Normal range of motion. She exhibits no edema or deformity.   Neurological: She is alert and oriented to person, place, and time. She exhibits normal muscle tone. Coordination normal.   Skin: Skin is warm, dry and intact. She is not diaphoretic. No pallor.   Psychiatric: She has a normal mood and affect. Her speech is normal and behavior is normal. Judgment and thought content normal. Cognition and memory are normal.   Nursing note and vitals reviewed.      Assessment:       1. Urinary tract infection without hematuria, site unspecified        Plan:        Results for orders placed or performed in visit on 11/24/18   POCT Urinalysis, Dipstick, Automated, W/O Scope   Result Value Ref Range    POC Blood, Urine Positive (A) Negative    POC Bilirubin, Urine Negative Negative    POC Urobilinogen, Urine norm 0.1 - 1.1    POC Ketones, Urine Negative Negative    POC Protein, Urine Positive (A) Negative    POC Nitrates, Urine Positive (A) Negative    POC Glucose, Urine Negative Negative    pH, UA 6.0 5 - 8    POC Specific Gravity, Urine 1.015 1.003 - 1.029    POC Leukocytes, Urine Positive (A) Negative       Urinary tract infection without hematuria, site unspecified  -     POCT Urinalysis, Dipstick, Automated, W/O Scope  -     nitrofurantoin, macrocrystal-monohydrate, (MACROBID) 100 MG capsule; Take 1 capsule (100 mg total) by mouth 2 (two) times daily. for 7 days  Dispense: 14 capsule; Refill: 0  -     phenazopyridine (PYRIDIUM) 200 MG tablet; Take 1 tablet (200 mg total) by mouth 3 (three) times daily as needed for Pain.  Dispense: 20 tablet; Refill: 0          Patient Instructions     Take Antibiotics as directed and to completion.  Drink plenty of Fluids to help flush bladder.  Tylenol or Ibuprofen as needed for any fevers.  Follow-up with PCP or Return to Urgent Care for worsening of symptoms.  Urinary Tract Infections in Women    Urinary tract infections (UTIs) are most often caused by bacteria (germs). These bacteria enter the urinary tract. The bacteria may come from outside the body. Or they may travel from the skin outside the rectum or vagina into the urethra. Female anatomy makes it easy for bacteria from the bowel to enter a womans urinary tract, which is the most common source of UTI. This means women develop UTIs more often than men. Pain in or around the urinary tract is a common UTI symptom. But the only way to know for sure if you have a UTI for the healthcare provider to test your urine. The two tests that may be done are the urinalysis and urine  culture.  Types of UTIs  · Cystitis: A bladder infection (cystitis) is the most common UTI in women. You may have urgent or frequent urination. You may also have pain, burning when you urinate, and bloody urine.  · Urethritis: This is an inflamed urethra, which is the tube that carries urine from the bladder to outside the body. You may have lower stomach or back pain. You may also have urgent or frequent urination.  · Pyelonephritis: This is a kidney infection. If not treated, it can be serious and damage your kidneys. In severe cases, you may be hospitalized. You may have a fever and lower back pain.  Medicines to treat a UTI  Most UTIs are treated with antibiotics. These kill the bacteria. The length of time you need to take them depends on the type of infection. It may be as short as 3 days. If you have repeated UTIs, a low-dose antibiotic may be needed for several months. Take antibiotics exactly as directed. Dont stop taking them until all of the medicine is gone. If you stop taking the antibiotic too soon, the infection may not go away, and you may develop a resistance to the antibiotic. This can make it much harder to treat.  Lifestyle changes to treat and prevent UTIs  The lifestyle changes below will help get rid of your UTI. They may also help prevent future UTIs.  · Drink plenty of fluids. This includes water, juice, or other caffeine-free drinks. Fluids help flush bacteria out of your body.  · Empty your bladder. Always empty your bladder when you feel the urge to urinate. And always urinate before going to sleep. Urine that stays in your bladder can lead to infection. Try to urinate before and after sex as well.  · Practice good personal hygiene. Wipe yourself from front to back after using the toilet. This helps keep bacteria from getting into the urethra.  · Use condoms during sex. These help prevent UTIs caused by sexually transmitted bacteria. Also, avoid using spermicides during sex. These can  increase the risk of UTIs. Choose other forms of birth control instead. For women who tend to get UTIs after sex, a low-dose of a preventive antibiotic may be used. Be sure to discuss this option with your healthcare provider.  · Follow up with your healthcare provider as directed. He or she may test to make sure the infection has cleared. If needed, more treatment may be started.  Date Last Reviewed: 1/1/2017 © 2000-2017 [employer]. [address] [place] [address]. All rights reserved. This information is not intended as a substitute for professional medical care. Always follow your healthcare professional's instructions.      Please return here or go to the Emergency Department for any concerns or worsening of condition.  If you were prescribed antibiotics, please take them to completion.  If you were prescribed a narcotic medication, do not drive or operate heavy equipment or machinery while taking these medications.  Please follow up with your primary care doctor or specialist as needed.    If you  smoke, please stop smoking.

## 2018-11-24 NOTE — PATIENT INSTRUCTIONS
Take Antibiotics as directed and to completion.  Drink plenty of Fluids to help flush bladder.  Tylenol or Ibuprofen as needed for any fevers.  Follow-up with PCP or Return to Urgent Care for worsening of symptoms.  Urinary Tract Infections in Women    Urinary tract infections (UTIs) are most often caused by bacteria (germs). These bacteria enter the urinary tract. The bacteria may come from outside the body. Or they may travel from the skin outside the rectum or vagina into the urethra. Female anatomy makes it easy for bacteria from the bowel to enter a womans urinary tract, which is the most common source of UTI. This means women develop UTIs more often than men. Pain in or around the urinary tract is a common UTI symptom. But the only way to know for sure if you have a UTI for the healthcare provider to test your urine. The two tests that may be done are the urinalysis and urine culture.  Types of UTIs  · Cystitis: A bladder infection (cystitis) is the most common UTI in women. You may have urgent or frequent urination. You may also have pain, burning when you urinate, and bloody urine.  · Urethritis: This is an inflamed urethra, which is the tube that carries urine from the bladder to outside the body. You may have lower stomach or back pain. You may also have urgent or frequent urination.  · Pyelonephritis: This is a kidney infection. If not treated, it can be serious and damage your kidneys. In severe cases, you may be hospitalized. You may have a fever and lower back pain.  Medicines to treat a UTI  Most UTIs are treated with antibiotics. These kill the bacteria. The length of time you need to take them depends on the type of infection. It may be as short as 3 days. If you have repeated UTIs, a low-dose antibiotic may be needed for several months. Take antibiotics exactly as directed. Dont stop taking them until all of the medicine is gone. If you stop taking the antibiotic too soon, the infection may not  go away, and you may develop a resistance to the antibiotic. This can make it much harder to treat.  Lifestyle changes to treat and prevent UTIs  The lifestyle changes below will help get rid of your UTI. They may also help prevent future UTIs.  · Drink plenty of fluids. This includes water, juice, or other caffeine-free drinks. Fluids help flush bacteria out of your body.  · Empty your bladder. Always empty your bladder when you feel the urge to urinate. And always urinate before going to sleep. Urine that stays in your bladder can lead to infection. Try to urinate before and after sex as well.  · Practice good personal hygiene. Wipe yourself from front to back after using the toilet. This helps keep bacteria from getting into the urethra.  · Use condoms during sex. These help prevent UTIs caused by sexually transmitted bacteria. Also, avoid using spermicides during sex. These can increase the risk of UTIs. Choose other forms of birth control instead. For women who tend to get UTIs after sex, a low-dose of a preventive antibiotic may be used. Be sure to discuss this option with your healthcare provider.  · Follow up with your healthcare provider as directed. He or she may test to make sure the infection has cleared. If needed, more treatment may be started.  Date Last Reviewed: 1/1/2017  © 1373-8975 [employer]. [address] [place] [address], [place] [address]. All rights reserved. This information is not intended as a substitute for professional medical care. Always follow your healthcare professional's instructions.      Please return here or go to the Emergency Department for any concerns or worsening of condition.  If you were prescribed antibiotics, please take them to completion.  If you were prescribed a narcotic medication, do not drive or operate heavy equipment or machinery while taking these medications.  Please follow up with your primary care doctor or specialist as needed.    If you  smoke,  please stop smoking.

## 2018-11-25 ENCOUNTER — PATIENT MESSAGE (OUTPATIENT)
Dept: FAMILY MEDICINE | Facility: CLINIC | Age: 48
End: 2018-11-25

## 2018-11-26 NOTE — TELEPHONE ENCOUNTER
Patient was seen at an Urgent Care on 11/24/2018 and [facility] message sent.  Prescribed Macrobid.  Please advise if anything further is needed.

## 2019-05-20 NOTE — PT/OT/SLP PROGRESS
Anesthesia has assumed care of the patient.   Occupational Therapy      Shireen Lucero Sloane  MRN: [id number]    Patient not seen today secondary to Unavailable (Comment) off floor for medical procedure. Will follow-up tomorrow as pt will go to dialysis after procedure.    Denia Heard OT  5/8/2017

## 2019-09-04 DIAGNOSIS — Z12.39 BREAST CANCER SCREENING: ICD-10-CM

## 2020-02-19 ENCOUNTER — OFFICE VISIT (OUTPATIENT)
Dept: FAMILY MEDICINE | Facility: CLINIC | Age: 50
End: 2020-02-19
Payer: COMMERCIAL

## 2020-02-19 VITALS
WEIGHT: 245.56 LBS | HEIGHT: 72 IN | TEMPERATURE: 98 F | RESPIRATION RATE: 17 BRPM | BODY MASS INDEX: 33.26 KG/M2 | DIASTOLIC BLOOD PRESSURE: 84 MMHG | HEART RATE: 76 BPM | OXYGEN SATURATION: 98 % | SYSTOLIC BLOOD PRESSURE: 119 MMHG

## 2020-02-19 DIAGNOSIS — R30.0 DYSURIA: Primary | ICD-10-CM

## 2020-02-19 DIAGNOSIS — N30.90 CYSTITIS: ICD-10-CM

## 2020-02-19 LAB
B-HCG UR QL: NEGATIVE
BILIRUB SERPL-MCNC: NORMAL MG/DL
BLOOD URINE, POC: 250
COLOR, POC UA: YELLOW
CTP QC/QA: YES
GLUCOSE UR QL STRIP: NORMAL
KETONES UR QL STRIP: NORMAL
LEUKOCYTE ESTERASE URINE, POC: NORMAL
NITRITE, POC UA: POSITIVE
PH, POC UA: 5
PROTEIN, POC: NORMAL
SPECIFIC GRAVITY, POC UA: 1015
UROBILINOGEN, POC UA: NORMAL

## 2020-02-19 PROCEDURE — 81002 POCT URINE DIPSTICK WITHOUT MICROSCOPE: ICD-10-PCS | Mod: S$GLB,,, | Performed by: INTERNAL MEDICINE

## 2020-02-19 PROCEDURE — 87086 URINE CULTURE/COLONY COUNT: CPT

## 2020-02-19 PROCEDURE — 99999 PR PBB SHADOW E&M-EST. PATIENT-LVL III: CPT | Mod: PBBFAC,,, | Performed by: INTERNAL MEDICINE

## 2020-02-19 PROCEDURE — 87077 CULTURE AEROBIC IDENTIFY: CPT

## 2020-02-19 PROCEDURE — 99999 PR PBB SHADOW E&M-EST. PATIENT-LVL III: ICD-10-PCS | Mod: PBBFAC,,, | Performed by: INTERNAL MEDICINE

## 2020-02-19 PROCEDURE — 87186 SC STD MICRODIL/AGAR DIL: CPT

## 2020-02-19 PROCEDURE — 3008F PR BODY MASS INDEX (BMI) DOCUMENTED: ICD-10-PCS | Mod: CPTII,S$GLB,, | Performed by: INTERNAL MEDICINE

## 2020-02-19 PROCEDURE — 81025 POCT URINE PREGNANCY: ICD-10-PCS | Mod: S$GLB,,, | Performed by: INTERNAL MEDICINE

## 2020-02-19 PROCEDURE — 3008F BODY MASS INDEX DOCD: CPT | Mod: CPTII,S$GLB,, | Performed by: INTERNAL MEDICINE

## 2020-02-19 PROCEDURE — 87088 URINE BACTERIA CULTURE: CPT

## 2020-02-19 PROCEDURE — 99213 OFFICE O/P EST LOW 20 MIN: CPT | Mod: 25,S$GLB,, | Performed by: INTERNAL MEDICINE

## 2020-02-19 PROCEDURE — 81002 URINALYSIS NONAUTO W/O SCOPE: CPT | Mod: S$GLB,,, | Performed by: INTERNAL MEDICINE

## 2020-02-19 PROCEDURE — 99213 PR OFFICE/OUTPT VISIT, EST, LEVL III, 20-29 MIN: ICD-10-PCS | Mod: 25,S$GLB,, | Performed by: INTERNAL MEDICINE

## 2020-02-19 PROCEDURE — 81025 URINE PREGNANCY TEST: CPT | Mod: S$GLB,,, | Performed by: INTERNAL MEDICINE

## 2020-02-19 RX ORDER — NITROFURANTOIN 25; 75 MG/1; MG/1
100 CAPSULE ORAL 2 TIMES DAILY
Qty: 10 CAPSULE | Refills: 0 | Status: SHIPPED | OUTPATIENT
Start: 2020-02-19 | End: 2020-02-24

## 2020-02-19 NOTE — PROGRESS NOTES
"Subjective:       Patient ID: Shireen Anton is a 50 y.o. female.    Chief Complaint: Urinary Tract Infection; Back Pain; and Abdominal Pain    Urinary Tract Infection    This is a new problem. The current episode started yesterday. The problem has been unchanged. The quality of the pain is described as burning. There has been no fever. Associated symptoms include frequency and urgency. Pertinent negatives include no discharge, flank pain, hematuria, nausea, bubble bath use, constipation or rash. She has tried nothing for the symptoms.     Review of Systems   Constitutional: Negative for activity change, appetite change, fatigue, fever and unexpected weight change.   HENT: Negative for ear pain, rhinorrhea and sore throat.    Eyes: Negative for discharge and visual disturbance.   Respiratory: Negative for chest tightness, shortness of breath and wheezing.    Cardiovascular: Negative for chest pain, palpitations and leg swelling.   Gastrointestinal: Negative for abdominal pain, constipation, diarrhea and nausea.   Endocrine: Negative for cold intolerance and heat intolerance.   Genitourinary: Positive for frequency and urgency. Negative for dysuria, flank pain and hematuria.   Musculoskeletal: Negative for joint swelling and neck stiffness.   Skin: Negative for rash.   Neurological: Negative for dizziness, syncope, weakness and headaches.   Psychiatric/Behavioral: Negative for suicidal ideas.       Objective:     Vitals:    02/19/20 1255   BP: 119/84   BP Location: Right arm   Patient Position: Sitting   BP Method: Medium (Automatic)   Pulse: 76   Resp: 17   Temp: 97.8 °F (36.6 °C)   TempSrc: Oral   SpO2: 98%   Weight: 111.4 kg (245 lb 9.5 oz)   Height: 6' 0.01" (1.829 m)          Physical Exam   Constitutional: She is oriented to person, place, and time. She appears well-developed and well-nourished.   HENT:   Head: Normocephalic and atraumatic.   Eyes: Conjunctivae are normal. No scleral icterus.   Neck: Normal " range of motion.   Abdominal: Soft. Bowel sounds are normal. There is no tenderness. There is no rebound and no guarding.   No CVA tenderness   Musculoskeletal: Normal range of motion. She exhibits no edema or tenderness.   No LE edema   Neurological: She is alert and oriented to person, place, and time. No cranial nerve deficit.   Skin: Skin is warm and dry.   Psychiatric: She has a normal mood and affect.     urine dip + nitrite leuk esterase and blood  Assessment and Plan   1. Dysuria  Classic symptoms with corresponding urine treat emperically with bactrim discussed alarm features of withholding urine rising flank pain or fever to seek emergent care  - POCT urine pregnancy  - POCT urine dipstick without microscope  - nitrofurantoin, macrocrystal-monohydrate, (MACROBID) 100 MG capsule; Take 1 capsule (100 mg total) by mouth 2 (two) times daily. for 5 days  Dispense: 10 capsule; Refill: 0    2. Cystitis  As abvoe sample for culture to check for resistance  - Urine culture

## 2020-02-21 LAB — BACTERIA UR CULT: ABNORMAL

## 2020-02-21 NOTE — LETTER
[facility]  [address]  [place] [address]  Phone: [phone] February 15, 2017     Patient: Shireen Anton   YOB: 1970   Date of Visit: 2/15/2017       To Whom It May Concern:    Patient was seen in my clinic today.  She has been seen by cardiology and had a stress test that was normal.  She is low risk for a moderate risk procedure    If you have any questions or concerns, please don't hesitate to contact my office.    Sincerely,        Jeancarlos Zamora MD     
alert and awake

## 2020-03-18 ENCOUNTER — TELEPHONE (OUTPATIENT)
Dept: FAMILY MEDICINE | Facility: CLINIC | Age: 50
End: 2020-03-18

## 2020-03-18 NOTE — TELEPHONE ENCOUNTER
Spoke with PT she stated she already contacted [facility] and is waiting for a call to go through drive thru for COVID-19 testing. PT also stated that [facility] told her to contact her PCP to see if she needs any further testing. PT was told to wait for employee health to contact her and if test comes back negative and she is still having systems than come in to see Dr. Zamora.

## 2020-03-18 NOTE — TELEPHONE ENCOUNTER
----- Message from Wendy Villafana sent at 3/18/2020  8:45 AM CDT -----  Contact: DARLEEN DRAPER [[id number]]  [employer]  Name of Who is Calling: DARLEEN DRAPER [[id number]]    What is the request in detail: patient would like to speak with Dr. Zamora regarding fever, sore throat, body aches, and moist skin. 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 Please call to discuss    Can the clinic reply by [facility]: no    What Number to Call Back if not in [facility]: [phone]

## 2020-03-19 ENCOUNTER — NURSE TRIAGE (OUTPATIENT)
Dept: ADMINISTRATIVE | Facility: CLINIC | Age: 50
End: 2020-03-19

## 2020-03-19 ENCOUNTER — CLINICAL SUPPORT (OUTPATIENT)
Dept: INTERNAL MEDICINE | Facility: CLINIC | Age: 50
End: 2020-03-19
Payer: COMMERCIAL

## 2020-03-19 DIAGNOSIS — R50.9 FEVER, UNSPECIFIED: Primary | ICD-10-CM

## 2020-03-19 DIAGNOSIS — R50.9 FEVER, UNSPECIFIED: ICD-10-CM

## 2020-03-19 PROCEDURE — U0002 COVID-19 LAB TEST NON-CDC: HCPCS

## 2020-03-19 NOTE — TELEPHONE ENCOUNTER
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 no further questions  Reason for Disposition   Caller has already spoken to PCP or another triager   Caller has already spoken with the PCP and has no further questions.    Additional Information   Negative: Caller can't be reached by phone   Negative: Medication questions   Negative: Lab result questions   Negative: [1] Caller is not with the adult (patient) AND [2] reporting urgent symptoms   Negative: Caller is angry or rude (e.g., hangs up, verbally abusive, yelling)   Negative: Caller hangs up    Protocols used: INFORMATION ONLY CALL-A-AH, NO CONTACT OR DUPLICATE CONTACT CALL-A-AH

## 2020-03-22 ENCOUNTER — PATIENT MESSAGE (OUTPATIENT)
Dept: FAMILY MEDICINE | Facility: CLINIC | Age: 50
End: 2020-03-22

## 2020-03-22 DIAGNOSIS — N30.00 ACUTE CYSTITIS WITHOUT HEMATURIA: Primary | ICD-10-CM

## 2020-03-22 LAB — SARS-COV-2 RNA RESP QL NAA+PROBE: NORMAL

## 2020-03-23 ENCOUNTER — PATIENT MESSAGE (OUTPATIENT)
Dept: FAMILY MEDICINE | Facility: CLINIC | Age: 50
End: 2020-03-23

## 2020-03-23 DIAGNOSIS — N30.01 ACUTE CYSTITIS WITH HEMATURIA: Primary | ICD-10-CM

## 2020-03-23 RX ORDER — NITROFURANTOIN 25; 75 MG/1; MG/1
100 CAPSULE ORAL 2 TIMES DAILY
Qty: 10 CAPSULE | Refills: 0 | Status: ON HOLD | OUTPATIENT
Start: 2020-03-23 | End: 2020-03-29 | Stop reason: HOSPADM

## 2020-03-24 ENCOUNTER — HOSPITAL ENCOUNTER (INPATIENT)
Facility: HOSPITAL | Age: 50
LOS: 5 days | Discharge: HOME | DRG: 392 | End: 2020-03-29
Attending: INTERNAL MEDICINE | Admitting: INTERNAL MEDICINE
Payer: COMMERCIAL

## 2020-03-24 ENCOUNTER — PATIENT MESSAGE (OUTPATIENT)
Dept: FAMILY MEDICINE | Facility: CLINIC | Age: 50
End: 2020-03-24

## 2020-03-24 DIAGNOSIS — N39.0 URINARY TRACT INFECTION WITH HEMATURIA, SITE UNSPECIFIED: ICD-10-CM

## 2020-03-24 DIAGNOSIS — R50.9 FEVER, UNSPECIFIED FEVER CAUSE: ICD-10-CM

## 2020-03-24 DIAGNOSIS — R79.82 CRP ELEVATED: ICD-10-CM

## 2020-03-24 DIAGNOSIS — R31.9 URINARY TRACT INFECTION WITH HEMATURIA, SITE UNSPECIFIED: ICD-10-CM

## 2020-03-24 DIAGNOSIS — K57.20 PERFORATION OF SIGMOID COLON DUE TO DIVERTICULITIS: ICD-10-CM

## 2020-03-24 DIAGNOSIS — Z20.822 SUSPECTED COVID-19 VIRUS INFECTION: Primary | ICD-10-CM

## 2020-03-24 PROBLEM — D69.6 THROMBOCYTOPENIA: Status: ACTIVE | Noted: 2020-03-24

## 2020-03-24 LAB
ALBUMIN SERPL BCP-MCNC: 3.6 G/DL (ref 3.5–5.2)
ALP SERPL-CCNC: 82 U/L (ref 55–135)
ALT SERPL W/O P-5'-P-CCNC: 15 U/L (ref 10–44)
ANION GAP SERPL CALC-SCNC: 13 MMOL/L (ref 8–16)
AST SERPL-CCNC: 26 U/L (ref 10–40)
B-HCG UR QL: NEGATIVE
BACTERIA #/AREA URNS HPF: ABNORMAL /HPF
BASOPHILS # BLD AUTO: 0.01 K/UL (ref 0–0.2)
BASOPHILS NFR BLD: 0.2 % (ref 0–1.9)
BILIRUB SERPL-MCNC: 0.5 MG/DL (ref 0.1–1)
BILIRUB UR QL STRIP: NEGATIVE
BUN SERPL-MCNC: 10 MG/DL (ref 6–20)
CALCIUM SERPL-MCNC: 8.9 MG/DL (ref 8.7–10.5)
CHLORIDE SERPL-SCNC: 103 MMOL/L (ref 95–110)
CLARITY UR: ABNORMAL
CO2 SERPL-SCNC: 22 MMOL/L (ref 23–29)
COLOR UR: ABNORMAL
CREAT SERPL-MCNC: 0.9 MG/DL (ref 0.5–1.4)
CRP SERPL-MCNC: 137.1 MG/L (ref 0–8.2)
CTP QC/QA: YES
CTP QC/QA: YES
DIFFERENTIAL METHOD: ABNORMAL
EOSINOPHIL # BLD AUTO: 0 K/UL (ref 0–0.5)
EOSINOPHIL NFR BLD: 0 % (ref 0–8)
ERYTHROCYTE [DISTWIDTH] IN BLOOD BY AUTOMATED COUNT: 12.4 % (ref 11.5–14.5)
EST. GFR  (AFRICAN AMERICAN): >60 ML/MIN/1.73 M^2
EST. GFR  (NON AFRICAN AMERICAN): >60 ML/MIN/1.73 M^2
GLUCOSE SERPL-MCNC: 111 MG/DL (ref 70–110)
GLUCOSE UR QL STRIP: NEGATIVE
HCT VFR BLD AUTO: 41 % (ref 37–48.5)
HGB BLD-MCNC: 13.6 G/DL (ref 12–16)
HGB UR QL STRIP: ABNORMAL
HYALINE CASTS #/AREA URNS LPF: 0 /LPF
IMM GRANULOCYTES # BLD AUTO: 0.01 K/UL (ref 0–0.04)
IMM GRANULOCYTES NFR BLD AUTO: 0.2 % (ref 0–0.5)
KETONES UR QL STRIP: ABNORMAL
LACTATE SERPL-SCNC: 0.6 MMOL/L (ref 0.5–2.2)
LEUKOCYTE ESTERASE UR QL STRIP: ABNORMAL
LIPASE SERPL-CCNC: 40 U/L (ref 4–60)
LYMPHOCYTES # BLD AUTO: 0.8 K/UL (ref 1–4.8)
LYMPHOCYTES NFR BLD: 15.1 % (ref 18–48)
MCH RBC QN AUTO: 30.8 PG (ref 27–31)
MCHC RBC AUTO-ENTMCNC: 33.2 G/DL (ref 32–36)
MCV RBC AUTO: 93 FL (ref 82–98)
MICROSCOPIC COMMENT: ABNORMAL
MONOCYTES # BLD AUTO: 0.3 K/UL (ref 0.3–1)
MONOCYTES NFR BLD: 5.4 % (ref 4–15)
NEUTROPHILS # BLD AUTO: 4.2 K/UL (ref 1.8–7.7)
NEUTROPHILS NFR BLD: 79.1 % (ref 38–73)
NITRITE UR QL STRIP: NEGATIVE
NRBC BLD-RTO: 0 /100 WBC
PH UR STRIP: 6 [PH] (ref 5–8)
PLATELET # BLD AUTO: 108 K/UL (ref 150–350)
PMV BLD AUTO: 12.2 FL (ref 9.2–12.9)
POC MOLECULAR INFLUENZA A AGN: NEGATIVE
POC MOLECULAR INFLUENZA B AGN: NEGATIVE
POTASSIUM SERPL-SCNC: 3.7 MMOL/L (ref 3.5–5.1)
PROCALCITONIN SERPL IA-MCNC: 0.12 NG/ML
PROT SERPL-MCNC: 8.1 G/DL (ref 6–8.4)
PROT UR QL STRIP: ABNORMAL
RBC # BLD AUTO: 4.41 M/UL (ref 4–5.4)
RBC #/AREA URNS HPF: 20 /HPF (ref 0–4)
SODIUM SERPL-SCNC: 138 MMOL/L (ref 136–145)
SP GR UR STRIP: 1.02 (ref 1–1.03)
URN SPEC COLLECT METH UR: ABNORMAL
UROBILINOGEN UR STRIP-ACNC: ABNORMAL EU/DL
WBC # BLD AUTO: 5.35 K/UL (ref 3.9–12.7)
WBC #/AREA URNS HPF: >100 /HPF (ref 0–5)

## 2020-03-24 PROCEDURE — 81025 URINE PREGNANCY TEST: CPT | Performed by: NURSE PRACTITIONER

## 2020-03-24 PROCEDURE — 87088 URINE BACTERIA CULTURE: CPT

## 2020-03-24 PROCEDURE — 81000 URINALYSIS NONAUTO W/SCOPE: CPT

## 2020-03-24 PROCEDURE — 85025 COMPLETE CBC W/AUTO DIFF WBC: CPT

## 2020-03-24 PROCEDURE — 99223 PR INITIAL HOSPITAL CARE,LEVL III: ICD-10-PCS | Mod: ,,, | Performed by: PHYSICIAN ASSISTANT

## 2020-03-24 PROCEDURE — 11000001 HC ACUTE MED/SURG PRIVATE ROOM

## 2020-03-24 PROCEDURE — 25000003 PHARM REV CODE 250: Performed by: SURGERY

## 2020-03-24 PROCEDURE — 87086 URINE CULTURE/COLONY COUNT: CPT

## 2020-03-24 PROCEDURE — S0030 INJECTION, METRONIDAZOLE: HCPCS | Performed by: SURGERY

## 2020-03-24 PROCEDURE — 63600175 PHARM REV CODE 636 W HCPCS: Performed by: NURSE PRACTITIONER

## 2020-03-24 PROCEDURE — 84145 PROCALCITONIN (PCT): CPT

## 2020-03-24 PROCEDURE — 87040 BLOOD CULTURE FOR BACTERIA: CPT | Mod: 59

## 2020-03-24 PROCEDURE — 63600175 PHARM REV CODE 636 W HCPCS: Performed by: SURGERY

## 2020-03-24 PROCEDURE — 96375 TX/PRO/DX INJ NEW DRUG ADDON: CPT

## 2020-03-24 PROCEDURE — 96365 THER/PROPH/DIAG IV INF INIT: CPT

## 2020-03-24 PROCEDURE — 86140 C-REACTIVE PROTEIN: CPT

## 2020-03-24 PROCEDURE — 25000003 PHARM REV CODE 250: Performed by: NURSE PRACTITIONER

## 2020-03-24 PROCEDURE — 87077 CULTURE AEROBIC IDENTIFY: CPT

## 2020-03-24 PROCEDURE — 99285 EMERGENCY DEPT VISIT HI MDM: CPT | Mod: 25

## 2020-03-24 PROCEDURE — 83690 ASSAY OF LIPASE: CPT

## 2020-03-24 PROCEDURE — 83605 ASSAY OF LACTIC ACID: CPT

## 2020-03-24 PROCEDURE — U0002 COVID-19 LAB TEST NON-CDC: HCPCS

## 2020-03-24 PROCEDURE — 87186 SC STD MICRODIL/AGAR DIL: CPT

## 2020-03-24 PROCEDURE — 80053 COMPREHEN METABOLIC PANEL: CPT

## 2020-03-24 PROCEDURE — 87502 INFLUENZA DNA AMP PROBE: CPT

## 2020-03-24 PROCEDURE — 25500020 PHARM REV CODE 255: Performed by: NURSE PRACTITIONER

## 2020-03-24 PROCEDURE — 99223 1ST HOSP IP/OBS HIGH 75: CPT | Mod: ,,, | Performed by: PHYSICIAN ASSISTANT

## 2020-03-24 RX ORDER — ACETAMINOPHEN 500 MG
500 TABLET ORAL EVERY 6 HOURS PRN
COMMUNITY
End: 2021-08-24

## 2020-03-24 RX ORDER — CYANOCOBALAMIN (VITAMIN B-12) 250 MCG
250 TABLET ORAL DAILY
Status: DISCONTINUED | OUTPATIENT
Start: 2020-03-25 | End: 2020-03-29 | Stop reason: HOSPADM

## 2020-03-24 RX ORDER — ACETAMINOPHEN 325 MG/1
650 TABLET ORAL EVERY 8 HOURS PRN
Status: DISCONTINUED | OUTPATIENT
Start: 2020-03-24 | End: 2020-03-29 | Stop reason: HOSPADM

## 2020-03-24 RX ORDER — METRONIDAZOLE 500 MG/100ML
500 INJECTION, SOLUTION INTRAVENOUS
Status: DISCONTINUED | OUTPATIENT
Start: 2020-03-24 | End: 2020-03-28

## 2020-03-24 RX ORDER — TRAZODONE HYDROCHLORIDE 50 MG/1
50 TABLET ORAL NIGHTLY
Status: DISCONTINUED | OUTPATIENT
Start: 2020-03-24 | End: 2020-03-29 | Stop reason: HOSPADM

## 2020-03-24 RX ORDER — MORPHINE SULFATE 10 MG/ML
4 INJECTION INTRAMUSCULAR; INTRAVENOUS; SUBCUTANEOUS EVERY 4 HOURS PRN
Status: DISCONTINUED | OUTPATIENT
Start: 2020-03-24 | End: 2020-03-29 | Stop reason: HOSPADM

## 2020-03-24 RX ORDER — MORPHINE SULFATE 10 MG/ML
5 INJECTION INTRAMUSCULAR; INTRAVENOUS; SUBCUTANEOUS EVERY 4 HOURS PRN
Status: DISCONTINUED | OUTPATIENT
Start: 2020-03-24 | End: 2020-03-24 | Stop reason: SDUPTHER

## 2020-03-24 RX ORDER — ONDANSETRON 2 MG/ML
8 INJECTION INTRAMUSCULAR; INTRAVENOUS
Status: COMPLETED | OUTPATIENT
Start: 2020-03-24 | End: 2020-03-24

## 2020-03-24 RX ORDER — PANTOPRAZOLE SODIUM 40 MG/10ML
40 INJECTION, POWDER, LYOPHILIZED, FOR SOLUTION INTRAVENOUS DAILY
Status: DISCONTINUED | OUTPATIENT
Start: 2020-03-25 | End: 2020-03-29 | Stop reason: HOSPADM

## 2020-03-24 RX ORDER — ACETAMINOPHEN 500 MG
1000 TABLET ORAL
Status: COMPLETED | OUTPATIENT
Start: 2020-03-24 | End: 2020-03-24

## 2020-03-24 RX ORDER — ONDANSETRON 2 MG/ML
4 INJECTION INTRAMUSCULAR; INTRAVENOUS EVERY 8 HOURS PRN
Status: DISCONTINUED | OUTPATIENT
Start: 2020-03-24 | End: 2020-03-29 | Stop reason: HOSPADM

## 2020-03-24 RX ORDER — MORPHINE SULFATE 10 MG/ML
4 INJECTION INTRAMUSCULAR; INTRAVENOUS; SUBCUTANEOUS
Status: COMPLETED | OUTPATIENT
Start: 2020-03-24 | End: 2020-03-24

## 2020-03-24 RX ORDER — MORPHINE SULFATE 10 MG/ML
2 INJECTION INTRAMUSCULAR; INTRAVENOUS; SUBCUTANEOUS EVERY 4 HOURS PRN
Status: DISCONTINUED | OUTPATIENT
Start: 2020-03-24 | End: 2020-03-29 | Stop reason: HOSPADM

## 2020-03-24 RX ADMIN — PIPERACILLIN SODIUM, TAZOBACTAM SODIUM 4.5 G: 4; .5 INJECTION, POWDER, LYOPHILIZED, FOR SOLUTION INTRAVENOUS at 09:03

## 2020-03-24 RX ADMIN — ACETAMINOPHEN 650 MG: 325 TABLET ORAL at 09:03

## 2020-03-24 RX ADMIN — TRAZODONE HYDROCHLORIDE 50 MG: 50 TABLET ORAL at 09:03

## 2020-03-24 RX ADMIN — MORPHINE SULFATE 4 MG: 10 INJECTION INTRAVENOUS at 09:03

## 2020-03-24 RX ADMIN — METRONIDAZOLE 500 MG: 500 INJECTION, SOLUTION INTRAVENOUS at 03:03

## 2020-03-24 RX ADMIN — PIPERACILLIN AND TAZOBACTAM 4.5 G: 4; .5 INJECTION, POWDER, LYOPHILIZED, FOR SOLUTION INTRAVENOUS; PARENTERAL at 12:03

## 2020-03-24 RX ADMIN — IOHEXOL 100 ML: 350 INJECTION, SOLUTION INTRAVENOUS at 10:03

## 2020-03-24 RX ADMIN — ACETAMINOPHEN 1000 MG: 500 TABLET ORAL at 09:03

## 2020-03-24 RX ADMIN — ONDANSETRON 8 MG: 2 INJECTION, SOLUTION INTRAMUSCULAR; INTRAVENOUS at 09:03

## 2020-03-24 RX ADMIN — MORPHINE SULFATE 4 MG: 10 INJECTION INTRAMUSCULAR; INTRAVENOUS; SUBCUTANEOUS at 12:03

## 2020-03-24 RX ADMIN — MORPHINE SULFATE 5 MG: 10 INJECTION INTRAVENOUS at 03:03

## 2020-03-24 NOTE — ED PROVIDER NOTES
Encounter Date: 3/24/2020    SCRIBE #1 NOTE: I, Kyung Brandon, am scribing for, and in the presence of,  Klaus Calzada NP. I have scribed the following portions of the note - Other sections scribed: HPI/ROS/PE.       History     Chief Complaint   Patient presents with    Abdominal Pain     c/o abdominal pain with nausea, localizes pain in lower abdomen.  pt took tylenol 4 hours PTA.      This 50 y.o. female with a medical history of GERD, NSTEMI, renal disorder, and cerebellar stroke presents to the ED for an emergent evaluation of severe, suprapubic and lower abdominal pain beginning last night. There is associated nausea. Pt reports a fever (ranging between 101F-102F) and chills x1 week. An oral temperature of 101.8F was noted in triage this AM. Pt reports she has been treating fevers with Tylenol with last dose being at 04:00 this AM. 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 She was tested for covid-19 on 3/19/20 and the results came back negative yesterday. Of note, pt reports having dysuria previously and was evaluated by Dr. Zamora yesterday. Pt was prescribed Macrobid and has taken 3 doses in total now with resolution of the dysuria. LMP was from 3/17/20-3/22/20. PSHx includes a , laparoscopic gastric banding, and cholecystectomy. Otherwise, pt denies chest pain, SOB, cough, vaginal bleeding or discharge, and any other associated symptoms.    The history is provided by the patient. No  was used.     Review of patient's allergies indicates:   Allergen Reactions    Hydrocodone      Past Medical History:   Diagnosis Date    Allergy     Coronary artery disease     Depression     3/24/22: denies feelings of current or history of self harm    GERD (gastroesophageal reflux disease)     History of acute renal failure 2017    received 1 month of hemodialysis    NSTEMI (non-ST elevated myocardial infarction)     Renal disorder     acute renal failure post  gastric sleeve    Stroke     cerebellar stroke post gastric sleeve     Past Surgical History:   Procedure Laterality Date    breast augmentation  1998    BREAST SURGERY  1998     SECTION      CHOLECYSTECTOMY  2001    FRACTURE SURGERY Right 1987    humerus, ORIF    LAPAROSCOPIC GASTRIC BANDING  2017    ORIF HUMERUS FRACTURE  1987     Family History   Problem Relation Age of Onset    Breast cancer Maternal Grandmother 63    Diabetes Father     Hypertension Father     Heart disease Father     Thyroid disease Mother     Cancer Mother 71        SCC of lung    Cancer Sister 48        pharyngeal cancer    Breast cancer Maternal Aunt      Social History     Tobacco Use    Smoking status: Former Smoker     Packs/day: 1.00     Years: 16.00     Pack years: 16.00     Last attempt to quit: 2011     Years since quittin.3    Smokeless tobacco: Never Used   Substance Use Topics    Alcohol use: Yes     Comment: occasional    Drug use: No     Review of Systems   Constitutional: Positive for chills and fever.   HENT: Negative for congestion, rhinorrhea and sore throat.    Eyes: Negative for visual disturbance.   Respiratory: Negative for cough and shortness of breath.    Cardiovascular: Negative for chest pain.   Gastrointestinal: Positive for abdominal pain and nausea. Negative for diarrhea and vomiting.   Genitourinary: Negative for difficulty urinating.   Musculoskeletal: Negative for neck stiffness.   Skin: Negative for rash.   Neurological: Negative for headaches.       Physical Exam     Initial Vitals [20 0827]   BP Pulse Resp Temp SpO2   122/79 88 20 (!) 101.8 °F (38.8 °C) 98 %      MAP       --         Physical Exam    Nursing note and vitals reviewed.  Constitutional: She appears well-developed and well-nourished. She is not diaphoretic. No distress.   HENT:   Head: Normocephalic and atraumatic.   Right Ear: External ear normal.   Left Ear: External ear normal.   Nose: Nose  normal.   Eyes: Conjunctivae and EOM are normal. Right eye exhibits no discharge. Left eye exhibits no discharge.   Neck: Normal range of motion. Neck supple. No tracheal deviation present.   Cardiovascular: Normal rate and regular rhythm.   Pulmonary/Chest: Breath sounds normal. No stridor. No respiratory distress.   Abdominal: Soft. She exhibits no distension. There is tenderness in the right lower quadrant, suprapubic area and left lower quadrant. There is no rigidity, no guarding, no tenderness at McBurney's point and negative Cunha's sign.   Musculoskeletal: Normal range of motion. She exhibits no edema or tenderness.   Neurological: She is alert and oriented to person, place, and time. She has normal strength. No cranial nerve deficit or sensory deficit.   Skin: Skin is warm and dry. No rash noted.   Psychiatric: She has a normal mood and affect. Her behavior is normal. Judgment and thought content normal.         ED Course   Procedures  Labs Reviewed   CBC W/ AUTO DIFFERENTIAL - Abnormal; Notable for the following components:       Result Value    Platelets 108 (*)     Lymph # 0.8 (*)     Gran% 79.1 (*)     Lymph% 15.1 (*)     All other components within normal limits   COMPREHENSIVE METABOLIC PANEL - Abnormal; Notable for the following components:    CO2 22 (*)     Glucose 111 (*)     All other components within normal limits   URINALYSIS, REFLEX TO URINE CULTURE - Abnormal; Notable for the following components:    Appearance, UA Hazy (*)     Protein, UA 2+ (*)     Ketones, UA 1+ (*)     Occult Blood UA 3+ (*)     Urobilinogen, UA 2.0-3.0 (*)     Leukocytes, UA 3+ (*)     All other components within normal limits    Narrative:     Preferred Collection Type->Urine, Clean Catch   URINALYSIS MICROSCOPIC - Abnormal; Notable for the following components:    RBC, UA 20 (*)     WBC, UA >100 (*)     Bacteria Moderate (*)     All other components within normal limits    Narrative:     Preferred Collection  Type->Urine, Clean Catch   C-REACTIVE PROTEIN - Abnormal; Notable for the following components:    .1 (*)     All other components within normal limits   CULTURE, URINE   CULTURE, BLOOD   CULTURE, BLOOD   LIPASE   PROCALCITONIN   C-REACTIVE PROTEIN   LACTIC ACID, PLASMA   SARS-COV-2 (COVID-19) QUALITATIVE PCR   POCT INFLUENZA A/B MOLECULAR   POCT URINE PREGNANCY          Imaging Results          CT Abdomen Pelvis With Contrast (Final result)  Result time 03/24/20 10:59:51    Final result by Juan Pablo Felton MD (03/24/20 10:59:51)                 Impression:      1. CT findings consistent with acute sigmoid colon diverticulitis complicated by microperforation and a small extraluminal/presumed intramural phlegmon versus developing abscess.  No obstruction.  No focal drainable fluid collections.  2. Multifocal subsegmental ground-glass and consolidative opacities throughout the visualized lungs most concerning for viral pneumonia.  3. Few gas bubbles within the non dependent portion of the bladder lumen, presumably related to recent instrumentation.  A gas-forming infection could present with similar findings and is possible.  4. Mild splenomegaly.  5. Subcentimeter hepatic hypodensity, too small to characterize but favored to represent a small cyst.  6. Left nonobstructing nephrolithiasis.  Left renal cyst and right renal too small to characterize hypodensity.  7. 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  Preliminary findings discussed with Klaus Calzada NP, 03/24/2020 at 10:45 am.      Electronically signed by: Juan Pablo Felton MD  Date:    03/24/2020  Time:    10:59             Narrative:    EXAMINATION:  CT ABDOMEN PELVIS WITH CONTRAST    CLINICAL HISTORY:  RLQ pain, appendicitis suspected;    TECHNIQUE:  5 mm axial images were obtained through the abdomen and pelvis following administration of 100 cc of Omnipaque 350 IV contrast.  Coronal and sagittal reformats were  performed.    COMPARISON:  CT 05/01/2017.    FINDINGS:  Visualized heart is normal.  No pericardial effusion.    There are multifocal subsegmental ground-glass opacities throughout the visualized lungs with superimposed subsegmental areas of consolidation within the posterior and medial basal segments of the right lower lobe.  No pleural effusion.    The liver is normal in size.  Subcentimeter hypodense lesion noted within the inferior aspect of the right hepatic lobe, too small to accurately characterize but favored to be benign, possibly a cyst.  Portal vasculature is patent.  No intrahepatic bile duct dilatation.    Gallbladder is surgically absent.  Common bile duct is slightly dilated measuring 7 mm in maximum dimension with tapering at the level of the ampulla.    There are postoperative changes of sleeve gastrectomy.  Residual stomach is unremarkable.    The spleen is enlarged.  No focal splenic abnormalities.    Duodenum, pancreas and adrenal glands are within normal limits.    Kidneys are normal in size and location.  There is a left renal cortical/parapelvic cyst, increased in size when compared to the previous exam.  Additional subcentimeter cortical hypodensity noted within the superior pole of the right kidney, too small to accurately characterize.  There is a 5 mm calcified stone within the superior aspect of the left renal collecting system.  No hydronephrosis or hydroureter.  Multiple gas bubbles noted within the non dependent portion of the bladder, presumably related to recent instrumentation.    IUD identified in appropriate position.  Ovaries are within normal limits.  No pelvic free fluid.    There is a short segment of circumferential wall thickening involving the sigmoid colon with numerous superimposed diverticuli, prominent surrounding fat stranding and a few scattered extraluminal gas bubbles.  There is a 1.6 x 1.4 x 1.4 cm extraluminal, possibly mural fluid collection along the posterior  wall of the inflamed sigmoid colon, presumably a small phlegmon or developing abscess.  No definite drainable fluid collections.    Aorta tapers normally without significant atherosclerotic calcification.  Celiac artery, SMA, bilateral renal arteries and ROSA are patent.    No focal mesenteric masses.    Subcutaneous soft tissues are within normal limits.  There are partially visualized bilateral breast implants.    No acute osseous abnormalities.  There are mild degenerative changes of the spine.                                 Medical Decision Making:   History:   Old Medical Records: I decided to obtain old medical records.  Differential Diagnosis:   Viral syndrome, COVID-19, influenza, appendicitis, diverticulitis, colitis, UTI, ovarian cyst, ovarian torsion, TOA, others  Clinical Tests:   Lab Tests: Ordered and Reviewed  Radiological Study: Ordered and Reviewed  ED Management:  HPI and physical exam as above.    Although patient recently tested negative for COVID-19 I will retest her given her risk factors and reported week-long fever and symptoms.  In addition to the week-long fever the patient reports acute onset lower abdominal pain that began last night.  The lower abdomen is tender to palpation.  CT imaging with evidence ground-glass opacities in the lungs further indicating likely COVID-19 infection.  There is also sigmoid diverticulitis with microperforation and phlegmon versus developing abscess.  CBC, CMP, lipase are unremarkable.  CRP is elevated.  Urinalysis with evidence of infection.  Findings concerning for diverticulitis with perforation, UTI, and COVID-19.  Patient's respiratory effort is normal and lungs are clear to auscultation bilaterally in all fields.  No tachypnea or increased work of breathing.  Ordered blood cultures and Zosyn.    Case discussed with Dr. Schwab (general surgery) and Dr. Dayton Cornell (hospital medicine).  Patient will be admitted to Hospital Medicine and General surgery  will consult.            Scribe Attestation:   Scribe #1: I performed the above scribed service and the documentation accurately describes the services I performed. I attest to the accuracy of the note.                          Clinical Impression:       ICD-10-CM ICD-9-CM   1. Suspected Covid-19 Virus Infection R68.89    2. Perforation of sigmoid colon due to diverticulitis K57.20 562.11   3. Urinary tract infection with hematuria, site unspecified N39.0 599.0    R31.9 599.70   4. Fever, unspecified fever cause R50.9 780.60         Disposition:   Disposition: Admitted     ED Disposition Condition    Admit        Scribe Attestation: I, Klaus Calzada NP, personally performed the services described in this documentation. All medical record entries made by the scribe were at my direction and in my presence. I have reviewed the chart and agree that the record reflects my personal performance and is accurate and complete.                    Klaus Calzada NP  03/24/20 3035

## 2020-03-24 NOTE — LETTER
March 29, 2020         [place] [id number]  Phone: [phone]  Fax: [phone]       Patient: Shireen Anton   YOB: 1970  Date of Visit: 03/29/2020    To Whom It May Concern:    Neil Anton  was at [facility] on 03/24/2020 to 03/29/2020. She may return to work on 4/6/2020 with light duties. If you have any questions or concerns, or if I can be of further assistance, please do not hesitate to contact me.    Sincerely,    Bryanna Chatman MD

## 2020-03-24 NOTE — ASSESSMENT & PLAN NOTE
- acute abdominal pain with CT findings consistent with acute sigmoid colon diverticulitis complicated by microperforation and a small extraluminal/presumed intramural phlegmon versus developing abscess.   - Zosyn started in ED, continue  - surgery consulted, rec to monitor, clears for now  - pain management  - hold off on IVF's in setting of possible COVID since pt on clears

## 2020-03-24 NOTE — H&P
[facility] Medicine  History & Physical    Patient Name: Shireen Anton  MRN: [id number]  Admission Date: 3/24/2020  Attending Physician: Dayton Cornell MD   Primary Care Provider: Jeancarlos Zamora MD         Patient information was obtained from patient, past medical records and ER records.     Subjective:     Principal Problem:Perforation of sigmoid colon due to diverticulitis    Chief Complaint:   Chief Complaint   Patient presents with    Abdominal Pain     c/o abdominal pain with nausea, localizes pain in lower abdomen.  pt took tylenol 4 hours PTA.         HPI: 50 y.o. female with a PMH of GERD, NSTEMI, gastric bypass, and cerebellar stroke presents to the ED with c/o severe, suprapubic and lower abdominal pain that started last night associated with nausea. Patient reports that she has had fevers and malaise for the last week and was tested for COVID on 3/19/2020 which resulted as negative. 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 Also c/o dysuria which has improved since starting macrobid, but urine still cloudy. She denies SOB, chest pain, vomiting, lower extremity edema. Denies tobacco and illicit drug use, endorses occ alcohol. ED evaluation patient febrile, normal WBC, lactic and procal, ;  CT findings consistent with acute sigmoid colon diverticulitis complicated by microperforation and a small extraluminal/presumed intramural phlegmon versus developing abscess.  No obstruction.  No focal drainable fluid collections and Multifocal subsegmental ground-glass and consolidative opacities throughout the visualized lungs most concerning for viral pneumonia. Admitted.            Past Medical History:   Diagnosis Date    Allergy     Coronary artery disease     Depression     3/24/22: denies feelings of current or history of self harm    GERD (gastroesophageal reflux disease)     History of acute renal failure 05/2017    received 1 month of hemodialysis     NSTEMI (non-ST elevated myocardial infarction)     Renal disorder     acute renal failure post gastric sleeve    Stroke     cerebellar stroke post gastric sleeve       Past Surgical History:   Procedure Laterality Date    breast augmentation      BREAST SURGERY  1998     SECTION      CHOLECYSTECTOMY      FRACTURE SURGERY Right 1987    humerus, ORIF    LAPAROSCOPIC GASTRIC BANDING  2017    ORIF HUMERUS FRACTURE  1987       Review of patient's allergies indicates:   Allergen Reactions    Hydrocodone        No current facility-administered medications on file prior to encounter.      Current Outpatient Medications on File Prior to Encounter   Medication Sig    acetaminophen (TYLENOL) 500 MG tablet Take 500 mg by mouth every 6 (six) hours as needed for Pain.    aspirin (ECOTRIN) 81 MG EC tablet Take 1 tablet (81 mg total) by mouth once daily.    cetirizine (ZYRTEC) 10 MG tablet Take 10 mg by mouth once daily.    cyanocobalamin (VITAMIN B-12) 250 MCG tablet Take 1 tablet (250 mcg total) by mouth once daily.    nitrofurantoin, macrocrystal-monohydrate, (MACROBID) 100 MG capsule Take 1 capsule (100 mg total) by mouth 2 (two) times daily. for 5 days    omeprazole (PRILOSEC) 20 MG capsule     PARAGARD T 380A 380 square mm IUD     calcium citrate (CALCITRATE) 200 mg (950 mg) tablet Take 1 tablet (200 mg total) by mouth 3 (three) times daily.    ergocalciferol (VITAMIN D2) 50,000 unit Cap Take 50,000 Units by mouth every 7 days.    metoprolol succinate (TOPROL-XL) 25 MG 24 hr tablet Take 1 tablet (25 mg total) by mouth once daily.    traZODone (DESYREL) 50 MG tablet Take 1 tablet (50 mg total) by mouth every evening.     Family History     Problem Relation (Age of Onset)    Breast cancer Maternal Grandmother (63), Maternal Aunt    Cancer Mother (71), Sister (48)    Diabetes Father    Heart disease Father    Hypertension Father    Thyroid disease Mother        Tobacco Use    Smoking  status: Former Smoker     Packs/day: 1.00     Years: 16.00     Pack years: 16.00     Last attempt to quit: 2011     Years since quittin.3    Smokeless tobacco: Never Used   Substance and Sexual Activity    Alcohol use: Yes     Comment: occasional    Drug use: No    Sexual activity: Yes     Partners: Male     Birth control/protection: Inserts     Review of Systems   Constitutional: Positive for appetite change, chills and fever.   HENT: Negative for congestion and trouble swallowing.    Eyes: Negative for discharge and visual disturbance.   Respiratory: Negative for cough, chest tightness and shortness of breath.    Cardiovascular: Negative for chest pain, palpitations and leg swelling.   Gastrointestinal: Positive for abdominal pain and nausea. Negative for abdominal distention, diarrhea and vomiting.   Genitourinary: Positive for dysuria. Negative for difficulty urinating, frequency, hematuria and urgency.   Musculoskeletal: Negative for arthralgias and myalgias.   Skin: Negative for rash and wound.   Neurological: Negative for dizziness, syncope, light-headedness, numbness and headaches.   Hematological: Does not bruise/bleed easily.   Psychiatric/Behavioral: Negative for confusion.     Objective:     Vital Signs (Most Recent):  Temp: 98.8 °F (37.1 °C) (20 1305)  Pulse: 73 (20 1305)  Resp: 18 (20 1305)  BP: 114/72 (20 1305)  SpO2: (!) 94 % (20 1305) Vital Signs (24h Range):  Temp:  [98.8 °F (37.1 °C)-102.8 °F (39.3 °C)] 98.8 °F (37.1 °C)  Pulse:  [71-88] 73  Resp:  [18-20] 18  SpO2:  [93 %-98 %] 94 %  BP: (113-122)/(68-79) 114/72     Weight: 112 kg (247 lb)  Body mass index is 33.49 kg/m².    Physical Exam   Constitutional: She is oriented to person, place, and time. She appears well-developed and well-nourished. No distress.   HENT:   Head: Normocephalic and atraumatic.   Eyes: Conjunctivae are normal. No scleral icterus.   Neck: Normal range of motion. Neck supple.    Cardiovascular: Normal rate, regular rhythm, normal heart sounds and intact distal pulses.   Pulmonary/Chest: Effort normal. No respiratory distress. She has no wheezes.   Diminished b/l   Abdominal: Soft. Bowel sounds are normal. She exhibits no distension. There is tenderness (LLQ).   Musculoskeletal: Normal range of motion. She exhibits no edema.   Neurological: She is alert and oriented to person, place, and time.   Skin: Skin is warm and dry. Capillary refill takes less than 2 seconds. She is not diaphoretic.   Psychiatric: She has a normal mood and affect.   Nursing note and vitals reviewed.          Significant Labs:   CBC:   Recent Labs   Lab 03/24/20  0912   WBC 5.35   HGB 13.6   HCT 41.0   *     CMP:   Recent Labs   Lab 03/24/20  0912      K 3.7      CO2 22*   *   BUN 10   CREATININE 0.9   CALCIUM 8.9   PROT 8.1   ALBUMIN 3.6   BILITOT 0.5   ALKPHOS 82   AST 26   ALT 15   ANIONGAP 13   EGFRNONAA >60     All pertinent labs within the past 24 hours have been reviewed.    Significant Imaging: I have reviewed all pertinent imaging results/findings within the past 24 hours.   Imaging Results          CT Abdomen Pelvis With Contrast (Final result)  Result time 03/24/20 10:59:51    Final result by Juan Pablo Felton MD (03/24/20 10:59:51)                 Impression:      1. CT findings consistent with acute sigmoid colon diverticulitis complicated by microperforation and a small extraluminal/presumed intramural phlegmon versus developing abscess.  No obstruction.  No focal drainable fluid collections.  2. Multifocal subsegmental ground-glass and consolidative opacities throughout the visualized lungs most concerning for viral pneumonia.  3. Few gas bubbles within the non dependent portion of the bladder lumen, presumably related to recent instrumentation.  A gas-forming infection could present with similar findings and is possible.  4. Mild splenomegaly.  5. Subcentimeter hepatic  hypodensity, too small to characterize but favored to represent a small cyst.  6. Left nonobstructing nephrolithiasis.  Left renal cyst and right renal too small to characterize hypodensity.  7. 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  Preliminary findings discussed with Klaus Calzada NP, 03/24/2020 at 10:45 am.      Electronically signed by: Juan Pablo Felton MD  Date:    03/24/2020  Time:    10:59             Narrative:    EXAMINATION:  CT ABDOMEN PELVIS WITH CONTRAST    CLINICAL HISTORY:  RLQ pain, appendicitis suspected;    TECHNIQUE:  5 mm axial images were obtained through the abdomen and pelvis following administration of 100 cc of Omnipaque 350 IV contrast.  Coronal and sagittal reformats were performed.    COMPARISON:  CT 05/01/2017.    FINDINGS:  Visualized heart is normal.  No pericardial effusion.    There are multifocal subsegmental ground-glass opacities throughout the visualized lungs with superimposed subsegmental areas of consolidation within the posterior and medial basal segments of the right lower lobe.  No pleural effusion.    The liver is normal in size.  Subcentimeter hypodense lesion noted within the inferior aspect of the right hepatic lobe, too small to accurately characterize but favored to be benign, possibly a cyst.  Portal vasculature is patent.  No intrahepatic bile duct dilatation.    Gallbladder is surgically absent.  Common bile duct is slightly dilated measuring 7 mm in maximum dimension with tapering at the level of the ampulla.    There are postoperative changes of sleeve gastrectomy.  Residual stomach is unremarkable.    The spleen is enlarged.  No focal splenic abnormalities.    Duodenum, pancreas and adrenal glands are within normal limits.    Kidneys are normal in size and location.  There is a left renal cortical/parapelvic cyst, increased in size when compared to the previous exam.  Additional subcentimeter cortical hypodensity noted within the superior  pole of the right kidney, too small to accurately characterize.  There is a 5 mm calcified stone within the superior aspect of the left renal collecting system.  No hydronephrosis or hydroureter.  Multiple gas bubbles noted within the non dependent portion of the bladder, presumably related to recent instrumentation.    IUD identified in appropriate position.  Ovaries are within normal limits.  No pelvic free fluid.    There is a short segment of circumferential wall thickening involving the sigmoid colon with numerous superimposed diverticuli, prominent surrounding fat stranding and a few scattered extraluminal gas bubbles.  There is a 1.6 x 1.4 x 1.4 cm extraluminal, possibly mural fluid collection along the posterior wall of the inflamed sigmoid colon, presumably a small phlegmon or developing abscess.  No definite drainable fluid collections.    Aorta tapers normally without significant atherosclerotic calcification.  Celiac artery, SMA, bilateral renal arteries and ROSA are patent.    No focal mesenteric masses.    Subcutaneous soft tissues are within normal limits.  There are partially visualized bilateral breast implants.    No acute osseous abnormalities.  There are mild degenerative changes of the spine.                                  Assessment/Plan:     * Perforation of sigmoid colon due to diverticulitis  - acute abdominal pain with CT findings consistent with acute sigmoid colon diverticulitis complicated by microperforation and a small extraluminal/presumed intramural phlegmon versus developing abscess.   - Zosyn started in ED, continue  - surgery consulted, rec to monitor, clears for now  - pain management  - hold off on IVF's in setting of possible COVID since pt on clears      UTI (urinary tract infection)  - previously on macrobid  - follow culture  - on abx      Thrombocytopenia  - appears chronic  - no evidence of bleeding  - monitor        Suspected Covid-19 Virus Infection  - previous COVID  3/19 was negative  - restested today  -  patient febrile, elevated CRP, CT Multifocal subsegmental ground-glass and consolidative opacities throughout the visualized lungs most concerning for viral pneumonia.  - no SOB, stable Spo2 93-98% on RA      VTE Risk Mitigation (From admission, onward)    None             Tiffanie Snell PA-C  Department of Hospital Medicine   [facility]

## 2020-03-24 NOTE — HPI
50 y.o. female with a PMH of GERD, NSTEMI, gastric bypass, and cerebellar stroke presents to the ED with c/o severe, suprapubic and lower abdominal pain that started last night associated with nausea. Patient reports that she has had fevers and malaise for the last week and was tested for COVID on 3/19/2020 which resulted as negative. 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 Also c/o dysuria which has improved since starting macrobid, but urine still cloudy. She denies SOB, chest pain, vomiting, lower extremity edema. Denies tobacco and illicit drug use, endorses occ alcohol. ED evaluation patient febrile, normal WBC, lactic and procal, ;  CT findings consistent with acute sigmoid colon diverticulitis complicated by microperforation and a small extraluminal/presumed intramural phlegmon versus developing abscess.  No obstruction.  No focal drainable fluid collections and Multifocal subsegmental ground-glass and consolidative opacities throughout the visualized lungs most concerning for viral pneumonia. Admitted.

## 2020-03-24 NOTE — CONSULTS
Consult Note    Consult Requested by:  ED  Reason for Consult:  Diverticulitis  SUBJECTIVE:     History of Present Illness:  Patient is a 50 y.o. female presents with abdominal pain left lower quadrant that started last night.  She does not associate this with constipation or diarrhea and no other GI symptoms..  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  Review of patient's allergies indicates:   Allergen Reactions    Hydrocodone        Past Medical History:   Diagnosis Date    Allergy     Coronary artery disease     Depression     3/24/22: denies feelings of current or history of self harm    GERD (gastroesophageal reflux disease)     History of acute renal failure 2017    received 1 month of hemodialysis    NSTEMI (non-ST elevated myocardial infarction)     Renal disorder     acute renal failure post gastric sleeve    Stroke     cerebellar stroke post gastric sleeve     Past Surgical History:   Procedure Laterality Date    breast augmentation      BREAST SURGERY  1998     SECTION      CHOLECYSTECTOMY  2001    FRACTURE SURGERY Right 1987    humerus, ORIF    LAPAROSCOPIC GASTRIC BANDING  2017    ORIF HUMERUS FRACTURE  1987     Family History   Problem Relation Age of Onset    Breast cancer Maternal Grandmother 63    Diabetes Father     Hypertension Father     Heart disease Father     Thyroid disease Mother     Cancer Mother 71        SCC of lung    Cancer Sister 48        pharyngeal cancer    Breast cancer Maternal Aunt      Social History     Tobacco Use    Smoking status: Former Smoker     Packs/day: 1.00     Years: 16.00     Pack years: 16.00     Last attempt to quit: 2011     Years since quittin.3    Smokeless tobacco: Never Used   Substance Use Topics    Alcohol use: Yes     Comment: occasional    Drug use: No       Review of Systems:  Constitutional: no fever or  chills  Respiratory: no cough or shortness of breath  Cardiovascular: no chest pain or palpitations  Gastrointestinal: no nausea or vomiting, no abdominal pain or change in bowel habits, positive for abdominal pain  Musculoskeletal: no arthralgias or myalgias  Neurological: no seizures or tremors    OBJECTIVE:     Vital Signs:  Temp:  [98.8 °F (37.1 °C)-102.8 °F (39.3 °C)]   Pulse:  [71-88]   Resp:  [18-20]   BP: (113-122)/(68-79)   SpO2:  [93 %-98 %]     Physical Exam:  General: well developed, well nourished  Eyes: conjunctivae/corneas clear. PERRL..  HENT: Head:normocephalic, atraumatic. Ears:bilateral TM's and external ear canals normal. Nose: Nares normal. Septum midline. Mucosa normal. No drainage or sinus tenderness., no discharge. Throat: lips, mucosa, and tongue normal; teeth and gums normal and no throat erythema.  Neck: supple, symmetrical, trachea midline, no JVD and thyroid not enlarged, symmetric, no tenderness/mass/nodules  Lungs:  clear to auscultation bilaterally and normal respiratory effort  Cardiovascular: Heart: regular rate and rhythm, S1, S2 normal, no murmur, click, rub or gallop. Chest Wall: no tenderness. Extremities: no cyanosis or edema, or clubbing. Pulses: 2+ and symmetric.  Abdomen/Rectal: Abdomen: abnormal findings:  distended. Rectal: normal tone, no masses or tenderness  Skin: Skin color, texture, turgor normal. No rashes or lesions  Musculoskeletal:normal gait and no clubbing, cyanosis  Neurologic: Normal strength and tone. No focal numbness or weakness    Laboratory:  CBC:   Recent Labs   Lab 03/24/20 0912   WBC 5.35   RBC 4.41   HGB 13.6   HCT 41.0   *   MCV 93   MCH 30.8   MCHC 33.2     BMP:   Recent Labs   Lab 03/24/20 0912   *      K 3.7      CO2 22*   BUN 10   CREATININE 0.9   CALCIUM 8.9     CMP:   Recent Labs   Lab 03/24/20 0912   *   CALCIUM 8.9   ALBUMIN 3.6   PROT 8.1      K 3.7   CO2 22*      BUN 10   CREATININE 0.9    ALKPHOS 82   ALT 15   AST 26   BILITOT 0.5       Diagnostic Results:  CT: Reviewed  Sigmoid diverticulitis with small micro perforation    ASSESSMENT/PLAN:     Complicated sigmoid diverticulitis    Plan:  IV antibiotics for her diverticulitis.  She is none operative at this point which should her status deteriorate she would need emergent operative intervention.  This is however complicated by the fact that she is awaiting the results of her 2nd test for covid 19.  I will follow with you

## 2020-03-24 NOTE — ED TRIAGE NOTES
Pt presents to ED with CC of fever x 1 week, reports last night around 2000 began having severe sharp 8/10 abdominal pain varying in intensity. Pt reports vomiting 1 time, denies blood. Pt denies diarrhea.     Pt reports current macrobid use for possible UTI. 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    Pt denies any other complaints at this time. AAOx4

## 2020-03-24 NOTE — SUBJECTIVE & OBJECTIVE
Past Medical History:   Diagnosis Date    Allergy     Coronary artery disease     Depression     3/24/22: denies feelings of current or history of self harm    GERD (gastroesophageal reflux disease)     History of acute renal failure 2017    received 1 month of hemodialysis    NSTEMI (non-ST elevated myocardial infarction)     Renal disorder     acute renal failure post gastric sleeve    Stroke     cerebellar stroke post gastric sleeve       Past Surgical History:   Procedure Laterality Date    breast augmentation      BREAST SURGERY  1998     SECTION      CHOLECYSTECTOMY  2001    FRACTURE SURGERY Right 1987    humerus, ORIF    LAPAROSCOPIC GASTRIC BANDING  2017    ORIF HUMERUS FRACTURE  1987       Review of patient's allergies indicates:   Allergen Reactions    Hydrocodone        No current facility-administered medications on file prior to encounter.      Current Outpatient Medications on File Prior to Encounter   Medication Sig    acetaminophen (TYLENOL) 500 MG tablet Take 500 mg by mouth every 6 (six) hours as needed for Pain.    aspirin (ECOTRIN) 81 MG EC tablet Take 1 tablet (81 mg total) by mouth once daily.    cetirizine (ZYRTEC) 10 MG tablet Take 10 mg by mouth once daily.    cyanocobalamin (VITAMIN B-12) 250 MCG tablet Take 1 tablet (250 mcg total) by mouth once daily.    nitrofurantoin, macrocrystal-monohydrate, (MACROBID) 100 MG capsule Take 1 capsule (100 mg total) by mouth 2 (two) times daily. for 5 days    omeprazole (PRILOSEC) 20 MG capsule     PARAGARD T 380A 380 square mm IUD     calcium citrate (CALCITRATE) 200 mg (950 mg) tablet Take 1 tablet (200 mg total) by mouth 3 (three) times daily.    ergocalciferol (VITAMIN D2) 50,000 unit Cap Take 50,000 Units by mouth every 7 days.    metoprolol succinate (TOPROL-XL) 25 MG 24 hr tablet Take 1 tablet (25 mg total) by mouth once daily.    traZODone (DESYREL) 50 MG tablet Take 1 tablet (50 mg total) by mouth  every evening.     Family History     Problem Relation (Age of Onset)    Breast cancer Maternal Grandmother (63), Maternal Aunt    Cancer Mother (71), Sister (48)    Diabetes Father    Heart disease Father    Hypertension Father    Thyroid disease Mother        Tobacco Use    Smoking status: Former Smoker     Packs/day: 1.00     Years: 16.00     Pack years: 16.00     Last attempt to quit: 2011     Years since quittin.3    Smokeless tobacco: Never Used   Substance and Sexual Activity    Alcohol use: Yes     Comment: occasional    Drug use: No    Sexual activity: Yes     Partners: Male     Birth control/protection: Inserts     Review of Systems   Constitutional: Positive for appetite change, chills and fever.   HENT: Negative for congestion and trouble swallowing.    Eyes: Negative for discharge and visual disturbance.   Respiratory: Negative for cough, chest tightness and shortness of breath.    Cardiovascular: Negative for chest pain, palpitations and leg swelling.   Gastrointestinal: Positive for abdominal pain and nausea. Negative for abdominal distention, diarrhea and vomiting.   Genitourinary: Positive for dysuria. Negative for difficulty urinating, frequency, hematuria and urgency.   Musculoskeletal: Negative for arthralgias and myalgias.   Skin: Negative for rash and wound.   Neurological: Negative for dizziness, syncope, light-headedness, numbness and headaches.   Hematological: Does not bruise/bleed easily.   Psychiatric/Behavioral: Negative for confusion.     Objective:     Vital Signs (Most Recent):  Temp: 98.8 °F (37.1 °C) (20 1305)  Pulse: 73 (20 1305)  Resp: 18 (20 1305)  BP: 114/72 (20 1305)  SpO2: (!) 94 % (20 1305) Vital Signs (24h Range):  Temp:  [98.8 °F (37.1 °C)-102.8 °F (39.3 °C)] 98.8 °F (37.1 °C)  Pulse:  [71-88] 73  Resp:  [18-20] 18  SpO2:  [93 %-98 %] 94 %  BP: (113-122)/(68-79) 114/72     Weight: 112 kg (247 lb)  Body mass index is 33.49  kg/m².    Physical Exam   Constitutional: She is oriented to person, place, and time. She appears well-developed and well-nourished. No distress.   HENT:   Head: Normocephalic and atraumatic.   Eyes: Conjunctivae are normal. No scleral icterus.   Neck: Normal range of motion. Neck supple.   Cardiovascular: Normal rate, regular rhythm, normal heart sounds and intact distal pulses.   Pulmonary/Chest: Effort normal. No respiratory distress. She has no wheezes.   Diminished b/l   Abdominal: Soft. Bowel sounds are normal. She exhibits no distension. There is tenderness (LLQ).   Musculoskeletal: Normal range of motion. She exhibits no edema.   Neurological: She is alert and oriented to person, place, and time.   Skin: Skin is warm and dry. Capillary refill takes less than 2 seconds. She is not diaphoretic.   Psychiatric: She has a normal mood and affect.   Nursing note and vitals reviewed.          Significant Labs:   CBC:   Recent Labs   Lab 03/24/20  0912   WBC 5.35   HGB 13.6   HCT 41.0   *     CMP:   Recent Labs   Lab 03/24/20  0912      K 3.7      CO2 22*   *   BUN 10   CREATININE 0.9   CALCIUM 8.9   PROT 8.1   ALBUMIN 3.6   BILITOT 0.5   ALKPHOS 82   AST 26   ALT 15   ANIONGAP 13   EGFRNONAA >60     All pertinent labs within the past 24 hours have been reviewed.    Significant Imaging: I have reviewed all pertinent imaging results/findings within the past 24 hours.   Imaging Results          CT Abdomen Pelvis With Contrast (Final result)  Result time 03/24/20 10:59:51    Final result by Juan Pablo Felton MD (03/24/20 10:59:51)                 Impression:      1. CT findings consistent with acute sigmoid colon diverticulitis complicated by microperforation and a small extraluminal/presumed intramural phlegmon versus developing abscess.  No obstruction.  No focal drainable fluid collections.  2. Multifocal subsegmental ground-glass and consolidative opacities throughout the visualized lungs  most concerning for viral pneumonia.  3. Few gas bubbles within the non dependent portion of the bladder lumen, presumably related to recent instrumentation.  A gas-forming infection could present with similar findings and is possible.  4. Mild splenomegaly.  5. Subcentimeter hepatic hypodensity, too small to characterize but favored to represent a small cyst.  6. Left nonobstructing nephrolithiasis.  Left renal cyst and right renal too small to characterize hypodensity.  7. 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  Preliminary findings discussed with Klaus Calzada NP, 03/24/2020 at 10:45 am.      Electronically signed by: Juan Pablo Felton MD  Date:    03/24/2020  Time:    10:59             Narrative:    EXAMINATION:  CT ABDOMEN PELVIS WITH CONTRAST    CLINICAL HISTORY:  RLQ pain, appendicitis suspected;    TECHNIQUE:  5 mm axial images were obtained through the abdomen and pelvis following administration of 100 cc of Omnipaque 350 IV contrast.  Coronal and sagittal reformats were performed.    COMPARISON:  CT 05/01/2017.    FINDINGS:  Visualized heart is normal.  No pericardial effusion.    There are multifocal subsegmental ground-glass opacities throughout the visualized lungs with superimposed subsegmental areas of consolidation within the posterior and medial basal segments of the right lower lobe.  No pleural effusion.    The liver is normal in size.  Subcentimeter hypodense lesion noted within the inferior aspect of the right hepatic lobe, too small to accurately characterize but favored to be benign, possibly a cyst.  Portal vasculature is patent.  No intrahepatic bile duct dilatation.    Gallbladder is surgically absent.  Common bile duct is slightly dilated measuring 7 mm in maximum dimension with tapering at the level of the ampulla.    There are postoperative changes of sleeve gastrectomy.  Residual stomach is unremarkable.    The spleen is enlarged.  No focal splenic  abnormalities.    Duodenum, pancreas and adrenal glands are within normal limits.    Kidneys are normal in size and location.  There is a left renal cortical/parapelvic cyst, increased in size when compared to the previous exam.  Additional subcentimeter cortical hypodensity noted within the superior pole of the right kidney, too small to accurately characterize.  There is a 5 mm calcified stone within the superior aspect of the left renal collecting system.  No hydronephrosis or hydroureter.  Multiple gas bubbles noted within the non dependent portion of the bladder, presumably related to recent instrumentation.    IUD identified in appropriate position.  Ovaries are within normal limits.  No pelvic free fluid.    There is a short segment of circumferential wall thickening involving the sigmoid colon with numerous superimposed diverticuli, prominent surrounding fat stranding and a few scattered extraluminal gas bubbles.  There is a 1.6 x 1.4 x 1.4 cm extraluminal, possibly mural fluid collection along the posterior wall of the inflamed sigmoid colon, presumably a small phlegmon or developing abscess.  No definite drainable fluid collections.    Aorta tapers normally without significant atherosclerotic calcification.  Celiac artery, SMA, bilateral renal arteries and ROSA are patent.    No focal mesenteric masses.    Subcutaneous soft tissues are within normal limits.  There are partially visualized bilateral breast implants.    No acute osseous abnormalities.  There are mild degenerative changes of the spine.

## 2020-03-24 NOTE — ASSESSMENT & PLAN NOTE
- previous COVID 3/19 was negative  - restested today  -  patient febrile, elevated CRP, CT Multifocal subsegmental ground-glass and consolidative opacities throughout the visualized lungs most concerning for viral pneumonia.  - no SOB, stable Spo2 93-98% on RA

## 2020-03-24 NOTE — PLAN OF CARE
Discharge planning assessment completed with assistance from patient via telephone.  Prior to admit, patient was residing at home and independent.  Patient had no DME or outside services.  [email or web address] [address].  Patient prefers afternoon appts.  If patient needs help post discharge, her significant other will assist.  Case Management team will continue to assess for discharge needs.           03/24/20 1254   Discharge Assessment   Assessment Type Discharge Planning Assessment   Confirmed/corrected address and phone number on facesheet? Yes   Assessment information obtained from? Patient   Expected Length of Stay (days)   (TBD)   Communicated expected length of stay with patient/caregiver   (TBD)   Prior to hospitilization cognitive status: Alert/Oriented   Prior to hospitalization functional status: Independent   Current cognitive status: Alert/Oriented   Current Functional Status: Independent   Facility Arrived From: home   Lives With significant other   Able to Return to Prior Arrangements yes   Is patient able to care for self after discharge? Yes   Who are your caregiver(s) and their phone number(s)? [place]   Patient's perception of discharge disposition home or selfcare   Readmission Within the Last 30 Days no previous admission in last 30 days   Patient currently being followed by outpatient case management? No   Patient currently receives any other outside agency services? No   Equipment Currently Used at Home none   Part D Coverage n/a   Do you have any problems affording any of your prescribed medications? No   Is the patient taking medications as prescribed? yes   Does the patient have transportation home? Yes   Transportation Anticipated family or friend will provide   Dialysis Name and Scheduled days n/a   Does the patient receive services at the [facility]? No   Discharge Plan A Home   Discharge Plan B Home   DME Needed Upon Discharge  none    Patient/Family in Agreement with Plan yes   Sandra Schwartz [facility]  3/24/20

## 2020-03-25 LAB
ALBUMIN SERPL BCP-MCNC: 3.2 G/DL (ref 3.5–5.2)
ALP SERPL-CCNC: 82 U/L (ref 55–135)
ALT SERPL W/O P-5'-P-CCNC: 15 U/L (ref 10–44)
ANION GAP SERPL CALC-SCNC: 12 MMOL/L (ref 8–16)
AST SERPL-CCNC: 27 U/L (ref 10–40)
BASOPHILS # BLD AUTO: 0.01 K/UL (ref 0–0.2)
BASOPHILS NFR BLD: 0.2 % (ref 0–1.9)
BILIRUB SERPL-MCNC: 0.5 MG/DL (ref 0.1–1)
BUN SERPL-MCNC: 10 MG/DL (ref 6–20)
CALCIUM SERPL-MCNC: 8.8 MG/DL (ref 8.7–10.5)
CHLORIDE SERPL-SCNC: 102 MMOL/L (ref 95–110)
CO2 SERPL-SCNC: 25 MMOL/L (ref 23–29)
CREAT SERPL-MCNC: 0.9 MG/DL (ref 0.5–1.4)
DIFFERENTIAL METHOD: ABNORMAL
EOSINOPHIL # BLD AUTO: 0 K/UL (ref 0–0.5)
EOSINOPHIL NFR BLD: 0 % (ref 0–8)
ERYTHROCYTE [DISTWIDTH] IN BLOOD BY AUTOMATED COUNT: 12.8 % (ref 11.5–14.5)
EST. GFR  (AFRICAN AMERICAN): >60 ML/MIN/1.73 M^2
EST. GFR  (NON AFRICAN AMERICAN): >60 ML/MIN/1.73 M^2
GLUCOSE SERPL-MCNC: 107 MG/DL (ref 70–110)
HCT VFR BLD AUTO: 38.8 % (ref 37–48.5)
HGB BLD-MCNC: 12.5 G/DL (ref 12–16)
IMM GRANULOCYTES # BLD AUTO: 0.03 K/UL (ref 0–0.04)
IMM GRANULOCYTES NFR BLD AUTO: 0.5 % (ref 0–0.5)
LYMPHOCYTES # BLD AUTO: 1 K/UL (ref 1–4.8)
LYMPHOCYTES NFR BLD: 15 % (ref 18–48)
MCH RBC QN AUTO: 31 PG (ref 27–31)
MCHC RBC AUTO-ENTMCNC: 32.2 G/DL (ref 32–36)
MCV RBC AUTO: 96 FL (ref 82–98)
MONOCYTES # BLD AUTO: 0.5 K/UL (ref 0.3–1)
MONOCYTES NFR BLD: 6.9 % (ref 4–15)
NEUTROPHILS # BLD AUTO: 5.1 K/UL (ref 1.8–7.7)
NEUTROPHILS NFR BLD: 77.4 % (ref 38–73)
NRBC BLD-RTO: 0 /100 WBC
PLATELET # BLD AUTO: 124 K/UL (ref 150–350)
PMV BLD AUTO: 12 FL (ref 9.2–12.9)
POTASSIUM SERPL-SCNC: 3.8 MMOL/L (ref 3.5–5.1)
PROT SERPL-MCNC: 7.7 G/DL (ref 6–8.4)
RBC # BLD AUTO: 4.03 M/UL (ref 4–5.4)
SARS-COV-2 RNA RESP QL NAA+PROBE: NOT DETECTED
SODIUM SERPL-SCNC: 139 MMOL/L (ref 136–145)
WBC # BLD AUTO: 6.54 K/UL (ref 3.9–12.7)

## 2020-03-25 PROCEDURE — 80053 COMPREHEN METABOLIC PANEL: CPT

## 2020-03-25 PROCEDURE — 85025 COMPLETE CBC W/AUTO DIFF WBC: CPT

## 2020-03-25 PROCEDURE — 99233 SBSQ HOSP IP/OBS HIGH 50: CPT | Mod: ,,, | Performed by: PHYSICIAN ASSISTANT

## 2020-03-25 PROCEDURE — 11000001 HC ACUTE MED/SURG PRIVATE ROOM

## 2020-03-25 PROCEDURE — 25000003 PHARM REV CODE 250: Performed by: SURGERY

## 2020-03-25 PROCEDURE — 25000003 PHARM REV CODE 250: Performed by: PHYSICIAN ASSISTANT

## 2020-03-25 PROCEDURE — 63600175 PHARM REV CODE 636 W HCPCS: Performed by: NURSE PRACTITIONER

## 2020-03-25 PROCEDURE — S0030 INJECTION, METRONIDAZOLE: HCPCS | Performed by: SURGERY

## 2020-03-25 PROCEDURE — 36415 COLL VENOUS BLD VENIPUNCTURE: CPT

## 2020-03-25 PROCEDURE — 25000003 PHARM REV CODE 250: Performed by: NURSE PRACTITIONER

## 2020-03-25 PROCEDURE — C9113 INJ PANTOPRAZOLE SODIUM, VIA: HCPCS | Performed by: NURSE PRACTITIONER

## 2020-03-25 PROCEDURE — 99233 PR SUBSEQUENT HOSPITAL CARE,LEVL III: ICD-10-PCS | Mod: ,,, | Performed by: PHYSICIAN ASSISTANT

## 2020-03-25 RX ORDER — DIPHENHYDRAMINE HCL 25 MG
25 CAPSULE ORAL 3 TIMES DAILY PRN
Status: DISCONTINUED | OUTPATIENT
Start: 2020-03-25 | End: 2020-03-29 | Stop reason: HOSPADM

## 2020-03-25 RX ORDER — OXYCODONE AND ACETAMINOPHEN 5; 325 MG/1; MG/1
1 TABLET ORAL EVERY 4 HOURS PRN
Status: DISCONTINUED | OUTPATIENT
Start: 2020-03-25 | End: 2020-03-29 | Stop reason: HOSPADM

## 2020-03-25 RX ADMIN — TRAZODONE HYDROCHLORIDE 50 MG: 50 TABLET ORAL at 11:03

## 2020-03-25 RX ADMIN — OXYCODONE HYDROCHLORIDE AND ACETAMINOPHEN 1 TABLET: 5; 325 TABLET ORAL at 01:03

## 2020-03-25 RX ADMIN — PIPERACILLIN SODIUM, TAZOBACTAM SODIUM 4.5 G: 4; .5 INJECTION, POWDER, LYOPHILIZED, FOR SOLUTION INTRAVENOUS at 04:03

## 2020-03-25 RX ADMIN — OXYCODONE HYDROCHLORIDE AND ACETAMINOPHEN 1 TABLET: 5; 325 TABLET ORAL at 09:03

## 2020-03-25 RX ADMIN — PIPERACILLIN SODIUM, TAZOBACTAM SODIUM 4.5 G: 4; .5 INJECTION, POWDER, LYOPHILIZED, FOR SOLUTION INTRAVENOUS at 11:03

## 2020-03-25 RX ADMIN — OXYCODONE HYDROCHLORIDE AND ACETAMINOPHEN 1 TABLET: 5; 325 TABLET ORAL at 05:03

## 2020-03-25 RX ADMIN — PANTOPRAZOLE SODIUM 40 MG: 40 INJECTION, POWDER, FOR SOLUTION INTRAVENOUS at 08:03

## 2020-03-25 RX ADMIN — METRONIDAZOLE 500 MG: 500 INJECTION, SOLUTION INTRAVENOUS at 03:03

## 2020-03-25 RX ADMIN — MORPHINE SULFATE 4 MG: 10 INJECTION INTRAVENOUS at 06:03

## 2020-03-25 RX ADMIN — CYANOCOBALAMIN TAB 250 MCG 250 MCG: 250 TAB at 08:03

## 2020-03-25 RX ADMIN — METRONIDAZOLE 500 MG: 500 INJECTION, SOLUTION INTRAVENOUS at 01:03

## 2020-03-25 RX ADMIN — METRONIDAZOLE 500 MG: 500 INJECTION, SOLUTION INTRAVENOUS at 08:03

## 2020-03-25 NOTE — ASSESSMENT & PLAN NOTE
- previously on macrobid  - follow culture; prelim GNR  - cont current Abx, follow speciation and senstivities

## 2020-03-25 NOTE — PLAN OF CARE
Plan of care reviewed with patient.  Patient verbalized understanding and had no further questions.  Patient continues to receive IV antibiotics and percocet for abdominal pain throughout the shift.  Patient now resting comfortably in bed locked in lowest position, side rails up x3, and call bell in reach.  Will continue to monitor.

## 2020-03-25 NOTE — HOSPITAL COURSE
50 y.o. female admitted with Sigmoid diverticulitis with small micro perforation and symptoms significant for a rule out of a Covid 19 pneumonia. Placed on Zosyn, Metronidazole; surgery consulted, non-operative at this point. Patient had a negative COVID result from 3/19/2020. Pending repeat.     3/26- covid test negative x 2  Still febrile, patient reports pain not improved    3/27- afebrile but CRP elevated. Respiratory status unchanged, CXR demonstrates bilateral infiltrates vs edema. Will give 40 mg lasix 1x. +BM.  3/28- abdominal pain resolved, tolerating regular diet.  3/29- Stable for discharge. O2 sats 93-95% on RA. Room on the side of precaution with return to work and direct patient care. [place] to finish antibiotics x one week and return to light duties. Viral infection panel swab taken prior to dc home and pending.

## 2020-03-25 NOTE — NURSING
Pt started on oxygen therapy due to desaturations  due to low 90's. PRN Pain medication utilized per pt request for lower abd pain.

## 2020-03-25 NOTE — PROGRESS NOTES
[facility]  General Surgery  Progress Note    Subjective:     Interval History: 51 yo female with microperf diverticulitis on her second day of antibiotics complicated by Covid 19 rule out with occasional fever and some pain in the left lower quadrant.  Pain is controlled on oral pain meds today    Post-Op Info:  * No surgery found *          Medications:  Continuous Infusions:    Scheduled Meds:   cyanocobalamin  250 mcg Oral Daily    metronidazole  500 mg Intravenous Q8H    pantoprazole  40 mg Intravenous Daily    piperacillin-tazobactam (ZOSYN) IVPB  4.5 g Intravenous Q8H    traZODone  50 mg Oral QHS     PRN Meds:acetaminophen, acetaminophen, morphine, morphine, ondansetron, oxyCODONE-acetaminophen, promethazine (PHENERGAN) IVPB     Objective:     Vital Signs (Most Recent):  Temp: 99.2 °F (37.3 °C) (03/25/20 1633)  Pulse: 83 (03/25/20 1633)  Resp: 20 (03/25/20 1633)  BP: (!) 110/59 (03/25/20 1633)  SpO2: 95 % (03/25/20 1633)   Vital Signs (24h Range):  Temp:  [98.6 °F (37 °C)-102.8 °F (39.3 °C)] 99.2 °F (37.3 °C)  Pulse:  [71-95] 83  Resp:  [17-20] 20  SpO2:  [93 %-97 %] 95 %  BP: (106-123)/(59-71) 110/59         Intake/Output Summary (Last 24 hours) at 3/25/2020 1723  Last data filed at 3/25/2020 1300  Gross per 24 hour   Intake 960 ml   Output 1 ml   Net 959 ml       Physical Exam    Significant Labs:  CBC:   Recent Labs   Lab 03/25/20  0619   WBC 6.54   RBC 4.03   HGB 12.5   HCT 38.8   *   MCV 96   MCH 31.0   MCHC 32.2       CMP:   Recent Labs   Lab 03/25/20  0619      CALCIUM 8.8   ALBUMIN 3.2*   PROT 7.7      K 3.8   CO2 25      BUN 10   CREATININE 0.9   ALKPHOS 82   ALT 15   AST 27   BILITOT 0.5       Significant Diagnostics:  None    Assessment/Plan:     Active Diagnoses:    Diagnosis Date Noted POA    PRINCIPAL PROBLEM:  Perforation of sigmoid colon due to diverticulitis [K57.20] 03/24/2020 Yes    Suspected Covid-19 Virus Infection [R68.89] 03/24/2020  Yes    Thrombocytopenia [D69.6] 03/24/2020 Yes    UTI (urinary tract infection) [N39.0] 03/24/2020 Yes      Problems Resolved During this Admission:         Hussein Schwab MD  General Surgery  [facility]

## 2020-03-25 NOTE — ASSESSMENT & PLAN NOTE
- previous COVID 3/19/020 was negative  - restested  3/24/2020  -  patient febrile, elevated CRP, CT Multifocal subsegmental ground-glass and consolidative opacities throughout the visualized lungs most concerning for viral pneumonia.  - placed on O2 last night, Spo2 low 90's; on 2 L NC 96%  - monitor

## 2020-03-25 NOTE — PLAN OF CARE
Problem: Adult Inpatient Plan of Care  Goal: Absence of Hospital-Acquired Illness or Injury  Outcome: Ongoing, Progressing  Intervention: Identify and Manage Fall Risk  Flowsheets (Taken 3/25/2020 0436)  Safety Promotion/Fall Prevention: assistive device/personal item within reach; diversional activities provided; Fall Risk reviewed with patient/family; Fall Risk signage in place; medications reviewed; side rails raised x 2; instructed to call staff for mobility; supervised activity     Problem: Fall Injury Risk  Goal: Absence of Fall and Fall-Related Injury  Outcome: Ongoing, Progressing  Intervention: Identify and Manage Contributors to Fall Injury Risk  Flowsheets (Taken 3/25/2020 0436)  Self-Care Promotion: independence encouraged; BADL personal objects within reach; BADL personal routines maintained  Medication Review/Management: medications reviewed     Problem: Infection  Goal: Infection Symptom Resolution  Outcome: Ongoing, Progressing  Intervention: Prevent or Manage Infection  Flowsheets (Taken 3/25/2020 0436)  Fever Reduction/Comfort Measures: lightweight clothing; lightweight bedding  Infection Management: aseptic technique maintained  Isolation Precautions: airborne precautions maintained; contact precautions maintained; droplet precautions maintained

## 2020-03-25 NOTE — NURSING
Patient arrived to unit. No distress noted. Patient complained of abdominal pain. Call light placed with in reach. Will address pain needs. Beds in low position. Patient educated on nurse rounding and clustered care. White board updated. Patient encouraged to call with any needs

## 2020-03-25 NOTE — ASSESSMENT & PLAN NOTE
- acute abdominal pain with CT findings consistent with acute sigmoid colon diverticulitis complicated by microperforation and a small extraluminal/presumed intramural phlegmon versus developing abscess.   - Zosyn started in ED, continue, Flagyl added   - surgery consulted, rec to monitor, clears for now  - pain management  - hold off on IVF's in setting of possible COVID since pt on clears

## 2020-03-25 NOTE — PROGRESS NOTES
[facility] Hospital Medicine  Progress Note    Patient Name: Shireen Anton  MRN: [id number]  Patient Class: IP- Inpatient   Admission Date: 3/24/2020  Length of Stay: 1 days  Attending Physician: Bryanna Chatman MD  Primary Care Provider: Jeancarlos Zamora MD        Subjective:     Principal Problem:Perforation of sigmoid colon due to diverticulitis        HPI:  50 y.o. female with a PMH of GERD, NSTEMI, gastric bypass, and cerebellar stroke presents to the ED with c/o severe, suprapubic and lower abdominal pain that started last night associated with nausea. Patient reports that she has had fevers and malaise for the last week and was tested for COVID on 3/19/2020 which resulted as negative. 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 Also c/o dysuria which has improved since starting macrobid, but urine still cloudy. She denies SOB, chest pain, vomiting, lower extremity edema. Denies tobacco and illicit drug use, endorses occ alcohol. ED evaluation patient febrile, normal WBC, lactic and procal, ;  CT findings consistent with acute sigmoid colon diverticulitis complicated by microperforation and a small extraluminal/presumed intramural phlegmon versus developing abscess.  No obstruction.  No focal drainable fluid collections and Multifocal subsegmental ground-glass and consolidative opacities throughout the visualized lungs most concerning for viral pneumonia. Admitted.            Overview/Hospital Course:  50 y.o. female  admitted with Sigmoid diverticulitis with small micro perforation and symptoms significant for a rule out of a Covid 19 pneumonia. Placed on Zosyn, Metronidazole; surgery consulted, non-operative at this point. Patient had a negative COVID result from 3/19/2020. Pending repeat.          Interval History: cont with fever, c/o abd pain, feels better with O2 on placed last night    Review of Systems   Constitutional: Positive for fever.   HENT: Negative for  congestion.    Eyes: Negative.    Respiratory: Negative for cough, chest tightness and shortness of breath.    Cardiovascular: Negative for chest pain, palpitations and leg swelling.   Gastrointestinal: Positive for abdominal pain and nausea. Negative for abdominal distention, diarrhea and vomiting.   Genitourinary: Negative for difficulty urinating, dysuria, frequency, hematuria and urgency.   Musculoskeletal: Negative for arthralgias and myalgias.   Skin: Negative for rash and wound.   Neurological: Negative for dizziness, syncope, light-headedness, numbness and headaches.   Psychiatric/Behavioral: Negative for confusion.     Objective:     Vital Signs (Most Recent):  Temp: 98.6 °F (37 °C) (03/25/20 0806)  Pulse: 95 (03/25/20 0806)  Resp: 17 (03/25/20 0806)  BP: 118/70 (03/25/20 0806)  SpO2: 96 % (03/25/20 0806) Vital Signs (24h Range):  Temp:  [98.6 °F (37 °C)-102.8 °F (39.3 °C)] 98.6 °F (37 °C)  Pulse:  [70-95] 95  Resp:  [17-18] 17  SpO2:  [93 %-96 %] 96 %  BP: (106-120)/(60-78) 118/70     Weight: 108.9 kg (240 lb)  Body mass index is 32.55 kg/m².    Intake/Output Summary (Last 24 hours) at 3/25/2020 1045  Last data filed at 3/24/2020 1800  Gross per 24 hour   Intake 340 ml   Output 1 ml   Net 339 ml      Physical Exam   Constitutional: She is oriented to person, place, and time. She appears well-developed and well-nourished. No distress.   HENT:   Head: Normocephalic and atraumatic.   Eyes: Conjunctivae are normal. No scleral icterus.   Neck: Normal range of motion. Neck supple.   Cardiovascular: Normal rate, regular rhythm, normal heart sounds and intact distal pulses.   Pulmonary/Chest: Effort normal. No respiratory distress. She has no wheezes.   Diminished b/l   Abdominal: Soft. Bowel sounds are normal. She exhibits no distension. There is tenderness (LLQ).   Musculoskeletal: Normal range of motion. She exhibits no edema.   Neurological: She is alert and oriented to person, place, and time.   Skin: Skin is  warm and dry. Capillary refill takes less than 2 seconds. She is not diaphoretic.   Psychiatric: She has a normal mood and affect.   Nursing note and vitals reviewed.      Significant Labs:   Blood Culture:   Recent Labs   Lab 03/24/20  1200 03/24/20  1205   LABBLOO No Growth to date No Growth to date     CBC:   Recent Labs   Lab 03/24/20  0912 03/25/20  0619   WBC 5.35 6.54   HGB 13.6 12.5   HCT 41.0 38.8   * 124*     CMP:   Recent Labs   Lab 03/24/20  0912 03/25/20  0619    139   K 3.7 3.8    102   CO2 22* 25   * 107   BUN 10 10   CREATININE 0.9 0.9   CALCIUM 8.9 8.8   PROT 8.1 7.7   ALBUMIN 3.6 3.2*   BILITOT 0.5 0.5   ALKPHOS 82 82   AST 26 27   ALT 15 15   ANIONGAP 13 12   EGFRNONAA >60 >60     Urine Culture:   Recent Labs   Lab 03/24/20  0930   LABURIN GRAM NEGATIVE ELSIE  10,000 - 49,999 cfu/ml  Identification and susceptibility pending  *     Urine Studies:   Recent Labs   Lab 03/24/20  0930   COLORU Christina   APPEARANCEUA Hazy*   PHUR 6.0   SPECGRAV 1.020   PROTEINUA 2+*   GLUCUA Negative   KETONESU 1+*   BILIRUBINUA Negative   OCCULTUA 3+*   NITRITE Negative   UROBILINOGEN 2.0-3.0*   LEUKOCYTESUR 3+*   RBCUA 20*   WBCUA >100*   BACTERIA Moderate*   HYALINECASTS 0     All pertinent labs within the past 24 hours have been reviewed.    Significant Imaging: I have reviewed all pertinent imaging results/findings within the past 24 hours.   Imaging Results          CT Abdomen Pelvis With Contrast (Final result)  Result time 03/24/20 10:59:51    Final result by Juan Pablo Felton MD (03/24/20 10:59:51)                 Impression:      1. CT findings consistent with acute sigmoid colon diverticulitis complicated by microperforation and a small extraluminal/presumed intramural phlegmon versus developing abscess.  No obstruction.  No focal drainable fluid collections.  2. Multifocal subsegmental ground-glass and consolidative opacities throughout the visualized lungs most concerning for viral  pneumonia.  3. Few gas bubbles within the non dependent portion of the bladder lumen, presumably related to recent instrumentation.  A gas-forming infection could present with similar findings and is possible.  4. Mild splenomegaly.  5. Subcentimeter hepatic hypodensity, too small to characterize but favored to represent a small cyst.  6. Left nonobstructing nephrolithiasis.  Left renal cyst and right renal too small to characterize hypodensity.  7. 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  Preliminary findings discussed with Klaus Calzada NP, 03/24/2020 at 10:45 am.      Electronically signed by: Juan Pablo Felton MD  Date:    03/24/2020  Time:    10:59             Narrative:    EXAMINATION:  CT ABDOMEN PELVIS WITH CONTRAST    CLINICAL HISTORY:  RLQ pain, appendicitis suspected;    TECHNIQUE:  5 mm axial images were obtained through the abdomen and pelvis following administration of 100 cc of Omnipaque 350 IV contrast.  Coronal and sagittal reformats were performed.    COMPARISON:  CT 05/01/2017.    FINDINGS:  Visualized heart is normal.  No pericardial effusion.    There are multifocal subsegmental ground-glass opacities throughout the visualized lungs with superimposed subsegmental areas of consolidation within the posterior and medial basal segments of the right lower lobe.  No pleural effusion.    The liver is normal in size.  Subcentimeter hypodense lesion noted within the inferior aspect of the right hepatic lobe, too small to accurately characterize but favored to be benign, possibly a cyst.  Portal vasculature is patent.  No intrahepatic bile duct dilatation.    Gallbladder is surgically absent.  Common bile duct is slightly dilated measuring 7 mm in maximum dimension with tapering at the level of the ampulla.    There are postoperative changes of sleeve gastrectomy.  Residual stomach is unremarkable.    The spleen is enlarged.  No focal splenic abnormalities.    Duodenum, pancreas and  adrenal glands are within normal limits.    Kidneys are normal in size and location.  There is a left renal cortical/parapelvic cyst, increased in size when compared to the previous exam.  Additional subcentimeter cortical hypodensity noted within the superior pole of the right kidney, too small to accurately characterize.  There is a 5 mm calcified stone within the superior aspect of the left renal collecting system.  No hydronephrosis or hydroureter.  Multiple gas bubbles noted within the non dependent portion of the bladder, presumably related to recent instrumentation.    IUD identified in appropriate position.  Ovaries are within normal limits.  No pelvic free fluid.    There is a short segment of circumferential wall thickening involving the sigmoid colon with numerous superimposed diverticuli, prominent surrounding fat stranding and a few scattered extraluminal gas bubbles.  There is a 1.6 x 1.4 x 1.4 cm extraluminal, possibly mural fluid collection along the posterior wall of the inflamed sigmoid colon, presumably a small phlegmon or developing abscess.  No definite drainable fluid collections.    Aorta tapers normally without significant atherosclerotic calcification.  Celiac artery, SMA, bilateral renal arteries and ROSA are patent.    No focal mesenteric masses.    Subcutaneous soft tissues are within normal limits.  There are partially visualized bilateral breast implants.    No acute osseous abnormalities.  There are mild degenerative changes of the spine.                                    Assessment/Plan:      * Perforation of sigmoid colon due to diverticulitis  - acute abdominal pain with CT findings consistent with acute sigmoid colon diverticulitis complicated by microperforation and a small extraluminal/presumed intramural phlegmon versus developing abscess.   - Zosyn started in ED, continue, Flagyl added   - surgery consulted, rec to monitor, clears for now  - pain management  - hold off on IVF's  in setting of possible COVID since pt on clears      UTI (urinary tract infection)  - previously on macrobid  - follow culture; prelim GNR  - cont current Abx, follow speciation and senstivities      Thrombocytopenia  - appears chronic, stable  - no evidence of bleeding  - monitor        Suspected Covid-19 Virus Infection  - previous COVID 3/19/020 was negative  - restested  3/24/2020  -  patient febrile, elevated CRP, CT Multifocal subsegmental ground-glass and consolidative opacities throughout the visualized lungs most concerning for viral pneumonia.  - placed on O2 last night, Spo2 low 90's; on 2 L NC 96%  - monitor    VTE Risk Mitigation (From admission, onward)         Ordered     IP VTE LOW RISK PATIENT  Once      03/24/20 1953     Place RENU hose  Until discontinued      03/24/20 1953     Place sequential compression device  Until discontinued      03/24/20 1953                      MELIZA LarsonC  Department of Hospital Medicine   [facility]

## 2020-03-25 NOTE — SUBJECTIVE & OBJECTIVE
Interval History: cont with fever, c/o abd pain, feels better with O2 on placed last night    Review of Systems   Constitutional: Positive for fever.   HENT: Negative for congestion.    Eyes: Negative.    Respiratory: Negative for cough, chest tightness and shortness of breath.    Cardiovascular: Negative for chest pain, palpitations and leg swelling.   Gastrointestinal: Positive for abdominal pain and nausea. Negative for abdominal distention, diarrhea and vomiting.   Genitourinary: Negative for difficulty urinating, dysuria, frequency, hematuria and urgency.   Musculoskeletal: Negative for arthralgias and myalgias.   Skin: Negative for rash and wound.   Neurological: Negative for dizziness, syncope, light-headedness, numbness and headaches.   Psychiatric/Behavioral: Negative for confusion.     Objective:     Vital Signs (Most Recent):  Temp: 98.6 °F (37 °C) (03/25/20 0806)  Pulse: 95 (03/25/20 0806)  Resp: 17 (03/25/20 0806)  BP: 118/70 (03/25/20 0806)  SpO2: 96 % (03/25/20 0806) Vital Signs (24h Range):  Temp:  [98.6 °F (37 °C)-102.8 °F (39.3 °C)] 98.6 °F (37 °C)  Pulse:  [70-95] 95  Resp:  [17-18] 17  SpO2:  [93 %-96 %] 96 %  BP: (106-120)/(60-78) 118/70     Weight: 108.9 kg (240 lb)  Body mass index is 32.55 kg/m².    Intake/Output Summary (Last 24 hours) at 3/25/2020 1045  Last data filed at 3/24/2020 1800  Gross per 24 hour   Intake 340 ml   Output 1 ml   Net 339 ml      Physical Exam   Constitutional: She is oriented to person, place, and time. She appears well-developed and well-nourished. No distress.   HENT:   Head: Normocephalic and atraumatic.   Eyes: Conjunctivae are normal. No scleral icterus.   Neck: Normal range of motion. Neck supple.   Cardiovascular: Normal rate, regular rhythm, normal heart sounds and intact distal pulses.   Pulmonary/Chest: Effort normal. No respiratory distress. She has no wheezes.   Diminished b/l   Abdominal: Soft. Bowel sounds are normal. She exhibits no distension. There  is tenderness (LLQ).   Musculoskeletal: Normal range of motion. She exhibits no edema.   Neurological: She is alert and oriented to person, place, and time.   Skin: Skin is warm and dry. Capillary refill takes less than 2 seconds. She is not diaphoretic.   Psychiatric: She has a normal mood and affect.   Nursing note and vitals reviewed.      Significant Labs:   Blood Culture:   Recent Labs   Lab 03/24/20  1200 03/24/20  1205   LABBLOO No Growth to date No Growth to date     CBC:   Recent Labs   Lab 03/24/20  0912 03/25/20  0619   WBC 5.35 6.54   HGB 13.6 12.5   HCT 41.0 38.8   * 124*     CMP:   Recent Labs   Lab 03/24/20  0912 03/25/20  0619    139   K 3.7 3.8    102   CO2 22* 25   * 107   BUN 10 10   CREATININE 0.9 0.9   CALCIUM 8.9 8.8   PROT 8.1 7.7   ALBUMIN 3.6 3.2*   BILITOT 0.5 0.5   ALKPHOS 82 82   AST 26 27   ALT 15 15   ANIONGAP 13 12   EGFRNONAA >60 >60     Urine Culture:   Recent Labs   Lab 03/24/20  0930   LABURIN GRAM NEGATIVE ELSIE  10,000 - 49,999 cfu/ml  Identification and susceptibility pending  *     Urine Studies:   Recent Labs   Lab 03/24/20  0930   COLORU Christina   APPEARANCEUA Hazy*   PHUR 6.0   SPECGRAV 1.020   PROTEINUA 2+*   GLUCUA Negative   KETONESU 1+*   BILIRUBINUA Negative   OCCULTUA 3+*   NITRITE Negative   UROBILINOGEN 2.0-3.0*   LEUKOCYTESUR 3+*   RBCUA 20*   WBCUA >100*   BACTERIA Moderate*   HYALINECASTS 0     All pertinent labs within the past 24 hours have been reviewed.    Significant Imaging: I have reviewed all pertinent imaging results/findings within the past 24 hours.   Imaging Results          CT Abdomen Pelvis With Contrast (Final result)  Result time 03/24/20 10:59:51    Final result by Juan Pablo Felton MD (03/24/20 10:59:51)                 Impression:      1. CT findings consistent with acute sigmoid colon diverticulitis complicated by microperforation and a small extraluminal/presumed intramural phlegmon versus developing abscess.  No  obstruction.  No focal drainable fluid collections.  2. Multifocal subsegmental ground-glass and consolidative opacities throughout the visualized lungs most concerning for viral pneumonia.  3. Few gas bubbles within the non dependent portion of the bladder lumen, presumably related to recent instrumentation.  A gas-forming infection could present with similar findings and is possible.  4. Mild splenomegaly.  5. Subcentimeter hepatic hypodensity, too small to characterize but favored to represent a small cyst.  6. Left nonobstructing nephrolithiasis.  Left renal cyst and right renal too small to characterize hypodensity.  7. 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  Preliminary findings discussed with Klaus Calzada NP, 03/24/2020 at 10:45 am.      Electronically signed by: Juan Pablo Felton MD  Date:    03/24/2020  Time:    10:59             Narrative:    EXAMINATION:  CT ABDOMEN PELVIS WITH CONTRAST    CLINICAL HISTORY:  RLQ pain, appendicitis suspected;    TECHNIQUE:  5 mm axial images were obtained through the abdomen and pelvis following administration of 100 cc of Omnipaque 350 IV contrast.  Coronal and sagittal reformats were performed.    COMPARISON:  CT 05/01/2017.    FINDINGS:  Visualized heart is normal.  No pericardial effusion.    There are multifocal subsegmental ground-glass opacities throughout the visualized lungs with superimposed subsegmental areas of consolidation within the posterior and medial basal segments of the right lower lobe.  No pleural effusion.    The liver is normal in size.  Subcentimeter hypodense lesion noted within the inferior aspect of the right hepatic lobe, too small to accurately characterize but favored to be benign, possibly a cyst.  Portal vasculature is patent.  No intrahepatic bile duct dilatation.    Gallbladder is surgically absent.  Common bile duct is slightly dilated measuring 7 mm in maximum dimension with tapering at the level of the  ampulla.    There are postoperative changes of sleeve gastrectomy.  Residual stomach is unremarkable.    The spleen is enlarged.  No focal splenic abnormalities.    Duodenum, pancreas and adrenal glands are within normal limits.    Kidneys are normal in size and location.  There is a left renal cortical/parapelvic cyst, increased in size when compared to the previous exam.  Additional subcentimeter cortical hypodensity noted within the superior pole of the right kidney, too small to accurately characterize.  There is a 5 mm calcified stone within the superior aspect of the left renal collecting system.  No hydronephrosis or hydroureter.  Multiple gas bubbles noted within the non dependent portion of the bladder, presumably related to recent instrumentation.    IUD identified in appropriate position.  Ovaries are within normal limits.  No pelvic free fluid.    There is a short segment of circumferential wall thickening involving the sigmoid colon with numerous superimposed diverticuli, prominent surrounding fat stranding and a few scattered extraluminal gas bubbles.  There is a 1.6 x 1.4 x 1.4 cm extraluminal, possibly mural fluid collection along the posterior wall of the inflamed sigmoid colon, presumably a small phlegmon or developing abscess.  No definite drainable fluid collections.    Aorta tapers normally without significant atherosclerotic calcification.  Celiac artery, SMA, bilateral renal arteries and ROSA are patent.    No focal mesenteric masses.    Subcutaneous soft tissues are within normal limits.  There are partially visualized bilateral breast implants.    No acute osseous abnormalities.  There are mild degenerative changes of the spine.

## 2020-03-26 LAB
ALBUMIN SERPL BCP-MCNC: 2.6 G/DL (ref 3.5–5.2)
ALP SERPL-CCNC: 72 U/L (ref 55–135)
ALT SERPL W/O P-5'-P-CCNC: 10 U/L (ref 10–44)
ANION GAP SERPL CALC-SCNC: 11 MMOL/L (ref 8–16)
AST SERPL-CCNC: 22 U/L (ref 10–40)
BACTERIA UR CULT: ABNORMAL
BASOPHILS # BLD AUTO: 0.01 K/UL (ref 0–0.2)
BASOPHILS NFR BLD: 0.1 % (ref 0–1.9)
BILIRUB SERPL-MCNC: 0.5 MG/DL (ref 0.1–1)
BUN SERPL-MCNC: 8 MG/DL (ref 6–20)
CALCIUM SERPL-MCNC: 8.7 MG/DL (ref 8.7–10.5)
CHLORIDE SERPL-SCNC: 104 MMOL/L (ref 95–110)
CO2 SERPL-SCNC: 24 MMOL/L (ref 23–29)
CREAT SERPL-MCNC: 0.8 MG/DL (ref 0.5–1.4)
DIFFERENTIAL METHOD: ABNORMAL
EOSINOPHIL # BLD AUTO: 0 K/UL (ref 0–0.5)
EOSINOPHIL NFR BLD: 0 % (ref 0–8)
ERYTHROCYTE [DISTWIDTH] IN BLOOD BY AUTOMATED COUNT: 13 % (ref 11.5–14.5)
EST. GFR  (AFRICAN AMERICAN): >60 ML/MIN/1.73 M^2
EST. GFR  (NON AFRICAN AMERICAN): >60 ML/MIN/1.73 M^2
GLUCOSE SERPL-MCNC: 96 MG/DL (ref 70–110)
HCT VFR BLD AUTO: 36 % (ref 37–48.5)
HGB BLD-MCNC: 11.5 G/DL (ref 12–16)
IMM GRANULOCYTES # BLD AUTO: 0.04 K/UL (ref 0–0.04)
IMM GRANULOCYTES NFR BLD AUTO: 0.6 % (ref 0–0.5)
LYMPHOCYTES # BLD AUTO: 0.9 K/UL (ref 1–4.8)
LYMPHOCYTES NFR BLD: 12.8 % (ref 18–48)
MCH RBC QN AUTO: 31.3 PG (ref 27–31)
MCHC RBC AUTO-ENTMCNC: 31.9 G/DL (ref 32–36)
MCV RBC AUTO: 98 FL (ref 82–98)
MONOCYTES # BLD AUTO: 0.3 K/UL (ref 0.3–1)
MONOCYTES NFR BLD: 4.2 % (ref 4–15)
NEUTROPHILS # BLD AUTO: 5.7 K/UL (ref 1.8–7.7)
NEUTROPHILS NFR BLD: 82.3 % (ref 38–73)
NRBC BLD-RTO: 0 /100 WBC
PLATELET # BLD AUTO: 133 K/UL (ref 150–350)
PMV BLD AUTO: 11.8 FL (ref 9.2–12.9)
POTASSIUM SERPL-SCNC: 3.7 MMOL/L (ref 3.5–5.1)
PROT SERPL-MCNC: 7 G/DL (ref 6–8.4)
RBC # BLD AUTO: 3.67 M/UL (ref 4–5.4)
SODIUM SERPL-SCNC: 139 MMOL/L (ref 136–145)
WBC # BLD AUTO: 6.94 K/UL (ref 3.9–12.7)

## 2020-03-26 PROCEDURE — 25000003 PHARM REV CODE 250: Performed by: SURGERY

## 2020-03-26 PROCEDURE — C9113 INJ PANTOPRAZOLE SODIUM, VIA: HCPCS | Performed by: NURSE PRACTITIONER

## 2020-03-26 PROCEDURE — S0030 INJECTION, METRONIDAZOLE: HCPCS | Performed by: SURGERY

## 2020-03-26 PROCEDURE — 63600175 PHARM REV CODE 636 W HCPCS: Performed by: NURSE PRACTITIONER

## 2020-03-26 PROCEDURE — 80053 COMPREHEN METABOLIC PANEL: CPT

## 2020-03-26 PROCEDURE — 25000003 PHARM REV CODE 250: Performed by: NURSE PRACTITIONER

## 2020-03-26 PROCEDURE — 11000001 HC ACUTE MED/SURG PRIVATE ROOM

## 2020-03-26 PROCEDURE — 85025 COMPLETE CBC W/AUTO DIFF WBC: CPT

## 2020-03-26 PROCEDURE — 36415 COLL VENOUS BLD VENIPUNCTURE: CPT

## 2020-03-26 RX ADMIN — METRONIDAZOLE 500 MG: 500 INJECTION, SOLUTION INTRAVENOUS at 02:03

## 2020-03-26 RX ADMIN — MORPHINE SULFATE 4 MG: 10 INJECTION INTRAVENOUS at 01:03

## 2020-03-26 RX ADMIN — MORPHINE SULFATE 4 MG: 10 INJECTION INTRAVENOUS at 05:03

## 2020-03-26 RX ADMIN — METRONIDAZOLE 500 MG: 500 INJECTION, SOLUTION INTRAVENOUS at 05:03

## 2020-03-26 RX ADMIN — ACETAMINOPHEN 650 MG: 325 TABLET ORAL at 08:03

## 2020-03-26 RX ADMIN — MORPHINE SULFATE 4 MG: 10 INJECTION INTRAVENOUS at 09:03

## 2020-03-26 RX ADMIN — PIPERACILLIN SODIUM, TAZOBACTAM SODIUM 4.5 G: 4; .5 INJECTION, POWDER, LYOPHILIZED, FOR SOLUTION INTRAVENOUS at 12:03

## 2020-03-26 RX ADMIN — MORPHINE SULFATE 4 MG: 10 INJECTION INTRAVENOUS at 04:03

## 2020-03-26 RX ADMIN — PIPERACILLIN SODIUM, TAZOBACTAM SODIUM 4.5 G: 4; .5 INJECTION, POWDER, LYOPHILIZED, FOR SOLUTION INTRAVENOUS at 08:03

## 2020-03-26 RX ADMIN — METRONIDAZOLE 500 MG: 500 INJECTION, SOLUTION INTRAVENOUS at 08:03

## 2020-03-26 RX ADMIN — CYANOCOBALAMIN TAB 250 MCG 250 MCG: 250 TAB at 08:03

## 2020-03-26 RX ADMIN — MORPHINE SULFATE 4 MG: 10 INJECTION INTRAVENOUS at 12:03

## 2020-03-26 RX ADMIN — PIPERACILLIN SODIUM, TAZOBACTAM SODIUM 4.5 G: 4; .5 INJECTION, POWDER, LYOPHILIZED, FOR SOLUTION INTRAVENOUS at 04:03

## 2020-03-26 RX ADMIN — PANTOPRAZOLE SODIUM 40 MG: 40 INJECTION, POWDER, FOR SOLUTION INTRAVENOUS at 08:03

## 2020-03-26 NOTE — PLAN OF CARE
Patient is alert and oriented x 4.  Mobility status is a level IV.  Patient medicated once throughout shift for abdomen discomfort.  Call light and fluids with reach.  IV antibiotics continues.        Problem: Adult Inpatient Plan of Care  Goal: Plan of Care Review  Outcome: Ongoing, Progressing  Flowsheets (Taken 3/26/2020 0334)  Plan of Care Reviewed With: patient  Goal: Patient-Specific Goal (Individualization)  Outcome: Ongoing, Progressing  Goal: Absence of Hospital-Acquired Illness or Injury  Outcome: Ongoing, Progressing  Goal: Optimal Comfort and Wellbeing  Outcome: Ongoing, Progressing  Goal: Readiness for Transition of Care  Outcome: Ongoing, Progressing  Goal: Rounds/Family Conference  Outcome: Ongoing, Progressing     Problem: Fall Injury Risk  Goal: Absence of Fall and Fall-Related Injury  Outcome: Ongoing, Progressing     Problem: Infection  Goal: Infection Symptom Resolution  Outcome: Ongoing, Progressing

## 2020-03-26 NOTE — NURSING
Pt aaox4, free of falls or injuries, medicated her once with iv pain med. Pt off floor via wheelchair per transport oxygen @ 2L NC, iv abx infusing

## 2020-03-26 NOTE — PROGRESS NOTES
[facility]  General Surgery  Progress Note    Subjective:     Interval History:  50-year-old female with the micro perforation diverticulitis and symptomatic pneumonia however negative for COVID-19.  She does however on her CT scan half ground glass opacities.  States that her pain is better in her left lower quadrant denies having any bowel activity however no evidence of nausea or vomiting.  She is tolerating a clear liquid diet    Post-Op Info:  * No surgery found *          Medications:  Continuous Infusions:  Scheduled Meds:   cyanocobalamin  250 mcg Oral Daily    metronidazole  500 mg Intravenous Q8H    pantoprazole  40 mg Intravenous Daily    piperacillin-tazobactam (ZOSYN) IVPB  4.5 g Intravenous Q8H    traZODone  50 mg Oral QHS     PRN Meds:acetaminophen, acetaminophen, diphenhydrAMINE, morphine, morphine, ondansetron, oxyCODONE-acetaminophen, promethazine (PHENERGAN) IVPB     Objective:     Vital Signs (Most Recent):  Temp: 99.2 °F (37.3 °C) (03/26/20 1059)  Pulse: 94 (03/26/20 0900)  Resp: 20 (03/26/20 0900)  BP: 111/60 (03/26/20 0900)  SpO2: (!) 93 % (03/26/20 0900) Vital Signs (24h Range):  Temp:  [98.2 °F (36.8 °C)-102.6 °F (39.2 °C)] 99.2 °F (37.3 °C)  Pulse:  [79-95] 94  Resp:  [20] 20  SpO2:  [93 %-97 %] 93 %  BP: (100-123)/(58-69) 111/60       Intake/Output Summary (Last 24 hours) at 3/26/2020 1121  Last data filed at 3/26/2020 0415  Gross per 24 hour   Intake 560 ml   Output --   Net 560 ml       Physical Exam   Constitutional: She is oriented to person, place, and time. She appears well-developed and well-nourished.   HENT:   Head: Normocephalic and atraumatic.   Right Ear: External ear normal.   Left Ear: External ear normal.   Nose: Nose normal.   Mouth/Throat: Oropharynx is clear and moist.   Eyes: Pupils are equal, round, and reactive to light. Conjunctivae and EOM are normal.   Neck: Normal range of motion. Neck supple.   Cardiovascular: Normal rate, regular rhythm,  normal heart sounds and intact distal pulses.   Pulmonary/Chest: Effort normal and breath sounds normal.   Abdominal: Soft. Bowel sounds are normal.   Musculoskeletal: Normal range of motion.   Neurological: She is alert and oriented to person, place, and time. She has normal reflexes.   Skin: Skin is warm and dry.   Psychiatric: She has a normal mood and affect. Her behavior is normal. Thought content normal.   Vitals reviewed.      Significant Labs:  BMP:   Recent Labs   Lab 03/26/20  0626   GLU 96      K 3.7      CO2 24   BUN 8   CREATININE 0.8   CALCIUM 8.7     CBC:   Recent Labs   Lab 03/26/20 0626   WBC 6.94   RBC 3.67*   HGB 11.5*   HCT 36.0*   *   MCV 98   MCH 31.3*   MCHC 31.9*     CMP:   Recent Labs   Lab 03/26/20 0626   GLU 96   CALCIUM 8.7   ALBUMIN 2.6*   PROT 7.0      K 3.7   CO2 24      BUN 8   CREATININE 0.8   ALKPHOS 72   ALT 10   AST 22   BILITOT 0.5       Significant Diagnostics:  None    Assessment/Plan:     Active Diagnoses:    Diagnosis Date Noted POA    PRINCIPAL PROBLEM:  Perforation of sigmoid colon due to diverticulitis [K57.20] 03/24/2020 Yes    Suspected Covid-19 Virus Infection [R68.89] 03/24/2020 Yes    Thrombocytopenia [D69.6] 03/24/2020 Yes    UTI (urinary tract infection) [N39.0] 03/24/2020 Yes      Problems Resolved During this Admission:    In spite of a fever of 102, her abdominal exam remains negative.  Continue IV antibiotics and I will defer to Medicine her COVID-19 status.  I will continue to follow      Hussein Schwab MD  General Surgery  [facility]

## 2020-03-27 PROBLEM — R09.02 HYPOXEMIA: Status: ACTIVE | Noted: 2020-03-27

## 2020-03-27 LAB
ALBUMIN SERPL BCP-MCNC: 2.4 G/DL (ref 3.5–5.2)
ALP SERPL-CCNC: 62 U/L (ref 55–135)
ALT SERPL W/O P-5'-P-CCNC: 9 U/L (ref 10–44)
ANION GAP SERPL CALC-SCNC: 8 MMOL/L (ref 8–16)
AST SERPL-CCNC: 21 U/L (ref 10–40)
BASOPHILS # BLD AUTO: 0.01 K/UL (ref 0–0.2)
BASOPHILS NFR BLD: 0.1 % (ref 0–1.9)
BILIRUB SERPL-MCNC: 0.5 MG/DL (ref 0.1–1)
BUN SERPL-MCNC: 7 MG/DL (ref 6–20)
CALCIUM SERPL-MCNC: 8.7 MG/DL (ref 8.7–10.5)
CHLORIDE SERPL-SCNC: 102 MMOL/L (ref 95–110)
CO2 SERPL-SCNC: 26 MMOL/L (ref 23–29)
CREAT SERPL-MCNC: 0.7 MG/DL (ref 0.5–1.4)
CRP SERPL-MCNC: 329.5 MG/L (ref 0–8.2)
DIFFERENTIAL METHOD: ABNORMAL
EOSINOPHIL # BLD AUTO: 0 K/UL (ref 0–0.5)
EOSINOPHIL NFR BLD: 0 % (ref 0–8)
ERYTHROCYTE [DISTWIDTH] IN BLOOD BY AUTOMATED COUNT: 13 % (ref 11.5–14.5)
EST. GFR  (AFRICAN AMERICAN): >60 ML/MIN/1.73 M^2
EST. GFR  (NON AFRICAN AMERICAN): >60 ML/MIN/1.73 M^2
GLUCOSE SERPL-MCNC: 101 MG/DL (ref 70–110)
HCT VFR BLD AUTO: 32.3 % (ref 37–48.5)
HGB BLD-MCNC: 10.7 G/DL (ref 12–16)
IMM GRANULOCYTES # BLD AUTO: 0.05 K/UL (ref 0–0.04)
IMM GRANULOCYTES NFR BLD AUTO: 0.7 % (ref 0–0.5)
LYMPHOCYTES # BLD AUTO: 0.8 K/UL (ref 1–4.8)
LYMPHOCYTES NFR BLD: 10.5 % (ref 18–48)
MCH RBC QN AUTO: 30.7 PG (ref 27–31)
MCHC RBC AUTO-ENTMCNC: 33.1 G/DL (ref 32–36)
MCV RBC AUTO: 93 FL (ref 82–98)
MONOCYTES # BLD AUTO: 0.4 K/UL (ref 0.3–1)
MONOCYTES NFR BLD: 5.1 % (ref 4–15)
NEUTROPHILS # BLD AUTO: 6.4 K/UL (ref 1.8–7.7)
NEUTROPHILS NFR BLD: 83.6 % (ref 38–73)
NRBC BLD-RTO: 0 /100 WBC
PLATELET # BLD AUTO: 161 K/UL (ref 150–350)
PMV BLD AUTO: 11.3 FL (ref 9.2–12.9)
POTASSIUM SERPL-SCNC: 3.2 MMOL/L (ref 3.5–5.1)
PROT SERPL-MCNC: 6.8 G/DL (ref 6–8.4)
RBC # BLD AUTO: 3.48 M/UL (ref 4–5.4)
SODIUM SERPL-SCNC: 136 MMOL/L (ref 136–145)
WBC # BLD AUTO: 7.68 K/UL (ref 3.9–12.7)

## 2020-03-27 PROCEDURE — 25000003 PHARM REV CODE 250: Performed by: NURSE PRACTITIONER

## 2020-03-27 PROCEDURE — 36415 COLL VENOUS BLD VENIPUNCTURE: CPT

## 2020-03-27 PROCEDURE — 25000003 PHARM REV CODE 250: Performed by: SURGERY

## 2020-03-27 PROCEDURE — 86140 C-REACTIVE PROTEIN: CPT

## 2020-03-27 PROCEDURE — C9113 INJ PANTOPRAZOLE SODIUM, VIA: HCPCS | Performed by: NURSE PRACTITIONER

## 2020-03-27 PROCEDURE — 63600175 PHARM REV CODE 636 W HCPCS: Performed by: NURSE PRACTITIONER

## 2020-03-27 PROCEDURE — S0030 INJECTION, METRONIDAZOLE: HCPCS | Performed by: SURGERY

## 2020-03-27 PROCEDURE — 11000001 HC ACUTE MED/SURG PRIVATE ROOM

## 2020-03-27 PROCEDURE — 85025 COMPLETE CBC W/AUTO DIFF WBC: CPT

## 2020-03-27 PROCEDURE — 63600175 PHARM REV CODE 636 W HCPCS: Performed by: SURGERY

## 2020-03-27 PROCEDURE — 80053 COMPREHEN METABOLIC PANEL: CPT

## 2020-03-27 RX ORDER — FUROSEMIDE 10 MG/ML
40 INJECTION INTRAMUSCULAR; INTRAVENOUS ONCE
Status: COMPLETED | OUTPATIENT
Start: 2020-03-27 | End: 2020-03-27

## 2020-03-27 RX ORDER — ENOXAPARIN SODIUM 100 MG/ML
40 INJECTION SUBCUTANEOUS EVERY 24 HOURS
Status: DISCONTINUED | OUTPATIENT
Start: 2020-03-27 | End: 2020-03-29 | Stop reason: HOSPADM

## 2020-03-27 RX ORDER — POTASSIUM CHLORIDE 20 MEQ/1
40 TABLET, EXTENDED RELEASE ORAL ONCE
Status: COMPLETED | OUTPATIENT
Start: 2020-03-27 | End: 2020-03-27

## 2020-03-27 RX ADMIN — MORPHINE SULFATE 4 MG: 10 INJECTION INTRAVENOUS at 09:03

## 2020-03-27 RX ADMIN — MORPHINE SULFATE 4 MG: 10 INJECTION INTRAVENOUS at 01:03

## 2020-03-27 RX ADMIN — METRONIDAZOLE 500 MG: 500 INJECTION, SOLUTION INTRAVENOUS at 03:03

## 2020-03-27 RX ADMIN — PIPERACILLIN SODIUM, TAZOBACTAM SODIUM 4.5 G: 4; .5 INJECTION, POWDER, LYOPHILIZED, FOR SOLUTION INTRAVENOUS at 09:03

## 2020-03-27 RX ADMIN — TRAZODONE HYDROCHLORIDE 50 MG: 50 TABLET ORAL at 09:03

## 2020-03-27 RX ADMIN — POTASSIUM CHLORIDE 40 MEQ: 20 TABLET, EXTENDED RELEASE ORAL at 03:03

## 2020-03-27 RX ADMIN — PANTOPRAZOLE SODIUM 40 MG: 40 INJECTION, POWDER, FOR SOLUTION INTRAVENOUS at 08:03

## 2020-03-27 RX ADMIN — ENOXAPARIN SODIUM 40 MG: 100 INJECTION SUBCUTANEOUS at 05:03

## 2020-03-27 RX ADMIN — FUROSEMIDE 40 MG: 10 INJECTION, SOLUTION INTRAMUSCULAR; INTRAVENOUS at 11:03

## 2020-03-27 RX ADMIN — PIPERACILLIN SODIUM, TAZOBACTAM SODIUM 4.5 G: 4; .5 INJECTION, POWDER, LYOPHILIZED, FOR SOLUTION INTRAVENOUS at 11:03

## 2020-03-27 RX ADMIN — PIPERACILLIN SODIUM, TAZOBACTAM SODIUM 4.5 G: 4; .5 INJECTION, POWDER, LYOPHILIZED, FOR SOLUTION INTRAVENOUS at 04:03

## 2020-03-27 RX ADMIN — CYANOCOBALAMIN TAB 250 MCG 250 MCG: 250 TAB at 08:03

## 2020-03-27 RX ADMIN — METRONIDAZOLE 500 MG: 500 INJECTION, SOLUTION INTRAVENOUS at 12:03

## 2020-03-27 RX ADMIN — MORPHINE SULFATE 4 MG: 10 INJECTION INTRAVENOUS at 03:03

## 2020-03-27 RX ADMIN — METRONIDAZOLE 500 MG: 500 INJECTION, SOLUTION INTRAVENOUS at 08:03

## 2020-03-27 NOTE — PLAN OF CARE
Patient alert and oriented x4. Respirations even and unlabored. Slightly shallow. 02 at 2l noted. Skin warm and dry. Iv saline locked. Iv antibiotics given as ordered. Patient complains of pain to abdomen. Pain medications given as needed. Patient remains free from falls this shift. Patient has no complaints at this time. Instructed to call for any needs. Bed in lowest position. Call bell within reach. Will continue to monitor.     Problem: Adult Inpatient Plan of Care  Goal: Plan of Care Review  Outcome: Ongoing, Progressing     Problem: Fall Injury Risk  Goal: Absence of Fall and Fall-Related Injury  Outcome: Ongoing, Progressing  Intervention: Identify and Manage Contributors to Fall Injury Risk  Flowsheets (Taken 3/27/2020 0514)  Self-Care Promotion: independence encouraged; BADL personal objects within reach  Medication Review/Management: medications reviewed; high risk medications identified  Intervention: Promote Injury-Free Environment  Flowsheets (Taken 3/27/2020 0514)  Safety Promotion/Fall Prevention: assistive device/personal item within reach; Fall Risk signage in place; high risk medications identified; nonskid shoes/socks when out of bed; side rails raised x 2  Environmental Safety Modification: assistive device/personal items within reach; clutter free environment maintained     Problem: Pain Acute  Goal: Optimal Pain Control  Outcome: Ongoing, Progressing  Intervention: Develop Pain Management Plan  Flowsheets (Taken 3/27/2020 0514)  Pain Management Interventions: pain management plan reviewed with patient/caregiver; pillow support provided; position adjusted

## 2020-03-27 NOTE — PLAN OF CARE
Pt free from falls/injury. Reports of pain treated with PRN IV analgesics with moderate relief. Still reports abdominal pain. Reports bowel movement (loose, foul smelling) during PM shift. IV antibiotics as ordered. Diet advanced to regular, tolerating. Call light in reach. Will continue to monitor.    Problem: Adult Inpatient Plan of Care  Goal: Plan of Care Review  3/27/2020 1818 by Evan Vallejo RN  Outcome: Ongoing, Progressing  3/27/2020 1818 by Evan Vallejo RN  Outcome: Ongoing, Progressing  Goal: Patient-Specific Goal (Individualization)  3/27/2020 1818 by Evan Vallejo RN  Outcome: Ongoing, Progressing  3/27/2020 1818 by Evan Vallejo RN  Outcome: Ongoing, Progressing  Goal: Absence of Hospital-Acquired Illness or Injury  3/27/2020 1818 by Evan Vallejo RN  Outcome: Ongoing, Progressing  3/27/2020 1818 by Evan Vallejo RN  Outcome: Ongoing, Progressing  Goal: Optimal Comfort and Wellbeing  3/27/2020 1818 by Evan Vallejo RN  Outcome: Ongoing, Progressing  3/27/2020 1818 by Evan Vallejo RN  Outcome: Ongoing, Progressing  Goal: Readiness for Transition of Care  3/27/2020 1818 by Evan Vallejo RN  Outcome: Ongoing, Progressing  3/27/2020 1818 by Evan Valeljo RN  Outcome: Ongoing, Progressing  Goal: Rounds/Family Conference  3/27/2020 1818 by Evan Vallejo RN  Outcome: Ongoing, Progressing  3/27/2020 1818 by Evan Vallejo RN  Outcome: Ongoing, Progressing     Problem: Fall Injury Risk  Goal: Absence of Fall and Fall-Related Injury  3/27/2020 1818 by Evan Vallejo RN  Outcome: Ongoing, Progressing  3/27/2020 1818 by Evan Vallejo RN  Outcome: Ongoing, Progressing     Problem: Infection  Goal: Infection Symptom Resolution  3/27/2020 1818 by Evan Vallejo RN  Outcome: Ongoing, Progressing  3/27/2020 1818 by Evan Vallejo RN  Outcome: Ongoing, Progressing     Problem: Pain Acute  Goal: Optimal Pain Control  3/27/2020 1818 by Evan Vallejo  RN  Outcome: Ongoing, Progressing  3/27/2020 1818 by Evan Vallejo RN  Outcome: Ongoing, Progressing

## 2020-03-27 NOTE — ASSESSMENT & PLAN NOTE
- acute abdominal pain with CT findings consistent with acute sigmoid colon diverticulitis complicated by microperforation and a small extraluminal/presumed intramural phlegmon versus developing abscess.   - Zosyn started in ED, continue, Flagyl added   - surgery consulted, rec to monitor, clears for now  - pain management  - hold off on IVF's in setting of possible COVID since pt on clears    3/27 pain continues but has BM. No fevers, WBC remains normal

## 2020-03-27 NOTE — PROGRESS NOTES
[facility] Hospital Medicine  Progress Note    Patient Name: Shireen Anton  MRN: [id number]  Patient Class: IP- Inpatient   Admission Date: 3/24/2020  Length of Stay: 3 days  Attending Physician: Bryanna Chatman MD  Primary Care Provider: Jeancarlos Zamora MD        Subjective:     Principal Problem:Perforation of sigmoid colon due to diverticulitis        HPI:  50 y.o. female with a PMH of GERD, NSTEMI, gastric bypass, and cerebellar stroke presents to the ED with c/o severe, suprapubic and lower abdominal pain that started last night associated with nausea. Patient reports that she has had fevers and malaise for the last week and was tested for COVID on 3/19/2020 which resulted as negative. 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 Also c/o dysuria which has improved since starting macrobid, but urine still cloudy. She denies SOB, chest pain, vomiting, lower extremity edema. Denies tobacco and illicit drug use, endorses occ alcohol. ED evaluation patient febrile, normal WBC, lactic and procal, ;  CT findings consistent with acute sigmoid colon diverticulitis complicated by microperforation and a small extraluminal/presumed intramural phlegmon versus developing abscess.  No obstruction.  No focal drainable fluid collections and Multifocal subsegmental ground-glass and consolidative opacities throughout the visualized lungs most concerning for viral pneumonia. Admitted.          Overview/Hospital Course:  50 y.o. female  admitted with Sigmoid diverticulitis with small micro perforation and symptoms significant for a rule out of a Covid 19 pneumonia. Placed on Zosyn, Metronidazole; surgery consulted, non-operative at this point. Patient had a negative COVID result from 3/19/2020. Pending repeat.     3/26- covid test negative x 2  Still febrile, patient reports pain not improved       Interval History: pt reports feeling hot and abdominal pain not improved     Review of Systems    Constitutional: Positive for fever.   HENT: Negative for congestion.    Eyes: Negative.    Respiratory: Negative for cough, chest tightness and shortness of breath.    Cardiovascular: Negative for chest pain, palpitations and leg swelling.   Gastrointestinal: Positive for abdominal pain and nausea. Negative for abdominal distention, diarrhea and vomiting.   Genitourinary: Negative for difficulty urinating, dysuria, frequency, hematuria and urgency.   Musculoskeletal: Negative for arthralgias and myalgias.   Skin: Negative for rash and wound.   Neurological: Negative for dizziness, syncope, light-headedness, numbness and headaches.   Psychiatric/Behavioral: Negative for confusion.     Objective:     Vital Signs (Most Recent):  Temp: 98 °F (36.7 °C) (03/27/20 0400)  Pulse: 78 (03/27/20 0400)  Resp: 17 (03/27/20 0400)  BP: 114/64 (03/27/20 0400)  SpO2: (!) 94 % (03/27/20 0400) Vital Signs (24h Range):  Temp:  [98 °F (36.7 °C)-102.6 °F (39.2 °C)] 98 °F (36.7 °C)  Pulse:  [77-94] 78  Resp:  [16-20] 17  SpO2:  [91 %-95 %] 94 %  BP: (103-140)/(59-80) 114/64     Weight: 111.4 kg (245 lb 9.6 oz)  Body mass index is 33.31 kg/m².    Intake/Output Summary (Last 24 hours) at 3/27/2020 0639  Last data filed at 3/27/2020 0426  Gross per 24 hour   Intake 660 ml   Output --   Net 660 ml      Physical Exam   Constitutional: She is oriented to person, place, and time. She appears well-developed and well-nourished. No distress.   HENT:   Head: Normocephalic and atraumatic.   Eyes: Conjunctivae are normal. No scleral icterus.   Neck: Normal range of motion. Neck supple.   Cardiovascular: Normal rate, regular rhythm, normal heart sounds and intact distal pulses.   Pulmonary/Chest: Effort normal. No respiratory distress. She has no wheezes.   Diminished b/l   Abdominal: Soft. Bowel sounds are normal. She exhibits no distension. There is tenderness (LLQ).   Musculoskeletal: Normal range of motion. She exhibits no edema.   Neurological:  She is alert and oriented to person, place, and time.   Skin: Skin is warm and dry. Capillary refill takes less than 2 seconds. She is not diaphoretic.   Psychiatric: She has a normal mood and affect.   Nursing note and vitals reviewed.      Significant Labs: All pertinent labs within the past 24 hours have been reviewed.    Significant Imaging: I have reviewed and interpreted all pertinent imaging results/findings within the past 24 hours.      Assessment/Plan:      * Perforation of sigmoid colon due to diverticulitis  - acute abdominal pain with CT findings consistent with acute sigmoid colon diverticulitis complicated by microperforation and a small extraluminal/presumed intramural phlegmon versus developing abscess.   - Zosyn started in ED, continue, Flagyl added   - surgery consulted, rec to monitor, clears for now  - pain management  - hold off on IVF's in setting of possible COVID since pt on clears      UTI (urinary tract infection)  - previously on macrobid  - follow culture; prelim GNR  - cont current Abx, follow speciation and senstivities      Thrombocytopenia  - appears chronic, stable  - no evidence of bleeding  - monitor        Suspected Covid-19 Virus Infection  - previous COVID 3/19/020 was negative  - restested  3/24/2020  -  patient febrile, elevated CRP, CT Multifocal subsegmental ground-glass and consolidative opacities throughout the visualized lungs most concerning for viral pneumonia.  - placed on O2 last night, Spo2 low 90's; on 2 L NC 96%  - monitor      VTE Risk Mitigation (From admission, onward)         Ordered     IP VTE LOW RISK PATIENT  Once      03/24/20 1953     Place RENU hose  Until discontinued      03/24/20 1953     Place sequential compression device  Until discontinued      03/24/20 1953                      Bryanna Chatman MD  Department of Hospital Medicine   [facility]

## 2020-03-27 NOTE — PROGRESS NOTES
[facility] Hospital Medicine  Progress Note    Patient Name: Shireen Anton  MRN: [id number]  Patient Class: IP- Inpatient   Admission Date: 3/24/2020  Length of Stay: 3 days  Attending Physician: Bryanna Chatman MD  Primary Care Provider: Jeancarlos Zamora MD        Subjective:     Principal Problem:Perforation of sigmoid colon due to diverticulitis        HPI:  50 y.o. female with a PMH of GERD, NSTEMI, gastric bypass, and cerebellar stroke presents to the ED with c/o severe, suprapubic and lower abdominal pain that started last night associated with nausea. Patient reports that she has had fevers and malaise for the last week and was tested for COVID on 3/19/2020 which resulted as negative. 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 Also c/o dysuria which has improved since starting macrobid, but urine still cloudy. She denies SOB, chest pain, vomiting, lower extremity edema. Denies tobacco and illicit drug use, endorses occ alcohol. ED evaluation patient febrile, normal WBC, lactic and procal, ;  CT findings consistent with acute sigmoid colon diverticulitis complicated by microperforation and a small extraluminal/presumed intramural phlegmon versus developing abscess.  No obstruction.  No focal drainable fluid collections and Multifocal subsegmental ground-glass and consolidative opacities throughout the visualized lungs most concerning for viral pneumonia. Admitted.          Overview/Hospital Course:  50 y.o. female  admitted with Sigmoid diverticulitis with small micro perforation and symptoms significant for a rule out of a Covid 19 pneumonia. Placed on Zosyn, Metronidazole; surgery consulted, non-operative at this point. Patient had a negative COVID result from 3/19/2020. Pending repeat.     3/26- covid test negative x 2  Still febrile, patient reports pain not improved    3/27- afebrile but CRP elevated. Respiratory status unchanged, CXR demonstrates bilateral  infiltrates vs edema. Will give 40 mg lasix 1x. +BM.    Interval History: 1 BM early morning. Diarrhea with some solid component. No blood.  Abdominal pain mildly improved. Respiratory status unchanged, needing few L O2. No cough or fever at this time.    Review of Systems   Constitutional: Positive for fatigue. Negative for chills and fever.   HENT: Negative.    Eyes: Negative.    Respiratory: Positive for shortness of breath. Negative for cough and chest tightness.    Cardiovascular: Negative.    Gastrointestinal: Positive for abdominal pain and diarrhea. Negative for blood in stool, constipation, nausea and vomiting.   Endocrine: Negative for cold intolerance and heat intolerance.   Genitourinary: Negative.    Musculoskeletal: Negative.    Skin: Negative.  Negative for rash.   Neurological: Negative for dizziness, syncope and light-headedness.   Psychiatric/Behavioral: Negative for agitation, confusion and hallucinations.     Objective:     Vital Signs (Most Recent):  Temp: 98.2 °F (36.8 °C) (03/27/20 0700)  Pulse: 80 (03/27/20 0700)  Resp: 18 (03/27/20 0700)  BP: 118/67 (03/27/20 0700)  SpO2: (!) 94 % (03/27/20 0700) Vital Signs (24h Range):  Temp:  [98 °F (36.7 °C)-99.8 °F (37.7 °C)] 98.2 °F (36.8 °C)  Pulse:  [78-92] 80  Resp:  [16-18] 18  SpO2:  [92 %-95 %] 94 %  BP: (114-140)/(61-80) 118/67     Weight: 111.4 kg (245 lb 9.6 oz)  Body mass index is 33.31 kg/m².    Intake/Output Summary (Last 24 hours) at 3/27/2020 1329  Last data filed at 3/27/2020 0426  Gross per 24 hour   Intake 300 ml   Output --   Net 300 ml      Physical Exam   Constitutional: She is oriented to person, place, and time. She appears well-developed and well-nourished.   HENT:   Head: Normocephalic and atraumatic.   Eyes: Pupils are equal, round, and reactive to light. Conjunctivae and EOM are normal.   Neck: Normal range of motion. Neck supple.   Cardiovascular: Normal rate, regular rhythm and normal heart sounds. Exam reveals no gallop and  no friction rub.   No murmur heard.  Pulmonary/Chest: Effort normal. No stridor. No respiratory distress. She has no wheezes. She has rales.   Abdominal: Soft. Bowel sounds are normal. She exhibits no distension. There is tenderness (lower abdominal tenderness, upper abdomen mildly tender, no guarding.).   Musculoskeletal: Normal range of motion.   Neurological: She is alert and oriented to person, place, and time. No cranial nerve deficit.   Skin: Skin is warm and dry. No rash noted. She is not diaphoretic.   Psychiatric: She has a normal mood and affect. Her behavior is normal.       Significant Labs: reviewed. CRP repeat is increased to >300 but WBC unchanged, normal    Significant Imaging: CXR: I have reviewed all pertinent results/findings within the past 24 hours and my personal findings are:  bilateral basilar infiltrates versus edema      Assessment/Plan:      * Perforation of sigmoid colon due to diverticulitis  - acute abdominal pain with CT findings consistent with acute sigmoid colon diverticulitis complicated by microperforation and a small extraluminal/presumed intramural phlegmon versus developing abscess.   - Zosyn started in ED, continue, Flagyl added   - surgery consulted, rec to monitor, clears for now  - pain management  - hold off on IVF's in setting of possible COVID since pt on clears    3/27 pain continues but has BM. No fevers, WBC remains normal    Hypoxemia        UTI (urinary tract infection)  - previously on macrobid  - follow culture; prelim GNR  - cont current Abx, follow speciation and senstivities      Thrombocytopenia  - appears chronic, stable  - no evidence of bleeding  - monitor        Suspected Covid-19 Virus Infection  - previous COVID 3/19/020 was negative  - restested  3/24/2020  -  patient febrile, elevated CRP, CT Multifocal subsegmental ground-glass and consolidative opacities throughout the visualized lungs most concerning for viral pneumonia.  - placed on O2 last night,  Spo2 low 90's; on 2 L NC 96%  - monitor      VTE Risk Mitigation (From admission, onward)         Ordered     IP VTE LOW RISK PATIENT  Once      03/24/20 1953     Place RENU hose  Until discontinued      03/24/20 1953     Place sequential compression device  Until discontinued      03/24/20 1953              Dispo:  Continue antibiotics and monitoring of abdominal exam for diverticulitis as well as respiratory status. If she worsens or WBC elevates, will obtain repeat CT scan.  Replace K  Add lovenox, patient not ambulating very much due to pain.      Darnell Castañeda MD  [facility]

## 2020-03-27 NOTE — SUBJECTIVE & OBJECTIVE
Interval History: 1 BM early morning. Diarrhea with some solid component. No blood.  Abdominal pain mildly improved. Respiratory status unchanged, needing few L O2. No cough or fever at this time.    Review of Systems   Constitutional: Positive for fatigue. Negative for chills and fever.   HENT: Negative.    Eyes: Negative.    Respiratory: Positive for shortness of breath. Negative for cough and chest tightness.    Cardiovascular: Negative.    Gastrointestinal: Positive for abdominal pain and diarrhea. Negative for blood in stool, constipation, nausea and vomiting.   Endocrine: Negative for cold intolerance and heat intolerance.   Genitourinary: Negative.    Musculoskeletal: Negative.    Skin: Negative.  Negative for rash.   Neurological: Negative for dizziness, syncope and light-headedness.   Psychiatric/Behavioral: Negative for agitation, confusion and hallucinations.     Objective:     Vital Signs (Most Recent):  Temp: 98.2 °F (36.8 °C) (03/27/20 0700)  Pulse: 80 (03/27/20 0700)  Resp: 18 (03/27/20 0700)  BP: 118/67 (03/27/20 0700)  SpO2: (!) 94 % (03/27/20 0700) Vital Signs (24h Range):  Temp:  [98 °F (36.7 °C)-99.8 °F (37.7 °C)] 98.2 °F (36.8 °C)  Pulse:  [78-92] 80  Resp:  [16-18] 18  SpO2:  [92 %-95 %] 94 %  BP: (114-140)/(61-80) 118/67     Weight: 111.4 kg (245 lb 9.6 oz)  Body mass index is 33.31 kg/m².    Intake/Output Summary (Last 24 hours) at 3/27/2020 1329  Last data filed at 3/27/2020 0426  Gross per 24 hour   Intake 300 ml   Output --   Net 300 ml      Physical Exam   Constitutional: She is oriented to person, place, and time. She appears well-developed and well-nourished.   HENT:   Head: Normocephalic and atraumatic.   Eyes: Pupils are equal, round, and reactive to light. Conjunctivae and EOM are normal.   Neck: Normal range of motion. Neck supple.   Cardiovascular: Normal rate, regular rhythm and normal heart sounds. Exam reveals no gallop and no friction rub.   No murmur heard.  Pulmonary/Chest:  Effort normal. No stridor. No respiratory distress. She has no wheezes. She has rales.   Abdominal: Soft. Bowel sounds are normal. She exhibits no distension. There is tenderness (lower abdominal tenderness, upper abdomen mildly tender, no guarding.).   Musculoskeletal: Normal range of motion.   Neurological: She is alert and oriented to person, place, and time. No cranial nerve deficit.   Skin: Skin is warm and dry. No rash noted. She is not diaphoretic.   Psychiatric: She has a normal mood and affect. Her behavior is normal.       Significant Labs: reviewed. CRP repeat is increased to >300 but WBC unchanged, normal    Significant Imaging: CXR: I have reviewed all pertinent results/findings within the past 24 hours and my personal findings are:  bilateral basilar infiltrates versus edema

## 2020-03-27 NOTE — SUBJECTIVE & OBJECTIVE
Interval History: pt reports feeling hot and abdominal pain not improved     Review of Systems   Constitutional: Positive for fever.   HENT: Negative for congestion.    Eyes: Negative.    Respiratory: Negative for cough, chest tightness and shortness of breath.    Cardiovascular: Negative for chest pain, palpitations and leg swelling.   Gastrointestinal: Positive for abdominal pain and nausea. Negative for abdominal distention, diarrhea and vomiting.   Genitourinary: Negative for difficulty urinating, dysuria, frequency, hematuria and urgency.   Musculoskeletal: Negative for arthralgias and myalgias.   Skin: Negative for rash and wound.   Neurological: Negative for dizziness, syncope, light-headedness, numbness and headaches.   Psychiatric/Behavioral: Negative for confusion.     Objective:     Vital Signs (Most Recent):  Temp: 98 °F (36.7 °C) (03/27/20 0400)  Pulse: 78 (03/27/20 0400)  Resp: 17 (03/27/20 0400)  BP: 114/64 (03/27/20 0400)  SpO2: (!) 94 % (03/27/20 0400) Vital Signs (24h Range):  Temp:  [98 °F (36.7 °C)-102.6 °F (39.2 °C)] 98 °F (36.7 °C)  Pulse:  [77-94] 78  Resp:  [16-20] 17  SpO2:  [91 %-95 %] 94 %  BP: (103-140)/(59-80) 114/64     Weight: 111.4 kg (245 lb 9.6 oz)  Body mass index is 33.31 kg/m².    Intake/Output Summary (Last 24 hours) at 3/27/2020 0639  Last data filed at 3/27/2020 0426  Gross per 24 hour   Intake 660 ml   Output --   Net 660 ml      Physical Exam   Constitutional: She is oriented to person, place, and time. She appears well-developed and well-nourished. No distress.   HENT:   Head: Normocephalic and atraumatic.   Eyes: Conjunctivae are normal. No scleral icterus.   Neck: Normal range of motion. Neck supple.   Cardiovascular: Normal rate, regular rhythm, normal heart sounds and intact distal pulses.   Pulmonary/Chest: Effort normal. No respiratory distress. She has no wheezes.   Diminished b/l   Abdominal: Soft. Bowel sounds are normal. She exhibits no distension. There is  tenderness (LLQ).   Musculoskeletal: Normal range of motion. She exhibits no edema.   Neurological: She is alert and oriented to person, place, and time.   Skin: Skin is warm and dry. Capillary refill takes less than 2 seconds. She is not diaphoretic.   Psychiatric: She has a normal mood and affect.   Nursing note and vitals reviewed.      Significant Labs: All pertinent labs within the past 24 hours have been reviewed.    Significant Imaging: I have reviewed and interpreted all pertinent imaging results/findings within the past 24 hours.

## 2020-03-28 LAB
ALBUMIN SERPL BCP-MCNC: 2.4 G/DL (ref 3.5–5.2)
ALP SERPL-CCNC: 57 U/L (ref 55–135)
ALT SERPL W/O P-5'-P-CCNC: 9 U/L (ref 10–44)
ANION GAP SERPL CALC-SCNC: 12 MMOL/L (ref 8–16)
AST SERPL-CCNC: 16 U/L (ref 10–40)
BACTERIA BLD CULT: NORMAL
BACTERIA BLD CULT: NORMAL
BASOPHILS # BLD AUTO: 0 K/UL (ref 0–0.2)
BASOPHILS NFR BLD: 0 % (ref 0–1.9)
BILIRUB SERPL-MCNC: 0.4 MG/DL (ref 0.1–1)
BUN SERPL-MCNC: 9 MG/DL (ref 6–20)
CALCIUM SERPL-MCNC: 8.9 MG/DL (ref 8.7–10.5)
CHLORIDE SERPL-SCNC: 103 MMOL/L (ref 95–110)
CO2 SERPL-SCNC: 26 MMOL/L (ref 23–29)
CREAT SERPL-MCNC: 0.7 MG/DL (ref 0.5–1.4)
DIFFERENTIAL METHOD: ABNORMAL
EOSINOPHIL # BLD AUTO: 0 K/UL (ref 0–0.5)
EOSINOPHIL NFR BLD: 0.4 % (ref 0–8)
ERYTHROCYTE [DISTWIDTH] IN BLOOD BY AUTOMATED COUNT: 12.8 % (ref 11.5–14.5)
EST. GFR  (AFRICAN AMERICAN): >60 ML/MIN/1.73 M^2
EST. GFR  (NON AFRICAN AMERICAN): >60 ML/MIN/1.73 M^2
GLUCOSE SERPL-MCNC: 100 MG/DL (ref 70–110)
HCT VFR BLD AUTO: 33.7 % (ref 37–48.5)
HGB BLD-MCNC: 11 G/DL (ref 12–16)
IMM GRANULOCYTES # BLD AUTO: 0.05 K/UL (ref 0–0.04)
IMM GRANULOCYTES NFR BLD AUTO: 0.9 % (ref 0–0.5)
LYMPHOCYTES # BLD AUTO: 1 K/UL (ref 1–4.8)
LYMPHOCYTES NFR BLD: 18.7 % (ref 18–48)
MCH RBC QN AUTO: 31.1 PG (ref 27–31)
MCHC RBC AUTO-ENTMCNC: 32.6 G/DL (ref 32–36)
MCV RBC AUTO: 95 FL (ref 82–98)
MONOCYTES # BLD AUTO: 0.4 K/UL (ref 0.3–1)
MONOCYTES NFR BLD: 7.2 % (ref 4–15)
NEUTROPHILS # BLD AUTO: 3.9 K/UL (ref 1.8–7.7)
NEUTROPHILS NFR BLD: 72.8 % (ref 38–73)
NRBC BLD-RTO: 0 /100 WBC
PLATELET # BLD AUTO: 191 K/UL (ref 150–350)
PMV BLD AUTO: 11.2 FL (ref 9.2–12.9)
POLYCHROMASIA BLD QL SMEAR: ABNORMAL
POTASSIUM SERPL-SCNC: 3.3 MMOL/L (ref 3.5–5.1)
PROT SERPL-MCNC: 6.8 G/DL (ref 6–8.4)
RBC # BLD AUTO: 3.54 M/UL (ref 4–5.4)
SODIUM SERPL-SCNC: 141 MMOL/L (ref 136–145)
WBC # BLD AUTO: 5.3 K/UL (ref 3.9–12.7)

## 2020-03-28 PROCEDURE — 25000003 PHARM REV CODE 250

## 2020-03-28 PROCEDURE — 25000003 PHARM REV CODE 250: Performed by: SURGERY

## 2020-03-28 PROCEDURE — 93005 ELECTROCARDIOGRAM TRACING: CPT

## 2020-03-28 PROCEDURE — 63600175 PHARM REV CODE 636 W HCPCS: Performed by: NURSE PRACTITIONER

## 2020-03-28 PROCEDURE — 87633 RESP VIRUS 12-25 TARGETS: CPT

## 2020-03-28 PROCEDURE — 93010 EKG 12-LEAD: ICD-10-PCS | Mod: ,,, | Performed by: INTERNAL MEDICINE

## 2020-03-28 PROCEDURE — 93010 ELECTROCARDIOGRAM REPORT: CPT | Mod: ,,, | Performed by: INTERNAL MEDICINE

## 2020-03-28 PROCEDURE — 85025 COMPLETE CBC W/AUTO DIFF WBC: CPT

## 2020-03-28 PROCEDURE — 25000003 PHARM REV CODE 250: Performed by: NURSE PRACTITIONER

## 2020-03-28 PROCEDURE — 80053 COMPREHEN METABOLIC PANEL: CPT

## 2020-03-28 PROCEDURE — 11000001 HC ACUTE MED/SURG PRIVATE ROOM

## 2020-03-28 PROCEDURE — 63600175 PHARM REV CODE 636 W HCPCS: Performed by: SURGERY

## 2020-03-28 PROCEDURE — 36415 COLL VENOUS BLD VENIPUNCTURE: CPT

## 2020-03-28 PROCEDURE — C9113 INJ PANTOPRAZOLE SODIUM, VIA: HCPCS | Performed by: NURSE PRACTITIONER

## 2020-03-28 PROCEDURE — S0030 INJECTION, METRONIDAZOLE: HCPCS | Performed by: SURGERY

## 2020-03-28 RX ORDER — CIPROFLOXACIN 500 MG/1
500 TABLET ORAL EVERY 12 HOURS
Status: DISCONTINUED | OUTPATIENT
Start: 2020-03-28 | End: 2020-03-29 | Stop reason: HOSPADM

## 2020-03-28 RX ORDER — METRONIDAZOLE 500 MG/1
500 TABLET ORAL EVERY 8 HOURS
Status: DISCONTINUED | OUTPATIENT
Start: 2020-03-28 | End: 2020-03-29 | Stop reason: HOSPADM

## 2020-03-28 RX ORDER — POTASSIUM CHLORIDE 20 MEQ/1
40 TABLET, EXTENDED RELEASE ORAL ONCE
Status: COMPLETED | OUTPATIENT
Start: 2020-03-28 | End: 2020-03-28

## 2020-03-28 RX ADMIN — POTASSIUM CHLORIDE 40 MEQ: 1500 TABLET, EXTENDED RELEASE ORAL at 12:03

## 2020-03-28 RX ADMIN — CIPROFLOXACIN 500 MG: 500 TABLET, FILM COATED ORAL at 08:03

## 2020-03-28 RX ADMIN — METRONIDAZOLE 500 MG: 500 TABLET ORAL at 11:03

## 2020-03-28 RX ADMIN — CYANOCOBALAMIN TAB 250 MCG 250 MCG: 250 TAB at 08:03

## 2020-03-28 RX ADMIN — ENOXAPARIN SODIUM 40 MG: 100 INJECTION SUBCUTANEOUS at 04:03

## 2020-03-28 RX ADMIN — PIPERACILLIN SODIUM, TAZOBACTAM SODIUM 4.5 G: 4; .5 INJECTION, POWDER, LYOPHILIZED, FOR SOLUTION INTRAVENOUS at 05:03

## 2020-03-28 RX ADMIN — ACETAMINOPHEN 650 MG: 325 TABLET ORAL at 02:03

## 2020-03-28 RX ADMIN — METRONIDAZOLE 500 MG: 500 INJECTION, SOLUTION INTRAVENOUS at 02:03

## 2020-03-28 RX ADMIN — TRAZODONE HYDROCHLORIDE 50 MG: 50 TABLET ORAL at 08:03

## 2020-03-28 RX ADMIN — CIPROFLOXACIN 500 MG: 500 TABLET, FILM COATED ORAL at 12:03

## 2020-03-28 RX ADMIN — ACETAMINOPHEN 650 MG: 325 TABLET ORAL at 04:03

## 2020-03-28 RX ADMIN — METRONIDAZOLE 500 MG: 500 TABLET ORAL at 02:03

## 2020-03-28 RX ADMIN — PANTOPRAZOLE SODIUM 40 MG: 40 INJECTION, POWDER, FOR SOLUTION INTRAVENOUS at 10:03

## 2020-03-28 NOTE — PROGRESS NOTES
General Surgery Progress Note    Subjective/Interval HPI:  No acute events. States her Abdominal pain is improved today. Tolerated small amount of regular diet. Having bowel movement. Ambulating around room, still SOB when up walking. On 3L NC.     Objective:    Vitals:    03/28/20 0740   BP:    Pulse: 64   Resp: 16   Temp: 97.5 °F (36.4 °C)       No intake or output data in the 24 hours ending 03/28/20 0900    Physical exam:  General: Awake, NAD  HEENT: NC, AT, EOMI  Neck: supple, no TTP  Chest: symmetric expansion, no distress, normal WOB. On 3L NC  Cardiovascular: RRR, normal cap refill  Abdomen: soft, non-distended. Mildly tender to lower quadrants  Extremities: moves all, no edema  Neuro: Alert and Oriented  Skin: warm, dry    Invalid input(s): 24H      Imaging Results          CT Abdomen Pelvis With Contrast (Final result)  Result time 03/24/20 10:59:51    Final result by Juan Pablo Felton MD (03/24/20 10:59:51)                 Impression:      1. CT findings consistent with acute sigmoid colon diverticulitis complicated by microperforation and a small extraluminal/presumed intramural phlegmon versus developing abscess.  No obstruction.  No focal drainable fluid collections.  2. Multifocal subsegmental ground-glass and consolidative opacities throughout the visualized lungs most concerning for viral pneumonia.  3. Few gas bubbles within the non dependent portion of the bladder lumen, presumably related to recent instrumentation.  A gas-forming infection could present with similar findings and is possible.  4. Mild splenomegaly.  5. Subcentimeter hepatic hypodensity, too small to characterize but favored to represent a small cyst.  6. Left nonobstructing nephrolithiasis.  Left renal cyst and right renal too small to characterize hypodensity.  7. 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  Preliminary findings discussed with Klaus Calzada NP, 03/24/2020 at 10:45 am.      Electronically signed  by: Juan Pablo Felton MD  Date:    03/24/2020  Time:    10:59             Narrative:    EXAMINATION:  CT ABDOMEN PELVIS WITH CONTRAST    CLINICAL HISTORY:  RLQ pain, appendicitis suspected;    TECHNIQUE:  5 mm axial images were obtained through the abdomen and pelvis following administration of 100 cc of Omnipaque 350 IV contrast.  Coronal and sagittal reformats were performed.    COMPARISON:  CT 05/01/2017.    FINDINGS:  Visualized heart is normal.  No pericardial effusion.    There are multifocal subsegmental ground-glass opacities throughout the visualized lungs with superimposed subsegmental areas of consolidation within the posterior and medial basal segments of the right lower lobe.  No pleural effusion.    The liver is normal in size.  Subcentimeter hypodense lesion noted within the inferior aspect of the right hepatic lobe, too small to accurately characterize but favored to be benign, possibly a cyst.  Portal vasculature is patent.  No intrahepatic bile duct dilatation.    Gallbladder is surgically absent.  Common bile duct is slightly dilated measuring 7 mm in maximum dimension with tapering at the level of the ampulla.    There are postoperative changes of sleeve gastrectomy.  Residual stomach is unremarkable.    The spleen is enlarged.  No focal splenic abnormalities.    Duodenum, pancreas and adrenal glands are within normal limits.    Kidneys are normal in size and location.  There is a left renal cortical/parapelvic cyst, increased in size when compared to the previous exam.  Additional subcentimeter cortical hypodensity noted within the superior pole of the right kidney, too small to accurately characterize.  There is a 5 mm calcified stone within the superior aspect of the left renal collecting system.  No hydronephrosis or hydroureter.  Multiple gas bubbles noted within the non dependent portion of the bladder, presumably related to recent instrumentation.    IUD identified in appropriate position.   Ovaries are within normal limits.  No pelvic free fluid.    There is a short segment of circumferential wall thickening involving the sigmoid colon with numerous superimposed diverticuli, prominent surrounding fat stranding and a few scattered extraluminal gas bubbles.  There is a 1.6 x 1.4 x 1.4 cm extraluminal, possibly mural fluid collection along the posterior wall of the inflamed sigmoid colon, presumably a small phlegmon or developing abscess.  No definite drainable fluid collections.    Aorta tapers normally without significant atherosclerotic calcification.  Celiac artery, SMA, bilateral renal arteries and ROSA are patent.    No focal mesenteric masses.    Subcutaneous soft tissues are within normal limits.  There are partially visualized bilateral breast implants.    No acute osseous abnormalities.  There are mild degenerative changes of the spine.                                  Assessment: 50 y.o. female with diverticulitis and microperforation. Also with E Coli UTI, and bilateral viral appearing pneumonia. COVID-19 negative.     Plan:  -improving from diverticulitis standpoint. Tolerating regular diet, pain improving, and bowel function returned. Still with mild tenderness to BLQ  -d/c IV abx. Will switch to PO cipro  -EKG  -continue regular diet  -defer to Medicine for pneumonia and UTI treatment.    Karri Yanes, PGYIII  [facility] Surgery Resident

## 2020-03-28 NOTE — SUBJECTIVE & OBJECTIVE
Interval History: abdominal tenderness greatly improved. Tolerating regular diet and BM+.  Respiratory status is minimally changed, still on nasal cannula. No cough. No fevers. Asking about need for Covid treatment medications despite negative tests.    Review of Systems   Constitutional: Negative for chills and fever.   HENT: Negative.    Eyes: Negative.    Respiratory: Negative for cough, chest tightness and shortness of breath (no labored breathing).    Cardiovascular: Negative.    Gastrointestinal: Positive for abdominal pain (very minimal lower abdominal pain, greatly improving). Negative for blood in stool, constipation, diarrhea, nausea and vomiting.   Endocrine: Negative for cold intolerance and heat intolerance.   Genitourinary: Negative.    Musculoskeletal: Negative.    Skin: Negative.  Negative for rash.   Neurological: Negative for dizziness, syncope and light-headedness.   Psychiatric/Behavioral: Negative for agitation, confusion and hallucinations.     Objective:     Vital Signs (Most Recent):  Temp: 97.5 °F (36.4 °C) (03/28/20 0740)  Pulse: 64 (03/28/20 0740)  Resp: 16 (03/28/20 0740)  BP: 113/72 (03/28/20 0503)  SpO2: 96 % (03/28/20 0740) Vital Signs (24h Range):  Temp:  [97.5 °F (36.4 °C)-98.2 °F (36.8 °C)] 97.5 °F (36.4 °C)  Pulse:  [64-74] 64  Resp:  [16-18] 16  SpO2:  [93 %-98 %] 96 %  BP: (100-132)/(61-74) 113/72     Weight: 111.4 kg (245 lb 9.6 oz)  Body mass index is 33.31 kg/m².  No intake or output data in the 24 hours ending 03/28/20 1157   Physical Exam   Constitutional: She is oriented to person, place, and time. She appears well-developed and well-nourished. No distress.   HENT:   Head: Normocephalic and atraumatic.   Eyes: Pupils are equal, round, and reactive to light. Conjunctivae and EOM are normal.   Neck: Normal range of motion. Neck supple.   Cardiovascular: Normal rate, regular rhythm and normal heart sounds. Exam reveals no gallop and no friction rub.   No murmur  heard.  Pulmonary/Chest: Effort normal and breath sounds normal. No stridor. No respiratory distress (requiring nasal cannula 2L to maintain sats in mid 90's). She has no wheezes.   Abdominal: Soft. Bowel sounds are normal. She exhibits no distension. There is tenderness (minimal lower abdominal tenderness).   Musculoskeletal: Normal range of motion.   Neurological: She is alert and oriented to person, place, and time. No cranial nerve deficit.   Skin: Skin is warm and dry. No rash noted. She is not diaphoretic.   Psychiatric: She has a normal mood and affect. Her behavior is normal.       Significant Labs: All pertinent labs within the past 24 hours have been reviewed.    Significant Imaging: I have reviewed and interpreted all pertinent imaging results/findings within the past 24 hours.

## 2020-03-28 NOTE — ASSESSMENT & PLAN NOTE
- acute abdominal pain with CT findings consistent with acute sigmoid colon diverticulitis complicated by microperforation and a small extraluminal/presumed intramural phlegmon versus developing abscess.   - Zosyn started in ED, continue, Flagyl added   - surgery consulted, rec to monitor, clears for now  - pain management  - hold off on IVF's in setting of possible COVID since pt on clears    3/27 pain continues but has BM. No fevers, WBC remains normal  3/28 greatly improved symptoms. Changes to oral antibiotics

## 2020-03-28 NOTE — PROGRESS NOTES
[facility] Medicine  Progress Note    Patient Name: Shireen Anton  MRN: [id number]  Patient Class: IP- Inpatient   Admission Date: 3/24/2020  Length of Stay: 4 days  Attending Physician: Bryanna Chatman MD  Primary Care Provider: Jeancarlos Zamora MD        Subjective:     Principal Problem:Perforation of sigmoid colon due to diverticulitis        HPI:  50 y.o. female with a PMH of GERD, NSTEMI, gastric bypass, and cerebellar stroke presents to the ED with c/o severe, suprapubic and lower abdominal pain that started last night associated with nausea. Patient reports that she has had fevers and malaise for the last week and was tested for COVID on 3/19/2020 which resulted as negative. 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 Also c/o dysuria which has improved since starting macrobid, but urine still cloudy. She denies SOB, chest pain, vomiting, lower extremity edema. Denies tobacco and illicit drug use, endorses occ alcohol. ED evaluation patient febrile, normal WBC, lactic and procal, ;  CT findings consistent with acute sigmoid colon diverticulitis complicated by microperforation and a small extraluminal/presumed intramural phlegmon versus developing abscess.  No obstruction.  No focal drainable fluid collections and Multifocal subsegmental ground-glass and consolidative opacities throughout the visualized lungs most concerning for viral pneumonia. Admitted.          Overview/Hospital Course:  50 y.o. female  admitted with Sigmoid diverticulitis with small micro perforation and symptoms significant for a rule out of a Covid 19 pneumonia. Placed on Zosyn, Metronidazole; surgery consulted, non-operative at this point. Patient had a negative COVID result from 3/19/2020. Pending repeat.     3/26- covid test negative x 2  Still febrile, patient reports pain not improved    3/27- afebrile but CRP elevated. Respiratory status unchanged, CXR demonstrates bilateral  infiltrates vs edema. Will give 40 mg lasix 1x. +BM.  3/28- abdominal pain resolved, tolerating regular diet.    Interval History: abdominal tenderness greatly improved. Tolerating regular diet and BM+.  Respiratory status is minimally changed, still on nasal cannula. No cough. No fevers. Asking about need for Covid treatment medications despite negative tests.    Review of Systems   Constitutional: Negative for chills and fever.   HENT: Negative.    Eyes: Negative.    Respiratory: Negative for cough, chest tightness and shortness of breath (no labored breathing).    Cardiovascular: Negative.    Gastrointestinal: Positive for abdominal pain (very minimal lower abdominal pain, greatly improving). Negative for blood in stool, constipation, diarrhea, nausea and vomiting.   Endocrine: Negative for cold intolerance and heat intolerance.   Genitourinary: Negative.    Musculoskeletal: Negative.    Skin: Negative.  Negative for rash.   Neurological: Negative for dizziness, syncope and light-headedness.   Psychiatric/Behavioral: Negative for agitation, confusion and hallucinations.     Objective:     Vital Signs (Most Recent):  Temp: 97.5 °F (36.4 °C) (03/28/20 0740)  Pulse: 64 (03/28/20 0740)  Resp: 16 (03/28/20 0740)  BP: 113/72 (03/28/20 0503)  SpO2: 96 % (03/28/20 0740) Vital Signs (24h Range):  Temp:  [97.5 °F (36.4 °C)-98.2 °F (36.8 °C)] 97.5 °F (36.4 °C)  Pulse:  [64-74] 64  Resp:  [16-18] 16  SpO2:  [93 %-98 %] 96 %  BP: (100-132)/(61-74) 113/72     Weight: 111.4 kg (245 lb 9.6 oz)  Body mass index is 33.31 kg/m².  No intake or output data in the 24 hours ending 03/28/20 1157   Physical Exam   Constitutional: She is oriented to person, place, and time. She appears well-developed and well-nourished. No distress.   HENT:   Head: Normocephalic and atraumatic.   Eyes: Pupils are equal, round, and reactive to light. Conjunctivae and EOM are normal.   Neck: Normal range of motion. Neck supple.   Cardiovascular: Normal  rate, regular rhythm and normal heart sounds. Exam reveals no gallop and no friction rub.   No murmur heard.  Pulmonary/Chest: Effort normal and breath sounds normal. No stridor. No respiratory distress (requiring nasal cannula 2L to maintain sats in mid 90's). She has no wheezes.   Abdominal: Soft. Bowel sounds are normal. She exhibits no distension. There is tenderness (minimal lower abdominal tenderness).   Musculoskeletal: Normal range of motion.   Neurological: She is alert and oriented to person, place, and time. No cranial nerve deficit.   Skin: Skin is warm and dry. No rash noted. She is not diaphoretic.   Psychiatric: She has a normal mood and affect. Her behavior is normal.       Significant Labs: All pertinent labs within the past 24 hours have been reviewed.    Significant Imaging: I have reviewed and interpreted all pertinent imaging results/findings within the past 24 hours.      Assessment/Plan:      * Perforation of sigmoid colon due to diverticulitis  - acute abdominal pain with CT findings consistent with acute sigmoid colon diverticulitis complicated by microperforation and a small extraluminal/presumed intramural phlegmon versus developing abscess.   - Zosyn started in ED, continue, Flagyl added   - surgery consulted, rec to monitor, clears for now  - pain management  - hold off on IVF's in setting of possible COVID since pt on clears    3/27 pain continues but has BM. No fevers, WBC remains normal  3/28 greatly improved symptoms. Changes to oral antibiotics    Hypoxemia        UTI (urinary tract infection)  - previously on macrobid  - follow culture; prelim GNR  - cont current Abx, follow speciation and senstivities      Thrombocytopenia  - appears chronic, stable  - no evidence of bleeding  - monitor        Suspected Covid-19 Virus Infection  - previous COVID 3/19/020 was negative  - restested  3/24/2020  -  patient febrile, elevated CRP, CT Multifocal subsegmental ground-glass and  consolidative opacities throughout the visualized lungs most concerning for viral pneumonia.  - placed on O2 last night, Spo2 low 90's; on 2 L NC 96%  - monitor      VTE Risk Mitigation (From admission, onward)         Ordered     enoxaparin injection 40 mg  Daily      03/27/20 1337     IP VTE LOW RISK PATIENT  Once      03/24/20 1953     Place RENU hose  Until discontinued      03/24/20 1953     Place sequential compression device  Until discontinued      03/24/20 1953              Dispo:  Diverticulitis standpoint has improved greatly and will watch at least 1 more day with oral antibiotics only, will add flagyl in addition to oral cipro so ultimate outpatient discharge will be a course of oral cipro+flagyl.  Respiratory standpoint: despite negative Covid tests, clinical history and imaging/labs are concerning for Covid infection with false negative tests (anecdotal 10-15% false negative rate). That being said, even if this is Covid infection, patient is not having severe symptoms and no cytokine storm so would not plan to prescribe plaquenil + azithromycin    Darnell Castañeda MD  [facility]

## 2020-03-28 NOTE — NURSING
Last Documentation = SpO2: (!) 92 % (03/28/20 1600)  O2 Device (Oxygen Therapy): room air (03/28/20 1600) room air lying, 92 room air sitting , 91 room air standing, 91 room air walking.

## 2020-03-29 VITALS
SYSTOLIC BLOOD PRESSURE: 109 MMHG | WEIGHT: 245.63 LBS | RESPIRATION RATE: 17 BRPM | OXYGEN SATURATION: 93 % | TEMPERATURE: 98 F | HEART RATE: 67 BPM | BODY MASS INDEX: 33.27 KG/M2 | HEIGHT: 72 IN | DIASTOLIC BLOOD PRESSURE: 66 MMHG

## 2020-03-29 PROBLEM — R09.02 HYPOXEMIA: Status: RESOLVED | Noted: 2020-03-27 | Resolved: 2020-03-29

## 2020-03-29 PROBLEM — Z20.822 SUSPECTED COVID-19 VIRUS INFECTION: Status: RESOLVED | Noted: 2020-03-24 | Resolved: 2020-03-29

## 2020-03-29 PROBLEM — D69.6 THROMBOCYTOPENIA: Status: RESOLVED | Noted: 2020-03-24 | Resolved: 2020-03-29

## 2020-03-29 LAB
25(OH)D3+25(OH)D2 SERPL-MCNC: 28 NG/ML (ref 30–96)
ALBUMIN SERPL BCP-MCNC: 2.5 G/DL (ref 3.5–5.2)
ALP SERPL-CCNC: 56 U/L (ref 55–135)
ALT SERPL W/O P-5'-P-CCNC: 7 U/L (ref 10–44)
ANION GAP SERPL CALC-SCNC: 7 MMOL/L (ref 8–16)
AST SERPL-CCNC: 14 U/L (ref 10–40)
BASOPHILS # BLD AUTO: 0.02 K/UL (ref 0–0.2)
BASOPHILS NFR BLD: 0.3 % (ref 0–1.9)
BILIRUB SERPL-MCNC: 0.3 MG/DL (ref 0.1–1)
BUN SERPL-MCNC: 10 MG/DL (ref 6–20)
CALCIUM SERPL-MCNC: 9 MG/DL (ref 8.7–10.5)
CHLORIDE SERPL-SCNC: 106 MMOL/L (ref 95–110)
CO2 SERPL-SCNC: 27 MMOL/L (ref 23–29)
CREAT SERPL-MCNC: 0.7 MG/DL (ref 0.5–1.4)
DIFFERENTIAL METHOD: ABNORMAL
EOSINOPHIL # BLD AUTO: 0 K/UL (ref 0–0.5)
EOSINOPHIL NFR BLD: 0.6 % (ref 0–8)
ERYTHROCYTE [DISTWIDTH] IN BLOOD BY AUTOMATED COUNT: 12.6 % (ref 11.5–14.5)
EST. GFR  (AFRICAN AMERICAN): >60 ML/MIN/1.73 M^2
EST. GFR  (NON AFRICAN AMERICAN): >60 ML/MIN/1.73 M^2
FERRITIN SERPL-MCNC: 827 NG/ML (ref 20–300)
FOLATE SERPL-MCNC: 11.5 NG/ML (ref 4–24)
GLUCOSE SERPL-MCNC: 98 MG/DL (ref 70–110)
HCT VFR BLD AUTO: 33.8 % (ref 37–48.5)
HGB BLD-MCNC: 11 G/DL (ref 12–16)
IMM GRANULOCYTES # BLD AUTO: 0.08 K/UL (ref 0–0.04)
IMM GRANULOCYTES NFR BLD AUTO: 1.1 % (ref 0–0.5)
IRON SERPL-MCNC: 33 UG/DL (ref 30–160)
LYMPHOCYTES # BLD AUTO: 1.1 K/UL (ref 1–4.8)
LYMPHOCYTES NFR BLD: 15.7 % (ref 18–48)
MAGNESIUM SERPL-MCNC: 1.8 MG/DL (ref 1.6–2.6)
MCH RBC QN AUTO: 30.5 PG (ref 27–31)
MCHC RBC AUTO-ENTMCNC: 32.5 G/DL (ref 32–36)
MCV RBC AUTO: 94 FL (ref 82–98)
MONOCYTES # BLD AUTO: 0.5 K/UL (ref 0.3–1)
MONOCYTES NFR BLD: 6.8 % (ref 4–15)
NEUTROPHILS # BLD AUTO: 5.5 K/UL (ref 1.8–7.7)
NEUTROPHILS NFR BLD: 75.5 % (ref 38–73)
NRBC BLD-RTO: 0 /100 WBC
PHOSPHATE SERPL-MCNC: 2.5 MG/DL (ref 2.7–4.5)
PLATELET # BLD AUTO: 241 K/UL (ref 150–350)
PMV BLD AUTO: 10.9 FL (ref 9.2–12.9)
POTASSIUM SERPL-SCNC: 3.5 MMOL/L (ref 3.5–5.1)
PREALB SERPL-MCNC: 12 MG/DL (ref 20–43)
PROT SERPL-MCNC: 7 G/DL (ref 6–8.4)
RBC # BLD AUTO: 3.61 M/UL (ref 4–5.4)
SATURATED IRON: 16 % (ref 20–50)
SODIUM SERPL-SCNC: 140 MMOL/L (ref 136–145)
TOTAL IRON BINDING CAPACITY: 206 UG/DL (ref 250–450)
TRANSFERRIN SERPL-MCNC: 139 MG/DL (ref 200–375)
VIT B12 SERPL-MCNC: >2000 PG/ML (ref 210–950)
WBC # BLD AUTO: 7.21 K/UL (ref 3.9–12.7)

## 2020-03-29 PROCEDURE — C9113 INJ PANTOPRAZOLE SODIUM, VIA: HCPCS | Performed by: NURSE PRACTITIONER

## 2020-03-29 PROCEDURE — 82306 VITAMIN D 25 HYDROXY: CPT

## 2020-03-29 PROCEDURE — 82746 ASSAY OF FOLIC ACID SERUM: CPT

## 2020-03-29 PROCEDURE — 82607 VITAMIN B-12: CPT

## 2020-03-29 PROCEDURE — 25000003 PHARM REV CODE 250: Performed by: SURGERY

## 2020-03-29 PROCEDURE — 84425 ASSAY OF VITAMIN B-1: CPT

## 2020-03-29 PROCEDURE — 84100 ASSAY OF PHOSPHORUS: CPT

## 2020-03-29 PROCEDURE — 83970 ASSAY OF PARATHORMONE: CPT

## 2020-03-29 PROCEDURE — 63600175 PHARM REV CODE 636 W HCPCS: Performed by: NURSE PRACTITIONER

## 2020-03-29 PROCEDURE — 83540 ASSAY OF IRON: CPT

## 2020-03-29 PROCEDURE — 82728 ASSAY OF FERRITIN: CPT

## 2020-03-29 PROCEDURE — 83735 ASSAY OF MAGNESIUM: CPT

## 2020-03-29 PROCEDURE — 85025 COMPLETE CBC W/AUTO DIFF WBC: CPT

## 2020-03-29 PROCEDURE — 84630 ASSAY OF ZINC: CPT

## 2020-03-29 PROCEDURE — 25000003 PHARM REV CODE 250: Performed by: NURSE PRACTITIONER

## 2020-03-29 PROCEDURE — 84134 ASSAY OF PREALBUMIN: CPT

## 2020-03-29 PROCEDURE — 25000003 PHARM REV CODE 250

## 2020-03-29 PROCEDURE — 80053 COMPREHEN METABOLIC PANEL: CPT

## 2020-03-29 RX ORDER — CIPROFLOXACIN 500 MG/1
500 TABLET ORAL EVERY 12 HOURS
Qty: 10 TABLET | Refills: 0 | Status: SHIPPED | OUTPATIENT
Start: 2020-03-29 | End: 2020-04-03

## 2020-03-29 RX ORDER — OXYCODONE AND ACETAMINOPHEN 5; 325 MG/1; MG/1
1 TABLET ORAL EVERY 8 HOURS PRN
Qty: 14 TABLET | Refills: 0 | Status: ON HOLD | OUTPATIENT
Start: 2020-03-29 | End: 2020-06-11

## 2020-03-29 RX ORDER — METRONIDAZOLE 500 MG/1
500 TABLET ORAL EVERY 8 HOURS
Qty: 15 TABLET | Refills: 0 | Status: SHIPPED | OUTPATIENT
Start: 2020-03-29 | End: 2020-04-03

## 2020-03-29 RX ADMIN — ONDANSETRON HYDROCHLORIDE 4 MG: 2 SOLUTION INTRAMUSCULAR; INTRAVENOUS at 03:03

## 2020-03-29 RX ADMIN — PANTOPRAZOLE SODIUM 40 MG: 40 INJECTION, POWDER, FOR SOLUTION INTRAVENOUS at 09:03

## 2020-03-29 RX ADMIN — MORPHINE SULFATE 4 MG: 10 INJECTION INTRAVENOUS at 03:03

## 2020-03-29 RX ADMIN — ACETAMINOPHEN 650 MG: 325 TABLET ORAL at 07:03

## 2020-03-29 RX ADMIN — CIPROFLOXACIN 500 MG: 500 TABLET, FILM COATED ORAL at 09:03

## 2020-03-29 RX ADMIN — METRONIDAZOLE 500 MG: 500 TABLET ORAL at 06:03

## 2020-03-29 RX ADMIN — CYANOCOBALAMIN TAB 250 MCG 250 MCG: 250 TAB at 09:03

## 2020-03-29 RX ADMIN — METRONIDAZOLE 500 MG: 500 TABLET ORAL at 01:03

## 2020-03-29 NOTE — NURSING
IV D/d'd. AVS instructions discussed. Verbalized understanding. Discharged via wheelchair to car.

## 2020-03-29 NOTE — DISCHARGE INSTRUCTIONS
Discharge Instructions for Diverticulitis  You have been diagnosed with diverticulitis. This is a condition in which small pouches form in your colon (large intestine) and become inflamed or infected. Follow the guidelines below for home care.  As you recover  Tips for recovery include:  · Eat a low-fiber diet. Your healthcare provider may advise a liquid diet. This gives your bowel a chance to rest so that it can recover.  · Foods to include: flake cereal, mashed potatoes, pancakes, waffles, pasta, white bread, rice, applesauce, bananas, eggs, fish, poultry, tofu, and well-cooked vegetables  · Take your medicines as directed. Do not stop taking the medicines, even if you feel better.  · Monitor your temperature and report any rising temperature to your healthcare provider.  · Take antibiotics exactly as directed. Do not miss any and keep taking them even if you feel better.   · Drink 6 to 8 glasses of water every day, unless directed otherwise.  · Use a heating pad or hot water bottle to reduce abdominal cramping or pain.  Preventing diverticulitis in the future  Tips for prevention include:  · Eat a high-fiber diet. Fiber adds bulk to the stool so that it passes through the large intestine more easily.  · Keep drinking 6 to 8 glasses of water every day, unless directed otherwise.  · Begin an exercise program. Ask your healthcare provider how to get started. You can benefit from simple activities such as walking or gardening.  · Treat diarrhea with a bland diet. Start with liquids only, then slowly add fiber over time.  · Watch for changes in your bowel movements (constipation to diarrhea).  · Avoid constipation with fiber and add a stool softener if needed.   · Get plenty of rest and sleep.  Follow-up care  Make a follow-up appointment as directed by our staff.  When to call your healthcare provider  Call your healthcare provider immediately if you have any of the following:  · Fever of 100.4°F (38.0°C) or  higher, or as directed by your healthcare provider  · Chills  · Severe cramps in the belly, most commonly the lower left side  · Tenderness in the belly, most commonly the lower left side  · Nausea and vomiting  · Bleeding from your rectum   Date Last Reviewed: 7/1/2016  © 9382-5080 [employer]. [address] [place] [address], [place] [address]. All rights reserved. This information is not intended as a substitute for professional medical care. Always follow your healthcare professional's instructions.        What is Pneumonia?    Pneumonia is a serious lung infection. Many cases of pneumonia are caused by bacteria or viruses. Fungi may also cause pneumonia, but this is less common.  You may also get pneumonia after another illness, such as a cold, flu, or bronchitis. Those most at risk include older adults, smokers, and people with long-term (chronic) health problems or weak immune systems.  Healthy lungs  · Air travels in and out of the lungs through tubes called airways.  · The tubes branch into smaller passages called bronchioles. These end in tiny sacs called alveoli.  · Blood vessels surrounding the alveoli take oxygen into the bloodstream. At the same time, the alveoli remove carbon dioxide (a waste gas) from the blood. The carbon dioxide is then exhaled.  When you have pneumonia  · Pneumonia causes the bronchioles and the alveoli to fill with excess mucus and become inflamed.  · Your bodys response may be to cough. This can help clear out the fluid.  · The fluid (or mucus) you cough up may appear green or dark yellow.  · The excess mucus may make you feel short of breath.  · The inflammation and infection may give you a fever.  What are the symptoms?  Symptoms of pneumonia can come without warning. At first, you may think you have a cold or flu. But symptoms may get worse quickly, turning into pneumonia. Symptoms can be different for bacterial and viral pneumonia. Common symptoms may include the  following:  · Severe cough with green or yellow mucus that doesn't improve or that gets worse  · Fever and chills  · Nausea, vomiting or diarrhea  · Shortness of breath with normal daily activities  · Increased heart rate  · Chest pain or discomfort when breathing in or coughing  · Headache  · Excessive sweating and clammy skin  Date Last Reviewed: 12/1/2016  © 1767-1912 [employer]. [address] [place] [address], [place] [address]. All rights reserved. This information is not intended as a substitute for professional medical care. Always follow your healthcare professional's instructions.

## 2020-03-29 NOTE — DISCHARGE SUMMARY
[facility] Hospital Medicine  Discharge Summary      Patient Name: Shireen Anton  MRN: [id number]  Admission Date: 3/24/2020  Hospital Length of Stay: 5 days  Discharge Date and Time:  03/29/2020 3:30 PM  Attending Physician: Bryanna Chatman MD   Discharging Provider: Bryanna Chatman MD  Primary Care Provider: Jeancarlos Zamora MD      HPI:   50 y.o. female with a PMH of GERD, NSTEMI, gastric bypass, and cerebellar stroke presents to the ED with c/o severe, suprapubic and lower abdominal pain that started last night associated with nausea. Patient reports that she has had fevers and malaise for the last week and was tested for COVID on 3/19/2020 which resulted as negative. 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 Also c/o dysuria which has improved since starting macrobid, but urine still cloudy. She denies SOB, chest pain, vomiting, lower extremity edema. Denies tobacco and illicit drug use, endorses occ alcohol. ED evaluation patient febrile, normal WBC, lactic and procal, ;  CT findings consistent with acute sigmoid colon diverticulitis complicated by microperforation and a small extraluminal/presumed intramural phlegmon versus developing abscess.  No obstruction.  No focal drainable fluid collections and Multifocal subsegmental ground-glass and consolidative opacities throughout the visualized lungs most concerning for viral pneumonia. Admitted.          * No surgery found *      Hospital Course:   50 y.o. female  admitted with Sigmoid diverticulitis with small micro perforation and symptoms significant for a rule out of a Covid 19 pneumonia. Placed on Zosyn, Metronidazole; surgery consulted, non-operative at this point. Patient had a negative COVID result from 3/19/2020. Pending repeat.     3/26- covid test negative x 2  Still febrile, patient reports pain not improved    3/27- afebrile but CRP elevated. Respiratory status unchanged, CXR demonstrates bilateral  infiltrates vs edema. Will give 40 mg lasix 1x. +BM.  3/28- abdominal pain resolved, tolerating regular diet.  3/29- Stable for discharge. O2 sats 93-95% on RA. Room on the side of precaution with return to work and direct patient care. [place] to finish antibiotics x one week and return to light duties. Viral infection panel swab taken prior to dc home and pending.      Consults:   Consults (From admission, onward)        Status Ordering Provider     Inpatient consult to General Surgery  Once     Provider:  Hussein Schwab MD    Completed NATALIA STEVE          No new Assessment & Plan notes have been filed under this hospital service since the last note was generated.  Service: Hospital Medicine    Final Active Diagnoses:    Diagnosis Date Noted POA    PRINCIPAL PROBLEM:  Perforation of sigmoid colon due to diverticulitis [K57.20] 03/24/2020 Yes    UTI (urinary tract infection) [N39.0] 03/24/2020 Yes      Problems Resolved During this Admission:    Diagnosis Date Noted Date Resolved POA    Hypoxemia [R09.02] 03/27/2020 03/29/2020 No    Suspected Covid-19 Virus Infection [R68.89] 03/24/2020 03/29/2020 Yes    Thrombocytopenia [D69.6] 03/24/2020 03/29/2020 Yes       Discharged Condition: good    Disposition: Home or Self Care    Follow Up:  Follow-up Information     Jeancarlos Zamora MD. Schedule an appointment as soon as possible for a visit in 2 weeks.    Specialties:  Family Medicine, Wound Care  Contact information:  [address] [place] [address]  [phone]                 Patient Instructions:      Diet Cardiac     Notify your health care provider if you experience any of the following:  temperature >100.4     Notify your health care provider if you experience any of the following:  persistent nausea and vomiting or diarrhea     Notify your health care provider if you experience any of the following:  severe uncontrolled pain     Notify your health care provider if you experience any of the  following:  difficulty breathing or increased cough     Activity as tolerated       Significant Diagnostic Studies:   Impression CXR       Patchy bilateral infiltrates.  This may represent viral pneumonitis.     SARS-CoV2 (COVID-19) Qualitative PCR Not Detected Not Detected  Undetected R, CM           Pending Diagnostic Studies:     Procedure Component Value Units Date/Time    Folate [[id number]] Collected:  03/29/20 0628    Order Status:  Sent Lab Status:  In process Updated:  03/29/20 1443    Specimen:  Blood     PTH, intact [[id number]] Collected:  03/29/20 0628    Order Status:  Sent Lab Status:  In process Updated:  03/29/20 0635    Specimen:  Blood     Vitamin B1 [[id number]] Collected:  03/29/20 0628    Order Status:  Sent Lab Status:  In process Updated:  03/29/20 0635    Specimen:  Blood     Vitamin B12 [[id number]] Collected:  03/29/20 0628    Order Status:  Sent Lab Status:  In process Updated:  03/29/20 1443    Specimen:  Blood     Zinc [[id number]] Collected:  03/29/20 0628    Order Status:  Sent Lab Status:  In process Updated:  03/29/20 0635    Specimen:  Blood          Medications:  Reconciled Home Medications:      Medication List      START taking these medications    ciprofloxacin HCl 500 MG tablet  Commonly known as:  CIPRO  Take 1 tablet (500 mg total) by mouth every 12 (twelve) hours. for 5 days     metroNIDAZOLE 500 MG tablet  Commonly known as:  FLAGYL  Take 1 tablet (500 mg total) by mouth every 8 (eight) hours. for 5 days     oxyCODONE-acetaminophen 5-325 mg per tablet  Commonly known as:  PERCOCET  Take 1 tablet by mouth every 8 (eight) hours as needed.        CONTINUE taking these medications    acetaminophen 500 MG tablet  Commonly known as:  TYLENOL  Take 500 mg by mouth every 6 (six) hours as needed for Pain.     aspirin 81 MG EC tablet  Commonly known as:  ECOTRIN  Take 1 tablet (81 mg total) by mouth once daily.     calcium citrate 200 mg (950 mg) tablet  Commonly known as:   CALCITRATE  Take 1 tablet (200 mg total) by mouth 3 (three) times daily.     cetirizine 10 MG tablet  Commonly known as:  ZYRTEC  Take 10 mg by mouth once daily.     cyanocobalamin 250 MCG tablet  Commonly known as:  VITAMIN B-12  Take 1 tablet (250 mcg total) by mouth once daily.     omeprazole 20 MG capsule  Commonly known as:  PRILOSEC     PARAGARD T 380A 380 square mm IUD  Generic drug:  copper intrauterine device     traZODone 50 MG tablet  Commonly known as:  DESYREL  Take 1 tablet (50 mg total) by mouth every evening.        STOP taking these medications    metoprolol succinate 25 MG 24 hr tablet  Commonly known as:  TOPROL-XL     nitrofurantoin (macrocrystal-monohydrate) 100 MG capsule  Commonly known as:  MACROBID     VITAMIN D2 50,000 unit Cap  Generic drug:  ergocalciferol            Indwelling Lines/Drains at time of discharge:   Lines/Drains/Airways     None                 Time spent on the discharge of patient: 30 minutes  Patient was seen and examined on the date of discharge and determined to be suitable for discharge.         Bryanna Chatman MD  Department of Hospital Medicine  [facility]

## 2020-03-29 NOTE — PROGRESS NOTES
[facility] CASE MANAGEMENT                  WRITTEN DISCHARGE INFORMATION      APPOINTMENTS AND RESOURCES TO HELP YOU MANAGE YOUR CARE AT HOME BASED ON YOUR PREFERENCES:  (If an appointment is not scheduled for you when you leave the hospital, call your doctor to schedule a follow up visit within a week)    Follow-up Information     Jeancarlos Zamora MD. Schedule an appointment as soon as possible for a visit in 2 weeks.    Specialties:  Family Medicine, Wound Care  Contact information:  [address] [place] [address]  [phone]                     Healthy Living Instructions to HELP MANAGE YOUR CARE AT HOME:  Things You are responsible for:  1.    Getting your prescriptions filled   2.    Taking your medications as directed, DO NOT MISS ANY DOSES!  3.    Following the diet and exercise recommended by your doctor  4.    Going to your follow-up doctor appointment. This is important because it allows the doctor to monitor your progress and determine if any changes need to made to your treatment plan.  5. If you have any questions about MANAGING YOUR CARE AT HOME Call the Nurse Care Line for 24/7 Assistance [phone]       Please answer any calls you may receive from [facility]. We want to continue to support you as you manage your healthcare needs. [facility] is happy to have the opportunity to serve you.      Thank you for choosing [facility] for your healthcare needs!    Your [facility] Case Management Team,    Alex Ramirez LMSW

## 2020-03-29 NOTE — PLAN OF CARE
Problem: Adult Inpatient Plan of Care  Goal: Plan of Care Review  Outcome: Met  Goal: Patient-Specific Goal (Individualization)  Outcome: Met  Goal: Absence of Hospital-Acquired Illness or Injury  Outcome: Met  Goal: Optimal Comfort and Wellbeing  Outcome: Met  Goal: Readiness for Transition of Care  Outcome: Met  Goal: Rounds/Family Conference  Outcome: Met     Problem: Fall Injury Risk  Goal: Absence of Fall and Fall-Related Injury  Outcome: Met     Problem: Infection  Goal: Infection Symptom Resolution  Outcome: Met     Problem: Pain Acute  Goal: Optimal Pain Control  Outcome: Met

## 2020-03-29 NOTE — PROGRESS NOTES
General Surgery Progress Note    Subjective/Interval HPI:  Feeling well this AM, complains of headache, no abdominal pain. States she did have episode of lower abdominal pain overnight after getting in/out of bed a lot. Had several loose BMs yesterday. Tolerating diet. Off of supplemental O2 this morning- denies SOB.     Objective:    Vitals:    03/29/20 0539   BP: 105/63   Pulse: 65   Resp: 18   Temp: 98 °F (36.7 °C)         Intake/Output Summary (Last 24 hours) at 3/29/2020 0738  Last data filed at 3/28/2020 1800  Gross per 24 hour   Intake 720 ml   Output --   Net 720 ml       Physical exam:  General: Awake, NAD  HEENT: NC, AT, EOMI  Neck: supple, no TTP  Chest: symmetric expansion, no distress, normal WOB  Cardiovascular: RRR, normal cap refill  Abdomen: soft, non-tender, non-distended  Extremities: moves all, no edema  Neuro: Alert and Oriented  Skin: warm, dry    Invalid input(s): 24H      Imaging Results          CT Abdomen Pelvis With Contrast (Final result)  Result time 03/24/20 10:59:51    Final result by Juan Pablo Felton MD (03/24/20 10:59:51)                 Impression:      1. CT findings consistent with acute sigmoid colon diverticulitis complicated by microperforation and a small extraluminal/presumed intramural phlegmon versus developing abscess.  No obstruction.  No focal drainable fluid collections.  2. Multifocal subsegmental ground-glass and consolidative opacities throughout the visualized lungs most concerning for viral pneumonia.  3. Few gas bubbles within the non dependent portion of the bladder lumen, presumably related to recent instrumentation.  A gas-forming infection could present with similar findings and is possible.  4. Mild splenomegaly.  5. Subcentimeter hepatic hypodensity, too small to characterize but favored to represent a small cyst.  6. Left nonobstructing nephrolithiasis.  Left renal cyst and right renal too small to characterize hypodensity.  7. Postoperative changes of  sleeve gastrectomy and hysterectomy.  Preliminary findings discussed with Klaus Calzada NP, 03/24/2020 at 10:45 am.      Electronically signed by: Juan Pablo Felton MD  Date:    03/24/2020  Time:    10:59             Narrative:    EXAMINATION:  CT ABDOMEN PELVIS WITH CONTRAST    CLINICAL HISTORY:  RLQ pain, appendicitis suspected;    TECHNIQUE:  5 mm axial images were obtained through the abdomen and pelvis following administration of 100 cc of Omnipaque 350 IV contrast.  Coronal and sagittal reformats were performed.    COMPARISON:  CT 05/01/2017.    FINDINGS:  Visualized heart is normal.  No pericardial effusion.    There are multifocal subsegmental ground-glass opacities throughout the visualized lungs with superimposed subsegmental areas of consolidation within the posterior and medial basal segments of the right lower lobe.  No pleural effusion.    The liver is normal in size.  Subcentimeter hypodense lesion noted within the inferior aspect of the right hepatic lobe, too small to accurately characterize but favored to be benign, possibly a cyst.  Portal vasculature is patent.  No intrahepatic bile duct dilatation.    Gallbladder is surgically absent.  Common bile duct is slightly dilated measuring 7 mm in maximum dimension with tapering at the level of the ampulla.    There are postoperative changes of sleeve gastrectomy.  Residual stomach is unremarkable.    The spleen is enlarged.  No focal splenic abnormalities.    Duodenum, pancreas and adrenal glands are within normal limits.    Kidneys are normal in size and location.  There is a left renal cortical/parapelvic cyst, increased in size when compared to the previous exam.  Additional subcentimeter cortical hypodensity noted within the superior pole of the right kidney, too small to accurately characterize.  There is a 5 mm calcified stone within the superior aspect of the left renal collecting system.  No hydronephrosis or hydroureter.  Multiple gas bubbles  noted within the non dependent portion of the bladder, presumably related to recent instrumentation.    IUD identified in appropriate position.  Ovaries are within normal limits.  No pelvic free fluid.    There is a short segment of circumferential wall thickening involving the sigmoid colon with numerous superimposed diverticuli, prominent surrounding fat stranding and a few scattered extraluminal gas bubbles.  There is a 1.6 x 1.4 x 1.4 cm extraluminal, possibly mural fluid collection along the posterior wall of the inflamed sigmoid colon, presumably a small phlegmon or developing abscess.  No definite drainable fluid collections.    Aorta tapers normally without significant atherosclerotic calcification.  Celiac artery, SMA, bilateral renal arteries and ROSA are patent.    No focal mesenteric masses.    Subcutaneous soft tissues are within normal limits.  There are partially visualized bilateral breast implants.    No acute osseous abnormalities.  There are mild degenerative changes of the spine.                                  Assessment: 50 y.o. female with diverticulitis and microperforation. Also with E Coli UTI, and bilateral viral appearing pneumonia, however COVID-19 negative.     Plan:  -improved from diverticulitis standpoint. Continue PO antibiotics  -continue diet  -IVF discontinued  -off of supplemental O2.   -ok to discharge home from surgery standpoint to likely home mathew Yanes, PGYIII  [facility] Surgery Resident

## 2020-03-30 LAB
ADENOVIRUS: NOT DETECTED
BORDETELLA PARAPERTUSSIS (IS1001): NOT DETECTED
BORDETELLA PERTUSSIS (PTXP): NOT DETECTED
CHLAMYDIA PNEUMONIAE: NOT DETECTED
CORONAVIRUS 229E, COMMON COLD VIRUS: NOT DETECTED
CORONAVIRUS HKU1, COMMON COLD VIRUS: NOT DETECTED
CORONAVIRUS NL63, COMMON COLD VIRUS: NOT DETECTED
CORONAVIRUS OC43, COMMON COLD VIRUS: NOT DETECTED
FLUBV RNA NPH QL NAA+NON-PROBE: NOT DETECTED
HPIV1 RNA NPH QL NAA+NON-PROBE: NOT DETECTED
HPIV2 RNA NPH QL NAA+NON-PROBE: NOT DETECTED
HPIV3 RNA NPH QL NAA+NON-PROBE: NOT DETECTED
HPIV4 RNA NPH QL NAA+NON-PROBE: NOT DETECTED
HUMAN METAPNEUMOVIRUS: NOT DETECTED
INFLUENZA A (SUBTYPES H1,H1-2009,H3): NOT DETECTED
MYCOPLASMA PNEUMONIAE: NOT DETECTED
PTH-INTACT SERPL-MCNC: 37.6 PG/ML (ref 9–77)
RESPIRATORY INFECTION PANEL SOURCE: NORMAL
RSV RNA NPH QL NAA+NON-PROBE: NOT DETECTED
RV+EV RNA NPH QL NAA+NON-PROBE: NOT DETECTED

## 2020-03-30 NOTE — PHYSICIAN QUERY
PT Name: Shireen Anton  MR #: [id number]     Physician Query Form - Diagnosis Clarification      CDS/: Cindy Ford               Contact information: [email or web address]    This form is a permanent document in the medical record.     Query Date: March 30, 2020    By submitting this query, we are merely seeking further clarification of documentation.  Please utilize your independent clinical judgment when addressing the question(s) below.     The medical record contains the following:      Findings Supporting Clinical Information Location in Medical Record   Admitted with Sigmoid diverticulitis with small micro perforation and symptoms significant for a rule out of a Covid 19 pneumonia.      Final Active Diagnoses:     Diagnosis Date Noted POA    PRINCIPAL PROBLEM:  Perforation of sigmoid colon due to diverticulitis [K57.20] 03/24/2020 Yes    UTI (urinary tract infection) [N39.0] 03/24/2020 Yes       Problems Resolved During this Admission:     Diagnosis Date Noted Date Resolved POA    Hypoxemia [R09.02] 03/27/2020 03/29/2020 No    Suspected Covid-19 Virus Infection [R68.89] 03/24/2020 03/29/2020 Yes    Thrombocytopenia [D69.6] 03/24/2020 03/29/2020 Yes      Multifocal subsegmental ground-glass and consolidative opacities throughout the visualized lungs most concerning for viral pneumonia.   3/26- covid test negative x 2  3/27- afebrile but CRP elevated. Respiratory status unchanged, CXR demonstrates bilateral infiltrates vs edema. Will give 40 mg lasix 1x. +BM.  3/29- Ideal to finish antibiotics x one week and return to light duties. Viral infection panel swab taken prior to dc home and pending.       Discharge Summary   Regarding her viral pneumonia, suspicious for COVID-19, recommend discharge to home quarantine for 2 weeks at the discretion of the hospitalist service.   General Surgery PN Attestation 03/29   50 y.o. female with diverticulitis and microperforation. Also with E Coli UTI, and  bilateral viral appearing pneumonia, however COVID-19 negative.      General Surgery PN 03/29    Respiratory standpoint: despite negative Covid tests, clinical history and imaging/labs are concerning for Covid infection with false negative tests (anecdotal 10-15% false negative rate). That being said, even if this is Covid infection, patient is not having severe symptoms and no cytokine storm so would not plan to prescribe plaquenil + azithromycin Hospital Medicine PN 03/28   Multifocal subsegmental ground-glass and consolidative opacities throughout the visualized lungs most concerning for viral pneumonia.  CT Abdomen Pelvise 03/24    Findings:  Heart size pulmonary vessels are similar.  There are patchy parenchymal opacities right base and left midlung fields.  No pneumothorax.  Impression:  Patchy bilateral infiltrates.  This may represent viral pneumonitis.   X Ray Chest 1 View 03/27     Please clarify if the __Viral Pneumonia___ diagnosis has been:    [   ] Ruled In   [   ] Ruled In, Now Resolved   [   ] Ruled Out   [   ] Other/Clarification of findings (please specify):   [   ] Clinically insignificant     [ x ] Clinically undetermined     Please document in your progress notes daily for the duration of treatment, until resolved, and include in your discharge summary.

## 2020-04-01 LAB — ZINC SERPL-MCNC: 60 UG/DL (ref 60–130)

## 2020-04-02 ENCOUNTER — PATIENT MESSAGE (OUTPATIENT)
Dept: FAMILY MEDICINE | Facility: CLINIC | Age: 50
End: 2020-04-02

## 2020-04-02 LAB — VIT B1 BLD-MCNC: 33 UG/L (ref 38–122)

## 2020-04-03 ENCOUNTER — OFFICE VISIT (OUTPATIENT)
Dept: FAMILY MEDICINE | Facility: CLINIC | Age: 50
End: 2020-04-03
Payer: COMMERCIAL

## 2020-04-03 ENCOUNTER — PATIENT MESSAGE (OUTPATIENT)
Dept: FAMILY MEDICINE | Facility: CLINIC | Age: 50
End: 2020-04-03

## 2020-04-03 ENCOUNTER — TELEPHONE (OUTPATIENT)
Dept: FAMILY MEDICINE | Facility: CLINIC | Age: 50
End: 2020-04-03

## 2020-04-03 DIAGNOSIS — J18.9 PNEUMONIA OF BOTH LUNGS DUE TO INFECTIOUS ORGANISM, UNSPECIFIED PART OF LUNG: Primary | ICD-10-CM

## 2020-04-03 DIAGNOSIS — Z20.822 SUSPECTED COVID-19 VIRUS INFECTION: ICD-10-CM

## 2020-04-03 DIAGNOSIS — K57.20 PERFORATION OF SIGMOID COLON DUE TO DIVERTICULITIS: ICD-10-CM

## 2020-04-03 PROCEDURE — 99213 PR OFFICE/OUTPT VISIT, EST, LEVL III, 20-29 MIN: ICD-10-PCS | Mod: 95,,, | Performed by: FAMILY MEDICINE

## 2020-04-03 PROCEDURE — 99213 OFFICE O/P EST LOW 20 MIN: CPT | Mod: 95,,, | Performed by: FAMILY MEDICINE

## 2020-04-03 NOTE — TELEPHONE ENCOUNTER
----- Message from Erum Núñez sent at 4/3/2020  1:01 PM CDT -----  Contact: Patient [phone]  Type: Patient Call Back    Who called:Patient    What is the request in detail:Need to talk to nurse in regards to getting note to excuse her from work corrected. Pt states that Light Duty wasn't added to the excuse. States she need this done today, ASAP! She has to turn the note in to employee health today. Please contact patient ASAP in regards to this. Pt had a Virtual Visit at 9:20 this morning.     Would the patient rather a call back or a response via [facility]? Call back     Best call back number: [phone]

## 2020-04-03 NOTE — LETTER
April 3, 2020    Shireen Anton  [address] [place] [address]             [facility]  [address]  [place] [address]  Phone: [phone] {[id number]}

## 2020-04-03 NOTE — TELEPHONE ENCOUNTER
Notified patient new letter was set in [facility] portal. Note to call back if unable to see. Verbalized understanding

## 2020-04-03 NOTE — LETTER
April 3, 2020    Shireen Anton  [address]  [place] [address]         [facility]  [address]  [place] [address]  Phone: [phone] April 3, 2020     Patient: Shireen Anton   YOB: 1970   Date of Visit: 4/3/2020       To Whom It May Concern:    It is my medical opinion that Shireen Anton should remain out of work until Monday, 4/13 due to recent illness which is expected to be resolved by that time.    If you have any questions or concerns, please don't hesitate to call.    Sincerely,     Azikiwe K. Lombard, MD

## 2020-04-03 NOTE — PROGRESS NOTES
Shireen Anton is a 50 y.o. female.      Visit type: Virtual visit with synchronous audio and video  The patient is located outside of this physical clinic and a thorough physical exam was not performed.  The chief complaint was presented by patient and discussed during visit and is documented below.    100% of visit was face to face counseling, and total visit lasted over 15 minutes.     Each patient provided medical services by telemedicine is:  (1) informed of the relationship between the physician and patient and the respective role of any other health care provider with respect to management of the patient; and (2) notified that he or she may decline to receive medical services by telemedicine and may withdraw from such care at any time.    Notes:    Pneumonia   She complains of cough, difficulty breathing (improved) and hoarse voice. There is no chest tightness, frequent throat clearing, hemoptysis, shortness of breath, sputum production or wheezing. This is a new problem. The current episode started 1 to 4 weeks ago. The problem occurs daily. The problem has been rapidly improving. The cough is non-productive. Associated symptoms include dyspnea on exertion and a fever (resolved). Pertinent negatives include no appetite change, chest pain, ear congestion, ear pain, headaches, heartburn, malaise/fatigue, myalgias, nasal congestion, orthopnea, PND, postnasal drip, rhinorrhea, sneezing, sore throat, sweats, trouble swallowing or weight loss. Her symptoms are aggravated by strenuous activity. Her symptoms are alleviated by rest. She reports moderate improvement on treatment. There are no known risk factors for lung disease. There is no history of asthma, bronchiectasis, bronchitis, COPD, emphysema or pneumonia.        ROS  Review of Systems   Constitutional: Positive for fever (resolved). Negative for activity change, appetite change, chills, diaphoresis, fatigue, malaise/fatigue, unexpected weight change and  weight loss.   HENT: Positive for hoarse voice and voice change. Negative for congestion, ear pain, hearing loss, nosebleeds, postnasal drip, rhinorrhea, sinus pressure, sneezing, sore throat and trouble swallowing.    Eyes: Negative for pain and visual disturbance.   Respiratory: Positive for cough and chest tightness. Negative for apnea, hemoptysis, sputum production, choking, shortness of breath, wheezing and stridor.    Cardiovascular: Positive for dyspnea on exertion. Negative for chest pain, leg swelling and PND.   Gastrointestinal: Positive for abdominal pain. Negative for constipation, diarrhea, heartburn, nausea and vomiting.   Genitourinary: Negative for difficulty urinating, dysuria, frequency and urgency.   Musculoskeletal: Negative.  Negative for arthralgias, back pain, gait problem, joint swelling and myalgias.   Skin: Negative.    Allergic/Immunologic: Negative for environmental allergies and food allergies.   Neurological: Positive for light-headedness. Negative for dizziness, seizures, syncope, weakness and headaches.   Psychiatric/Behavioral: Negative.  Negative for confusion, decreased concentration, dysphoric mood and sleep disturbance. The patient is not nervous/anxious.        Assessment & Plan    Discussion of plan of care including treatment options regarding health and wellness were reviewed and discussed with patient.  Any changes to medication or treatment plan, as well as any screening blood test, imaging, or referrals to specialist, are documented.  Follow up as indicated.     1. 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  Patient was recommended to remain out of work until 4/13.    2. Suspected Covid-19 Virus Infection  Testing was negative despite known contact with COVID-19 patients in the ICU and pneumonia; presumptive  COVID-19 based on history and symptoms.  Recovering; afebrile.    3. Perforation of sigmoid colon due to diverticulitis  Referral to appropriate specialist for evaluation and management placed in Epic.   - Ambulatory referral/consult to Colorectal Surgery; Future       Follow up if symptoms worsen or fail to improve.        ACTIVE MEDICAL ISSUES:  Documented in Problem List    PAST MEDICAL HISTORY  Documented    PAST SURGICAL HISTORY:  Documented    SOCIAL HISTORY:  Documented    FAMILY HISTORY:  Documented    ALLERGIES AND MEDICATIONS: updated and reviewed.  Documented    Health Maintenance       Date Due Completion Date    HIV Screening 01/07/1987 ---    Mammogram 08/24/2019 8/24/2017    Pap Smear with HPV Cotest 08/30/2020 8/30/2017    Lipid Panel 05/03/2022 5/3/2017    TETANUS VACCINE 08/23/2027 8/23/2017

## 2020-04-06 ENCOUNTER — PATIENT OUTREACH (OUTPATIENT)
Dept: ADMINISTRATIVE | Facility: CLINIC | Age: 50
End: 2020-04-06

## 2020-04-06 ENCOUNTER — PATIENT MESSAGE (OUTPATIENT)
Dept: FAMILY MEDICINE | Facility: CLINIC | Age: 50
End: 2020-04-06

## 2020-04-13 ENCOUNTER — PATIENT MESSAGE (OUTPATIENT)
Dept: FAMILY MEDICINE | Facility: CLINIC | Age: 50
End: 2020-04-13

## 2020-04-14 ENCOUNTER — PATIENT MESSAGE (OUTPATIENT)
Dept: FAMILY MEDICINE | Facility: CLINIC | Age: 50
End: 2020-04-14

## 2020-04-15 ENCOUNTER — PATIENT MESSAGE (OUTPATIENT)
Dept: FAMILY MEDICINE | Facility: CLINIC | Age: 50
End: 2020-04-15

## 2020-04-21 DIAGNOSIS — Z01.84 ANTIBODY RESPONSE EXAMINATION: ICD-10-CM

## 2020-04-23 ENCOUNTER — LAB VISIT (OUTPATIENT)
Dept: LAB | Facility: HOSPITAL | Age: 50
End: 2020-04-23
Attending: INTERNAL MEDICINE
Payer: COMMERCIAL

## 2020-04-23 DIAGNOSIS — Z01.84 ANTIBODY RESPONSE EXAMINATION: ICD-10-CM

## 2020-04-23 LAB — SARS-COV-2 IGG SERPL QL IA: POSITIVE

## 2020-04-23 PROCEDURE — 36415 COLL VENOUS BLD VENIPUNCTURE: CPT

## 2020-04-23 PROCEDURE — 86769 SARS-COV-2 COVID-19 ANTIBODY: CPT

## 2020-04-29 ENCOUNTER — PATIENT OUTREACH (OUTPATIENT)
Dept: ADMINISTRATIVE | Facility: OTHER | Age: 50
End: 2020-04-29

## 2020-04-30 NOTE — PROGRESS NOTES
CRS Office Visit History and Physical    The patient location is: Home  The chief complaint leading to consultation is: Diverticulitis  Visit type: audiovisual  Total time spent with patient: 20min  Each patient to whom he or she provides medical services by telemedicine is:  (1) informed of the relationship between the physician and patient and the respective role of any other health care provider with respect to management of the patient; and (2) notified that he or she may decline to receive medical services by telemedicine and may withdraw from such care at any time.      SUBJECTIVE:     Chief Complaint: Diverticulitis    History of Present Illness:  The patient is new patient to this practice.   Course is as follows:  Patient is a 50 y.o. female presents for management of recent episode of acute, uncomplicated diverticulitis.  She works as a nurse at [employer].  Initially developed upper respiratory in symptoms in March, test was negative for COVID, but did have ground-glass opacities on CT scan and was later found to have antibodies.  Towards the end of her upper respiratory symptoms, she developed acute onset lower abdominal pain.  Presented to the emergency department at [facility] and had a CT scan which demonstrated sigmoid inflammation with 1.6 cm abscess.  Was admitted to the hospital and treated with IV Zosyn which was later transitioned to oral ciprofloxacin and Flagyl for a total of 10 day course.  She was discharged from the hospital on day 4.    At this point she feels that her symptoms have completely resolved.  She has been having soft, formed bowel movements.  His on a regular diet.  No prior episodes of diverticulitis.  No prior colonoscopy.  No family history of colon or rectal cancer.  Prior surgical history includes laparoscopic cholecystectomy, and laparoscopic sleeve gastrectomy.  Interestingly, sleeve gastrectomy was later complicated by readmission for hypotension, NSTEMI, GASPER and cerebellar  stroke, felt to be a reaction to Lortab.    Review of patient's allergies indicates:   Allergen Reactions    Hydrocodone        Past Medical History:   Diagnosis Date    Allergy     Coronary artery disease     Depression     3/24/22: denies feelings of current or history of self harm    GERD (gastroesophageal reflux disease)     History of acute renal failure 2017    received 1 month of hemodialysis    NSTEMI (non-ST elevated myocardial infarction)     Renal disorder     acute renal failure post gastric sleeve    Stroke     cerebellar stroke post gastric sleeve     Past Surgical History:   Procedure Laterality Date    breast augmentation      BREAST SURGERY  1998     SECTION      CHOLECYSTECTOMY      FRACTURE SURGERY Right 1987    humerus, ORIF    LAPAROSCOPIC GASTRIC BANDING  2017    ORIF HUMERUS FRACTURE  1987     Family History   Problem Relation Age of Onset    Breast cancer Maternal Grandmother 63    Diabetes Father     Hypertension Father     Heart disease Father     Thyroid disease Mother     Cancer Mother 71        SCC of lung    Cancer Sister 48        pharyngeal cancer    Breast cancer Maternal Aunt      Social History     Tobacco Use    Smoking status: Former Smoker     Packs/day: 1.00     Years: 16.00     Pack years: 16.00     Last attempt to quit: 2011     Years since quittin.4    Smokeless tobacco: Never Used   Substance Use Topics    Alcohol use: Yes     Comment: occasional    Drug use: No        Review of Systems:  Review of Systems   Constitutional: Negative for chills and fever.   Gastrointestinal: Negative for abdominal pain, blood in stool, constipation and diarrhea.   All other systems reviewed and are negative.      OBJECTIVE:     Physical Exam:  General: White female in no distress   Neuro: Alert and oriented x 4.     Imaging:   CT abd/pel (3/24/20)  1. CT findings consistent with acute sigmoid colon diverticulitis complicated by  microperforation and a small extraluminal/presumed intramural phlegmon versus developing abscess.  No obstruction.  No focal drainable fluid collections.  2. Multifocal subsegmental ground-glass and consolidative opacities throughout the visualized lungs most concerning for viral pneumonia.  3. Few gas bubbles within the non dependent portion of the bladder lumen, presumably related to recent instrumentation.  A gas-forming infection could present with similar findings and is possible.  4. Mild splenomegaly.  5. Subcentimeter hepatic hypodensity, too small to characterize but favored to represent a small cyst.  6. Left nonobstructing nephrolithiasis.  Left renal cyst and right renal too small to characterize hypodensity.  7. 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    CT abd/pel (5/2017)  Status post gastric bypass surgery.  No evidence of bowel obstruction or gastric outlet obstruction.  Fatty infiltration of the liver.  Diverticulosis coli with minimal inflammatory changes surrounding the sigmoid mesocolon.  Suggest clinical correlation to exclude component of acute diverticulitis.      ASSESSMENT/PLAN:     50-year-old female presenting for further management after episode of uncomplicated diverticulitis requiring admission on March 24th.    We reviewed the anatomy and development of diverticulitis.  We reviewed the patient's imaging studies. We discussed risk of recurrence after 1 episode of uncomplicated diverticulitis is ~20%, after 2 episodes is ~30%, and after 3 episodes is ~50%.  We reviewed that after uncomplicated diverticulitis, the risk of a severe episode requiring colostomy or emergent surgery is <6%. We reviewed that, after surgery, the risk for developing recurrent diverticulitis in the remaining colon is ~6%.  Asked if any additional questions, none at this time.    I have recommended a colonoscopy, to be performed 6 weeks after resolution of symptoms, to rule out mass or other  underlying cause of sigmoid inflammation.    Roz Solis MD  Staff Surgeon  Colon & Rectal Surgery

## 2020-05-01 ENCOUNTER — OFFICE VISIT (OUTPATIENT)
Dept: SURGERY | Facility: CLINIC | Age: 50
End: 2020-05-01
Attending: COLON & RECTAL SURGERY
Payer: COMMERCIAL

## 2020-05-01 DIAGNOSIS — K57.20 PERFORATION OF SIGMOID COLON DUE TO DIVERTICULITIS: Primary | ICD-10-CM

## 2020-05-01 PROCEDURE — 99202 OFFICE O/P NEW SF 15 MIN: CPT | Mod: 95,,, | Performed by: COLON & RECTAL SURGERY

## 2020-05-01 PROCEDURE — 99202 PR OFFICE/OUTPT VISIT, NEW, LEVL II, 15-29 MIN: ICD-10-PCS | Mod: 95,,, | Performed by: COLON & RECTAL SURGERY

## 2020-05-01 NOTE — LETTER
May 1, 2020      Azikiwe K. Lombard, MD  [address] [facility] [place] [address]           [facility]  [address]  [facility] [address]  Phone: [phone]          Patient: Shireen Anton   MR Number: [id number]   YOB: 1970   Date of Visit: 5/1/2020       Dear Dr. Azikiwe K. Lombard:    Thank you for referring Shireen Anton to me for evaluation. Attached you will find relevant portions of my assessment and plan of care.    If you have questions, please do not hesitate to call me. I look forward to following Shireen Atnon along with you.    Sincerely,    Roz Solis MD    Enclosure  CC:  No Recipients    If you would like to receive this communication electronically, please contact [email or web address] or [phone] to request more information on [facility] access.    For providers and/or their staff who would like to refer a patient to [facility], please contact us through our one-stop-shop provider referral line, [facility] [place], at [phone].    If you feel you have received this communication in error or would no longer like to receive these types of communications, please e-mail [email or web address]

## 2020-06-02 ENCOUNTER — TELEPHONE (OUTPATIENT)
Dept: ENDOSCOPY | Facility: HOSPITAL | Age: 50
End: 2020-06-02

## 2020-06-02 DIAGNOSIS — K57.92 DIVERTICULITIS: Primary | ICD-10-CM

## 2020-06-02 DIAGNOSIS — Z12.11 SPECIAL SCREENING FOR MALIGNANT NEOPLASMS, COLON: Primary | ICD-10-CM

## 2020-06-02 RX ORDER — POLYETHYLENE GLYCOL 3350, SODIUM SULFATE ANHYDROUS, SODIUM BICARBONATE, SODIUM CHLORIDE, POTASSIUM CHLORIDE 236; 22.74; 6.74; 5.86; 2.97 G/4L; G/4L; G/4L; G/4L; G/4L
4 POWDER, FOR SOLUTION ORAL ONCE
Qty: 4000 ML | Refills: 0 | Status: SHIPPED | OUTPATIENT
Start: 2020-06-02 | End: 2020-06-02

## 2020-06-09 ENCOUNTER — LAB VISIT (OUTPATIENT)
Dept: FAMILY MEDICINE | Facility: CLINIC | Age: 50
End: 2020-06-09
Payer: COMMERCIAL

## 2020-06-09 DIAGNOSIS — K57.92 DIVERTICULITIS: ICD-10-CM

## 2020-06-09 PROCEDURE — U0003 INFECTIOUS AGENT DETECTION BY NUCLEIC ACID (DNA OR RNA); SEVERE ACUTE RESPIRATORY SYNDROME CORONAVIRUS 2 (SARS-COV-2) (CORONAVIRUS DISEASE [COVID-19]), AMPLIFIED PROBE TECHNIQUE, MAKING USE OF HIGH THROUGHPUT TECHNOLOGIES AS DESCRIBED BY CMS-2020-01-R: HCPCS

## 2020-06-10 LAB — SARS-COV-2 RNA RESP QL NAA+PROBE: NOT DETECTED

## 2020-06-11 ENCOUNTER — ANESTHESIA EVENT (OUTPATIENT)
Dept: ENDOSCOPY | Facility: HOSPITAL | Age: 50
End: 2020-06-11
Payer: COMMERCIAL

## 2020-06-11 ENCOUNTER — HOSPITAL ENCOUNTER (OUTPATIENT)
Facility: HOSPITAL | Age: 50
Discharge: HOME | End: 2020-06-11
Attending: COLON & RECTAL SURGERY | Admitting: COLON & RECTAL SURGERY
Payer: COMMERCIAL

## 2020-06-11 ENCOUNTER — ANESTHESIA (OUTPATIENT)
Dept: ENDOSCOPY | Facility: HOSPITAL | Age: 50
End: 2020-06-11
Payer: COMMERCIAL

## 2020-06-11 VITALS
HEIGHT: 72 IN | RESPIRATION RATE: 16 BRPM | WEIGHT: 240 LBS | HEART RATE: 72 BPM | TEMPERATURE: 99 F | DIASTOLIC BLOOD PRESSURE: 79 MMHG | SYSTOLIC BLOOD PRESSURE: 150 MMHG | OXYGEN SATURATION: 97 % | BODY MASS INDEX: 32.51 KG/M2

## 2020-06-11 DIAGNOSIS — R19.4 ENCOUNTER FOR DIAGNOSTIC COLONOSCOPY DUE TO CHANGE IN BOWEL HABITS: ICD-10-CM

## 2020-06-11 LAB
B-HCG UR QL: NEGATIVE
CTP QC/QA: YES

## 2020-06-11 PROCEDURE — 37000008 HC ANESTHESIA 1ST 15 MINUTES: Performed by: COLON & RECTAL SURGERY

## 2020-06-11 PROCEDURE — G0121 COLON CA SCRN NOT HI RSK IND: HCPCS | Mod: 53,,, | Performed by: COLON & RECTAL SURGERY

## 2020-06-11 PROCEDURE — 25000003 PHARM REV CODE 250: Performed by: NURSE ANESTHETIST, CERTIFIED REGISTERED

## 2020-06-11 PROCEDURE — G0121 COLON CA SCRN NOT HI RSK IND: HCPCS | Performed by: COLON & RECTAL SURGERY

## 2020-06-11 PROCEDURE — E9220 PRA ENDO ANESTHESIA: HCPCS | Mod: ,,, | Performed by: NURSE ANESTHETIST, CERTIFIED REGISTERED

## 2020-06-11 PROCEDURE — 81025 URINE PREGNANCY TEST: CPT | Performed by: COLON & RECTAL SURGERY

## 2020-06-11 PROCEDURE — 37000009 HC ANESTHESIA EA ADD 15 MINS: Performed by: COLON & RECTAL SURGERY

## 2020-06-11 PROCEDURE — G0121 COLON CA SCRN NOT HI RSK IND: ICD-10-PCS | Mod: 53,,, | Performed by: COLON & RECTAL SURGERY

## 2020-06-11 PROCEDURE — 63600175 PHARM REV CODE 636 W HCPCS: Performed by: NURSE ANESTHETIST, CERTIFIED REGISTERED

## 2020-06-11 PROCEDURE — 25000003 PHARM REV CODE 250: Performed by: COLON & RECTAL SURGERY

## 2020-06-11 PROCEDURE — E9220 PRA ENDO ANESTHESIA: ICD-10-PCS | Mod: ,,, | Performed by: NURSE ANESTHETIST, CERTIFIED REGISTERED

## 2020-06-11 RX ORDER — OXYCODONE AND ACETAMINOPHEN 5; 325 MG/1; MG/1
1 TABLET ORAL EVERY 4 HOURS PRN
Qty: 10 TABLET | Refills: 0 | OUTPATIENT
Start: 2020-06-11 | End: 2021-02-15

## 2020-06-11 RX ORDER — PROPOFOL 10 MG/ML
VIAL (ML) INTRAVENOUS
Status: DISCONTINUED | OUTPATIENT
Start: 2020-06-11 | End: 2020-06-11

## 2020-06-11 RX ORDER — PROPOFOL 10 MG/ML
VIAL (ML) INTRAVENOUS CONTINUOUS PRN
Status: DISCONTINUED | OUTPATIENT
Start: 2020-06-11 | End: 2020-06-11

## 2020-06-11 RX ORDER — SODIUM CHLORIDE 9 MG/ML
INJECTION, SOLUTION INTRAVENOUS CONTINUOUS
Status: DISCONTINUED | OUTPATIENT
Start: 2020-06-11 | End: 2020-06-11 | Stop reason: HOSPADM

## 2020-06-11 RX ORDER — CIPROFLOXACIN 500 MG/1
500 TABLET ORAL EVERY 12 HOURS
Qty: 28 TABLET | Refills: 0 | Status: SHIPPED | OUTPATIENT
Start: 2020-06-11 | End: 2020-06-25

## 2020-06-11 RX ORDER — METRONIDAZOLE 500 MG/1
500 TABLET ORAL EVERY 8 HOURS
Qty: 42 TABLET | Refills: 0 | Status: SHIPPED | OUTPATIENT
Start: 2020-06-11 | End: 2020-06-25

## 2020-06-11 RX ORDER — LIDOCAINE HYDROCHLORIDE 20 MG/ML
INJECTION INTRAVENOUS
Status: DISCONTINUED | OUTPATIENT
Start: 2020-06-11 | End: 2020-06-11

## 2020-06-11 RX ORDER — SODIUM CHLORIDE 9 MG/ML
INJECTION, SOLUTION INTRAVENOUS CONTINUOUS PRN
Status: DISCONTINUED | OUTPATIENT
Start: 2020-06-11 | End: 2020-06-11

## 2020-06-11 RX ADMIN — LIDOCAINE HYDROCHLORIDE 100 MG: 20 INJECTION, SOLUTION INTRAVENOUS at 12:06

## 2020-06-11 RX ADMIN — SODIUM CHLORIDE: 0.9 INJECTION, SOLUTION INTRAVENOUS at 11:06

## 2020-06-11 RX ADMIN — SODIUM CHLORIDE: 0.9 INJECTION, SOLUTION INTRAVENOUS at 10:06

## 2020-06-11 RX ADMIN — PROPOFOL 200 MCG/KG/MIN: 10 INJECTION, EMULSION INTRAVENOUS at 12:06

## 2020-06-11 RX ADMIN — PROPOFOL 100 MG: 10 INJECTION, EMULSION INTRAVENOUS at 12:06

## 2020-06-11 NOTE — ANESTHESIA POSTPROCEDURE EVALUATION
Anesthesia Post Evaluation    Patient: Shireen Anton    Procedure(s) Performed: Procedure(s) (LRB):  COLONOSCOPY (N/A)    Final Anesthesia Type: general    Patient location during evaluation: PACU  Patient participation: Yes- Able to Participate  Level of consciousness: awake and alert and oriented  Post-procedure vital signs: reviewed and stable  Pain management: adequate  Airway patency: patent    PONV status at discharge: No PONV  Anesthetic complications: no      Cardiovascular status: hemodynamically stable  Respiratory status: unassisted  Hydration status: euvolemic  Follow-up not needed.          Vitals Value Taken Time   /79 6/11/2020 12:36 PM   Temp 37.3 °C (99.1 °F) 6/11/2020 12:14 PM   Pulse 72 6/11/2020 12:36 PM   Resp 16 6/11/2020 12:36 PM   SpO2 97 % 6/11/2020 12:36 PM         No case tracking events are documented in the log.      Pain/Pradeep Score: Pradeep Score: 9 (6/11/2020 12:37 PM)

## 2020-06-11 NOTE — TRANSFER OF CARE
Anesthesia Transfer of Care Note    Patient: Shireen Anton    Procedure(s) Performed: Procedure(s) (LRB):  COLONOSCOPY (N/A)    Patient location: PACU    Anesthesia Type: general    Transport from OR: Transported from OR on 2-3 L/min O2 by NC with adequate spontaneous ventilation    Post pain: adequate analgesia    Post assessment: no apparent anesthetic complications and tolerated procedure well    Post vital signs: stable    Level of consciousness: awake, alert and oriented    Nausea/Vomiting: no nausea/vomiting    Complications: none    Transfer of care protocol was followed      Last vitals:   Visit Vitals  /73 (BP Location: Left arm, Patient Position: Lying)   Pulse 72   Temp 37.1 °C (98.8 °F) (Temporal)   Resp 17   Ht 6' (1.829 m)   Wt 108.9 kg (240 lb)   SpO2 96%   Breastfeeding? No   BMI 32.55 kg/m²

## 2020-06-11 NOTE — H&P
COLONOSCOPY HISTORY AND PHYSICAL EXAM    Procedure : Colonoscopy      INDICATIONS: Diverticulitis of the sigmoid colon    Family Hx of CRC: Denies    Last Colonoscopy:  None       Past Medical History:   Diagnosis Date    Allergy     Coronary artery disease     Depression     3/24/22: denies feelings of current or history of self harm    GERD (gastroesophageal reflux disease)     History of acute renal failure 2017    received 1 month of hemodialysis    NSTEMI (non-ST elevated myocardial infarction)     Renal disorder     acute renal failure post gastric sleeve    Stroke     cerebellar stroke post gastric sleeve     Sedation Problems: NO  Family History   Problem Relation Age of Onset    Breast cancer Maternal Grandmother 63    Diabetes Father     Hypertension Father     Heart disease Father     Thyroid disease Mother     Cancer Mother 71        SCC of lung    Cancer Sister 48        pharyngeal cancer    Breast cancer Maternal Aunt      Fam Hx of Sedation Problems: NO  Social History     Socioeconomic History    Marital status: Single     Spouse name: Not on file    Number of children: Not on file    Years of education: Not on file    Highest education level: Not on file   Occupational History     Employer: [employer]   Social Needs    Financial resource strain: Not on file    Food insecurity:     Worry: Not on file     Inability: Not on file    Transportation needs:     Medical: Not on file     Non-medical: Not on file   Tobacco Use    Smoking status: Former Smoker     Packs/day: 1.00     Years: 16.00     Pack years: 16.00     Last attempt to quit: 2011     Years since quittin.5    Smokeless tobacco: Never Used   Substance and Sexual Activity    Alcohol use: Yes     Comment: occasional    Drug use: No    Sexual activity: Yes     Partners: Male     Birth control/protection: Inserts   Lifestyle    Physical activity:     Days per week: Not on file     Minutes per session:  Not on file    Stress: Not on file   Relationships    Social connections:     Talks on phone: Not on file     Gets together: Not on file     Attends [employer] service: Not on file     Active member of club or organization: Not on file     Attends meetings of clubs or organizations: Not on file     Relationship status: Not on file   Other Topics Concern    Not on file   Social History Narrative    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 No   BMI 32.55 kg/m²   General: well developed, well nourished, no distress  Head: normocephalic, atraumatic  Mallampati Score   Neck: supple, symmetrical, trachea midline  Lungs:  normal respiratory effort  Heart: regular rate and rhythm  Abdomen: normal findings: soft, nondistended and abnormal findings:  tender to deep palpation in lower abdomen  Extremities: no cyanosis or edema, or clubbing    ASA:  III    PLAN  COLONOSCOPY.    SedationPlan :MAC    The details of the procedure, the possible need for biopsy or polypectomy and the potential risks including bleeding, perforation, missed polyps were discussed in detail.

## 2020-06-11 NOTE — DISCHARGE INSTRUCTIONS
Colonoscopy     A camera attached to a flexible tube with a viewing lens is used to take video pictures.     Colonoscopy is a test to view the inside of your lower digestive tract (colon and rectum). Sometimes it can show the last part of the small intestine (ileum). During the test, small pieces of tissue may be removed for testing. This is called a biopsy. Small growths, such as polyps, may also be removed.   Why is colonoscopy done?  The test is done to help look for colon cancer. And it can help find the source of abdominal pain, bleeding, and changes in bowel habits. It may be needed once a year, depending on factors such as your:  · Age  · Health history  · Family health history  · Symptoms  · Results from any prior colonoscopy  Risks and possible complications  These include:  · Bleeding               · A puncture or tear in the colon   · Risks of anesthesia  · A cancer lesion not being seen  Getting ready   To prepare for the test:  · Talk with your healthcare provider about the risks of the test (see below). Also ask your healthcare provider about alternatives to the test.  · Tell your healthcare provider about any medicines you take. Also tell him or her about any health conditions you may have.  · Make sure your rectum and colon are empty for the test. Follow the diet and bowel prep instructions exactly. If you dont, the test may need to be rescheduled.  · Plan for a friend or family member to drive you home after the test.     Colonoscopy provides an inside view of the entire colon.     You may discuss the results with your doctor right away or at a future visit.  During the test   The test is usually done in the hospital on an outpatient basis. This means you go home the same day. The procedure takes about 30 minutes. During that time:  · You are given relaxing (sedating) medicine through an IV line. You may be drowsy, or fully asleep.  · The healthcare provider will first give you a physical exam to  check for anal and rectal problems.  · Then the anus is lubricated and the scope inserted.  · If you are awake, you may have a feeling similar to needing to have a bowel movement. You may also feel pressure as air is pumped into the colon. Its OK to pass gas during the procedure.  · Biopsy, polyp removal, or other treatments may be done during the test.  After the test   You may have gas right after the test. It can help to try to pass it to help prevent later bloating. Your healthcare provider may discuss the results with you right away. Or you may need to schedule a follow-up visit to talk about the results. After the test, you can go back to your normal eating and other activities. You may be tired from the sedation and need to rest for a few hours.  Date Last Reviewed: 11/1/2016 © 2000-2017 [employer]. [address] [place] [address], [place] [address]. All rights reserved. This information is not intended as a substitute for professional medical care. Always follow your healthcare professional's instructions.

## 2020-06-11 NOTE — [employer] PATIENT INSTRUCTIONS
Discharge Summary/Instructions after an Endoscopic Procedure  Patient Name: Shireen Anton  Patient MRN: [id number]  Patient YOB: 1970 Thursday, June 11, 2020  Roz Solis MD  RESTRICTIONS:  During your procedure today, you received medications for sedation.  These   medications may affect your judgment, balance and coordination.  Therefore,   for 24 hours, you have the following restrictions:   - DO NOT drive a car, operate machinery, make legal/financial decisions,   sign important papers or drink alcohol.    ACTIVITY:  Today: no heavy lifting, straining or running due to procedural   sedation/anesthesia.  The following day: return to full activity including work.  DIET:  Eat and drink normally unless instructed otherwise.     TREATMENT FOR COMMON SIDE EFFECTS:  - Mild abdominal pain, nausea, belching, bloating or excessive gas:  rest,   eat lightly and use a heating pad.  - Sore Throat: treat with throat lozenges and/or gargle with warm salt   water.  - Because air was used during the procedure, expelling large amounts of air   from your rectum or belching is normal.  - If a bowel prep was taken, you may not have a bowel movement for 1-3 days.    This is normal.  SYMPTOMS TO WATCH FOR AND REPORT TO YOUR PHYSICIAN:  1. Abdominal pain or bloating, other than gas cramps.  2. Chest pain.  3. Back pain.  4. Signs of infection such as: chills or fever occurring within 24 hours   after the procedure.  5. Rectal bleeding, which would show as bright red, maroon, or black stools.   (A tablespoon of blood from the rectum is not serious, especially if   hemorrhoids are present.)  6. Vomiting.  7. Weakness or dizziness.  GO DIRECTLY TO THE NEAREST EMERGENCY ROOM IF YOU HAVE ANY OF THE FOLLOWING:      Difficulty breathing              Chills and/or fever over 101 F   Persistent vomiting and/or vomiting blood   Severe abdominal pain   Severe chest pain   Black, tarry stools   Bleeding- more than one  tablespoon   Any other symptom or condition that you feel may need urgent attention  Your doctor recommends these additional instructions:  If any biopsies were taken, your doctors clinic will contact you in 1 to 2   weeks with any results.  - Discharge patient to home.   - Clear liquid diet.   - Cipro (ciprofloxacin) 500 mg PO BID for 2 weeks.   - Flagyl (metronidazole) 500 mg PO TID for 2 weeks.   - Repeat colonoscopy because the examination was incomplete.   - Return to my office.   - Written discharge instructions were provided to the patient.   - The signs and symptoms of potential delayed complications were discussed   with the patient.   - Patient has a contact number available for emergencies.   - Return to normal activities tomorrow.  For questions, problems or results please call your physician - Roz Solis MD at Work:  [phone].  [facility] [place], EMERGENCY ROOM PHONE NUMBER: [phone]  IF A COMPLICATION OR EMERGENCY SITUATION ARISES AND YOU ARE UNABLE TO REACH   YOUR PHYSICIAN - GO DIRECTLY TO THE EMERGENCY ROOM.  Roz Solis MD  6/11/2020 12:15:43 PM  This report has been verified and signed electronically.  [employer]

## 2020-10-06 ENCOUNTER — OFFICE VISIT (OUTPATIENT)
Dept: FAMILY MEDICINE | Facility: CLINIC | Age: 50
End: 2020-10-06
Payer: COMMERCIAL

## 2020-10-06 DIAGNOSIS — R39.9 UTI SYMPTOMS: Primary | ICD-10-CM

## 2020-10-06 PROCEDURE — 99213 PR OFFICE/OUTPT VISIT, EST, LEVL III, 20-29 MIN: ICD-10-PCS | Mod: 95,,, | Performed by: NURSE PRACTITIONER

## 2020-10-06 PROCEDURE — 99213 OFFICE O/P EST LOW 20 MIN: CPT | Mod: 95,,, | Performed by: NURSE PRACTITIONER

## 2020-10-06 RX ORDER — NITROFURANTOIN 25; 75 MG/1; MG/1
100 CAPSULE ORAL 2 TIMES DAILY
Qty: 14 CAPSULE | Refills: 0 | Status: SHIPPED | OUTPATIENT
Start: 2020-10-06 | End: 2021-06-16

## 2020-10-06 NOTE — PROGRESS NOTES
Shireen was seen today for urinary tract infection.    Diagnoses and all orders for this visit:    UTI symptoms    Other orders  -     nitrofurantoin, macrocrystal-monohydrate, (MACROBID) 100 MG capsule; Take 1 capsule (100 mg total) by mouth 2 (two) times daily.        The patient location is: [place]  The chief complaint leading to consultation is: uti symptoms    Visit type: audiovisual    Face to Face time with patient: 5  10 minutes of total time spent on the encounter, which includes face to face time and non-face to face time preparing to see the patient (eg, review of tests), Obtaining and/or reviewing separately obtained history, Documenting clinical information in the electronic or other health record, Independently interpreting results (not separately reported) and communicating results to the patient/family/caregiver, or Care coordination (not separately reported).         Each patient to whom he or she provides medical services by telemedicine is:  (1) informed of the relationship between the physician and patient and the respective role of any other health care provider with respect to management of the patient; and (2) notified that he or she may decline to receive medical services by telemedicine and may withdraw from such care at any time.    Notes:      WKND. Dysuria. Freq. Bladder pressure. Malodor.   No meds. Tylenol. No relief  Same since onset  F/c no. lbp no. nvd no. No hematuria.   abx none for 3-4 months diverticulitis in June   Answers for HPI/ROS submitted by the patient on 10/6/2020   Dysuria  Chronicity: recurrent  Onset: in the past 7 days  Frequency: every urination  Progression since onset: waxing and waning  Pain quality: burning  Pain - numeric: 7/10  Fever: no fever  Sexually active?: Yes  History of pyelonephritis?: No  chills: No  discharge: No  flank pain: No  frequency: Yes  hematuria: No  hesitancy: No  nausea: No  possible pregnancy: No  sweats: No  urgency: Yes  vomiting:  No  constipation: No  rash: No  weight loss: No  withholding: No  behavior changes: No  Treatments tried: acetaminophen  Improvement on treatment: no relief  Pain severity: moderate  catheterization: No  diabetes insipidus: No  diabetes mellitus: No  genitourinary reflux: No  hypertension: No  recurrent UTIs: Yes  single kidney: No  STD: No  urinary stasis: No  urological procedure: No  kidney stones: No

## 2020-11-09 ENCOUNTER — OFFICE VISIT (OUTPATIENT)
Dept: FAMILY MEDICINE | Facility: CLINIC | Age: 50
End: 2020-11-09
Payer: COMMERCIAL

## 2020-11-09 VITALS
DIASTOLIC BLOOD PRESSURE: 82 MMHG | TEMPERATURE: 98 F | HEIGHT: 72 IN | BODY MASS INDEX: 34.28 KG/M2 | SYSTOLIC BLOOD PRESSURE: 122 MMHG | WEIGHT: 253.06 LBS | OXYGEN SATURATION: 96 % | HEART RATE: 65 BPM

## 2020-11-09 DIAGNOSIS — G47.00 INSOMNIA, UNSPECIFIED TYPE: ICD-10-CM

## 2020-11-09 DIAGNOSIS — N30.00 ACUTE CYSTITIS WITHOUT HEMATURIA: Primary | ICD-10-CM

## 2020-11-09 DIAGNOSIS — Z12.31 BREAST CANCER SCREENING BY MAMMOGRAM: ICD-10-CM

## 2020-11-09 DIAGNOSIS — R30.0 DYSURIA: ICD-10-CM

## 2020-11-09 PROCEDURE — 87086 URINE CULTURE/COLONY COUNT: CPT

## 2020-11-09 PROCEDURE — 3008F BODY MASS INDEX DOCD: CPT | Mod: CPTII,S$GLB,, | Performed by: FAMILY MEDICINE

## 2020-11-09 PROCEDURE — 99214 PR OFFICE/OUTPT VISIT, EST, LEVL IV, 30-39 MIN: ICD-10-PCS | Mod: S$GLB,,, | Performed by: FAMILY MEDICINE

## 2020-11-09 PROCEDURE — 3008F PR BODY MASS INDEX (BMI) DOCUMENTED: ICD-10-PCS | Mod: CPTII,S$GLB,, | Performed by: FAMILY MEDICINE

## 2020-11-09 PROCEDURE — 87088 URINE BACTERIA CULTURE: CPT

## 2020-11-09 PROCEDURE — 99999 PR PBB SHADOW E&M-EST. PATIENT-LVL IV: CPT | Mod: PBBFAC,,, | Performed by: FAMILY MEDICINE

## 2020-11-09 PROCEDURE — 81000 URINALYSIS NONAUTO W/SCOPE: CPT

## 2020-11-09 PROCEDURE — 99999 PR PBB SHADOW E&M-EST. PATIENT-LVL IV: ICD-10-PCS | Mod: PBBFAC,,, | Performed by: FAMILY MEDICINE

## 2020-11-09 PROCEDURE — 99214 OFFICE O/P EST MOD 30 MIN: CPT | Mod: S$GLB,,, | Performed by: FAMILY MEDICINE

## 2020-11-09 PROCEDURE — 87186 SC STD MICRODIL/AGAR DIL: CPT

## 2020-11-09 PROCEDURE — 87077 CULTURE AEROBIC IDENTIFY: CPT

## 2020-11-09 RX ORDER — DOXYCYCLINE 100 MG/1
100 CAPSULE ORAL EVERY 12 HOURS
Qty: 20 CAPSULE | Refills: 0 | Status: SHIPPED | OUTPATIENT
Start: 2020-11-09 | End: 2020-11-19

## 2020-11-09 RX ORDER — TRAZODONE HYDROCHLORIDE 50 MG/1
50 TABLET ORAL NIGHTLY
Qty: 90 TABLET | Refills: 1 | Status: ON HOLD | OUTPATIENT
Start: 2020-11-09 | End: 2022-11-29 | Stop reason: HOSPADM

## 2020-11-09 NOTE — PROGRESS NOTES
Routine Office Visit    Patient Name: Shireen Anton    : 1970  MRN: [id number]    Subjective:  Shireen is a 50 y.o. female who presents today for:    1. Dysuria  Patient presenting today with dysuria x 3 days.  She states that she frequently gets UTI's.  Her last one was March of this year.  She denies any flank pain or blood in her urine. There has been no lower abdominal pain, fevers, chills, or sweats.      Past Medical History  Past Medical History:   Diagnosis Date    Allergy     Coronary artery disease     Depression     3/24/22: denies feelings of current or history of self harm    GERD (gastroesophageal reflux disease)     History of acute renal failure 2017    received 1 month of hemodialysis    NSTEMI (non-ST elevated myocardial infarction)     Renal disorder     acute renal failure post gastric sleeve    Stroke     cerebellar stroke post gastric sleeve       Past Surgical History  Past Surgical History:   Procedure Laterality Date    breast augmentation      BREAST SURGERY  1998     SECTION      CHOLECYSTECTOMY      COLONOSCOPY N/A 2020    Procedure: COLONOSCOPY;  Surgeon: Roz Solis MD;  Location: [address]);  Service: Endoscopy;  Laterality: N/A;  covid test on 20 at [facility]    FRACTURE SURGERY Right 1987    humerus, ORIF    LAPAROSCOPIC GASTRIC BANDING  2017    ORIF HUMERUS FRACTURE         Family History  Family History   Problem Relation Age of Onset    Breast cancer Maternal Grandmother 63    Diabetes Father     Hypertension Father     Heart disease Father     Thyroid disease Mother     Cancer Mother 71        SCC of lung    Cancer Sister 48        pharyngeal cancer    Breast cancer Maternal Aunt        Social History  Social History     Socioeconomic History    Marital status: Single     Spouse name: Not on file    Number of children: Not on file    Years of education: Not on file    Highest education level: Not  on file   Occupational History     Employer: [employer]   Social Needs    Financial resource strain: Not very hard    Food insecurity     Worry: Never true     Inability: Never true    Transportation needs     Medical: No     Non-medical: No   Tobacco Use    Smoking status: Former Smoker     Packs/day: 1.00     Years: 16.00     Pack years: 16.00     Quit date: 2011     Years since quittin.9    Smokeless tobacco: Never Used   Substance and Sexual Activity    Alcohol use: Yes     Frequency: 2-4 times a month     Drinks per session: 3 or 4     Binge frequency: Less than monthly     Comment: occasional    Drug use: No    Sexual activity: Yes     Partners: Male     Birth control/protection: Inserts   Lifestyle    Physical activity     Days per week: 3 days     Minutes per session: 30 min    Stress: To some extent   Relationships    Social connections     Talks on phone: Not on file     Gets together: Not on file     Attends Hinduism service: Not on file     Active member of club or organization: Not on file     Attends meetings of clubs or organizations: Not on file     Relationship status: Not on file   Other Topics Concern    Not on file   Social History Narrative    Together since 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  Playing [employer].She is a registered nurse for [employer]       Current Medications  Current Outpatient Medications on File Prior to Visit   Medication Sig Dispense Refill    cetirizine (ZYRTEC) 10 MG tablet Take 10 mg by mouth once daily.      cyanocobalamin (VITAMIN B-12) 250 MCG tablet Take 1 tablet (250 mcg total) by mouth once daily. 30 tablet 5    omeprazole (PRILOSEC) 20 MG capsule       PARAGARD T 380A 380 square mm IUD       [DISCONTINUED] traZODone (DESYREL) 50 MG tablet Take 1 tablet (50 mg total) by mouth every evening. 90 tablet 1    acetaminophen (TYLENOL) 500 MG tablet Take 500 mg by mouth every 6 (six) hours as needed for Pain.      aspirin (ECOTRIN) 81 MG EC tablet  Take 1 tablet (81 mg total) by mouth once daily.  0    calcium citrate (CALCITRATE) 200 mg (950 mg) tablet Take 1 tablet (200 mg total) by mouth 3 (three) times daily. 90 tablet 5    nitrofurantoin, macrocrystal-monohydrate, (MACROBID) 100 MG capsule Take 1 capsule (100 mg total) by mouth 2 (two) times daily. 14 capsule 0    oxyCODONE-acetaminophen (PERCOCET) 5-325 mg per tablet Take 1 tablet by mouth every 4 (four) hours as needed for Pain. 10 tablet 0     No current facility-administered medications on file prior to visit.        Allergies   Review of patient's allergies indicates:   Allergen Reactions    Hydrocodone        Review of Systems (Pertinent positives)  Review of Systems   Constitutional: Negative.    HENT: Negative for congestion, hearing loss and sinus pain.    Eyes: Negative for discharge.   Respiratory: Negative for wheezing.    Cardiovascular: Negative for chest pain and palpitations.   Gastrointestinal: Negative for blood in stool, constipation, diarrhea and vomiting.   Genitourinary: Positive for dysuria. Negative for frequency, hematuria and urgency.   Musculoskeletal: Negative for neck pain.   Skin: Negative.    Neurological: Negative for weakness and headaches.   Endo/Heme/Allergies: Negative for polydipsia.         /82 (BP Location: Left arm, Patient Position: Sitting, BP Method: Large (Manual))   Pulse 65   Temp 98.2 °F (36.8 °C) (Oral)   Ht 6' (1.829 m)   Wt 114.8 kg (253 lb 1.4 oz)   LMP 10/14/2020   SpO2 96%   BMI 34.32 kg/m²     GENERAL APPEARANCE: in no apparent distress and well developed and well nourished  HEENT: PERRLA, EOMI, Sclera clear, anicteric, Oropharynx clear, no lesions, Neck supple with midline trachea  NECK: normal, supple, no adenopathy, thyroid normal in size  RESPIRATORY: appears well, vitals normal, no respiratory distress, acyanotic, normal RR, chest clear, no wheezing, crepitations, rhonchi, normal symmetric air entry  HEART: regular rate and  rhythm, S1, S2 normal, no murmur, click, rub or gallop.    ABDOMEN: abdomen is soft without tenderness, no masses, no hernias, no organomegaly, no rebound, no guarding. Suprapubic tenderness absent. No CVA tenderness.  SKIN: no rashes, no wounds, no other lesions  PSYCH: Alert, oriented x 3, thought content appropriate, speech normal, pleasant and cooperative, good eye contact, well groomed,    Assessment/Plan:  Shireen Anton is a 50 y.o. female who presents today for :    Shireen was seen today for breast mass.    Diagnoses and all orders for this visit:    Acute cystitis without hematuria  -     doxycycline (VIBRAMYCIN) 100 MG Cap; Take 1 capsule (100 mg total) by mouth every 12 (twelve) hours. for 10 days    Insomnia, unspecified type  -     traZODone (DESYREL) 50 MG tablet; Take 1 tablet (50 mg total) by mouth every evening.    Breast cancer screening by mammogram  -     Mammo Digital Screening Bilat; Future    Dysuria  -     Urinalysis, Reflex to Urine Culture Urine, Clean Catch        1.  Doxycyline based on previous cultures  2.  Trazodone refilled for her insomnia  3.  Mammogram ordered as she is due and because she feels a lump at the medial portion of her left breast  4.  Will change antibiotic if indicated on lab results  5.  Patient states she will get mammogram done after working ngozi Zamora MD

## 2020-11-10 LAB
BACTERIA #/AREA URNS HPF: ABNORMAL /HPF
BILIRUB UR QL STRIP: NEGATIVE
CLARITY UR: ABNORMAL
COLOR UR: YELLOW
GLUCOSE UR QL STRIP: NEGATIVE
HGB UR QL STRIP: ABNORMAL
KETONES UR QL STRIP: NEGATIVE
LEUKOCYTE ESTERASE UR QL STRIP: ABNORMAL
MICROSCOPIC COMMENT: ABNORMAL
NITRITE UR QL STRIP: NEGATIVE
PH UR STRIP: 7 [PH] (ref 5–8)
PROT UR QL STRIP: NEGATIVE
RBC #/AREA URNS HPF: 8 /HPF (ref 0–4)
SP GR UR STRIP: 1.02 (ref 1–1.03)
URN SPEC COLLECT METH UR: ABNORMAL
UROBILINOGEN UR STRIP-ACNC: NEGATIVE EU/DL
WBC #/AREA URNS HPF: 30 /HPF (ref 0–5)

## 2020-11-12 ENCOUNTER — HOSPITAL ENCOUNTER (OUTPATIENT)
Dept: RADIOLOGY | Facility: HOSPITAL | Age: 50
Discharge: HOME | End: 2020-11-12
Attending: FAMILY MEDICINE
Payer: COMMERCIAL

## 2020-11-12 DIAGNOSIS — Z12.31 BREAST CANCER SCREENING BY MAMMOGRAM: ICD-10-CM

## 2020-11-12 LAB — BACTERIA UR CULT: ABNORMAL

## 2020-11-12 PROCEDURE — 77067 SCR MAMMO BI INCL CAD: CPT | Mod: TC

## 2020-11-12 PROCEDURE — 77063 MAMMO DIGITAL SCREENING BILAT WITH TOMO: ICD-10-PCS | Mod: 26,,, | Performed by: RADIOLOGY

## 2020-11-12 PROCEDURE — 77063 BREAST TOMOSYNTHESIS BI: CPT | Mod: 26,,, | Performed by: RADIOLOGY

## 2020-11-12 PROCEDURE — 77067 SCR MAMMO BI INCL CAD: CPT | Mod: 26,,, | Performed by: RADIOLOGY

## 2020-11-12 PROCEDURE — 77067 MAMMO DIGITAL SCREENING BILAT WITH TOMO: ICD-10-PCS | Mod: 26,,, | Performed by: RADIOLOGY

## 2020-11-17 DIAGNOSIS — N63.20 LEFT BREAST MASS: Primary | ICD-10-CM

## 2020-11-18 ENCOUNTER — HOSPITAL ENCOUNTER (OUTPATIENT)
Dept: RADIOLOGY | Facility: HOSPITAL | Age: 50
Discharge: HOME | End: 2020-11-18
Attending: FAMILY MEDICINE
Payer: COMMERCIAL

## 2020-11-18 DIAGNOSIS — N63.20 LEFT BREAST MASS: ICD-10-CM

## 2020-11-18 PROCEDURE — 76642 ULTRASOUND BREAST LIMITED: CPT | Mod: TC,LT

## 2020-11-18 PROCEDURE — 76642 US BREAST LEFT LIMITED: ICD-10-PCS | Mod: 26,LT,, | Performed by: RADIOLOGY

## 2020-11-18 PROCEDURE — 76642 ULTRASOUND BREAST LIMITED: CPT | Mod: 26,LT,, | Performed by: RADIOLOGY

## 2020-12-16 ENCOUNTER — IMMUNIZATION (OUTPATIENT)
Dept: OBSTETRICS AND GYNECOLOGY | Facility: CLINIC | Age: 50
End: 2020-12-16
Payer: COMMERCIAL

## 2020-12-16 DIAGNOSIS — Z23 NEED FOR VACCINATION: ICD-10-CM

## 2020-12-16 PROCEDURE — 91300 COVID-19, MRNA, LNP-S, PF, 30 MCG/0.3 ML DOSE VACCINE: ICD-10-PCS | Mod: ,,, | Performed by: FAMILY MEDICINE

## 2020-12-16 PROCEDURE — 0001A COVID-19, MRNA, LNP-S, PF, 30 MCG/0.3 ML DOSE VACCINE: CPT | Mod: CV19,,, | Performed by: FAMILY MEDICINE

## 2020-12-16 PROCEDURE — 91300 COVID-19, MRNA, LNP-S, PF, 30 MCG/0.3 ML DOSE VACCINE: CPT | Mod: ,,, | Performed by: FAMILY MEDICINE

## 2020-12-16 PROCEDURE — 0001A COVID-19, MRNA, LNP-S, PF, 30 MCG/0.3 ML DOSE VACCINE: ICD-10-PCS | Mod: CV19,,, | Performed by: FAMILY MEDICINE

## 2021-01-04 ENCOUNTER — PATIENT MESSAGE (OUTPATIENT)
Dept: ADMINISTRATIVE | Facility: HOSPITAL | Age: 51
End: 2021-01-04

## 2021-01-06 ENCOUNTER — IMMUNIZATION (OUTPATIENT)
Dept: OBSTETRICS AND GYNECOLOGY | Facility: CLINIC | Age: 51
End: 2021-01-06
Payer: COMMERCIAL

## 2021-01-06 DIAGNOSIS — Z23 NEED FOR VACCINATION: ICD-10-CM

## 2021-01-06 PROCEDURE — 91300 COVID-19, MRNA, LNP-S, PF, 30 MCG/0.3 ML DOSE VACCINE: CPT | Mod: PBBFAC | Performed by: FAMILY MEDICINE

## 2021-01-06 PROCEDURE — 0002A COVID-19, MRNA, LNP-S, PF, 30 MCG/0.3 ML DOSE VACCINE: CPT | Mod: PBBFAC | Performed by: FAMILY MEDICINE

## 2021-02-14 ENCOUNTER — HOSPITAL ENCOUNTER (EMERGENCY)
Facility: HOSPITAL | Age: 51
Discharge: HOME | End: 2021-02-15
Attending: EMERGENCY MEDICINE
Payer: COMMERCIAL

## 2021-02-14 DIAGNOSIS — N12 PYELONEPHRITIS: Primary | ICD-10-CM

## 2021-02-14 DIAGNOSIS — N20.0 NEPHROLITHIASIS: ICD-10-CM

## 2021-02-14 LAB
B-HCG UR QL: NEGATIVE
BILIRUB UR QL STRIP: ABNORMAL
CLARITY UR: ABNORMAL
COLOR UR: ABNORMAL
CTP QC/QA: YES
GLUCOSE UR QL STRIP: ABNORMAL
HGB UR QL STRIP: ABNORMAL
KETONES UR QL STRIP: ABNORMAL
LEUKOCYTE ESTERASE UR QL STRIP: ABNORMAL
NITRITE UR QL STRIP: ABNORMAL
PH UR STRIP: ABNORMAL [PH] (ref 5–8)
PROT UR QL STRIP: ABNORMAL
SP GR UR STRIP: ABNORMAL (ref 1–1.03)
URN SPEC COLLECT METH UR: ABNORMAL
UROBILINOGEN UR STRIP-ACNC: ABNORMAL EU/DL

## 2021-02-14 PROCEDURE — 96375 TX/PRO/DX INJ NEW DRUG ADDON: CPT

## 2021-02-14 PROCEDURE — 99284 EMERGENCY DEPT VISIT MOD MDM: CPT | Mod: 25

## 2021-02-14 PROCEDURE — 81000 URINALYSIS NONAUTO W/SCOPE: CPT

## 2021-02-14 PROCEDURE — 87086 URINE CULTURE/COLONY COUNT: CPT

## 2021-02-14 PROCEDURE — 96361 HYDRATE IV INFUSION ADD-ON: CPT

## 2021-02-14 PROCEDURE — 87186 SC STD MICRODIL/AGAR DIL: CPT

## 2021-02-14 PROCEDURE — 87088 URINE BACTERIA CULTURE: CPT

## 2021-02-14 PROCEDURE — 87077 CULTURE AEROBIC IDENTIFY: CPT

## 2021-02-14 PROCEDURE — 80053 COMPREHEN METABOLIC PANEL: CPT

## 2021-02-14 PROCEDURE — 25000003 PHARM REV CODE 250: Performed by: EMERGENCY MEDICINE

## 2021-02-14 PROCEDURE — 96365 THER/PROPH/DIAG IV INF INIT: CPT

## 2021-02-14 PROCEDURE — 81025 URINE PREGNANCY TEST: CPT | Performed by: EMERGENCY MEDICINE

## 2021-02-14 PROCEDURE — 85025 COMPLETE CBC W/AUTO DIFF WBC: CPT

## 2021-02-14 RX ORDER — HYDROMORPHONE HYDROCHLORIDE 2 MG/ML
1 INJECTION, SOLUTION INTRAMUSCULAR; INTRAVENOUS; SUBCUTANEOUS
Status: COMPLETED | OUTPATIENT
Start: 2021-02-14 | End: 2021-02-15

## 2021-02-14 RX ORDER — ONDANSETRON 2 MG/ML
4 INJECTION INTRAMUSCULAR; INTRAVENOUS
Status: COMPLETED | OUTPATIENT
Start: 2021-02-14 | End: 2021-02-15

## 2021-02-14 RX ADMIN — SODIUM CHLORIDE 1000 ML: 0.9 INJECTION, SOLUTION INTRAVENOUS at 11:02

## 2021-02-15 VITALS
BODY MASS INDEX: 33.86 KG/M2 | HEART RATE: 68 BPM | HEIGHT: 72 IN | SYSTOLIC BLOOD PRESSURE: 124 MMHG | DIASTOLIC BLOOD PRESSURE: 64 MMHG | TEMPERATURE: 99 F | RESPIRATION RATE: 18 BRPM | WEIGHT: 250 LBS | OXYGEN SATURATION: 95 %

## 2021-02-15 LAB
ALBUMIN SERPL BCP-MCNC: 3.5 G/DL (ref 3.5–5.2)
ALP SERPL-CCNC: 78 U/L (ref 55–135)
ALT SERPL W/O P-5'-P-CCNC: 13 U/L (ref 10–44)
ANION GAP SERPL CALC-SCNC: 14 MMOL/L (ref 8–16)
AST SERPL-CCNC: 13 U/L (ref 10–40)
BACTERIA #/AREA URNS HPF: ABNORMAL /HPF
BASOPHILS # BLD AUTO: 0.02 K/UL (ref 0–0.2)
BASOPHILS NFR BLD: 0.2 % (ref 0–1.9)
BILIRUB SERPL-MCNC: 1 MG/DL (ref 0.1–1)
BUN SERPL-MCNC: 11 MG/DL (ref 6–20)
CALCIUM SERPL-MCNC: 9 MG/DL (ref 8.7–10.5)
CHLORIDE SERPL-SCNC: 105 MMOL/L (ref 95–110)
CO2 SERPL-SCNC: 17 MMOL/L (ref 23–29)
CREAT SERPL-MCNC: 0.9 MG/DL (ref 0.5–1.4)
DIFFERENTIAL METHOD: ABNORMAL
EOSINOPHIL # BLD AUTO: 0 K/UL (ref 0–0.5)
EOSINOPHIL NFR BLD: 0 % (ref 0–8)
ERYTHROCYTE [DISTWIDTH] IN BLOOD BY AUTOMATED COUNT: 12.9 % (ref 11.5–14.5)
EST. GFR  (AFRICAN AMERICAN): >60 ML/MIN/1.73 M^2
EST. GFR  (NON AFRICAN AMERICAN): >60 ML/MIN/1.73 M^2
GLUCOSE SERPL-MCNC: 136 MG/DL (ref 70–110)
HCT VFR BLD AUTO: 38.4 % (ref 37–48.5)
HGB BLD-MCNC: 13.1 G/DL (ref 12–16)
IMM GRANULOCYTES # BLD AUTO: 0.04 K/UL (ref 0–0.04)
IMM GRANULOCYTES NFR BLD AUTO: 0.4 % (ref 0–0.5)
LYMPHOCYTES # BLD AUTO: 0.6 K/UL (ref 1–4.8)
LYMPHOCYTES NFR BLD: 5.3 % (ref 18–48)
MCH RBC QN AUTO: 30.9 PG (ref 27–31)
MCHC RBC AUTO-ENTMCNC: 34.1 G/DL (ref 32–36)
MCV RBC AUTO: 91 FL (ref 82–98)
MICROSCOPIC COMMENT: ABNORMAL
MONOCYTES # BLD AUTO: 1.1 K/UL (ref 0.3–1)
MONOCYTES NFR BLD: 10.2 % (ref 4–15)
NEUTROPHILS # BLD AUTO: 9.4 K/UL (ref 1.8–7.7)
NEUTROPHILS NFR BLD: 83.9 % (ref 38–73)
NRBC BLD-RTO: 0 /100 WBC
PLATELET # BLD AUTO: 146 K/UL (ref 150–350)
PMV BLD AUTO: 11.8 FL (ref 9.2–12.9)
POTASSIUM SERPL-SCNC: 3.3 MMOL/L (ref 3.5–5.1)
PROT SERPL-MCNC: 7.2 G/DL (ref 6–8.4)
RBC # BLD AUTO: 4.24 M/UL (ref 4–5.4)
RBC #/AREA URNS HPF: >100 /HPF (ref 0–4)
SODIUM SERPL-SCNC: 136 MMOL/L (ref 136–145)
WBC # BLD AUTO: 11.22 K/UL (ref 3.9–12.7)
WBC #/AREA URNS HPF: 40 /HPF (ref 0–5)

## 2021-02-15 PROCEDURE — 63600175 PHARM REV CODE 636 W HCPCS: Performed by: EMERGENCY MEDICINE

## 2021-02-15 PROCEDURE — 25000003 PHARM REV CODE 250: Performed by: EMERGENCY MEDICINE

## 2021-02-15 RX ORDER — ONDANSETRON 4 MG/1
4 TABLET, FILM COATED ORAL EVERY 6 HOURS
Qty: 12 TABLET | Refills: 0 | Status: SHIPPED | OUTPATIENT
Start: 2021-02-15 | End: 2021-08-24

## 2021-02-15 RX ORDER — CEFPODOXIME PROXETIL 200 MG/1
200 TABLET, FILM COATED ORAL EVERY 12 HOURS
Qty: 20 TABLET | Refills: 0 | Status: SHIPPED | OUTPATIENT
Start: 2021-02-15 | End: 2021-02-25

## 2021-02-15 RX ORDER — OXYCODONE AND ACETAMINOPHEN 5; 325 MG/1; MG/1
1 TABLET ORAL EVERY 6 HOURS PRN
Qty: 12 TABLET | Refills: 0 | Status: SHIPPED | OUTPATIENT
Start: 2021-02-15 | End: 2021-02-18

## 2021-02-15 RX ORDER — NAPROXEN 500 MG/1
500 TABLET ORAL 2 TIMES DAILY WITH MEALS
Qty: 14 TABLET | Refills: 0 | Status: SHIPPED | OUTPATIENT
Start: 2021-02-15 | End: 2021-02-22

## 2021-02-15 RX ORDER — ACETAMINOPHEN 325 MG/1
325 TABLET ORAL EVERY 6 HOURS PRN
COMMUNITY
End: 2021-08-24

## 2021-02-15 RX ORDER — HYDROCODONE BITARTRATE AND ACETAMINOPHEN 5; 325 MG/1; MG/1
1 TABLET ORAL EVERY 6 HOURS PRN
Qty: 12 TABLET | Refills: 0 | Status: SHIPPED | OUTPATIENT
Start: 2021-02-15 | End: 2021-02-15 | Stop reason: ALTCHOICE

## 2021-02-15 RX ORDER — ONDANSETRON 4 MG/1
4 TABLET, FILM COATED ORAL EVERY 6 HOURS
Qty: 12 TABLET | Refills: 0 | Status: SHIPPED | OUTPATIENT
Start: 2021-02-15 | End: 2021-02-15 | Stop reason: SDUPTHER

## 2021-02-15 RX ADMIN — ONDANSETRON 4 MG: 2 INJECTION INTRAMUSCULAR; INTRAVENOUS at 12:02

## 2021-02-15 RX ADMIN — CEFTRIAXONE 1 G: 1 INJECTION, POWDER, FOR SOLUTION INTRAMUSCULAR; INTRAVENOUS at 01:02

## 2021-02-15 RX ADMIN — HYDROMORPHONE HYDROCHLORIDE 1 MG: 2 INJECTION INTRAMUSCULAR; INTRAVENOUS; SUBCUTANEOUS at 12:02

## 2021-02-17 LAB — BACTERIA UR CULT: ABNORMAL

## 2021-04-06 ENCOUNTER — PATIENT MESSAGE (OUTPATIENT)
Dept: ADMINISTRATIVE | Facility: HOSPITAL | Age: 51
End: 2021-04-06

## 2021-06-16 ENCOUNTER — OFFICE VISIT (OUTPATIENT)
Dept: FAMILY MEDICINE | Facility: CLINIC | Age: 51
End: 2021-06-16
Payer: COMMERCIAL

## 2021-06-16 DIAGNOSIS — N39.0 URINARY TRACT INFECTION WITHOUT HEMATURIA, SITE UNSPECIFIED: Primary | ICD-10-CM

## 2021-06-16 PROCEDURE — 99213 PR OFFICE/OUTPT VISIT, EST, LEVL III, 20-29 MIN: ICD-10-PCS | Mod: 95,,, | Performed by: INTERNAL MEDICINE

## 2021-06-16 PROCEDURE — 99213 OFFICE O/P EST LOW 20 MIN: CPT | Mod: 95,,, | Performed by: INTERNAL MEDICINE

## 2021-06-16 RX ORDER — NITROFURANTOIN 25; 75 MG/1; MG/1
100 CAPSULE ORAL 2 TIMES DAILY
Qty: 14 CAPSULE | Refills: 0 | Status: SHIPPED | OUTPATIENT
Start: 2021-06-16 | End: 2021-06-23

## 2021-07-06 ENCOUNTER — PATIENT MESSAGE (OUTPATIENT)
Dept: ADMINISTRATIVE | Facility: HOSPITAL | Age: 51
End: 2021-07-06

## 2021-08-24 ENCOUNTER — LAB VISIT (OUTPATIENT)
Dept: LAB | Facility: HOSPITAL | Age: 51
End: 2021-08-24
Attending: INTERNAL MEDICINE
Payer: COMMERCIAL

## 2021-08-24 ENCOUNTER — OFFICE VISIT (OUTPATIENT)
Dept: FAMILY MEDICINE | Facility: CLINIC | Age: 51
End: 2021-08-24
Payer: COMMERCIAL

## 2021-08-24 VITALS
HEART RATE: 66 BPM | WEIGHT: 258.25 LBS | TEMPERATURE: 98 F | HEIGHT: 72 IN | SYSTOLIC BLOOD PRESSURE: 108 MMHG | DIASTOLIC BLOOD PRESSURE: 70 MMHG | OXYGEN SATURATION: 99 % | BODY MASS INDEX: 34.98 KG/M2

## 2021-08-24 DIAGNOSIS — R30.0 DYSURIA: Primary | ICD-10-CM

## 2021-08-24 DIAGNOSIS — N30.01 ACUTE CYSTITIS WITH HEMATURIA: ICD-10-CM

## 2021-08-24 DIAGNOSIS — R30.0 DYSURIA: ICD-10-CM

## 2021-08-24 DIAGNOSIS — Z87.442 HISTORY OF NEPHROLITHIASIS: ICD-10-CM

## 2021-08-24 LAB
BACTERIA #/AREA URNS AUTO: ABNORMAL /HPF
BILIRUB UR QL STRIP: NEGATIVE
CLARITY UR REFRACT.AUTO: CLEAR
COLOR UR AUTO: ABNORMAL
GLUCOSE UR QL STRIP: NEGATIVE
HGB UR QL STRIP: ABNORMAL
KETONES UR QL STRIP: NEGATIVE
LEUKOCYTE ESTERASE UR QL STRIP: ABNORMAL
MICROSCOPIC COMMENT: ABNORMAL
NITRITE UR QL STRIP: NEGATIVE
PH UR STRIP: 6 [PH] (ref 5–8)
PROT UR QL STRIP: NEGATIVE
RBC #/AREA URNS AUTO: 4 /HPF (ref 0–4)
SP GR UR STRIP: 1 (ref 1–1.03)
SQUAMOUS #/AREA URNS AUTO: 4 /HPF
URN SPEC COLLECT METH UR: ABNORMAL
WBC #/AREA URNS AUTO: 11 /HPF (ref 0–5)

## 2021-08-24 PROCEDURE — 1126F PR PAIN SEVERITY QUANTIFIED, NO PAIN PRESENT: ICD-10-PCS | Mod: CPTII,S$GLB,, | Performed by: INTERNAL MEDICINE

## 2021-08-24 PROCEDURE — 3008F PR BODY MASS INDEX (BMI) DOCUMENTED: ICD-10-PCS | Mod: CPTII,S$GLB,, | Performed by: INTERNAL MEDICINE

## 2021-08-24 PROCEDURE — 87186 SC STD MICRODIL/AGAR DIL: CPT | Performed by: INTERNAL MEDICINE

## 2021-08-24 PROCEDURE — 81001 URINALYSIS AUTO W/SCOPE: CPT | Performed by: INTERNAL MEDICINE

## 2021-08-24 PROCEDURE — 99999 PR PBB SHADOW E&M-EST. PATIENT-LVL III: ICD-10-PCS | Mod: PBBFAC,,, | Performed by: INTERNAL MEDICINE

## 2021-08-24 PROCEDURE — 3074F SYST BP LT 130 MM HG: CPT | Mod: CPTII,S$GLB,, | Performed by: INTERNAL MEDICINE

## 2021-08-24 PROCEDURE — 3078F DIAST BP <80 MM HG: CPT | Mod: CPTII,S$GLB,, | Performed by: INTERNAL MEDICINE

## 2021-08-24 PROCEDURE — 87086 URINE CULTURE/COLONY COUNT: CPT | Performed by: INTERNAL MEDICINE

## 2021-08-24 PROCEDURE — 99213 OFFICE O/P EST LOW 20 MIN: CPT | Mod: S$GLB,,, | Performed by: INTERNAL MEDICINE

## 2021-08-24 PROCEDURE — 1159F MED LIST DOCD IN RCRD: CPT | Mod: CPTII,S$GLB,, | Performed by: INTERNAL MEDICINE

## 2021-08-24 PROCEDURE — 3078F PR MOST RECENT DIASTOLIC BLOOD PRESSURE < 80 MM HG: ICD-10-PCS | Mod: CPTII,S$GLB,, | Performed by: INTERNAL MEDICINE

## 2021-08-24 PROCEDURE — 1159F PR MEDICATION LIST DOCUMENTED IN MEDICAL RECORD: ICD-10-PCS | Mod: CPTII,S$GLB,, | Performed by: INTERNAL MEDICINE

## 2021-08-24 PROCEDURE — 87077 CULTURE AEROBIC IDENTIFY: CPT | Performed by: INTERNAL MEDICINE

## 2021-08-24 PROCEDURE — 87088 URINE BACTERIA CULTURE: CPT | Performed by: INTERNAL MEDICINE

## 2021-08-24 PROCEDURE — 3074F PR MOST RECENT SYSTOLIC BLOOD PRESSURE < 130 MM HG: ICD-10-PCS | Mod: CPTII,S$GLB,, | Performed by: INTERNAL MEDICINE

## 2021-08-24 PROCEDURE — 3008F BODY MASS INDEX DOCD: CPT | Mod: CPTII,S$GLB,, | Performed by: INTERNAL MEDICINE

## 2021-08-24 PROCEDURE — 1126F AMNT PAIN NOTED NONE PRSNT: CPT | Mod: CPTII,S$GLB,, | Performed by: INTERNAL MEDICINE

## 2021-08-24 PROCEDURE — 99213 PR OFFICE/OUTPT VISIT, EST, LEVL III, 20-29 MIN: ICD-10-PCS | Mod: S$GLB,,, | Performed by: INTERNAL MEDICINE

## 2021-08-24 PROCEDURE — 1160F PR REVIEW ALL MEDS BY PRESCRIBER/CLIN PHARMACIST DOCUMENTED: ICD-10-PCS | Mod: CPTII,S$GLB,, | Performed by: INTERNAL MEDICINE

## 2021-08-24 PROCEDURE — 99999 PR PBB SHADOW E&M-EST. PATIENT-LVL III: CPT | Mod: PBBFAC,,, | Performed by: INTERNAL MEDICINE

## 2021-08-24 PROCEDURE — 1160F RVW MEDS BY RX/DR IN RCRD: CPT | Mod: CPTII,S$GLB,, | Performed by: INTERNAL MEDICINE

## 2021-08-24 RX ORDER — AMOXICILLIN AND CLAVULANATE POTASSIUM 875; 125 MG/1; MG/1
1 TABLET, FILM COATED ORAL 2 TIMES DAILY
Qty: 6 TABLET | Refills: 0 | Status: SHIPPED | OUTPATIENT
Start: 2021-08-24 | End: 2021-08-27

## 2021-08-27 LAB — BACTERIA UR CULT: ABNORMAL

## 2021-09-27 ENCOUNTER — IMMUNIZATION (OUTPATIENT)
Dept: OBSTETRICS AND GYNECOLOGY | Facility: CLINIC | Age: 51
End: 2021-09-27
Payer: COMMERCIAL

## 2021-09-27 DIAGNOSIS — Z23 NEED FOR VACCINATION: Primary | ICD-10-CM

## 2021-09-27 PROCEDURE — 91300 COVID-19, MRNA, LNP-S, PF, 30 MCG/0.3 ML DOSE VACCINE: CPT | Mod: PBBFAC | Performed by: FAMILY MEDICINE

## 2021-09-27 PROCEDURE — 0003A COVID-19, MRNA, LNP-S, PF, 30 MCG/0.3 ML DOSE VACCINE: CPT | Mod: PBBFAC | Performed by: FAMILY MEDICINE

## 2021-10-04 ENCOUNTER — PATIENT MESSAGE (OUTPATIENT)
Dept: ADMINISTRATIVE | Facility: HOSPITAL | Age: 51
End: 2021-10-04

## 2021-10-11 ENCOUNTER — PATIENT MESSAGE (OUTPATIENT)
Dept: FAMILY MEDICINE | Facility: CLINIC | Age: 51
End: 2021-10-11

## 2021-10-11 DIAGNOSIS — R39.89 SUSPECTED UTI: Primary | ICD-10-CM

## 2021-10-11 RX ORDER — NITROFURANTOIN 25; 75 MG/1; MG/1
100 CAPSULE ORAL 2 TIMES DAILY
Qty: 10 CAPSULE | Refills: 0 | Status: SHIPPED | OUTPATIENT
Start: 2021-10-11 | End: 2021-10-16

## 2021-10-13 ENCOUNTER — LAB VISIT (OUTPATIENT)
Dept: LAB | Facility: HOSPITAL | Age: 51
End: 2021-10-13
Attending: FAMILY MEDICINE
Payer: COMMERCIAL

## 2021-10-13 DIAGNOSIS — R39.89 SUSPECTED UTI: ICD-10-CM

## 2021-10-13 LAB
BACTERIA #/AREA URNS AUTO: ABNORMAL /HPF
BILIRUB UR QL STRIP: NEGATIVE
CLARITY UR REFRACT.AUTO: ABNORMAL
COLOR UR AUTO: YELLOW
GLUCOSE UR QL STRIP: NEGATIVE
HGB UR QL STRIP: ABNORMAL
HYALINE CASTS UR QL AUTO: 0 /LPF
KETONES UR QL STRIP: NEGATIVE
LEUKOCYTE ESTERASE UR QL STRIP: ABNORMAL
MICROSCOPIC COMMENT: ABNORMAL
NITRITE UR QL STRIP: NEGATIVE
PH UR STRIP: 5 [PH] (ref 5–8)
PROT UR QL STRIP: ABNORMAL
RBC #/AREA URNS AUTO: 65 /HPF (ref 0–4)
SP GR UR STRIP: 1.01 (ref 1–1.03)
SQUAMOUS #/AREA URNS AUTO: 4 /HPF
URN SPEC COLLECT METH UR: ABNORMAL
WBC #/AREA URNS AUTO: >100 /HPF (ref 0–5)
WBC CLUMPS UR QL AUTO: ABNORMAL

## 2021-10-13 PROCEDURE — 87077 CULTURE AEROBIC IDENTIFY: CPT | Performed by: FAMILY MEDICINE

## 2021-10-13 PROCEDURE — 87086 URINE CULTURE/COLONY COUNT: CPT | Performed by: FAMILY MEDICINE

## 2021-10-13 PROCEDURE — 81001 URINALYSIS AUTO W/SCOPE: CPT | Performed by: FAMILY MEDICINE

## 2021-10-13 PROCEDURE — 87088 URINE BACTERIA CULTURE: CPT | Performed by: FAMILY MEDICINE

## 2021-10-13 PROCEDURE — 87186 SC STD MICRODIL/AGAR DIL: CPT | Performed by: FAMILY MEDICINE

## 2021-10-14 ENCOUNTER — PATIENT MESSAGE (OUTPATIENT)
Dept: FAMILY MEDICINE | Facility: CLINIC | Age: 51
End: 2021-10-14
Payer: COMMERCIAL

## 2021-10-14 DIAGNOSIS — N12 PYELONEPHRITIS: Primary | ICD-10-CM

## 2021-10-14 RX ORDER — ONDANSETRON 4 MG/1
4 TABLET, ORALLY DISINTEGRATING ORAL EVERY 8 HOURS PRN
Qty: 30 TABLET | Refills: 1 | Status: SHIPPED | OUTPATIENT
Start: 2021-10-14 | End: 2021-11-22

## 2021-10-14 RX ORDER — LEVOFLOXACIN 750 MG/1
750 TABLET ORAL DAILY
Qty: 7 TABLET | Refills: 0 | Status: SHIPPED | OUTPATIENT
Start: 2021-10-14 | End: 2021-10-17

## 2021-10-15 ENCOUNTER — OFFICE VISIT (OUTPATIENT)
Dept: FAMILY MEDICINE | Facility: CLINIC | Age: 51
End: 2021-10-15
Payer: COMMERCIAL

## 2021-10-15 VITALS
OXYGEN SATURATION: 96 % | HEART RATE: 82 BPM | BODY MASS INDEX: 34.58 KG/M2 | HEIGHT: 72 IN | DIASTOLIC BLOOD PRESSURE: 60 MMHG | SYSTOLIC BLOOD PRESSURE: 102 MMHG | RESPIRATION RATE: 18 BRPM | WEIGHT: 255.31 LBS | TEMPERATURE: 99 F

## 2021-10-15 DIAGNOSIS — N39.0 RECURRENT UTI: Primary | ICD-10-CM

## 2021-10-15 LAB — BACTERIA UR CULT: ABNORMAL

## 2021-10-15 PROCEDURE — 3008F BODY MASS INDEX DOCD: CPT | Mod: CPTII,S$GLB,, | Performed by: INTERNAL MEDICINE

## 2021-10-15 PROCEDURE — 99214 PR OFFICE/OUTPT VISIT, EST, LEVL IV, 30-39 MIN: ICD-10-PCS | Mod: S$GLB,,, | Performed by: INTERNAL MEDICINE

## 2021-10-15 PROCEDURE — 3074F SYST BP LT 130 MM HG: CPT | Mod: CPTII,S$GLB,, | Performed by: INTERNAL MEDICINE

## 2021-10-15 PROCEDURE — 1160F PR REVIEW ALL MEDS BY PRESCRIBER/CLIN PHARMACIST DOCUMENTED: ICD-10-PCS | Mod: CPTII,S$GLB,, | Performed by: INTERNAL MEDICINE

## 2021-10-15 PROCEDURE — 99999 PR PBB SHADOW E&M-EST. PATIENT-LVL V: CPT | Mod: PBBFAC,,, | Performed by: INTERNAL MEDICINE

## 2021-10-15 PROCEDURE — 1159F PR MEDICATION LIST DOCUMENTED IN MEDICAL RECORD: ICD-10-PCS | Mod: CPTII,S$GLB,, | Performed by: INTERNAL MEDICINE

## 2021-10-15 PROCEDURE — 3008F PR BODY MASS INDEX (BMI) DOCUMENTED: ICD-10-PCS | Mod: CPTII,S$GLB,, | Performed by: INTERNAL MEDICINE

## 2021-10-15 PROCEDURE — 3078F DIAST BP <80 MM HG: CPT | Mod: CPTII,S$GLB,, | Performed by: INTERNAL MEDICINE

## 2021-10-15 PROCEDURE — 99999 PR PBB SHADOW E&M-EST. PATIENT-LVL V: ICD-10-PCS | Mod: PBBFAC,,, | Performed by: INTERNAL MEDICINE

## 2021-10-15 PROCEDURE — 3078F PR MOST RECENT DIASTOLIC BLOOD PRESSURE < 80 MM HG: ICD-10-PCS | Mod: CPTII,S$GLB,, | Performed by: INTERNAL MEDICINE

## 2021-10-15 PROCEDURE — 99214 OFFICE O/P EST MOD 30 MIN: CPT | Mod: S$GLB,,, | Performed by: INTERNAL MEDICINE

## 2021-10-15 PROCEDURE — 3074F PR MOST RECENT SYSTOLIC BLOOD PRESSURE < 130 MM HG: ICD-10-PCS | Mod: CPTII,S$GLB,, | Performed by: INTERNAL MEDICINE

## 2021-10-15 PROCEDURE — 1160F RVW MEDS BY RX/DR IN RCRD: CPT | Mod: CPTII,S$GLB,, | Performed by: INTERNAL MEDICINE

## 2021-10-15 PROCEDURE — 1159F MED LIST DOCD IN RCRD: CPT | Mod: CPTII,S$GLB,, | Performed by: INTERNAL MEDICINE

## 2021-10-17 DIAGNOSIS — N39.0 RECURRENT UTI: Primary | ICD-10-CM

## 2021-10-17 RX ORDER — CEPHALEXIN 500 MG/1
500 CAPSULE ORAL EVERY 12 HOURS
Qty: 14 CAPSULE | Refills: 0 | Status: SHIPPED | OUTPATIENT
Start: 2021-10-17 | End: 2021-10-24

## 2021-10-18 ENCOUNTER — PATIENT MESSAGE (OUTPATIENT)
Dept: FAMILY MEDICINE | Facility: CLINIC | Age: 51
End: 2021-10-18
Payer: COMMERCIAL

## 2021-10-20 ENCOUNTER — LAB VISIT (OUTPATIENT)
Dept: LAB | Facility: HOSPITAL | Age: 51
End: 2021-10-20
Attending: INTERNAL MEDICINE
Payer: COMMERCIAL

## 2021-10-20 DIAGNOSIS — N39.0 RECURRENT UTI: ICD-10-CM

## 2021-10-20 LAB
ALBUMIN SERPL BCP-MCNC: 3.2 G/DL (ref 3.5–5.2)
ALP SERPL-CCNC: 79 U/L (ref 55–135)
ALT SERPL W/O P-5'-P-CCNC: 11 U/L (ref 10–44)
ANION GAP SERPL CALC-SCNC: 9 MMOL/L (ref 8–16)
AST SERPL-CCNC: 15 U/L (ref 10–40)
BASOPHILS # BLD AUTO: 0.04 K/UL (ref 0–0.2)
BASOPHILS NFR BLD: 0.6 % (ref 0–1.9)
BILIRUB SERPL-MCNC: 0.4 MG/DL (ref 0.1–1)
BUN SERPL-MCNC: 13 MG/DL (ref 6–20)
CALCIUM SERPL-MCNC: 10.3 MG/DL (ref 8.7–10.5)
CHLORIDE SERPL-SCNC: 104 MMOL/L (ref 95–110)
CO2 SERPL-SCNC: 28 MMOL/L (ref 23–29)
CREAT SERPL-MCNC: 0.9 MG/DL (ref 0.5–1.4)
DIFFERENTIAL METHOD: ABNORMAL
EOSINOPHIL # BLD AUTO: 0.1 K/UL (ref 0–0.5)
EOSINOPHIL NFR BLD: 1.9 % (ref 0–8)
ERYTHROCYTE [DISTWIDTH] IN BLOOD BY AUTOMATED COUNT: 13.1 % (ref 11.5–14.5)
EST. GFR  (AFRICAN AMERICAN): >60 ML/MIN/1.73 M^2
EST. GFR  (NON AFRICAN AMERICAN): >60 ML/MIN/1.73 M^2
GLUCOSE SERPL-MCNC: 98 MG/DL (ref 70–110)
HCT VFR BLD AUTO: 37.4 % (ref 37–48.5)
HGB BLD-MCNC: 12.4 G/DL (ref 12–16)
IMM GRANULOCYTES # BLD AUTO: 0.09 K/UL (ref 0–0.04)
IMM GRANULOCYTES NFR BLD AUTO: 1.3 % (ref 0–0.5)
LYMPHOCYTES # BLD AUTO: 2.7 K/UL (ref 1–4.8)
LYMPHOCYTES NFR BLD: 39.3 % (ref 18–48)
MCH RBC QN AUTO: 30.4 PG (ref 27–31)
MCHC RBC AUTO-ENTMCNC: 33.2 G/DL (ref 32–36)
MCV RBC AUTO: 92 FL (ref 82–98)
MONOCYTES # BLD AUTO: 0.6 K/UL (ref 0.3–1)
MONOCYTES NFR BLD: 8.5 % (ref 4–15)
NEUTROPHILS # BLD AUTO: 3.3 K/UL (ref 1.8–7.7)
NEUTROPHILS NFR BLD: 48.4 % (ref 38–73)
NRBC BLD-RTO: 0 /100 WBC
PLATELET # BLD AUTO: 325 K/UL (ref 150–450)
PMV BLD AUTO: 10.8 FL (ref 9.2–12.9)
POTASSIUM SERPL-SCNC: 4.2 MMOL/L (ref 3.5–5.1)
PROT SERPL-MCNC: 7.8 G/DL (ref 6–8.4)
RBC # BLD AUTO: 4.08 M/UL (ref 4–5.4)
SODIUM SERPL-SCNC: 141 MMOL/L (ref 136–145)
WBC # BLD AUTO: 6.82 K/UL (ref 3.9–12.7)

## 2021-10-20 PROCEDURE — 80053 COMPREHEN METABOLIC PANEL: CPT | Performed by: INTERNAL MEDICINE

## 2021-10-20 PROCEDURE — 36415 COLL VENOUS BLD VENIPUNCTURE: CPT | Mod: PN | Performed by: INTERNAL MEDICINE

## 2021-10-20 PROCEDURE — 85025 COMPLETE CBC W/AUTO DIFF WBC: CPT | Performed by: INTERNAL MEDICINE

## 2021-10-28 ENCOUNTER — OFFICE VISIT (OUTPATIENT)
Dept: UROLOGY | Facility: CLINIC | Age: 51
End: 2021-10-28
Payer: COMMERCIAL

## 2021-10-28 VITALS — BODY MASS INDEX: 34.31 KG/M2 | HEIGHT: 72 IN | RESPIRATION RATE: 18 BRPM | WEIGHT: 253.31 LBS

## 2021-10-28 DIAGNOSIS — N39.0 RECURRENT UTI: Primary | ICD-10-CM

## 2021-10-28 DIAGNOSIS — N20.1 LEFT URETERAL STONE: ICD-10-CM

## 2021-10-28 PROCEDURE — 99243 OFF/OP CNSLTJ NEW/EST LOW 30: CPT | Mod: S$GLB,,, | Performed by: NURSE PRACTITIONER

## 2021-10-28 PROCEDURE — 87086 URINE CULTURE/COLONY COUNT: CPT | Performed by: NURSE PRACTITIONER

## 2021-10-28 PROCEDURE — 87186 SC STD MICRODIL/AGAR DIL: CPT | Performed by: NURSE PRACTITIONER

## 2021-10-28 PROCEDURE — 99999 PR PBB SHADOW E&M-EST. PATIENT-LVL IV: ICD-10-PCS | Mod: PBBFAC,,, | Performed by: NURSE PRACTITIONER

## 2021-10-28 PROCEDURE — 81001 PR  URINALYSIS, AUTO, W/SCOPE: ICD-10-PCS | Mod: S$GLB,,, | Performed by: NURSE PRACTITIONER

## 2021-10-28 PROCEDURE — 3008F BODY MASS INDEX DOCD: CPT | Mod: CPTII,S$GLB,, | Performed by: NURSE PRACTITIONER

## 2021-10-28 PROCEDURE — 87077 CULTURE AEROBIC IDENTIFY: CPT | Performed by: NURSE PRACTITIONER

## 2021-10-28 PROCEDURE — 1160F PR REVIEW ALL MEDS BY PRESCRIBER/CLIN PHARMACIST DOCUMENTED: ICD-10-PCS | Mod: CPTII,S$GLB,, | Performed by: NURSE PRACTITIONER

## 2021-10-28 PROCEDURE — 81001 URINALYSIS AUTO W/SCOPE: CPT | Mod: S$GLB,,, | Performed by: NURSE PRACTITIONER

## 2021-10-28 PROCEDURE — 99999 PR PBB SHADOW E&M-EST. PATIENT-LVL IV: CPT | Mod: PBBFAC,,, | Performed by: NURSE PRACTITIONER

## 2021-10-28 PROCEDURE — 87088 URINE BACTERIA CULTURE: CPT | Performed by: NURSE PRACTITIONER

## 2021-10-28 PROCEDURE — 1159F PR MEDICATION LIST DOCUMENTED IN MEDICAL RECORD: ICD-10-PCS | Mod: CPTII,S$GLB,, | Performed by: NURSE PRACTITIONER

## 2021-10-28 PROCEDURE — 1159F MED LIST DOCD IN RCRD: CPT | Mod: CPTII,S$GLB,, | Performed by: NURSE PRACTITIONER

## 2021-10-28 PROCEDURE — 99243 PR OFFICE CONSULTATION,LEVEL III: ICD-10-PCS | Mod: S$GLB,,, | Performed by: NURSE PRACTITIONER

## 2021-10-28 PROCEDURE — 3008F PR BODY MASS INDEX (BMI) DOCUMENTED: ICD-10-PCS | Mod: CPTII,S$GLB,, | Performed by: NURSE PRACTITIONER

## 2021-10-28 PROCEDURE — 1160F RVW MEDS BY RX/DR IN RCRD: CPT | Mod: CPTII,S$GLB,, | Performed by: NURSE PRACTITIONER

## 2021-10-29 LAB
BILIRUB SERPL-MCNC: ABNORMAL MG/DL
BLOOD URINE, POC: 50
COLOR, POC UA: YELLOW
GLUCOSE UR QL STRIP: NORMAL
KETONES UR QL STRIP: ABNORMAL
LEUKOCYTE ESTERASE URINE, POC: ABNORMAL
NITRITE, POC UA: ABNORMAL
PH, POC UA: 5
PROTEIN, POC: 30
SPECIFIC GRAVITY, POC UA: 1020
UROBILINOGEN, POC UA: NORMAL

## 2021-10-30 LAB — BACTERIA UR CULT: ABNORMAL

## 2021-11-01 ENCOUNTER — TELEPHONE (OUTPATIENT)
Dept: UROLOGY | Facility: CLINIC | Age: 51
End: 2021-11-01
Payer: COMMERCIAL

## 2021-11-01 ENCOUNTER — HOSPITAL ENCOUNTER (OUTPATIENT)
Dept: RADIOLOGY | Facility: HOSPITAL | Age: 51
Discharge: HOME | End: 2021-11-01
Attending: NURSE PRACTITIONER
Payer: COMMERCIAL

## 2021-11-01 DIAGNOSIS — N20.1 LEFT URETERAL STONE: ICD-10-CM

## 2021-11-01 DIAGNOSIS — N39.0 RECURRENT UTI: ICD-10-CM

## 2021-11-01 DIAGNOSIS — R31.9 HEMATURIA, UNSPECIFIED TYPE: Primary | ICD-10-CM

## 2021-11-01 PROCEDURE — 25500020 PHARM REV CODE 255: Performed by: NURSE PRACTITIONER

## 2021-11-01 PROCEDURE — 74178 CT ABD&PLV WO CNTR FLWD CNTR: CPT | Mod: 26,,, | Performed by: RADIOLOGY

## 2021-11-01 PROCEDURE — 74178 CT UROGRAM ABD PELVIS W WO: ICD-10-PCS | Mod: 26,,, | Performed by: RADIOLOGY

## 2021-11-01 PROCEDURE — 74178 CT ABD&PLV WO CNTR FLWD CNTR: CPT | Mod: TC

## 2021-11-01 RX ADMIN — IOHEXOL 125 ML: 350 INJECTION, SOLUTION INTRAVENOUS at 09:11

## 2021-11-02 ENCOUNTER — TELEPHONE (OUTPATIENT)
Dept: UROLOGY | Facility: CLINIC | Age: 51
End: 2021-11-02
Payer: COMMERCIAL

## 2021-11-02 RX ORDER — NITROFURANTOIN 25; 75 MG/1; MG/1
100 CAPSULE ORAL 2 TIMES DAILY
Qty: 14 CAPSULE | Refills: 0 | Status: SHIPPED | OUTPATIENT
Start: 2021-11-02 | End: 2021-11-09

## 2021-11-22 ENCOUNTER — PROCEDURE VISIT (OUTPATIENT)
Dept: UROLOGY | Facility: CLINIC | Age: 51
End: 2021-11-22
Payer: COMMERCIAL

## 2021-11-22 ENCOUNTER — PATIENT MESSAGE (OUTPATIENT)
Dept: SURGERY | Facility: CLINIC | Age: 51
End: 2021-11-22
Payer: COMMERCIAL

## 2021-11-22 DIAGNOSIS — N39.0 RECURRENT UTI: ICD-10-CM

## 2021-11-22 PROCEDURE — 87088 URINE BACTERIA CULTURE: CPT | Performed by: UROLOGY

## 2021-11-22 PROCEDURE — 52000 CYSTOSCOPY: ICD-10-PCS | Mod: S$GLB,,, | Performed by: UROLOGY

## 2021-11-22 PROCEDURE — 87077 CULTURE AEROBIC IDENTIFY: CPT | Performed by: UROLOGY

## 2021-11-22 PROCEDURE — 87086 URINE CULTURE/COLONY COUNT: CPT | Performed by: UROLOGY

## 2021-11-22 PROCEDURE — 52000 CYSTOURETHROSCOPY: CPT | Mod: S$GLB,,, | Performed by: UROLOGY

## 2021-11-22 PROCEDURE — 87186 SC STD MICRODIL/AGAR DIL: CPT | Performed by: UROLOGY

## 2021-11-24 ENCOUNTER — TELEPHONE (OUTPATIENT)
Dept: UROLOGY | Facility: CLINIC | Age: 51
End: 2021-11-24
Payer: COMMERCIAL

## 2021-11-24 LAB — BACTERIA UR CULT: ABNORMAL

## 2021-11-24 RX ORDER — SULFAMETHOXAZOLE AND TRIMETHOPRIM 800; 160 MG/1; MG/1
1 TABLET ORAL 2 TIMES DAILY
Qty: 14 TABLET | Refills: 0 | Status: SHIPPED | OUTPATIENT
Start: 2021-11-24 | End: 2021-12-01

## 2021-12-01 DIAGNOSIS — Z12.31 OTHER SCREENING MAMMOGRAM: ICD-10-CM

## 2021-12-08 ENCOUNTER — PATIENT MESSAGE (OUTPATIENT)
Dept: ADMINISTRATIVE | Facility: HOSPITAL | Age: 51
End: 2021-12-08
Payer: COMMERCIAL

## 2021-12-16 ENCOUNTER — HOSPITAL ENCOUNTER (OUTPATIENT)
Dept: RADIOLOGY | Facility: HOSPITAL | Age: 51
Discharge: HOME | End: 2021-12-16
Attending: FAMILY MEDICINE
Payer: COMMERCIAL

## 2021-12-16 DIAGNOSIS — Z12.31 OTHER SCREENING MAMMOGRAM: ICD-10-CM

## 2021-12-16 PROCEDURE — 77067 SCR MAMMO BI INCL CAD: CPT | Mod: 26,,, | Performed by: RADIOLOGY

## 2021-12-16 PROCEDURE — 77063 BREAST TOMOSYNTHESIS BI: CPT | Mod: 26,,, | Performed by: RADIOLOGY

## 2021-12-16 PROCEDURE — 77067 SCR MAMMO BI INCL CAD: CPT | Mod: TC

## 2021-12-16 PROCEDURE — 77067 MAMMO DIGITAL SCREENING BILAT WITH TOMO: ICD-10-PCS | Mod: 26,,, | Performed by: RADIOLOGY

## 2021-12-16 PROCEDURE — 77063 MAMMO DIGITAL SCREENING BILAT WITH TOMO: ICD-10-PCS | Mod: 26,,, | Performed by: RADIOLOGY

## 2022-02-17 ENCOUNTER — OFFICE VISIT (OUTPATIENT)
Dept: FAMILY MEDICINE | Facility: CLINIC | Age: 52
End: 2022-02-17
Payer: COMMERCIAL

## 2022-02-17 VITALS
BODY MASS INDEX: 35.38 KG/M2 | RESPIRATION RATE: 18 BRPM | WEIGHT: 261.25 LBS | DIASTOLIC BLOOD PRESSURE: 84 MMHG | HEIGHT: 72 IN | TEMPERATURE: 98 F | SYSTOLIC BLOOD PRESSURE: 116 MMHG | OXYGEN SATURATION: 99 % | HEART RATE: 60 BPM

## 2022-02-17 DIAGNOSIS — E01.0 THYROMEGALY: Primary | ICD-10-CM

## 2022-02-17 PROCEDURE — 1160F RVW MEDS BY RX/DR IN RCRD: CPT | Mod: CPTII,S$GLB,, | Performed by: FAMILY MEDICINE

## 2022-02-17 PROCEDURE — 3074F PR MOST RECENT SYSTOLIC BLOOD PRESSURE < 130 MM HG: ICD-10-PCS | Mod: CPTII,S$GLB,, | Performed by: FAMILY MEDICINE

## 2022-02-17 PROCEDURE — 99999 PR PBB SHADOW E&M-EST. PATIENT-LVL V: ICD-10-PCS | Mod: PBBFAC,,, | Performed by: FAMILY MEDICINE

## 2022-02-17 PROCEDURE — 3079F DIAST BP 80-89 MM HG: CPT | Mod: CPTII,S$GLB,, | Performed by: FAMILY MEDICINE

## 2022-02-17 PROCEDURE — 3008F BODY MASS INDEX DOCD: CPT | Mod: CPTII,S$GLB,, | Performed by: FAMILY MEDICINE

## 2022-02-17 PROCEDURE — 1159F PR MEDICATION LIST DOCUMENTED IN MEDICAL RECORD: ICD-10-PCS | Mod: CPTII,S$GLB,, | Performed by: FAMILY MEDICINE

## 2022-02-17 PROCEDURE — 99213 OFFICE O/P EST LOW 20 MIN: CPT | Mod: S$GLB,,, | Performed by: FAMILY MEDICINE

## 2022-02-17 PROCEDURE — 1160F PR REVIEW ALL MEDS BY PRESCRIBER/CLIN PHARMACIST DOCUMENTED: ICD-10-PCS | Mod: CPTII,S$GLB,, | Performed by: FAMILY MEDICINE

## 2022-02-17 PROCEDURE — 3074F SYST BP LT 130 MM HG: CPT | Mod: CPTII,S$GLB,, | Performed by: FAMILY MEDICINE

## 2022-02-17 PROCEDURE — 99213 PR OFFICE/OUTPT VISIT, EST, LEVL III, 20-29 MIN: ICD-10-PCS | Mod: S$GLB,,, | Performed by: FAMILY MEDICINE

## 2022-02-17 PROCEDURE — 99999 PR PBB SHADOW E&M-EST. PATIENT-LVL V: CPT | Mod: PBBFAC,,, | Performed by: FAMILY MEDICINE

## 2022-02-17 PROCEDURE — 3008F PR BODY MASS INDEX (BMI) DOCUMENTED: ICD-10-PCS | Mod: CPTII,S$GLB,, | Performed by: FAMILY MEDICINE

## 2022-02-17 PROCEDURE — 3079F PR MOST RECENT DIASTOLIC BLOOD PRESSURE 80-89 MM HG: ICD-10-PCS | Mod: CPTII,S$GLB,, | Performed by: FAMILY MEDICINE

## 2022-02-17 PROCEDURE — 1159F MED LIST DOCD IN RCRD: CPT | Mod: CPTII,S$GLB,, | Performed by: FAMILY MEDICINE

## 2022-02-17 NOTE — PROGRESS NOTES
02/17/22 1107   Depression Patient Health Questionnaire (PHQ-2)   Over the last two weeks how often have you been bothered by little interest or pleasure in doing things 0   Over the last two weeks how often have you been bothered by feeling down, depressed or hopeless 0   PHQ-2 Total Score 0

## 2022-02-22 ENCOUNTER — HOSPITAL ENCOUNTER (OUTPATIENT)
Dept: RADIOLOGY | Facility: HOSPITAL | Age: 52
Discharge: HOME | End: 2022-02-22
Attending: FAMILY MEDICINE
Payer: COMMERCIAL

## 2022-02-22 DIAGNOSIS — E01.0 THYROMEGALY: ICD-10-CM

## 2022-02-22 PROCEDURE — 76536 US EXAM OF HEAD AND NECK: CPT | Mod: TC

## 2022-02-22 PROCEDURE — 76536 US EXAM OF HEAD AND NECK: CPT | Mod: 26,,, | Performed by: RADIOLOGY

## 2022-02-22 PROCEDURE — 76536 US SOFT TISSUE HEAD NECK THYROID: ICD-10-PCS | Mod: 26,,, | Performed by: RADIOLOGY

## 2022-02-23 ENCOUNTER — PATIENT MESSAGE (OUTPATIENT)
Dept: FAMILY MEDICINE | Facility: CLINIC | Age: 52
End: 2022-02-23
Payer: COMMERCIAL

## 2022-02-24 ENCOUNTER — OFFICE VISIT (OUTPATIENT)
Dept: UROLOGY | Facility: CLINIC | Age: 52
End: 2022-02-24
Payer: COMMERCIAL

## 2022-02-24 VITALS — HEIGHT: 72 IN | WEIGHT: 261 LBS | BODY MASS INDEX: 35.35 KG/M2

## 2022-02-24 DIAGNOSIS — N39.0 RECURRENT UTI: Primary | ICD-10-CM

## 2022-02-24 DIAGNOSIS — N20.1 LEFT URETERAL STONE: ICD-10-CM

## 2022-02-24 DIAGNOSIS — E04.2 MULTINODULAR THYROID: Primary | ICD-10-CM

## 2022-02-24 PROCEDURE — 87088 URINE BACTERIA CULTURE: CPT | Performed by: NURSE PRACTITIONER

## 2022-02-24 PROCEDURE — 99999 PR PBB SHADOW E&M-EST. PATIENT-LVL III: CPT | Mod: PBBFAC,,, | Performed by: NURSE PRACTITIONER

## 2022-02-24 PROCEDURE — 81001 URINALYSIS AUTO W/SCOPE: CPT | Mod: S$GLB,,, | Performed by: NURSE PRACTITIONER

## 2022-02-24 PROCEDURE — 1160F PR REVIEW ALL MEDS BY PRESCRIBER/CLIN PHARMACIST DOCUMENTED: ICD-10-PCS | Mod: CPTII,S$GLB,, | Performed by: NURSE PRACTITIONER

## 2022-02-24 PROCEDURE — 1159F MED LIST DOCD IN RCRD: CPT | Mod: CPTII,S$GLB,, | Performed by: NURSE PRACTITIONER

## 2022-02-24 PROCEDURE — 1160F RVW MEDS BY RX/DR IN RCRD: CPT | Mod: CPTII,S$GLB,, | Performed by: NURSE PRACTITIONER

## 2022-02-24 PROCEDURE — 99214 PR OFFICE/OUTPT VISIT, EST, LEVL IV, 30-39 MIN: ICD-10-PCS | Mod: S$GLB,,, | Performed by: NURSE PRACTITIONER

## 2022-02-24 PROCEDURE — 87186 SC STD MICRODIL/AGAR DIL: CPT | Performed by: NURSE PRACTITIONER

## 2022-02-24 PROCEDURE — 99999 PR PBB SHADOW E&M-EST. PATIENT-LVL III: ICD-10-PCS | Mod: PBBFAC,,, | Performed by: NURSE PRACTITIONER

## 2022-02-24 PROCEDURE — 1159F PR MEDICATION LIST DOCUMENTED IN MEDICAL RECORD: ICD-10-PCS | Mod: CPTII,S$GLB,, | Performed by: NURSE PRACTITIONER

## 2022-02-24 PROCEDURE — 81001 PR  URINALYSIS, AUTO, W/SCOPE: ICD-10-PCS | Mod: S$GLB,,, | Performed by: NURSE PRACTITIONER

## 2022-02-24 PROCEDURE — 3008F BODY MASS INDEX DOCD: CPT | Mod: CPTII,S$GLB,, | Performed by: NURSE PRACTITIONER

## 2022-02-24 PROCEDURE — 3008F PR BODY MASS INDEX (BMI) DOCUMENTED: ICD-10-PCS | Mod: CPTII,S$GLB,, | Performed by: NURSE PRACTITIONER

## 2022-02-24 PROCEDURE — 87086 URINE CULTURE/COLONY COUNT: CPT | Performed by: NURSE PRACTITIONER

## 2022-02-24 PROCEDURE — 87077 CULTURE AEROBIC IDENTIFY: CPT | Performed by: NURSE PRACTITIONER

## 2022-02-24 PROCEDURE — 99214 OFFICE O/P EST MOD 30 MIN: CPT | Mod: S$GLB,,, | Performed by: NURSE PRACTITIONER

## 2022-02-24 RX ORDER — SULFAMETHOXAZOLE AND TRIMETHOPRIM 800; 160 MG/1; MG/1
1 TABLET ORAL 2 TIMES DAILY
Qty: 14 TABLET | Refills: 0 | Status: SHIPPED | OUTPATIENT
Start: 2022-02-24 | End: 2022-03-03

## 2022-02-24 NOTE — PROGRESS NOTES
Subjective:       Patient ID: Shireen Anton is a 52 y.o. female who was last seen in this office 11/22/2021    Chief Complaint:   Chief Complaint   Patient presents with    Follow-up       Recurrent UTI  Patient with hx of  recurrent UTIs. She reports UTI x 3 in the past 3 months. Describes UTI symptoms as dysuria, foul smelling urine, urinary frequency and urgency.  She denies gross hematuria, flank pain or fever with UTI. She feels as though her infections occur after sexual intercourse. She does not douche.  She was initially prescribed Macrobid for her most recent UTI. Medication later changed to Keflex. She completed her antibiotics 4 days ago. She denies UTI symptoms currently.     She presented to the ER with flank pain in 2/21. CT scan showed 4 mm L UPJ stone w/ hydronephrosis.  First stone episode. Her symptoms resolved a few days later and she felt she had passed her stone. She did not visualize stone.     She had a cystoscopy in 3/15 for microscopic hematuria with Dr Alston. Her cystoscopy was normal.     2/24/22  She is here today for follow up. She has not been treated for a UTI since her last visit. Today she reports UTI symptoms that began over the weekend. She has not tried any medications for her symptoms. UA today c/w UTI      CT uro 11/21--no stones, masses or lesions  Cysto 11/21-- moderate sediment, essentially normal    UCx:  11/21-- 10 to 49k Citrobacter  10/21--50 to 99k E coli  10/21-- 10 to 49k E coli  8/21-- 10 to 49k E coli  2/21-- 10 to 49k E coli  11/20-- >100k E coli  3/20 -- 10 to 49k E coli  2/20-- >100k E coli  9/18 -->100k E coli    ACTIVE MEDICAL ISSUES:  Patient Active Problem List   Diagnosis    Anxiety    Loss or death of family member    Hematuria    Morbid obesity due to excess calories    History of CVA in adulthood    History of MI (myocardial infarction)    Perforation of sigmoid colon due to diverticulitis    Recurrent UTI    Encounter for diagnostic colonoscopy  due to change in bowel habits       ALLERGIES AND MEDICATIONS: updated and reviewed.  Review of patient's allergies indicates:   Allergen Reactions    Hydrocodone      Current Outpatient Medications   Medication Sig    aspirin (ECOTRIN) 81 MG EC tablet Take 1 tablet (81 mg total) by mouth once daily.    calcium citrate (CALCITRATE) 200 mg (950 mg) tablet Take 1 tablet (200 mg total) by mouth 3 (three) times daily.    cetirizine (ZYRTEC) 10 MG tablet Take 10 mg by mouth once daily.    cyanocobalamin (VITAMIN B-12) 250 MCG tablet Take 1 tablet (250 mcg total) by mouth once daily.    omeprazole (PRILOSEC) 20 MG capsule Take 20 mg by mouth once daily.     PARAGARD T 380A 380 square mm IUD     sulfamethoxazole-trimethoprim 800-160mg (BACTRIM DS) 800-160 mg Tab Take 1 tablet by mouth 2 (two) times daily. for 7 days    traZODone (DESYREL) 50 MG tablet Take 1 tablet (50 mg total) by mouth every evening. (Patient taking differently: Take 50 mg by mouth nightly as needed.)     No current facility-administered medications for this visit.       Review of Systems   Constitutional: Negative for activity change, chills, fatigue, fever and unexpected weight change.   Eyes: Negative for discharge, redness and visual disturbance.   Respiratory: Negative for cough, shortness of breath and wheezing.    Cardiovascular: Negative for chest pain and leg swelling.   Gastrointestinal: Negative for abdominal distention, abdominal pain, constipation, diarrhea, nausea and vomiting.   Genitourinary: Positive for dysuria. Negative for decreased urine volume, difficulty urinating, flank pain, hematuria, pelvic pain and urgency.        Malodorous urine   Musculoskeletal: Negative for arthralgias, joint swelling and myalgias.   Skin: Negative for color change and rash.   Neurological: Negative for dizziness and light-headedness.   Psychiatric/Behavioral: Negative for behavioral problems and confusion. The patient is not nervous/anxious.         Objective:      Vitals:    02/24/22 1622   Weight: 118.4 kg (261 lb)   Height: 6' (1.829 m)     Physical Exam  Constitutional:       Appearance: She is well-developed.   HENT:      Head: Normocephalic and atraumatic.      Nose: Nose normal.   Eyes:      General:         Right eye: No discharge.         Left eye: No discharge.      Conjunctiva/sclera: Conjunctivae normal.   Neck:      Thyroid: No thyromegaly.      Trachea: No tracheal deviation.   Cardiovascular:      Rate and Rhythm: Normal rate and regular rhythm.   Pulmonary:      Effort: Pulmonary effort is normal. No respiratory distress.      Breath sounds: No wheezing.   Abdominal:      General: There is no distension.      Palpations: Abdomen is soft.      Tenderness: There is no abdominal tenderness.      Hernia: No hernia is present.   Genitourinary:     Comments:  exam deferred  Musculoskeletal:         General: Normal range of motion.      Cervical back: Normal range of motion and neck supple.   Skin:     General: Skin is warm and dry.      Findings: No erythema or rash.   Neurological:      Mental Status: She is alert and oriented to person, place, and time.   Psychiatric:         Behavior: Behavior normal.         Judgment: Judgment normal.         Urine dipstick shows ++LE, +Nitritie, trace protein, 50 RBC.     Assessment:       1. Recurrent UTI    2. Left ureteral stone          Plan:       1. Recurrent UTI   - Discussed UTI prevention strategies.   - Adequate hydration.   - Double voiding. Consider timed voiding.    - Avoid constipation.   - Cranberry/probiotics.  -CT uro--no stones, masses or lesions  -Office cysto--essentially normal (moderate sediment) Tx for UTI by Dr Muse at that time  -?daily abx vs post-coital abx in the near future   - Call with UTI symptoms so UA/UCx can be sent.  -Empiric Bactrim today due to suspected UTI  - POCT urinalysis, dipstick or tablet reag  - Urine culture  - sulfamethoxazole-trimethoprim 800-160mg  (BACTRIM DS) 800-160 mg Tab; Take 1 tablet by mouth 2 (two) times daily. for 7 days  Dispense: 14 tablet; Refill: 0    2. Left ureteral stone  - 4 mm L UPJ stone noted on CT scan in 2/21  -CT uro 11/21--no stones, no hydronephrosis                Follow up in about 6 months (around 8/24/2022) for Follow up.

## 2022-02-25 ENCOUNTER — TELEPHONE (OUTPATIENT)
Dept: FAMILY MEDICINE | Facility: CLINIC | Age: 52
End: 2022-02-25
Payer: COMMERCIAL

## 2022-02-25 NOTE — TELEPHONE ENCOUNTER
LVM for patient to contact office. ----- Message from Kaleb Katz Jr., MD sent at 2/24/2022 11:26 PM CST -----  Please let patient know that US showed multiple thyroid nodules. None with particularly strong indications for biopsy, however I placed a referral for an endocrinologist so she can discuss.

## 2022-02-26 LAB — BACTERIA UR CULT: ABNORMAL

## 2022-02-28 NOTE — PROGRESS NOTES
Subjective:       Patient ID: Shireen Anton is a 52 y.o. female.    Chief Complaint: Neck Swelling    Head and Neck Mass:   Chronicity:  New  Onset:  1 to 4 weeks ago  Progression since onset:  Unchanged  Frequency:  Constantly  Pain Scale:  0/10  Mass characteristics:  SoftNo sore throat, no chills, no ear pain, no fever, no headaches, no dysphasia, no hemoptysis, no myalgias, no nasal congestion, no postnasal drip, no hoarse voice, no immunosuppression, no shortness of breath, no sore throat, no sweats, no weight loss and no infection.Aggravated by:  Nothing  Treatments tried:  Nothing    Review of Systems   Constitutional: Negative for chills, fever and weight loss.   HENT: Negative for ear pain, hoarse voice, postnasal drip and sore throat.    Respiratory: Negative for hemoptysis and shortness of breath.    Musculoskeletal: Negative for myalgias.   Neurological: Negative for headaches.         Objective:      Physical Exam  HENT:      Right Ear: External ear normal.      Left Ear: External ear normal.      Nose: Nose normal.      Mouth/Throat:      Mouth: Mucous membranes are moist.   Eyes:      Extraocular Movements: Extraocular movements intact.      Pupils: Pupils are equal, round, and reactive to light.   Neck:      Thyroid: Thyromegaly present. No thyroid tenderness.   Cardiovascular:      Rate and Rhythm: Normal rate.      Pulses: Normal pulses.   Pulmonary:      Effort: Pulmonary effort is normal.      Breath sounds: Normal breath sounds.   Musculoskeletal:      Cervical back: Normal range of motion and neck supple. No rigidity or tenderness.   Lymphadenopathy:      Cervical: No cervical adenopathy.   Neurological:      Mental Status: She is alert.         Assessment:       Problem List Items Addressed This Visit    None     Visit Diagnoses     Thyromegaly    -  Primary    Relevant Orders    US Soft Tissue Head Neck Thyroid (Completed)    TSH (Completed)    T4, Free (Completed)          Plan:       Shireen  was seen today for neck swelling.She is an established patient but new to me.     Diagnoses and all orders for this visit:    Thyromegaly  -     US Soft Tissue Head Neck Thyroid; Future  -     TSH; Future  -     T4, Free; Future  Check thyroid function and thyroid US for further evaluation.

## 2022-03-02 ENCOUNTER — TELEPHONE (OUTPATIENT)
Dept: UROLOGY | Facility: CLINIC | Age: 52
End: 2022-03-02
Payer: COMMERCIAL

## 2022-03-04 ENCOUNTER — TELEPHONE (OUTPATIENT)
Dept: UROLOGY | Facility: CLINIC | Age: 52
End: 2022-03-04

## 2022-03-07 ENCOUNTER — OFFICE VISIT (OUTPATIENT)
Dept: ENDOCRINOLOGY | Facility: CLINIC | Age: 52
End: 2022-03-07
Payer: COMMERCIAL

## 2022-03-07 VITALS
SYSTOLIC BLOOD PRESSURE: 138 MMHG | TEMPERATURE: 98 F | WEIGHT: 265 LBS | DIASTOLIC BLOOD PRESSURE: 90 MMHG | HEART RATE: 62 BPM | RESPIRATION RATE: 18 BRPM | BODY MASS INDEX: 35.89 KG/M2 | HEIGHT: 72 IN

## 2022-03-07 DIAGNOSIS — E04.2 MULTINODULAR THYROID: ICD-10-CM

## 2022-03-07 DIAGNOSIS — E04.2 MULTIPLE THYROID NODULES: Primary | ICD-10-CM

## 2022-03-07 PROCEDURE — 3080F DIAST BP >= 90 MM HG: CPT | Mod: CPTII,S$GLB,, | Performed by: HOSPITALIST

## 2022-03-07 PROCEDURE — 3008F BODY MASS INDEX DOCD: CPT | Mod: CPTII,S$GLB,, | Performed by: HOSPITALIST

## 2022-03-07 PROCEDURE — 99999 PR PBB SHADOW E&M-EST. PATIENT-LVL IV: ICD-10-PCS | Mod: PBBFAC,,, | Performed by: HOSPITALIST

## 2022-03-07 PROCEDURE — 99999 PR PBB SHADOW E&M-EST. PATIENT-LVL IV: CPT | Mod: PBBFAC,,, | Performed by: HOSPITALIST

## 2022-03-07 PROCEDURE — 3075F PR MOST RECENT SYSTOLIC BLOOD PRESS GE 130-139MM HG: ICD-10-PCS | Mod: CPTII,S$GLB,, | Performed by: HOSPITALIST

## 2022-03-07 PROCEDURE — 1159F PR MEDICATION LIST DOCUMENTED IN MEDICAL RECORD: ICD-10-PCS | Mod: CPTII,S$GLB,, | Performed by: HOSPITALIST

## 2022-03-07 PROCEDURE — 1159F MED LIST DOCD IN RCRD: CPT | Mod: CPTII,S$GLB,, | Performed by: HOSPITALIST

## 2022-03-07 PROCEDURE — 99204 PR OFFICE/OUTPT VISIT, NEW, LEVL IV, 45-59 MIN: ICD-10-PCS | Mod: S$GLB,,, | Performed by: HOSPITALIST

## 2022-03-07 PROCEDURE — 3008F PR BODY MASS INDEX (BMI) DOCUMENTED: ICD-10-PCS | Mod: CPTII,S$GLB,, | Performed by: HOSPITALIST

## 2022-03-07 PROCEDURE — 3075F SYST BP GE 130 - 139MM HG: CPT | Mod: CPTII,S$GLB,, | Performed by: HOSPITALIST

## 2022-03-07 PROCEDURE — 3080F PR MOST RECENT DIASTOLIC BLOOD PRESSURE >= 90 MM HG: ICD-10-PCS | Mod: CPTII,S$GLB,, | Performed by: HOSPITALIST

## 2022-03-07 PROCEDURE — 99204 OFFICE O/P NEW MOD 45 MIN: CPT | Mod: S$GLB,,, | Performed by: HOSPITALIST

## 2022-03-07 PROCEDURE — 1160F RVW MEDS BY RX/DR IN RCRD: CPT | Mod: CPTII,S$GLB,, | Performed by: HOSPITALIST

## 2022-03-07 PROCEDURE — 1160F PR REVIEW ALL MEDS BY PRESCRIBER/CLIN PHARMACIST DOCUMENTED: ICD-10-PCS | Mod: CPTII,S$GLB,, | Performed by: HOSPITALIST

## 2022-03-07 NOTE — PROGRESS NOTES
Subjective:      Patient ID: Shireen Anton is a 52 y.o. female presented to [facility] on 3/7/2022.  Chief Complaint:  Thyroid Problem      History of Present Illness: Shireen Anton is a 52 y.o. female here for Thyroid nodules  No other significant past medical history      Multiple Thyroid nodules:  Presents with nodule that was diagnosed by ultrasound 2/22  Recently noticed  Preexisting thyroid disease?: no    Compressive symptoms:  Anterior neck pressure?: no  Dysphagia?: no  Voice changes?: no    Risk Factors:  Radiation to head or neck for any treatment of cancer or exposure to radiation?: no  Personal history of colon or breast cancer?: no  Tobacco use?: no former smoker 20 years  Family history of thyroid cancer?: no   Mom with hypothyroidism       Last ultrasound: 2/22/22  Right lobe 4.8 x 1.6 x 1.6 cm, isthmus 0.3 cm and left lobe 4.8 x 2.2 x 2.3 cm.     Multinodular goiter with partially cystic and complex nodules right lobe of 0.9, 0.7, 0.8 and 0.6 cm.    Left lobe other upper pole nodule 0.6 cm and complex predominately cystic major nodule left midpole 2.6 x 1.9 x 2.4 cm.     Impression:  Multinodular goiter with the major nodule, predominant cystic, left mid pole.  No strong indication for FNA evaluation of the left midpole complex nodule.      Lab Results   Component Value Date    TSH 0.649 02/22/2022    TSH 0.474 05/02/2017    TSH 1.580 01/28/2015    FREET4 0.95 02/22/2022    FREET4 1.52 (H) 05/02/2017       Reviewed past surgical, medical, family, social history and updated as appropriate.    Review of Systems: see HPI above    Objective:   BP (!) 138/90 (BP Location: Left arm, Patient Position: Sitting, BP Method: Large (Automatic))   Pulse 62   Temp 98 °F (36.7 °C)   Resp 18   Ht 6' (1.829 m)   Wt 120.2 kg (265 lb)   BMI 35.94 kg/m²     Body mass index is 35.94 kg/m².  Vital signs reviewed    Physical Exam  Vitals and nursing note reviewed.   Constitutional:       General:  She is not in acute distress.     Appearance: Normal appearance. She is well-developed. She is obese. She is not toxic-appearing.   Neck:      Thyroid: No thyromegaly.      Comments: Left side nodule  Cardiovascular:      Heart sounds: Normal heart sounds.   Pulmonary:      Effort: Pulmonary effort is normal. No respiratory distress.   Abdominal:      Tenderness: There is no abdominal tenderness.   Musculoskeletal:         General: No deformity. Normal range of motion.      Cervical back: Normal range of motion.   Skin:     Findings: No bruising.   Neurological:      Mental Status: She is alert and oriented to person, place, and time.   Psychiatric:         Mood and Affect: Mood normal.         Lab Reviewed:   Lab Results   Component Value Date    HGBA1C 5.5 05/02/2017       Lab Results   Component Value Date    CHOL 125 05/03/2017    HDL 25 (L) 05/03/2017    LDLCALC 67.4 05/03/2017    TRIG 163 (H) 05/03/2017    CHOLHDL 20.0 05/03/2017       Lab Results   Component Value Date     10/20/2021    K 4.2 10/20/2021     10/20/2021    CO2 28 10/20/2021    GLU 98 10/20/2021    BUN 13 10/20/2021    CREATININE 0.9 10/20/2021    CALCIUM 10.3 10/20/2021    PROT 7.8 10/20/2021    ALBUMIN 3.2 (L) 10/20/2021    BILITOT 0.4 10/20/2021    ALKPHOS 79 10/20/2021    AST 15 10/20/2021    ALT 11 10/20/2021    ANIONGAP 9 10/20/2021    ESTGFRAFRICA >60 10/20/2021    EGFRNONAA >60 10/20/2021    TSH 0.649 02/22/2022        Lab Results   Component Value Date    PTH 37.6 03/29/2020    .9 (H) 05/02/2017    NUXNSAKE85SW 28 (L) 03/29/2020    CALCIUM 10.3 10/20/2021    CALCIUM 9.0 02/14/2021    CALCIUM 9.0 03/29/2020    PHOS 2.5 (L) 03/29/2020    PHOS 6.0 (H) 05/12/2017    PHOS 6.2 (H) 05/11/2017    ALKPHOS 79 10/20/2021    ALKPHOS 78 02/14/2021    ALKPHOS 56 03/29/2020    TSH 0.649 02/22/2022       Assessment     1. Multiple thyroid nodules  US FNA Biopsy w/ Imaging 1st Lesion   2. Multinodular thyroid  Ambulatory  referral/consult to Endocrinology    US FNA Biopsy w/ Imaging 1st Lesion        Plan       Multiple thyroid nodules  - US reviewed in clinic and reviewed with patient, noted large dominant left thyroid nodule.  Multiple subcentimeter right thyroid nodules  - large left meet FNA criteria given size  - TSH reviewed: stable, reassuring  - FNA techniques were explained, AVS/handout given  - Discussed possible pathology result and the each individual plans   - With all this info and all questions were answered: at this time, patient is comfortable in pursing FNA  - We will set up FNA, per patient schedule>> at [facility]  - Pending FNA will determine further work up vs monitoring  - Follow up in 1 year        Dann Ruano M.D.  Endocrinology  [facility]  3/7/2022      Disclaimer: This note has been generated in part with the use of voice-recognition software. There may be typographical errors that have been missed during proof-reading.

## 2022-03-09 ENCOUNTER — PATIENT MESSAGE (OUTPATIENT)
Dept: ENDOCRINOLOGY | Facility: CLINIC | Age: 52
End: 2022-03-09
Payer: COMMERCIAL

## 2022-03-10 ENCOUNTER — PATIENT MESSAGE (OUTPATIENT)
Dept: ENDOCRINOLOGY | Facility: CLINIC | Age: 52
End: 2022-03-10
Payer: COMMERCIAL

## 2022-04-13 ENCOUNTER — PATIENT MESSAGE (OUTPATIENT)
Dept: UROLOGY | Facility: CLINIC | Age: 52
End: 2022-04-13
Payer: COMMERCIAL

## 2022-04-13 DIAGNOSIS — R39.89 SUSPECTED UTI: Primary | ICD-10-CM

## 2022-04-13 RX ORDER — CEPHALEXIN 500 MG/1
500 CAPSULE ORAL 2 TIMES DAILY
Qty: 14 CAPSULE | Refills: 0 | Status: SHIPPED | OUTPATIENT
Start: 2022-04-13 | End: 2022-04-20

## 2022-04-13 NOTE — TELEPHONE ENCOUNTER
Good morning,    I will place an order for urine culture. Please drop off urine specimen at any [facility]. I am also sending Rx for antibiotics to your pharmacy. Please drop off urine sample BEFORE starting antibiotics.    Take care,  Ann Okeefe

## 2022-04-14 ENCOUNTER — LAB VISIT (OUTPATIENT)
Dept: LAB | Facility: HOSPITAL | Age: 52
End: 2022-04-14
Attending: NURSE PRACTITIONER
Payer: COMMERCIAL

## 2022-04-14 ENCOUNTER — IMMUNIZATION (OUTPATIENT)
Dept: OBSTETRICS AND GYNECOLOGY | Facility: CLINIC | Age: 52
End: 2022-04-14
Payer: COMMERCIAL

## 2022-04-14 DIAGNOSIS — R39.89 SUSPECTED UTI: ICD-10-CM

## 2022-04-14 DIAGNOSIS — Z23 NEED FOR VACCINATION: Primary | ICD-10-CM

## 2022-04-14 PROCEDURE — 87186 SC STD MICRODIL/AGAR DIL: CPT | Performed by: NURSE PRACTITIONER

## 2022-04-14 PROCEDURE — 87086 URINE CULTURE/COLONY COUNT: CPT | Performed by: NURSE PRACTITIONER

## 2022-04-14 PROCEDURE — 87088 URINE BACTERIA CULTURE: CPT | Performed by: NURSE PRACTITIONER

## 2022-04-14 PROCEDURE — 87077 CULTURE AEROBIC IDENTIFY: CPT | Performed by: NURSE PRACTITIONER

## 2022-04-14 PROCEDURE — 91305 COVID-19, MRNA, LNP-S, PF, 30 MCG/0.3 ML DOSE VACCINE (PFIZER): CPT | Mod: PBBFAC | Performed by: FAMILY MEDICINE

## 2022-04-16 LAB — BACTERIA UR CULT: ABNORMAL

## 2022-04-19 ENCOUNTER — TELEPHONE (OUTPATIENT)
Dept: UROLOGY | Facility: CLINIC | Age: 52
End: 2022-04-19
Payer: COMMERCIAL

## 2022-04-19 NOTE — TELEPHONE ENCOUNTER
Recent urine culture was positive for a UTI. She should continue Keflex until antibiotic completed

## 2022-04-28 ENCOUNTER — HOSPITAL ENCOUNTER (OUTPATIENT)
Dept: ENDOCRINOLOGY | Facility: CLINIC | Age: 52
Discharge: HOME | End: 2022-04-28
Attending: HOSPITALIST
Payer: COMMERCIAL

## 2022-04-28 DIAGNOSIS — E04.2 MULTIPLE THYROID NODULES: ICD-10-CM

## 2022-04-28 DIAGNOSIS — E04.2 MULTINODULAR THYROID: ICD-10-CM

## 2022-04-28 PROCEDURE — 88173 PR  INTERPRETATION OF FNA SMEAR: ICD-10-PCS | Mod: 26,,, | Performed by: PATHOLOGY

## 2022-04-28 PROCEDURE — 10005 FNA BX W/US GDN 1ST LES: CPT | Mod: S$GLB,,, | Performed by: INTERNAL MEDICINE

## 2022-04-28 PROCEDURE — 88173 CYTOPATH EVAL FNA REPORT: CPT | Performed by: PATHOLOGY

## 2022-04-28 PROCEDURE — 10005 US FINE NEEDLE ASPIRATION BIOPSY, FIRST LESION: ICD-10-PCS | Mod: S$GLB,,, | Performed by: INTERNAL MEDICINE

## 2022-04-28 PROCEDURE — 88173 CYTOPATH EVAL FNA REPORT: CPT | Mod: 26,,, | Performed by: PATHOLOGY

## 2022-04-29 ENCOUNTER — PATIENT MESSAGE (OUTPATIENT)
Dept: ENDOCRINOLOGY | Facility: CLINIC | Age: 52
End: 2022-04-29
Payer: COMMERCIAL

## 2022-04-29 LAB
FINAL PATHOLOGIC DIAGNOSIS: NORMAL
Lab: NORMAL

## 2022-06-27 DIAGNOSIS — R39.89 SUSPECTED UTI: Primary | ICD-10-CM

## 2022-06-28 ENCOUNTER — LAB VISIT (OUTPATIENT)
Dept: LAB | Facility: HOSPITAL | Age: 52
End: 2022-06-28
Attending: STUDENT IN AN ORGANIZED HEALTH CARE EDUCATION/TRAINING PROGRAM
Payer: COMMERCIAL

## 2022-06-28 DIAGNOSIS — R39.89 SUSPECTED UTI: Primary | ICD-10-CM

## 2022-06-28 DIAGNOSIS — R39.89 SUSPECTED UTI: ICD-10-CM

## 2022-06-28 PROCEDURE — 87088 URINE BACTERIA CULTURE: CPT | Performed by: STUDENT IN AN ORGANIZED HEALTH CARE EDUCATION/TRAINING PROGRAM

## 2022-06-28 PROCEDURE — 87086 URINE CULTURE/COLONY COUNT: CPT | Performed by: STUDENT IN AN ORGANIZED HEALTH CARE EDUCATION/TRAINING PROGRAM

## 2022-06-28 PROCEDURE — 87186 SC STD MICRODIL/AGAR DIL: CPT | Performed by: STUDENT IN AN ORGANIZED HEALTH CARE EDUCATION/TRAINING PROGRAM

## 2022-06-28 PROCEDURE — 87077 CULTURE AEROBIC IDENTIFY: CPT | Performed by: STUDENT IN AN ORGANIZED HEALTH CARE EDUCATION/TRAINING PROGRAM

## 2022-06-28 RX ORDER — SULFAMETHOXAZOLE AND TRIMETHOPRIM 800; 160 MG/1; MG/1
1 TABLET ORAL EVERY 12 HOURS
Qty: 10 TABLET | Refills: 0 | Status: SHIPPED | OUTPATIENT
Start: 2022-06-28 | End: 2022-07-03

## 2022-06-28 NOTE — PROGRESS NOTES
Suspected UTI without flank pain for fever  Urine culture pending  Bactrim sent to pharmacy on file

## 2022-07-01 LAB — BACTERIA UR CULT: ABNORMAL

## 2022-08-10 ENCOUNTER — TELEPHONE (OUTPATIENT)
Dept: UROLOGY | Facility: CLINIC | Age: 52
End: 2022-08-10
Payer: COMMERCIAL

## 2022-08-15 ENCOUNTER — OFFICE VISIT (OUTPATIENT)
Dept: URGENT CARE | Facility: CLINIC | Age: 52
End: 2022-08-15
Payer: COMMERCIAL

## 2022-08-15 ENCOUNTER — TELEPHONE (OUTPATIENT)
Dept: FAMILY MEDICINE | Facility: CLINIC | Age: 52
End: 2022-08-15
Payer: COMMERCIAL

## 2022-08-15 VITALS
SYSTOLIC BLOOD PRESSURE: 112 MMHG | BODY MASS INDEX: 35.89 KG/M2 | HEIGHT: 72 IN | RESPIRATION RATE: 20 BRPM | WEIGHT: 265 LBS | TEMPERATURE: 98 F | DIASTOLIC BLOOD PRESSURE: 76 MMHG | HEART RATE: 64 BPM | OXYGEN SATURATION: 98 %

## 2022-08-15 DIAGNOSIS — N39.0 URINARY TRACT INFECTION WITHOUT HEMATURIA, SITE UNSPECIFIED: Primary | ICD-10-CM

## 2022-08-15 LAB
B-HCG UR QL: NEGATIVE
BILIRUB UR QL STRIP: NEGATIVE
CTP QC/QA: YES
GLUCOSE UR QL STRIP: NEGATIVE
KETONES UR QL STRIP: NEGATIVE
LEUKOCYTE ESTERASE UR QL STRIP: POSITIVE
PH, POC UA: 5.5
POC BLOOD, URINE: POSITIVE
POC NITRATES, URINE: POSITIVE
PROT UR QL STRIP: POSITIVE
SP GR UR STRIP: 1.02 (ref 1–1.03)
UROBILINOGEN UR STRIP-ACNC: NORMAL (ref 0.1–1.1)

## 2022-08-15 PROCEDURE — 81003 POCT URINALYSIS, DIPSTICK, AUTOMATED, W/O SCOPE: ICD-10-PCS | Mod: QW,S$GLB,, | Performed by: PHYSICIAN ASSISTANT

## 2022-08-15 PROCEDURE — 81025 POCT URINE PREGNANCY: ICD-10-PCS | Mod: S$GLB,,, | Performed by: PHYSICIAN ASSISTANT

## 2022-08-15 PROCEDURE — 3078F DIAST BP <80 MM HG: CPT | Mod: CPTII,S$GLB,, | Performed by: PHYSICIAN ASSISTANT

## 2022-08-15 PROCEDURE — 81025 URINE PREGNANCY TEST: CPT | Mod: S$GLB,,, | Performed by: PHYSICIAN ASSISTANT

## 2022-08-15 PROCEDURE — 81003 URINALYSIS AUTO W/O SCOPE: CPT | Mod: QW,S$GLB,, | Performed by: PHYSICIAN ASSISTANT

## 2022-08-15 PROCEDURE — 3074F SYST BP LT 130 MM HG: CPT | Mod: CPTII,S$GLB,, | Performed by: PHYSICIAN ASSISTANT

## 2022-08-15 PROCEDURE — 1160F RVW MEDS BY RX/DR IN RCRD: CPT | Mod: CPTII,S$GLB,, | Performed by: PHYSICIAN ASSISTANT

## 2022-08-15 PROCEDURE — 3078F PR MOST RECENT DIASTOLIC BLOOD PRESSURE < 80 MM HG: ICD-10-PCS | Mod: CPTII,S$GLB,, | Performed by: PHYSICIAN ASSISTANT

## 2022-08-15 PROCEDURE — 99213 OFFICE O/P EST LOW 20 MIN: CPT | Mod: S$GLB,,, | Performed by: PHYSICIAN ASSISTANT

## 2022-08-15 PROCEDURE — 3008F BODY MASS INDEX DOCD: CPT | Mod: CPTII,S$GLB,, | Performed by: PHYSICIAN ASSISTANT

## 2022-08-15 PROCEDURE — 3074F PR MOST RECENT SYSTOLIC BLOOD PRESSURE < 130 MM HG: ICD-10-PCS | Mod: CPTII,S$GLB,, | Performed by: PHYSICIAN ASSISTANT

## 2022-08-15 PROCEDURE — 1159F PR MEDICATION LIST DOCUMENTED IN MEDICAL RECORD: ICD-10-PCS | Mod: CPTII,S$GLB,, | Performed by: PHYSICIAN ASSISTANT

## 2022-08-15 PROCEDURE — 1159F MED LIST DOCD IN RCRD: CPT | Mod: CPTII,S$GLB,, | Performed by: PHYSICIAN ASSISTANT

## 2022-08-15 PROCEDURE — 99213 PR OFFICE/OUTPT VISIT, EST, LEVL III, 20-29 MIN: ICD-10-PCS | Mod: S$GLB,,, | Performed by: PHYSICIAN ASSISTANT

## 2022-08-15 PROCEDURE — 3008F PR BODY MASS INDEX (BMI) DOCUMENTED: ICD-10-PCS | Mod: CPTII,S$GLB,, | Performed by: PHYSICIAN ASSISTANT

## 2022-08-15 PROCEDURE — 1160F PR REVIEW ALL MEDS BY PRESCRIBER/CLIN PHARMACIST DOCUMENTED: ICD-10-PCS | Mod: CPTII,S$GLB,, | Performed by: PHYSICIAN ASSISTANT

## 2022-08-15 RX ORDER — PHENAZOPYRIDINE HYDROCHLORIDE 100 MG/1
100 TABLET, FILM COATED ORAL 3 TIMES DAILY PRN
Qty: 10 TABLET | Refills: 0 | Status: SHIPPED | OUTPATIENT
Start: 2022-08-15 | End: 2022-08-25

## 2022-08-15 RX ORDER — NITROFURANTOIN 25; 75 MG/1; MG/1
100 CAPSULE ORAL 2 TIMES DAILY
Qty: 20 CAPSULE | Refills: 1 | Status: SHIPPED | OUTPATIENT
Start: 2022-08-15 | End: 2022-08-25

## 2022-08-15 RX ORDER — ONDANSETRON 4 MG/1
4 TABLET, ORALLY DISINTEGRATING ORAL EVERY 8 HOURS PRN
Status: ON HOLD | COMMUNITY
Start: 2022-04-14 | End: 2022-11-29 | Stop reason: HOSPADM

## 2022-08-15 NOTE — TELEPHONE ENCOUNTER
Patient calling and states she is having UTI symptoms and would like to be seen today or have urine orders placed, advised patient that there were no availabilities for today, patient requested message be sent to provider requesting urine orders. Please advise.

## 2022-08-15 NOTE — PROGRESS NOTES
Subjective:       Patient ID: Shireen Anton is a 52 y.o. female.    Vitals:  height is 6' (1.829 m) and weight is 120.2 kg (265 lb). Her temperature is 98 °F (36.7 °C). Her blood pressure is 112/76 and her pulse is 64. Her respiration is 20 and oxygen saturation is 98%.     Chief Complaint: Urinary Tract Infection    Pt is coming in today with uti symptoms. Symptoms started 2 days ago. Getting worse. Tylenol taken for the pain with no relief.  Posterior bladder discomfort last 2 days.  Feels like typical UTI    Urinary Tract Infection   This is a recurrent problem. Episode onset: 2 days  The problem occurs every urination. The problem has been gradually worsening. The quality of the pain is described as burning and aching. The pain is at a severity of 10/10. The pain is severe. There has been no fever. She is sexually active. There is no history of pyelonephritis. Associated symptoms include frequency and urgency. She has tried acetaminophen for the symptoms. The treatment provided no relief. Her past medical history is significant for recurrent UTIs.       Genitourinary: Positive for dysuria, frequency and urgency. Negative for vaginal odor.   Musculoskeletal: Negative for back pain.   Skin: Negative for erythema.       Objective:      Physical Exam   Constitutional: She is oriented to person, place, and time. She appears well-developed. She is cooperative.  Non-toxic appearance. She does not appear ill. No distress.   HENT:   Head: Normocephalic and atraumatic.   Ears:   Right Ear: Hearing and external ear normal.   Left Ear: Hearing, tympanic membrane and external ear normal.   Nose: Nose normal. No mucosal edema, rhinorrhea or nasal deformity. No epistaxis. Right sinus exhibits no maxillary sinus tenderness and no frontal sinus tenderness. Left sinus exhibits no maxillary sinus tenderness and no frontal sinus tenderness.   Mouth/Throat: Uvula is midline, oropharynx is clear and moist and mucous membranes are  normal. No trismus in the jaw. Normal dentition. No uvula swelling. No posterior oropharyngeal edema.   Eyes: Conjunctivae, EOM and lids are normal. Pupils are equal, round, and reactive to light. Right eye exhibits no discharge. Left eye exhibits no discharge.   Neck: Trachea normal and phonation normal. Neck supple. No JVD present. No tracheal deviation present. No thyromegaly present. No edema present. No erythema present. No neck rigidity present.   Cardiovascular: Normal rate, regular rhythm, normal heart sounds and normal pulses.   No murmur heard.Exam reveals no gallop and no friction rub.   Pulmonary/Chest: Effort normal and breath sounds normal. No stridor. No respiratory distress. She has no decreased breath sounds. She has no wheezes. She has no rhonchi. She has no rales. She exhibits no tenderness.   Abdominal: Normal appearance and bowel sounds are normal. She exhibits no distension, no abdominal bruit and no pulsatile midline mass. Soft. There is no abdominal tenderness. There is no rebound, no guarding, no left CVA tenderness and no right CVA tenderness.   Musculoskeletal: Normal range of motion.         General: No deformity. Normal range of motion.   Neurological: She is alert and oriented to person, place, and time. She has normal strength. She exhibits normal muscle tone. Coordination normal.   Skin: Skin is warm, dry, intact, not diaphoretic, not pale and no rash. Capillary refill takes less than 2 seconds. No erythema   Psychiatric: Her speech is normal and behavior is normal. Judgment and thought content normal.   Nursing note and vitals reviewed.    Results for orders placed or performed in visit on 08/15/22   POCT Urinalysis, Dipstick, Automated, W/O Scope   Result Value Ref Range    POC Blood, Urine Positive (A) Negative    POC Bilirubin, Urine Negative Negative    POC Urobilinogen, Urine normal 0.1 - 1.1    POC Ketones, Urine Negative Negative    POC Protein, Urine Positive (A) Negative     POC Nitrates, Urine Positive (A) Negative    POC Glucose, Urine Negative Negative    pH, UA 5.5     POC Specific Gravity, Urine 1.025 1.003 - 1.029    POC Leukocytes, Urine Positive (A) Negative   POCT urine pregnancy   Result Value Ref Range    POC Preg Test, Ur Negative Negative     Acceptable Yes     No results found.       Assessment:       1. Urinary tract infection without hematuria, site unspecified          Plan:         Urinary tract infection without hematuria, site unspecified  -     POCT Urinalysis, Dipstick, Automated, W/O Scope  -     POCT urine pregnancy  -     nitrofurantoin, macrocrystal-monohydrate, (MACROBID) 100 MG capsule; Take 1 capsule (100 mg total) by mouth 2 (two) times daily. for 10 days  Dispense: 20 capsule; Refill: 1  -     phenazopyridine (PYRIDIUM) 100 MG tablet; Take 1 tablet (100 mg total) by mouth 3 (three) times daily as needed for Pain.  Dispense: 10 tablet; Refill: 0    Follow up if symptoms worsen or fail to improve, for F/U with PCP or ED. There are no Patient Instructions on file for this visit.

## 2022-09-16 ENCOUNTER — OFFICE VISIT (OUTPATIENT)
Dept: UROLOGY | Facility: CLINIC | Age: 52
End: 2022-09-16
Payer: COMMERCIAL

## 2022-09-16 ENCOUNTER — LAB VISIT (OUTPATIENT)
Dept: LAB | Facility: HOSPITAL | Age: 52
End: 2022-09-16
Attending: STUDENT IN AN ORGANIZED HEALTH CARE EDUCATION/TRAINING PROGRAM
Payer: COMMERCIAL

## 2022-09-16 VITALS — BODY MASS INDEX: 35.68 KG/M2 | HEIGHT: 72 IN | WEIGHT: 263.44 LBS

## 2022-09-16 DIAGNOSIS — N39.0 RECURRENT UTI: ICD-10-CM

## 2022-09-16 DIAGNOSIS — R39.89 SUSPECTED UTI: ICD-10-CM

## 2022-09-16 DIAGNOSIS — N39.0 RECURRENT UTI: Primary | ICD-10-CM

## 2022-09-16 DIAGNOSIS — N22 CALCULUS OF URINARY TRACT IN DISEASES CLASSIFIED ELSEWHERE: ICD-10-CM

## 2022-09-16 DIAGNOSIS — N95.8 GENITOURINARY SYNDROME OF MENOPAUSE: ICD-10-CM

## 2022-09-16 PROCEDURE — 99999 PR PBB SHADOW E&M-EST. PATIENT-LVL IV: ICD-10-PCS | Mod: PBBFAC,,, | Performed by: STUDENT IN AN ORGANIZED HEALTH CARE EDUCATION/TRAINING PROGRAM

## 2022-09-16 PROCEDURE — 1160F PR REVIEW ALL MEDS BY PRESCRIBER/CLIN PHARMACIST DOCUMENTED: ICD-10-PCS | Mod: CPTII,S$GLB,, | Performed by: STUDENT IN AN ORGANIZED HEALTH CARE EDUCATION/TRAINING PROGRAM

## 2022-09-16 PROCEDURE — 3008F PR BODY MASS INDEX (BMI) DOCUMENTED: ICD-10-PCS | Mod: CPTII,S$GLB,, | Performed by: STUDENT IN AN ORGANIZED HEALTH CARE EDUCATION/TRAINING PROGRAM

## 2022-09-16 PROCEDURE — 99214 PR OFFICE/OUTPT VISIT, EST, LEVL IV, 30-39 MIN: ICD-10-PCS | Mod: S$GLB,,, | Performed by: STUDENT IN AN ORGANIZED HEALTH CARE EDUCATION/TRAINING PROGRAM

## 2022-09-16 PROCEDURE — 1159F MED LIST DOCD IN RCRD: CPT | Mod: CPTII,S$GLB,, | Performed by: STUDENT IN AN ORGANIZED HEALTH CARE EDUCATION/TRAINING PROGRAM

## 2022-09-16 PROCEDURE — 1160F RVW MEDS BY RX/DR IN RCRD: CPT | Mod: CPTII,S$GLB,, | Performed by: STUDENT IN AN ORGANIZED HEALTH CARE EDUCATION/TRAINING PROGRAM

## 2022-09-16 PROCEDURE — 3008F BODY MASS INDEX DOCD: CPT | Mod: CPTII,S$GLB,, | Performed by: STUDENT IN AN ORGANIZED HEALTH CARE EDUCATION/TRAINING PROGRAM

## 2022-09-16 PROCEDURE — 87186 SC STD MICRODIL/AGAR DIL: CPT | Performed by: STUDENT IN AN ORGANIZED HEALTH CARE EDUCATION/TRAINING PROGRAM

## 2022-09-16 PROCEDURE — 87088 URINE BACTERIA CULTURE: CPT | Performed by: STUDENT IN AN ORGANIZED HEALTH CARE EDUCATION/TRAINING PROGRAM

## 2022-09-16 PROCEDURE — 99214 OFFICE O/P EST MOD 30 MIN: CPT | Mod: S$GLB,,, | Performed by: STUDENT IN AN ORGANIZED HEALTH CARE EDUCATION/TRAINING PROGRAM

## 2022-09-16 PROCEDURE — 99999 PR PBB SHADOW E&M-EST. PATIENT-LVL IV: CPT | Mod: PBBFAC,,, | Performed by: STUDENT IN AN ORGANIZED HEALTH CARE EDUCATION/TRAINING PROGRAM

## 2022-09-16 PROCEDURE — 87086 URINE CULTURE/COLONY COUNT: CPT | Performed by: STUDENT IN AN ORGANIZED HEALTH CARE EDUCATION/TRAINING PROGRAM

## 2022-09-16 PROCEDURE — 87077 CULTURE AEROBIC IDENTIFY: CPT | Performed by: STUDENT IN AN ORGANIZED HEALTH CARE EDUCATION/TRAINING PROGRAM

## 2022-09-16 PROCEDURE — 1159F PR MEDICATION LIST DOCUMENTED IN MEDICAL RECORD: ICD-10-PCS | Mod: CPTII,S$GLB,, | Performed by: STUDENT IN AN ORGANIZED HEALTH CARE EDUCATION/TRAINING PROGRAM

## 2022-09-16 RX ORDER — ESTRADIOL 0.1 MG/G
1 CREAM VAGINAL
Qty: 42.5 G | Refills: 5 | Status: SHIPPED | OUTPATIENT
Start: 2022-09-19 | End: 2023-01-19

## 2022-09-16 RX ORDER — CEFDINIR 300 MG/1
300 CAPSULE ORAL EVERY 12 HOURS
Qty: 10 CAPSULE | Refills: 0 | Status: SHIPPED | OUTPATIENT
Start: 2022-09-16 | End: 2022-09-21

## 2022-09-16 NOTE — PROGRESS NOTES
Patient ID: Shireen Anton is a 52 y.o. female.    Chief Complaint: Urinary Tract Infection (Burning with urination)  Recurrent UTI  Referral: No referring provider defined for this encounter.     HPI  52 y.o. who presents to the Urology clinic for evaluation of recurrent UTI. 3 documented utis in the last year, symptoms include dysuria, pressure while voiding. Patient recently given 10 d course of nitrofurantion at Urgent care due to concern for UTI. Patient without flank pain, nausea, vomiting, fevers, chills. Patient w/ hx of stones. Patient notes loose stools from hx of  cholecystectomy, she has not had this worked up in the past. Patient drinsk 2 L of water daily, 1 cup of juice/ soda on occasion, 1 daily cup of coffee- 8-10 oz. Patient w/ hx of 1 C section. Notes vaginal dryness with sexual intercourse, uses lubrication.      Medically Necessary ROS documented in HPI    Past Medical History  Active Ambulatory Problems     Diagnosis Date Noted    Anxiety 05/31/2014    Loss or death of family member 05/31/2014    Hematuria 12/16/2015    Morbid obesity due to excess calories 05/02/2017    History of CVA in adulthood 07/18/2017    History of MI (myocardial infarction) 07/18/2017    Perforation of sigmoid colon due to diverticulitis 03/24/2020    Recurrent UTI 03/24/2020    Encounter for diagnostic colonoscopy due to change in bowel habits 06/11/2020    Multiple thyroid nodules 03/07/2022     Resolved Ambulatory Problems     Diagnosis Date Noted    Acute hearing loss of both ears 05/01/2017    NSTEMI (non-ST elevated myocardial infarction) 05/02/2017    Shock liver 05/02/2017    Stage 4 chronic kidney disease 05/02/2017    Non-traumatic rhabdomyolysis 05/02/2017    Acute ischemic stroke 05/03/2017    Metabolic encephalopathy 05/03/2017    UTI (urinary tract infection) 05/13/2017    Chest pain, atypical 05/30/2017    Suspected COVID-19 virus infection 03/24/2020    Thrombocytopenia 03/24/2020    Hypoxemia  2020     Past Medical History:   Diagnosis Date    Allergy     Coronary artery disease     Depression     GERD (gastroesophageal reflux disease)     History of acute renal failure 2017    Renal disorder     Stroke          Past Surgical History  Past Surgical History:   Procedure Laterality Date    AUGMENTATION OF BREAST Bilateral     breast augmentation      BREAST SURGERY  1998     SECTION      CHOLECYSTECTOMY      COLONOSCOPY N/A 2020    Procedure: COLONOSCOPY;  Surgeon: Roz Solis MD;  Location: [address]);  Service: Endoscopy;  Laterality: N/A;  covid test on 20 at [facility]    FRACTURE SURGERY Right 1987    humerus, ORIF    LAPAROSCOPIC GASTRIC BANDING  2017    ORIF HUMERUS FRACTURE  1987       Social History  Social Connections: Unknown    Frequency of Communication with Friends and Family: Twice a week    Frequency of Social Gatherings with Friends and Family: Once a week    Attends Synagogue Services: Not on file    Active Member of Clubs or Organizations: No    Attends Club or Organization Meetings: Never    Marital Status: Living with partner       Medications    Current Outpatient Medications:     aspirin (ECOTRIN) 81 MG EC tablet, Take 1 tablet (81 mg total) by mouth once daily., Disp: , Rfl: 0    calcium citrate (CALCITRATE) 200 mg (950 mg) tablet, Take 1 tablet (200 mg total) by mouth 3 (three) times daily., Disp: 90 tablet, Rfl: 5    cetirizine (ZYRTEC) 10 MG tablet, Take 10 mg by mouth once daily., Disp: , Rfl:     cyanocobalamin (VITAMIN B-12) 250 MCG tablet, Take 1 tablet (250 mcg total) by mouth once daily., Disp: 30 tablet, Rfl: 5    omeprazole (PRILOSEC) 20 MG capsule, Take 20 mg by mouth once daily. , Disp: , Rfl:     ondansetron (ZOFRAN-ODT) 4 MG TbDL, Take 4 mg by mouth every 8 (eight) hours as needed., Disp: , Rfl:     PARAGARD T 380A 380 square mm IUD, , Disp: , Rfl:     traZODone (DESYREL) 50 MG tablet, Take 1 tablet (50 mg  total) by mouth every evening. (Patient taking differently: Take 50 mg by mouth nightly as needed.), Disp: 90 tablet, Rfl: 1    Allergies  Review of patient's allergies indicates:   Allergen Reactions    Hydrocodone        Patient's PMH, FH, Social hx, Medications, allergies reviewed and updated as pertinent to today's visit    Objective:      Physical Exam  Constitutional:       Appearance: She is well-developed.   HENT:      Head: Normocephalic and atraumatic.   Eyes:      Conjunctiva/sclera: Conjunctivae normal.   Pulmonary:      Effort: Pulmonary effort is normal. No respiratory distress.   Abdominal:      General: Abdomen is flat. There is no distension.      Palpations: Abdomen is soft. There is no mass.      Tenderness: There is no abdominal tenderness. There is no right CVA tenderness, left CVA tenderness or guarding.   Musculoskeletal:      Cervical back: Neck supple.   Skin:     General: Skin is warm.      Findings: No rash.   Neurological:      Mental Status: She is alert and oriented to person, place, and time.   Psychiatric:         Behavior: Behavior normal.           Assessment:       1. Recurrent UTI    2. Genitourinary syndrome of menopause    3. Calculus of urinary tract in diseases classified elsewhere    4. Suspected UTI          Plan:       Recurrent UTI  3 documented UTIs w/in last 1 year  POCT UA with suspected UTI- abx discussed and sent  Discussed with patient that only culture proven UTIs ( Urinary tract infections) should be treated with antibiotics as many urinary symptoms mimic urinary tract infections. Excess exposure to antibiotics will contribute to multidrug resistance, increased risk of fungal infections, eliminate protective protective bacterial qasim and may be potentially harmful to the patient in the long run.    Prophylactic antibiotics produce more harm than good and are not part of typical treatment plan for recurrent UTIs  CT renal stone to eval for current stone burden  given hx  Loose stools need to be addressed due to proximity of vagina/urethra/anus  Follow up with PCP as this is a risk factor of UTIs       Post menopausal women are at increased risk of recurrent UTIs due to loss of estrogen.  Studies have shown as women age their vaginal tissues lose their elasticity increasing risk for development of UTIs, low dose local replacement ( not oral medication) can help decrease incidence.       syndrome of menopause/ dyspareunia/ vaginal dryness  Topical E2 discussed and recommended. Rx provided    Will tailor follow up on results

## 2022-09-16 NOTE — LETTER
September 16, 2022        Jeancarlos Zamora MD  [address] [facility]  [place] [address]             [facility] [facility]  [address]  [place] [address]  Phone: [phone]  Fax: [phone]   Patient: Shireen Anton   MR Number: [id number]   YOB: 1970   Date of Visit: 9/16/2022     Dear Dr. Zamora,     Patient with recurrent UTI, noted to have loose stools, needs management to prevent UTIs due to proximity of urethra/vagina/ anus. Patient notes she is due for colonoscopy as well. Please help to arrange, thanks    Sincerely,      Ivan Magana MD            CC    No Recipients    Enclosure

## 2022-09-18 LAB — BACTERIA UR CULT: ABNORMAL

## 2022-09-21 ENCOUNTER — HOSPITAL ENCOUNTER (OUTPATIENT)
Dept: RADIOLOGY | Facility: HOSPITAL | Age: 52
Discharge: HOME | End: 2022-09-21
Attending: STUDENT IN AN ORGANIZED HEALTH CARE EDUCATION/TRAINING PROGRAM
Payer: COMMERCIAL

## 2022-09-21 DIAGNOSIS — N22 CALCULUS OF URINARY TRACT IN DISEASES CLASSIFIED ELSEWHERE: ICD-10-CM

## 2022-09-21 DIAGNOSIS — N39.0 RECURRENT UTI: ICD-10-CM

## 2022-09-21 PROCEDURE — 74176 CT RENAL STONE STUDY ABD PELVIS WO: ICD-10-PCS | Mod: 26,,, | Performed by: INTERNAL MEDICINE

## 2022-09-21 PROCEDURE — 74176 CT ABD & PELVIS W/O CONTRAST: CPT | Mod: TC

## 2022-09-21 PROCEDURE — 74176 CT ABD & PELVIS W/O CONTRAST: CPT | Mod: 26,,, | Performed by: INTERNAL MEDICINE

## 2022-09-21 NOTE — PROGRESS NOTES
Please alert patient no evidence of renal stones, she can follow up with me in 6-7 months. I have alerted her PCP regarding the findings of chronic inflammation of the colon,  she may need a referral for a gastroenterologist especially in the setting of her frequent loose stools.

## 2022-09-22 ENCOUNTER — TELEPHONE (OUTPATIENT)
Dept: UROLOGY | Facility: CLINIC | Age: 52
End: 2022-09-22
Payer: COMMERCIAL

## 2022-09-22 NOTE — TELEPHONE ENCOUNTER
LVM to have pt return call to discuss results.    ----- Message from Ivan Magana MD sent at 9/21/2022  5:43 PM CDT -----  Please alert patient no evidence of renal stones, she can follow up with me in 6-7 months. I have alerted her PCP regarding the findings of chronic inflammation of the colon,  she may need a referral for a gastroenterologist especially in the setting of her frequent loose stools.

## 2022-11-02 ENCOUNTER — PATIENT MESSAGE (OUTPATIENT)
Dept: UROLOGY | Facility: CLINIC | Age: 52
End: 2022-11-02
Payer: COMMERCIAL

## 2022-11-02 DIAGNOSIS — R39.89 SUSPECTED UTI: Primary | ICD-10-CM

## 2022-11-03 ENCOUNTER — LAB VISIT (OUTPATIENT)
Dept: LAB | Facility: HOSPITAL | Age: 52
End: 2022-11-03
Attending: STUDENT IN AN ORGANIZED HEALTH CARE EDUCATION/TRAINING PROGRAM
Payer: COMMERCIAL

## 2022-11-03 DIAGNOSIS — R39.89 SUSPECTED UTI: ICD-10-CM

## 2022-11-03 PROCEDURE — 87088 URINE BACTERIA CULTURE: CPT | Performed by: STUDENT IN AN ORGANIZED HEALTH CARE EDUCATION/TRAINING PROGRAM

## 2022-11-03 PROCEDURE — 87086 URINE CULTURE/COLONY COUNT: CPT | Performed by: STUDENT IN AN ORGANIZED HEALTH CARE EDUCATION/TRAINING PROGRAM

## 2022-11-03 PROCEDURE — 87186 SC STD MICRODIL/AGAR DIL: CPT | Performed by: STUDENT IN AN ORGANIZED HEALTH CARE EDUCATION/TRAINING PROGRAM

## 2022-11-03 PROCEDURE — 87077 CULTURE AEROBIC IDENTIFY: CPT | Performed by: STUDENT IN AN ORGANIZED HEALTH CARE EDUCATION/TRAINING PROGRAM

## 2022-11-05 LAB — BACTERIA UR CULT: ABNORMAL

## 2022-11-06 ENCOUNTER — NURSE TRIAGE (OUTPATIENT)
Dept: ADMINISTRATIVE | Facility: CLINIC | Age: 52
End: 2022-11-06
Payer: COMMERCIAL

## 2022-11-06 DIAGNOSIS — N39.0 RECURRENT UTI: Primary | ICD-10-CM

## 2022-11-06 RX ORDER — SULFAMETHOXAZOLE AND TRIMETHOPRIM 800; 160 MG/1; MG/1
1 TABLET ORAL 2 TIMES DAILY
Qty: 10 TABLET | Refills: 0 | Status: SHIPPED | OUTPATIENT
Start: 2022-11-06 | End: 2022-11-11

## 2022-11-06 RX ORDER — CEFDINIR 300 MG/1
300 CAPSULE ORAL EVERY 12 HOURS
Qty: 10 CAPSULE | Refills: 0 | Status: SHIPPED | OUTPATIENT
Start: 2022-11-06 | End: 2022-11-11

## 2022-11-06 NOTE — TELEPHONE ENCOUNTER
Dropped off urine culture Thursday morning, tx by Dr. Magana, urology.     Has recurrent UTIs, culture resulted yesterday morning. Caller states still having dysuria, foul smelling urine, cramping, and usually UTI s/s. Denies fever or back pain. Taking AZO, urine bright orange can't tell if hematuria. Caller requesting rx for culture.     Urine Culture, Routine  Abnormal   ESCHERICHIA COLI   >100,000 cfu/ml      Called Dr. Law to discuss culture. Preferred pharmacy [place] [phone].     S/w Dr. Law and reviewed culture and sensitivity. Verbal order with read back: Bactrim DS BID x 5 days. Rx placed and pt updated.     Reason for Disposition   Age > 50 years    Additional Information   Negative: Shock suspected (e.g., cold/pale/clammy skin, too weak to stand, low BP, rapid pulse)   Negative: Sounds like a life-threatening emergency to the triager   [1] Pain or burning with passing urine (urination) AND [2] female   Negative: Shock suspected (e.g., cold/pale/clammy skin, too weak to stand, low BP, rapid pulse)   Negative: Sounds like a life-threatening emergency to the triager   Negative: [1] Unable to urinate (or only a few drops) > 4 hours AND [2] bladder feels very full (e.g., palpable bladder or strong urge to urinate)   Negative: Vomiting   Negative: Patient sounds very sick or weak to the triager   Negative: [1] SEVERE pain with urination (e.g., excruciating) AND [2] not improved after 2 hours of pain medicine and Sitz bath   Negative: Fever > 100.4 F (38.0 C)   Negative: Side (flank) or lower back pain present   Negative: Diabetes mellitus or weak immune system (e.g., HIV positive, cancer chemo, splenectomy, organ transplant, chronic steroids)   Negative: Bedridden (e.g., nursing home patient, CVA, chronic illness, recovering from surgery)   Negative: Artificial heart valve or artificial joint    Protocols used: Urinary Symptoms-A-AH, Urination Pain - Female-A-AH

## 2022-11-07 ENCOUNTER — TELEPHONE (OUTPATIENT)
Dept: UROLOGY | Facility: CLINIC | Age: 52
End: 2022-11-07
Payer: COMMERCIAL

## 2022-11-07 NOTE — TELEPHONE ENCOUNTER
Pt was made aware and verbalized understanding.    ----- Message from Ivan Magana MD sent at 11/7/2022  8:33 AM CST -----  abx sent to pharmacy for uti

## 2022-11-07 NOTE — TELEPHONE ENCOUNTER
LM for pt to call clinic back.    ----- Message from Ivan Magana MD sent at 11/7/2022  8:33 AM CST -----  abx sent to pharmacy for uti

## 2022-11-10 ENCOUNTER — PATIENT MESSAGE (OUTPATIENT)
Dept: SURGERY | Facility: CLINIC | Age: 52
End: 2022-11-10
Payer: COMMERCIAL

## 2022-11-10 ENCOUNTER — TELEPHONE (OUTPATIENT)
Dept: SURGERY | Facility: CLINIC | Age: 52
End: 2022-11-10
Payer: COMMERCIAL

## 2022-11-10 ENCOUNTER — OFFICE VISIT (OUTPATIENT)
Dept: GASTROENTEROLOGY | Facility: CLINIC | Age: 52
End: 2022-11-10
Payer: COMMERCIAL

## 2022-11-10 VITALS
DIASTOLIC BLOOD PRESSURE: 84 MMHG | HEART RATE: 68 BPM | WEIGHT: 265.13 LBS | BODY MASS INDEX: 35.91 KG/M2 | HEIGHT: 72 IN | SYSTOLIC BLOOD PRESSURE: 122 MMHG

## 2022-11-10 DIAGNOSIS — Z87.19 HISTORY OF DIVERTICULITIS: Primary | ICD-10-CM

## 2022-11-10 PROCEDURE — 1159F MED LIST DOCD IN RCRD: CPT | Mod: CPTII,S$GLB,, | Performed by: INTERNAL MEDICINE

## 2022-11-10 PROCEDURE — 3079F PR MOST RECENT DIASTOLIC BLOOD PRESSURE 80-89 MM HG: ICD-10-PCS | Mod: CPTII,S$GLB,, | Performed by: INTERNAL MEDICINE

## 2022-11-10 PROCEDURE — 3008F PR BODY MASS INDEX (BMI) DOCUMENTED: ICD-10-PCS | Mod: CPTII,S$GLB,, | Performed by: INTERNAL MEDICINE

## 2022-11-10 PROCEDURE — 99204 PR OFFICE/OUTPT VISIT, NEW, LEVL IV, 45-59 MIN: ICD-10-PCS | Mod: S$GLB,,, | Performed by: INTERNAL MEDICINE

## 2022-11-10 PROCEDURE — 99204 OFFICE O/P NEW MOD 45 MIN: CPT | Mod: S$GLB,,, | Performed by: INTERNAL MEDICINE

## 2022-11-10 PROCEDURE — 3079F DIAST BP 80-89 MM HG: CPT | Mod: CPTII,S$GLB,, | Performed by: INTERNAL MEDICINE

## 2022-11-10 PROCEDURE — 99999 PR PBB SHADOW E&M-EST. PATIENT-LVL IV: CPT | Mod: PBBFAC,,, | Performed by: INTERNAL MEDICINE

## 2022-11-10 PROCEDURE — 99999 PR PBB SHADOW E&M-EST. PATIENT-LVL IV: ICD-10-PCS | Mod: PBBFAC,,, | Performed by: INTERNAL MEDICINE

## 2022-11-10 PROCEDURE — 3074F PR MOST RECENT SYSTOLIC BLOOD PRESSURE < 130 MM HG: ICD-10-PCS | Mod: CPTII,S$GLB,, | Performed by: INTERNAL MEDICINE

## 2022-11-10 PROCEDURE — 1159F PR MEDICATION LIST DOCUMENTED IN MEDICAL RECORD: ICD-10-PCS | Mod: CPTII,S$GLB,, | Performed by: INTERNAL MEDICINE

## 2022-11-10 PROCEDURE — 3074F SYST BP LT 130 MM HG: CPT | Mod: CPTII,S$GLB,, | Performed by: INTERNAL MEDICINE

## 2022-11-10 PROCEDURE — 3008F BODY MASS INDEX DOCD: CPT | Mod: CPTII,S$GLB,, | Performed by: INTERNAL MEDICINE

## 2022-11-10 NOTE — PROGRESS NOTES
[facility] Consultation     Reason for Consult:  The encounter diagnosis was History of diverticulitis.    PCP:   Jeancarlos Zamora   No address on file    Referring MD:  Aaareferral Self  No address on file    HPI:  This is a 52 y.o. female with CAD history of MI, history of gastric sleeve, history of sigmoid diverticulitis with microperforation who presents to GI clinic.    She recently underwent CT renal stone 9/2022:  GI TRACT: Postoperative changes from sleeve gastrectomy.  Colonic diverticulosis.  There is wall thickening in the sigmoid colon with prominence of the Vasa recta and proliferation of middle colic fat, likely secondary to chronic inflammation.  No pericolonic fat stranding or peritoneal thickening to suggest active inflammation.  There is a curvilinear soft tissue tract involving the sigmoid colon (2:153), base of the uterus (601:96), and extending cranially within the sigmoid mesocolon (2:146), concerning for scarring versus a complex fistula/sinus tract.  No bowel distension.  Normal appendix.    She reports recurrent UTIs. Denies passing gas from the vaginal vault or feces.   Her bowel movements have always been on the looser end, brown in color. Denies blood in the stool. Denies fevers, abdominal pain.      ColonImpression:           - Erythematous mucosa in the sigmoid colon. Likely                         recurrent sigmoid diverticulitis.                         - The examination was otherwise normal.   oscopy 2020 with JORDAN Solis:    The scope was only advanced to the splenic flexure due to restricted mobility. The procedure was aborted. The patient was supposed to be re-scheduled but was lost to follow up.    She reports eating a high fiber diet. Not taking fiber supplementation.    ROS:  Constitutional: No fevers, chills, No weight loss  ENT: No allergies  CV: No chest pain  Pulm: No cough, No shortness of breath  Ophtho: No vision changes  GI: see HPI  Derm: No  rash  Heme: No lymphadenopathy, No bruising  MSK: No arthritis  : No dysuria, No hematuria  Endo: No hot or cold intolerance  Neuro: No syncope, No seizure  Psych: No anxiety, No depression    Medical History:  has a past medical history of Allergy, Coronary artery disease, Depression, Diverticulitis of colon (), GERD (gastroesophageal reflux disease), History of acute renal failure (2017), NSTEMI (non-ST elevated myocardial infarction), Renal disorder, and Stroke.    Surgical History:  has a past surgical history that includes  section; breast augmentation (); Cholecystectomy (); ORIF humerus fracture (); Laparoscopic gastric banding (2017); Breast surgery (1998); Fracture surgery (Right, ); Colonoscopy (N/A, 2020); and Augmentation of breast (Bilateral, ).    Family History: family history includes Breast cancer in her maternal aunt; Breast cancer (age of onset: 63) in her maternal grandmother; Cancer (age of onset: 48) in her sister; Cancer (age of onset: 71) in her mother; Diabetes in her father; Esophageal cancer in her sister; Heart disease in her father; Hypertension in her father; Thyroid disease in her mother..   No contributory family history     Social History:  reports that she quit smoking about 10 years ago. Her smoking use included cigarettes. She has a 16.00 pack-year smoking history. She has never used smokeless tobacco. She reports current alcohol use. She reports that she does not use drugs.    Review of patient's allergies indicates:   Allergen Reactions    Hydrocodone        Current Outpatient Rx   Medication Sig Dispense Refill    cefdinir (OMNICEF) 300 MG capsule Take 1 capsule (300 mg total) by mouth every 12 (twelve) hours. for 5 days 10 capsule 0    cetirizine (ZYRTEC) 10 MG tablet Take 10 mg by mouth once daily.      estradioL (ESTRACE) 0.01 % (0.1 mg/gram) vaginal cream Place 1 g vaginally twice a week. 42.5 g 5    omeprazole (PRILOSEC) 20 MG  capsule Take 20 mg by mouth once daily.       aspirin (ECOTRIN) 81 MG EC tablet Take 1 tablet (81 mg total) by mouth once daily.  0    calcium citrate (CALCITRATE) 200 mg (950 mg) tablet Take 1 tablet (200 mg total) by mouth 3 (three) times daily. 90 tablet 5    cyanocobalamin (VITAMIN B-12) 250 MCG tablet Take 1 tablet (250 mcg total) by mouth once daily. (Patient not taking: Reported on 11/10/2022) 30 tablet 5    ondansetron (ZOFRAN-ODT) 4 MG TbDL Take 4 mg by mouth every 8 (eight) hours as needed.      PARAGARD T 380A 380 square mm IUD       sulfamethoxazole-trimethoprim 800-160mg (BACTRIM DS) 800-160 mg Tab Take 1 tablet by mouth 2 (two) times daily. for 5 days (Patient not taking: Reported on 11/10/2022) 10 tablet 0    traZODone (DESYREL) 50 MG tablet Take 1 tablet (50 mg total) by mouth every evening. (Patient taking differently: Take 50 mg by mouth nightly as needed.) 90 tablet 1       Objective Findings:    Vital Signs:  /84   Pulse 68   Ht 6' (1.829 m)   Wt 120.2 kg (265 lb 1.7 oz)   BMI 35.95 kg/m²   Body mass index is 35.95 kg/m².    Physical Exam:  General Appearance: well-appearing, NAD  Head:   Normocephalic, without obvious abnormality  Eyes:    No scleral icterus, EOMI  ENT: Neck supple; moist mucus membranes  Lungs: clear to auscultation bilaterally.  Heart:  regular rate and rhythm, S1, S2 normal, no murmurs heard  Abdomen: soft, non-tender, non-distended with bowel sounds in all four quadrants. No hepatosplenomegaly, ascites, or palpable masses  Extremities: 2+ pulses, no clubbing, cyanosis or edema  Skin: No visible rash  Neurologic: no focal motor deficits      Labs:  Lab Results   Component Value Date    WBC 6.82 10/20/2021    HGB 12.4 10/20/2021    HCT 37.4 10/20/2021     10/20/2021    CHOL 125 05/03/2017    TRIG 163 (H) 05/03/2017    HDL 25 (L) 05/03/2017    ALT 11 10/20/2021    AST 15 10/20/2021     10/20/2021    K 4.2 10/20/2021     10/20/2021    CREATININE 0.9  10/20/2021    BUN 13 10/20/2021    CO2 28 10/20/2021    TSH 0.649 02/22/2022    INR 1.0 05/15/2017    HGBA1C 5.5 05/02/2017       IMAGING:  CT 2022  Postoperative changes from sleeve gastrectomy.  Colonic diverticulosis.  There is wall thickening in the sigmoid colon with prominence of the Vasa recta and proliferation of middle colic fat, likely secondary to chronic inflammation.  No pericolonic fat stranding or peritoneal thickening to suggest active inflammation.  There is a curvilinear soft tissue tract involving the sigmoid colon (2:153), base of the uterus (601:96), and extending cranially within the sigmoid mesocolon (2:146), concerning for scarring versus a complex fistula/sinus tract.  No bowel distension.  Normal appendix.  IMPRESSION: Colonic diverticulosis, with evidence of chronic inflammation in the sigmoid colon.  Additional findings could represent a complex fistula/sinus tract involving the sigmoid colon and uterus.    CT 2020  1. CT findings consistent with acute sigmoid colon diverticulitis complicated by microperforation and a small extraluminal/presumed intramural phlegmon versus developing abscess.  No obstruction.  No focal drainable fluid collections.  2. Multifocal subsegmental ground-glass and consolidative opacities throughout the visualized lungs most concerning for viral pneumonia.  3. Few gas bubbles within the non dependent portion of the bladder lumen, presumably related to recent instrumentation.  A gas-forming infection could present with similar findings and is possible.  4. Mild splenomegaly.  5. Subcentimeter hepatic hypodensity, too small to characterize but favored to represent a small cyst.  6. Left nonobstructing nephrolithiasis.  Left renal cyst and right renal too small to characterize hypodensity.  7. 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    PROCEDURES:  Colonoscopy 2020  Impression:           - Erythematous mucosa in the sigmoid colon. Likely         "                 recurrent sigmoid diverticulitis.                         - The examination was otherwise normal.                         - No specimens collected.   Recommendation:       - Discharge patient to home.                         - Clear liquid diet.                         - Cipro (ciprofloxacin) 500 mg PO BID for 2 weeks.                         - Flagyl (metronidazole) 500 mg PO TID for 2 weeks.                         - Repeat colonoscopy because the examination was                         incomplete.     Assessment/Plan:  This is a 52 y.o. female with CAD history of MI, history of gastric sleeve, history of sigmoid diverticulitis with microperforation who presents to GI clinic.     History of sigmoid diverticulitis with microperforation  Last colonoscopy 2020 with JORDAN Solis with erythema in the sigmoid colon concerning for recurrent diverticulitis. The procedure was aborted due to rstricted mobility of the colon and she was prescribed outpatient abx. Lost to follow up since then. Now with recurrent UTIs, CT renal stone with "a curvilinear soft tissue tract involving the sigmoid colon (2:153), base of the uterus (601:96), and extending cranially within the sigmoid mesocolon (2:146), concerning for scarring versus a complex fistula/sinus tract."  Query colovesicular fistula given recurrent UTIs.  Will re-placed order for colonoscopy with Dr. Solis and further management for possible fistula with CRS.         Order summary:  Orders Placed This Encounter    Ambulatory referral/consult to Endo Procedure          Thank you so much for allowing me to participate in the care of Shireen Sumner MD  Gastroenterology   [facility]          "

## 2022-11-27 ENCOUNTER — HOSPITAL ENCOUNTER (INPATIENT)
Facility: HOSPITAL | Age: 52
LOS: 1 days | Discharge: HOME | DRG: 392 | End: 2022-11-29
Attending: EMERGENCY MEDICINE | Admitting: COLON & RECTAL SURGERY
Payer: COMMERCIAL

## 2022-11-27 DIAGNOSIS — K57.92 DIVERTICULITIS: Primary | ICD-10-CM

## 2022-11-27 LAB
ALBUMIN SERPL BCP-MCNC: 3.3 G/DL (ref 3.5–5.2)
ALP SERPL-CCNC: 89 U/L (ref 55–135)
ALT SERPL W/O P-5'-P-CCNC: 15 U/L (ref 10–44)
ANION GAP SERPL CALC-SCNC: 11 MMOL/L (ref 8–16)
AST SERPL-CCNC: 17 U/L (ref 10–40)
B-HCG UR QL: NEGATIVE
BACTERIA #/AREA URNS AUTO: ABNORMAL /HPF
BASOPHILS # BLD AUTO: 0.03 K/UL (ref 0–0.2)
BASOPHILS NFR BLD: 0.4 % (ref 0–1.9)
BILIRUB SERPL-MCNC: 0.9 MG/DL (ref 0.1–1)
BILIRUB UR QL STRIP: NEGATIVE
BUN SERPL-MCNC: 8 MG/DL (ref 6–20)
BUN SERPL-MCNC: 8 MG/DL (ref 6–30)
CALCIUM SERPL-MCNC: 9.1 MG/DL (ref 8.7–10.5)
CHLORIDE SERPL-SCNC: 101 MMOL/L (ref 95–110)
CHLORIDE SERPL-SCNC: 104 MMOL/L (ref 95–110)
CLARITY UR REFRACT.AUTO: ABNORMAL
CO2 SERPL-SCNC: 21 MMOL/L (ref 23–29)
COLOR UR AUTO: YELLOW
CREAT SERPL-MCNC: 0.8 MG/DL (ref 0.5–1.4)
CREAT SERPL-MCNC: 0.8 MG/DL (ref 0.5–1.4)
CTP QC/QA: YES
DIFFERENTIAL METHOD: ABNORMAL
EOSINOPHIL # BLD AUTO: 0 K/UL (ref 0–0.5)
EOSINOPHIL NFR BLD: 0.1 % (ref 0–8)
ERYTHROCYTE [DISTWIDTH] IN BLOOD BY AUTOMATED COUNT: 12.9 % (ref 11.5–14.5)
EST. GFR  (NO RACE VARIABLE): >60 ML/MIN/1.73 M^2
GLUCOSE SERPL-MCNC: 115 MG/DL (ref 70–110)
GLUCOSE SERPL-MCNC: 121 MG/DL (ref 70–110)
GLUCOSE UR QL STRIP: NEGATIVE
HCT VFR BLD AUTO: 37.1 % (ref 37–48.5)
HCT VFR BLD CALC: 36 %PCV (ref 36–54)
HGB BLD-MCNC: 12.7 G/DL (ref 12–16)
HGB UR QL STRIP: ABNORMAL
IMM GRANULOCYTES # BLD AUTO: 0.03 K/UL (ref 0–0.04)
IMM GRANULOCYTES NFR BLD AUTO: 0.4 % (ref 0–0.5)
KETONES UR QL STRIP: NEGATIVE
LEUKOCYTE ESTERASE UR QL STRIP: ABNORMAL
LYMPHOCYTES # BLD AUTO: 0.6 K/UL (ref 1–4.8)
LYMPHOCYTES NFR BLD: 7.3 % (ref 18–48)
MCH RBC QN AUTO: 31.3 PG (ref 27–31)
MCHC RBC AUTO-ENTMCNC: 34.2 G/DL (ref 32–36)
MCV RBC AUTO: 91 FL (ref 82–98)
MICROSCOPIC COMMENT: ABNORMAL
MONOCYTES # BLD AUTO: 0.4 K/UL (ref 0.3–1)
MONOCYTES NFR BLD: 5.3 % (ref 4–15)
NEUTROPHILS # BLD AUTO: 7.2 K/UL (ref 1.8–7.7)
NEUTROPHILS NFR BLD: 86.5 % (ref 38–73)
NITRITE UR QL STRIP: NEGATIVE
NRBC BLD-RTO: 0 /100 WBC
PH UR STRIP: 6 [PH] (ref 5–8)
PLATELET # BLD AUTO: 160 K/UL (ref 150–450)
PMV BLD AUTO: 12 FL (ref 9.2–12.9)
POC IONIZED CALCIUM: 1.16 MMOL/L (ref 1.06–1.42)
POC TCO2 (MEASURED): 23 MMOL/L (ref 23–29)
POTASSIUM BLD-SCNC: 3.5 MMOL/L (ref 3.5–5.1)
POTASSIUM SERPL-SCNC: 3.4 MMOL/L (ref 3.5–5.1)
PROT SERPL-MCNC: 7.3 G/DL (ref 6–8.4)
PROT UR QL STRIP: ABNORMAL
RBC # BLD AUTO: 4.06 M/UL (ref 4–5.4)
RBC #/AREA URNS AUTO: 77 /HPF (ref 0–4)
SAMPLE: ABNORMAL
SODIUM BLD-SCNC: 136 MMOL/L (ref 136–145)
SODIUM SERPL-SCNC: 136 MMOL/L (ref 136–145)
SP GR UR STRIP: 1.01 (ref 1–1.03)
SQUAMOUS #/AREA URNS AUTO: 8 /HPF
URN SPEC COLLECT METH UR: ABNORMAL
WBC # BLD AUTO: 8.34 K/UL (ref 3.9–12.7)
WBC #/AREA URNS AUTO: 19 /HPF (ref 0–5)

## 2022-11-27 PROCEDURE — 81001 URINALYSIS AUTO W/SCOPE: CPT | Performed by: EMERGENCY MEDICINE

## 2022-11-27 PROCEDURE — 96365 THER/PROPH/DIAG IV INF INIT: CPT

## 2022-11-27 PROCEDURE — 63600175 PHARM REV CODE 636 W HCPCS: Performed by: STUDENT IN AN ORGANIZED HEALTH CARE EDUCATION/TRAINING PROGRAM

## 2022-11-27 PROCEDURE — 25500020 PHARM REV CODE 255: Performed by: EMERGENCY MEDICINE

## 2022-11-27 PROCEDURE — 99285 PR EMERGENCY DEPT VISIT,LEVEL V: ICD-10-PCS | Mod: ,,, | Performed by: EMERGENCY MEDICINE

## 2022-11-27 PROCEDURE — 96372 THER/PROPH/DIAG INJ SC/IM: CPT | Performed by: STUDENT IN AN ORGANIZED HEALTH CARE EDUCATION/TRAINING PROGRAM

## 2022-11-27 PROCEDURE — 25000003 PHARM REV CODE 250: Performed by: STUDENT IN AN ORGANIZED HEALTH CARE EDUCATION/TRAINING PROGRAM

## 2022-11-27 PROCEDURE — G0378 HOSPITAL OBSERVATION PER HR: HCPCS

## 2022-11-27 PROCEDURE — 63600175 PHARM REV CODE 636 W HCPCS: Performed by: EMERGENCY MEDICINE

## 2022-11-27 PROCEDURE — 99285 EMERGENCY DEPT VISIT HI MDM: CPT | Mod: 25

## 2022-11-27 PROCEDURE — 81025 URINE PREGNANCY TEST: CPT | Performed by: EMERGENCY MEDICINE

## 2022-11-27 PROCEDURE — 99285 EMERGENCY DEPT VISIT HI MDM: CPT | Mod: ,,, | Performed by: EMERGENCY MEDICINE

## 2022-11-27 PROCEDURE — 80047 BASIC METABLC PNL IONIZED CA: CPT

## 2022-11-27 PROCEDURE — 25000003 PHARM REV CODE 250: Performed by: EMERGENCY MEDICINE

## 2022-11-27 PROCEDURE — 96375 TX/PRO/DX INJ NEW DRUG ADDON: CPT

## 2022-11-27 PROCEDURE — 80053 COMPREHEN METABOLIC PANEL: CPT | Performed by: EMERGENCY MEDICINE

## 2022-11-27 PROCEDURE — 87086 URINE CULTURE/COLONY COUNT: CPT | Performed by: EMERGENCY MEDICINE

## 2022-11-27 PROCEDURE — 94761 N-INVAS EAR/PLS OXIMETRY MLT: CPT

## 2022-11-27 PROCEDURE — 85025 COMPLETE CBC W/AUTO DIFF WBC: CPT | Performed by: EMERGENCY MEDICINE

## 2022-11-27 RX ORDER — ACETAMINOPHEN 325 MG/1
650 TABLET ORAL EVERY 8 HOURS PRN
Status: DISCONTINUED | OUTPATIENT
Start: 2022-11-27 | End: 2022-11-29 | Stop reason: HOSPADM

## 2022-11-27 RX ORDER — CIPROFLOXACIN 500 MG/1
500 TABLET ORAL 2 TIMES DAILY
Qty: 28 TABLET | Refills: 0 | Status: SHIPPED | OUTPATIENT
Start: 2022-11-27 | End: 2022-12-11

## 2022-11-27 RX ORDER — SODIUM CHLORIDE 0.9 % (FLUSH) 0.9 %
10 SYRINGE (ML) INJECTION
Status: DISCONTINUED | OUTPATIENT
Start: 2022-11-27 | End: 2022-11-29 | Stop reason: HOSPADM

## 2022-11-27 RX ORDER — METRONIDAZOLE 500 MG/1
500 TABLET ORAL EVERY 12 HOURS
Qty: 28 TABLET | Refills: 0 | Status: SHIPPED | OUTPATIENT
Start: 2022-11-27 | End: 2022-12-11

## 2022-11-27 RX ORDER — KETOROLAC TROMETHAMINE 30 MG/ML
10 INJECTION, SOLUTION INTRAMUSCULAR; INTRAVENOUS
Status: COMPLETED | OUTPATIENT
Start: 2022-11-27 | End: 2022-11-27

## 2022-11-27 RX ORDER — LIDOCAINE HYDROCHLORIDE 10 MG/ML
1 INJECTION, SOLUTION EPIDURAL; INFILTRATION; INTRACAUDAL; PERINEURAL ONCE AS NEEDED
Status: DISCONTINUED | OUTPATIENT
Start: 2022-11-27 | End: 2022-11-29 | Stop reason: HOSPADM

## 2022-11-27 RX ORDER — ENOXAPARIN SODIUM 100 MG/ML
40 INJECTION SUBCUTANEOUS EVERY 24 HOURS
Status: DISCONTINUED | OUTPATIENT
Start: 2022-11-27 | End: 2022-11-29 | Stop reason: HOSPADM

## 2022-11-27 RX ORDER — TALC
6 POWDER (GRAM) TOPICAL NIGHTLY PRN
Status: DISCONTINUED | OUTPATIENT
Start: 2022-11-27 | End: 2022-11-29 | Stop reason: HOSPADM

## 2022-11-27 RX ORDER — ONDANSETRON 8 MG/1
8 TABLET, ORALLY DISINTEGRATING ORAL EVERY 8 HOURS PRN
Status: DISCONTINUED | OUTPATIENT
Start: 2022-11-27 | End: 2022-11-29 | Stop reason: HOSPADM

## 2022-11-27 RX ADMIN — PIPERACILLIN SODIUM AND TAZOBACTAM SODIUM 4.5 G: 4; .5 INJECTION, POWDER, LYOPHILIZED, FOR SOLUTION INTRAVENOUS at 07:11

## 2022-11-27 RX ADMIN — ACETAMINOPHEN 650 MG: 325 TABLET ORAL at 11:11

## 2022-11-27 RX ADMIN — IOHEXOL 75 ML: 350 INJECTION, SOLUTION INTRAVENOUS at 05:11

## 2022-11-27 RX ADMIN — KETOROLAC TROMETHAMINE 10 MG: 30 INJECTION, SOLUTION INTRAMUSCULAR; INTRAVENOUS at 04:11

## 2022-11-27 RX ADMIN — ENOXAPARIN SODIUM 40 MG: 40 INJECTION SUBCUTANEOUS at 10:11

## 2022-11-27 NOTE — ED TRIAGE NOTES
Patient presents to the ED today with reports of fever and abdominal pain. Patient states dx with bowel perf in 2020 and recent CT scan showed fistula which patient states she has not been able to follow up about yet. Patient reports temp 102.6 at home but reports taking tylenol PTA    Patient identifiers verified and correct for Shireen Sloane  LOC: The patient is awake, alert and aware of environment with an appropriate affect, the patient is oriented x 3 and speaking appropriately.   APPEARANCE: Patient appears comfortable and in no acute distress, patient is clean and well groomed.  SKIN: The skin is warm and dry, color consistent with ethnicity, patient has normal skin turgor and moist mucus membranes, skin intact, no breakdown or bruising noted.   MUSCULOSKELETAL: Patient moving all extremities spontaneously, no swelling noted.  RESPIRATORY: Airway is open and patent, respirations are spontaneous, patient has a normal effort and rate, no accessory muscle use noted, O2 sats noted at 97% on room air.  CARDIAC: Denies chest pain  GASTRO: Endorses abdominal cramping with fever  : Pt denies any pain or frequency with urination.  NEURO: Pt opens eyes spontaneously, behavior appropriate to situation, follows commands, facial expression symmetrical, bilateral hand grasp equal and even, purposeful motor response noted, normal sensation in all extremities when touched with a finger.

## 2022-11-27 NOTE — ED NOTES
I-STAT Chem-8+ Results:   Value Reference Range   Sodium 136 136-145 mmol/L   Potassium  3.5 3.5-5.1 mmol/L   Chloride 101  mmol/L   Ionized Calcium 1.16 1.06-1.42 mmol/L   CO2 (measured) 23 23-29 mmol/L   Glucose 121  mg/dL   BUN 8 6-30 mg/dL   Creatinine 0.8 0.5-1.4 mg/dL   Hematocrit 36 36-54%

## 2022-11-27 NOTE — ED PROVIDER NOTES
Encounter Date: 11/27/2022    SCRIBE #1 NOTE: I, Gloria Yoo, am scribing for, and in the presence of,  Hugo Bhagat MD. I have scribed the following portions of the note - Other sections scribed: HPI.     History     Chief Complaint   Patient presents with    Abdominal Pain     Hx of perforated bowel from diverticulitis in March 2020, recent CT showed fistula     Fever     102.6 at home, + chills, took Tylenol at 2pm     52 y.o. female with a h/o recurrent UTIs, perforated bowel, fistula, GERD, stroke, acute renal failure, NSTEMI, CAD, diverticulitis, and cholecystectomy, presenting with fever.    Context: Patient has chronic abdominal pain. She has a history of a perforated bowel and fistula was shown on a CT a few months ago. She has been having fevers since yesterday which peaked at 102.6F this morning. Last BM was yesterday.  Location: Abdominal pain is lower, left-sided.   Duration: 1 day  Quality: Abdominal pain is cramping.  Severity: Abdominal pain is 5/10.  Modifiers: Tylenol every 6 hours, improves the fever.   Associated Symptoms: Endorses nausea and minor dysuria. Denies rhinorrhea, sore throat, cough, constipation, or vomiting.    The history is provided by the patient and medical records. No  was used.   Review of patient's allergies indicates:   Allergen Reactions    Hydrocodone      Past Medical History:   Diagnosis Date    Allergy     Coronary artery disease     Depression     3/24/22: denies feelings of current or history of self harm    Diverticulitis of colon 2020    GERD (gastroesophageal reflux disease)     History of acute renal failure 05/2017    received 1 month of hemodialysis    NSTEMI (non-ST elevated myocardial infarction)     Renal disorder     acute renal failure post gastric sleeve    Stroke     cerebellar stroke post gastric sleeve     Past Surgical History:   Procedure Laterality Date    AUGMENTATION OF BREAST Bilateral 1998    breast augmentation  1998     BREAST SURGERY  1998     SECTION      CHOLECYSTECTOMY      COLONOSCOPY N/A 2020    Procedure: COLONOSCOPY;  Surgeon: Roz Solis MD;  Location: [facility] ([address] [place] [address]);  Service: Endoscopy;  Laterality: N/A;  covid test on 20 at [facility]    FRACTURE SURGERY Right 1987    humerus, ORIF    LAPAROSCOPIC GASTRIC BANDING  2017    ORIF HUMERUS FRACTURE  1987     Family History   Problem Relation Age of Onset    Thyroid disease Mother     Cancer Mother 71        SCC of lung    Diabetes Father     Hypertension Father     Heart disease Father     Esophageal cancer Sister     Cancer Sister 48        pharyngeal cancer    Breast cancer Maternal Aunt     Breast cancer Maternal Grandmother 63    Colon cancer Neg Hx      Social History     Tobacco Use    Smoking status: Former     Packs/day: 1.00     Years: 16.00     Pack years: 16.00     Types: Cigarettes     Quit date: 2011     Years since quittin.0    Smokeless tobacco: Never   Substance Use Topics    Alcohol use: Yes     Comment: occasional    Drug use: No     Review of Systems   Constitutional:  Positive for fever.   HENT:  Negative for rhinorrhea and sore throat.    Respiratory:  Negative for shortness of breath.    Cardiovascular:  Negative for chest pain.   Gastrointestinal:  Positive for abdominal pain and nausea.   Genitourinary:  Negative for dysuria.   Musculoskeletal:  Negative for neck stiffness.   Skin:  Negative for rash.   Allergic/Immunologic: Negative for immunocompromised state.   Neurological:  Negative for headaches.     Physical Exam     Initial Vitals [22 1449]   BP Pulse Resp Temp SpO2   128/65 92 18 99.1 °F (37.3 °C) 95 %      MAP       --         Physical Exam    Nursing note and vitals reviewed.  Constitutional: She is not diaphoretic. No distress.   HENT:   Head: Normocephalic and atraumatic.   Eyes: Right eye exhibits no discharge. Left eye exhibits no discharge.   Neck: Neck supple. No tracheal  deviation present.   No meningismus    Cardiovascular:  Normal rate and regular rhythm.           Pulmonary/Chest: Breath sounds normal. No respiratory distress.   Abdominal: Abdomen is soft. Bowel sounds are normal. There is abdominal tenderness (LLQ).   No right CVA tenderness.  No left CVA tenderness.   Musculoskeletal:      Cervical back: Neck supple.     Neurological: She is alert and oriented to person, place, and time.   Skin: Skin is warm. No rash noted.   Psychiatric: She has a normal mood and affect. Her behavior is normal.       ED Course   Procedures  Labs Reviewed   CBC W/ AUTO DIFFERENTIAL - Abnormal; Notable for the following components:       Result Value    MCH 31.3 (*)     Lymph # 0.6 (*)     Gran % 86.5 (*)     Lymph % 7.3 (*)     All other components within normal limits   COMPREHENSIVE METABOLIC PANEL - Abnormal; Notable for the following components:    Potassium 3.4 (*)     CO2 21 (*)     Glucose 115 (*)     Albumin 3.3 (*)     All other components within normal limits   URINALYSIS, REFLEX TO URINE CULTURE - Abnormal; Notable for the following components:    Appearance, UA Hazy (*)     Protein, UA Trace (*)     Occult Blood UA 3+ (*)     Leukocytes, UA 3+ (*)     All other components within normal limits    Narrative:     Specimen Source->Urine   URINALYSIS MICROSCOPIC - Abnormal; Notable for the following components:    RBC, UA 77 (*)     WBC, UA 19 (*)     All other components within normal limits    Narrative:     Specimen Source->Urine   ISTAT PROCEDURE - Abnormal; Notable for the following components:    POC Glucose 121 (*)     All other components within normal limits   CULTURE, URINE   HIV 1 / 2 ANTIBODY   HEPATITIS C ANTIBODY   POCT URINE PREGNANCY   ISTAT CHEM8          Imaging Results               CT Abdomen Pelvis With Contrast (Final result)  Result time 11/27/22 17:59:09      Final result by Harish Guillen MD (11/27/22 17:59:09)                   Impression:      1. Findings  concerning for acute sigmoid diverticulitis, complicated by suspected complex fistula/sinus tract involving the sigmoid colon uterus which appears more distended from prior, as well as bilateral adnexal region new hypodense masses, overall complex/multiloculated on the right with an internal gas focus, and simple on the left.  The gas focus on the right could be related to suspected chronic sigmoid and uterine fistula/sinus tract; however, gas-forming infection/abscess formation not excluded.  Clinical correlation advised.  Further evaluation/follow-up as warranted.  2. Hepatosplenomegaly.  3. Remote gastric sleeve surgery and cholecystectomy.  4. IUD.  5. Stable additional findings as above.  This report was flagged in Epic as abnormal.      Electronically signed by: Harish Guillen MD  Date:    11/27/2022  Time:    17:59               Narrative:    EXAMINATION:  CT ABDOMEN PELVIS WITH CONTRAST    CLINICAL HISTORY:  LLQ abdominal pain;    TECHNIQUE:  Low dose axial images, sagittal and coronal reformations were obtained from the lung bases to the pubic symphysis following the IV administration of 75 mL of Omnipaque 350 .  Oral contrast was not given.    COMPARISON:  CT abdomen and pelvis 09/21/2022, renal ultrasound 05/10/2017    FINDINGS:  Imaged lung bases show minimal dependent atelectasis and scattered areas of platelike scarring versus atelectasis most prominent in the right middle lobe and lingula.  No consolidation or pleural effusion.    Base of the heart is within normal limits.  Partially imaged bilateral breast implants noted.    Remote postoperative change of prior gastric sleeve surgery.  Remote cholecystectomy.  No significant biliary ductal dilatation.    Main portal vein appears patent.  Liver is enlarged similar to prior.  Stable tiny hypoattenuating parenchymal focus along the medial aspect of the inferior right hepatic lobe which is too small to characterize.  Spleen is mildly enlarged noting stable  tiny hypoattenuating parenchymal focus along the posterior aspect which is too small to characterize.    Pancreas and bilateral adrenal glands are within normal limits.  Duodenum is within normal limits.    Bilateral kidneys are stable in overall size, shape and location with symmetric normal enhancement noting areas of cortical thinning and scarring.  Bilateral minimal nonspecific perinephric stranding similar to prior.  Grossly stable nonenhancing cyst at the left renal mid to upper pole.  Few scattered tiny hypoattenuating cortical foci at the right kidney which are too small to characterize.  No hydronephrosis.  Ureters are nondilated.  Urinary bladder is well distended without wall thickening.    Uterus is anteflexed and tilted towards the right.  T-shaped metallic IUD appropriate in position.    At the right adnexal region there is multiloculated slightly heterogeneous hypodense mass new from prior.  There is a punctate focus of gas within this mass also new from prior.  There is mild adjacent stranding within the medial aspect of the right lower quadrant new from prior.  The largest cystic component measures up to 4.1 cm.    New 3.9 x 3 cm fluid density mass at the left adnexa suggestive of a cyst.    No significant free fluid within the pelvis.  Pelvic phleboliths noted.    Appendix and terminal ileum are within normal limits.  There is thickening of the sigmoid colon overall increased from prior.  There is mild stranding the mesocolon surrounding the sigmoid colon slightly increased from prior.  Within the left paramedian aspect of the upper pelvis there is a somewhat tubular shaped 1 x 2.6 cm heterogeneous gas and fluid collection in region of previous questioned sinus tract involving the sigmoid colon and uterus.  No bowel obstruction.  Remaining small and large bowel loops are within normal limits.  No pneumatosis or portal venous gas.    No upper abdominal ascites or free air.  Overall interval  increased size and number of multiple scattered subcentimeter mesenteric, retroperitoneal and inguinal lymph nodes, but none appear enlarged by CT criteria.  The largest measures 8-9 mm and the infrarenal aortocaval region.    No significant atherosclerosis.  No aortic aneurysm or dissection.    Tiny fat containing umbilical hernia.    Osseous structures appear stable without acute process seen.                                       Medications   piperacillin-tazobactam 4.5 g in sodium chloride 0.9% 100 mL IVPB (ready to mix system) (has no administration in time range)   ketorolac injection 9.999 mg (9.999 mg Intravenous Given 11/27/22 1630)   iohexoL (OMNIPAQUE 350) injection 75 mL (75 mLs Intravenous Given 11/27/22 1730)     Medical Decision Making:   History:   Old Medical Records: I decided to obtain old medical records.  Old Records Summarized: other records.       <> Summary of Records: Follows with CRS, arranging outpatient colonoscopy.   Initial Assessment:   52-year-old female presenting with abdominal pain, nausea, fevers.  On arrival, her abdomen is non peritonitic.  She is afebrile, but took an antipyretic prior to arrival.  Plan for lab evaluation CT abdomen/pelvis to assess for emergent intra-abdominal pathology.  Differential Diagnosis:   Including, but not limited to:    Diverticulitis  Fistula  UTI  Pyelonephritis  Clinical Tests:   Lab Tests: Reviewed and Ordered  Radiological Study: Ordered and Reviewed  ED Management:  No leukocytosis, normal renal function.  Signed out to oncoming attending at  Dr. Holman pending UA, CT A/P.  Dispo pending CT and reassessment, may need CRS involvement depending on CT findings.          Scribe Attestation:   Scribe #1: I performed the above scribed service and the documentation accurately describes the services I performed. I attest to the accuracy of the note.    Attending Attestation:           Physician Attestation for Scribe:  Physician Attestation  Statement for Scribe #1: I, Hugo Bhagat, reviewed documentation, as scribed by Gloria Yoo in my presence, and it is both accurate and complete.     Comments: Attending:   Physician Attestation Statement for Scribe #1: I, Hugo Bhagat MD, personally performed the services described in this documentation. All medical record entries made by the scribe were at my direction and in my presence.  I have reviewed the chart and agree that the record reflects my personal performance and is accurate and complete.                      Clinical Impression:   Final diagnoses:  [K57.92] Diverticulitis (Primary)      ED Disposition Condition    Admit Stable                Hugo Bhagat MD  11/27/22 [id number]

## 2022-11-28 LAB
ANION GAP SERPL CALC-SCNC: 10 MMOL/L (ref 8–16)
BACTERIA UR CULT: NORMAL
BACTERIA UR CULT: NORMAL
BASOPHILS # BLD AUTO: 0.01 K/UL (ref 0–0.2)
BASOPHILS NFR BLD: 0.2 % (ref 0–1.9)
BUN SERPL-MCNC: 10 MG/DL (ref 6–20)
CALCIUM SERPL-MCNC: 8.9 MG/DL (ref 8.7–10.5)
CHLORIDE SERPL-SCNC: 106 MMOL/L (ref 95–110)
CO2 SERPL-SCNC: 21 MMOL/L (ref 23–29)
CREAT SERPL-MCNC: 0.8 MG/DL (ref 0.5–1.4)
CRP SERPL-MCNC: 217.7 MG/L (ref 0–8.2)
DIFFERENTIAL METHOD: ABNORMAL
EOSINOPHIL # BLD AUTO: 0 K/UL (ref 0–0.5)
EOSINOPHIL NFR BLD: 0 % (ref 0–8)
ERYTHROCYTE [DISTWIDTH] IN BLOOD BY AUTOMATED COUNT: 13.2 % (ref 11.5–14.5)
EST. GFR  (NO RACE VARIABLE): >60 ML/MIN/1.73 M^2
GLUCOSE SERPL-MCNC: 86 MG/DL (ref 70–110)
HCT VFR BLD AUTO: 37.3 % (ref 37–48.5)
HGB BLD-MCNC: 12.2 G/DL (ref 12–16)
IMM GRANULOCYTES # BLD AUTO: 0.02 K/UL (ref 0–0.04)
IMM GRANULOCYTES NFR BLD AUTO: 0.4 % (ref 0–0.5)
LYMPHOCYTES # BLD AUTO: 0.5 K/UL (ref 1–4.8)
LYMPHOCYTES NFR BLD: 11.8 % (ref 18–48)
MCH RBC QN AUTO: 31 PG (ref 27–31)
MCHC RBC AUTO-ENTMCNC: 32.7 G/DL (ref 32–36)
MCV RBC AUTO: 95 FL (ref 82–98)
MONOCYTES # BLD AUTO: 0.2 K/UL (ref 0.3–1)
MONOCYTES NFR BLD: 4.9 % (ref 4–15)
NEUTROPHILS # BLD AUTO: 3.7 K/UL (ref 1.8–7.7)
NEUTROPHILS NFR BLD: 82.7 % (ref 38–73)
NRBC BLD-RTO: 0 /100 WBC
PLATELET # BLD AUTO: 116 K/UL (ref 150–450)
PLATELET BLD QL SMEAR: ABNORMAL
PMV BLD AUTO: 12.6 FL (ref 9.2–12.9)
POTASSIUM SERPL-SCNC: 3.7 MMOL/L (ref 3.5–5.1)
RBC # BLD AUTO: 3.93 M/UL (ref 4–5.4)
SODIUM SERPL-SCNC: 137 MMOL/L (ref 136–145)
WBC # BLD AUTO: 4.48 K/UL (ref 3.9–12.7)

## 2022-11-28 PROCEDURE — 20600001 HC STEP DOWN PRIVATE ROOM

## 2022-11-28 PROCEDURE — 99223 PR INITIAL HOSPITAL CARE,LEVL III: ICD-10-PCS | Mod: ,,, | Performed by: COLON & RECTAL SURGERY

## 2022-11-28 PROCEDURE — 25000003 PHARM REV CODE 250: Performed by: STUDENT IN AN ORGANIZED HEALTH CARE EDUCATION/TRAINING PROGRAM

## 2022-11-28 PROCEDURE — 99223 1ST HOSP IP/OBS HIGH 75: CPT | Mod: ,,, | Performed by: COLON & RECTAL SURGERY

## 2022-11-28 PROCEDURE — 63600175 PHARM REV CODE 636 W HCPCS: Performed by: STUDENT IN AN ORGANIZED HEALTH CARE EDUCATION/TRAINING PROGRAM

## 2022-11-28 PROCEDURE — 63600175 PHARM REV CODE 636 W HCPCS: Performed by: EMERGENCY MEDICINE

## 2022-11-28 PROCEDURE — 86140 C-REACTIVE PROTEIN: CPT | Performed by: STUDENT IN AN ORGANIZED HEALTH CARE EDUCATION/TRAINING PROGRAM

## 2022-11-28 PROCEDURE — 36415 COLL VENOUS BLD VENIPUNCTURE: CPT | Performed by: STUDENT IN AN ORGANIZED HEALTH CARE EDUCATION/TRAINING PROGRAM

## 2022-11-28 PROCEDURE — 85025 COMPLETE CBC W/AUTO DIFF WBC: CPT | Performed by: STUDENT IN AN ORGANIZED HEALTH CARE EDUCATION/TRAINING PROGRAM

## 2022-11-28 PROCEDURE — 80048 BASIC METABOLIC PNL TOTAL CA: CPT | Performed by: STUDENT IN AN ORGANIZED HEALTH CARE EDUCATION/TRAINING PROGRAM

## 2022-11-28 PROCEDURE — 25000003 PHARM REV CODE 250: Performed by: COLON & RECTAL SURGERY

## 2022-11-28 RX ORDER — POTASSIUM CHLORIDE 20 MEQ/1
40 TABLET, EXTENDED RELEASE ORAL ONCE
Status: COMPLETED | OUTPATIENT
Start: 2022-11-28 | End: 2022-11-28

## 2022-11-28 RX ORDER — NAPROXEN SODIUM 220 MG/1
81 TABLET, FILM COATED ORAL DAILY
Status: DISCONTINUED | OUTPATIENT
Start: 2022-11-28 | End: 2022-11-29 | Stop reason: HOSPADM

## 2022-11-28 RX ADMIN — POTASSIUM CHLORIDE 40 MEQ: 1500 TABLET, EXTENDED RELEASE ORAL at 09:11

## 2022-11-28 RX ADMIN — PIPERACILLIN SODIUM AND TAZOBACTAM SODIUM 4.5 G: 4; .5 INJECTION, POWDER, LYOPHILIZED, FOR SOLUTION INTRAVENOUS at 06:11

## 2022-11-28 RX ADMIN — PIPERACILLIN SODIUM AND TAZOBACTAM SODIUM 4.5 G: 4; .5 INJECTION, POWDER, LYOPHILIZED, FOR SOLUTION INTRAVENOUS at 09:11

## 2022-11-28 RX ADMIN — ACETAMINOPHEN 650 MG: 325 TABLET ORAL at 09:11

## 2022-11-28 RX ADMIN — ACETAMINOPHEN 650 MG: 325 TABLET ORAL at 12:11

## 2022-11-28 RX ADMIN — SODIUM CHLORIDE, SODIUM LACTATE, POTASSIUM CHLORIDE, AND CALCIUM CHLORIDE 1000 ML: .6; .31; .03; .02 INJECTION, SOLUTION INTRAVENOUS at 12:11

## 2022-11-28 RX ADMIN — ASPIRIN 81 MG: 81 TABLET, CHEWABLE ORAL at 09:11

## 2022-11-28 RX ADMIN — ENOXAPARIN SODIUM 40 MG: 40 INJECTION SUBCUTANEOUS at 05:11

## 2022-11-28 RX ADMIN — PIPERACILLIN SODIUM AND TAZOBACTAM SODIUM 4.5 G: 4; .5 INJECTION, POWDER, LYOPHILIZED, FOR SOLUTION INTRAVENOUS at 02:11

## 2022-11-28 NOTE — PLAN OF CARE
Patient is alet, awke, and oriented. She had a tmax  of 102F. Per dr. Kim tylenol administered x2. Free of injuries and falls. Ambulates to toilet. Nurse will continue to monitor.  Problem: Adult Inpatient Plan of Care  Goal: Plan of Care Review  Outcome: Ongoing, Progressing     Problem: Adult Inpatient Plan of Care  Goal: Patient-Specific Goal (Individualized)  Outcome: Ongoing, Progressing

## 2022-11-28 NOTE — PROVIDER PROGRESS NOTES - EMERGENCY DEPT.
Encounter Date: 11/27/2022    ED Physician Progress Notes        Physician Note:   Received sign-out on patient of 5:00 p.m.  Patient with a history of complicated diverticulitis, recent suspicion for fistulous tract between colon and bladder uterus based on imaging in September  Presented tonight with worsening abdominal pain and fever to 102 at home    CT has resulted and shows  1. Findings concerning for acute sigmoid diverticulitis, complicated by suspected complex fistula/sinus tract involving the sigmoid colon uterus which appears more distended from prior, as well as bilateral adnexal region new hypodense masses, overall complex/multiloculated on the right with an internal gas focus, and simple on the left.  The gas focus on the right could be related to suspected chronic sigmoid and uterine fistula/sinus tract; however, gas-forming infection/abscess formation not excluded.  Clinical correlation advised.  Further evaluation/follow-up as warranted.   2. Hepatosplenomegaly.   3. Remote gastric sleeve surgery and cholecystectomy.   4. IUD.   5. Stable additional findings as above.   This report was flagged in Epic as abnormal.     Colorectal surgery consulted.  I have spoken to the fellow.  He will come evaluate the patient.  IV Zosyn ordered    8:21 pm  Colorectal surgery has evaluated the patient.  They recommend discharge home with a prescription for Cipro and Flagyl.  Updated pt on this plan - she was having rigors, concerned about discharge home - CRS updated on her concerns and have decided to admit her

## 2022-11-28 NOTE — SUBJECTIVE & OBJECTIVE
Subjective:     Interval History: No acute events overnight. Patient resting comfortably in bed this morning. Denies N/V.     Post-Op Info:  * No surgery found *          Medications:  Continuous Infusions:  Scheduled Meds:   enoxaparin  40 mg Subcutaneous Daily    piperacillin-tazobactam (ZOSYN) IVPB  4.5 g Intravenous Q8H     PRN Meds:   acetaminophen    LIDOcaine (PF) 10 mg/ml (1%)    melatonin    ondansetron    sodium chloride 0.9%        Objective:     Vital Signs (Most Recent):  Temp: 98.7 °F (37.1 °C) (11/28/22 0344)  Pulse: 73 (11/28/22 0344)  Resp: 16 (11/28/22 0344)  BP: (!) 116/57 (11/28/22 0344)  SpO2: 95 % (11/28/22 0344)   Vital Signs (24h Range):  Temp:  [98.7 °F (37.1 °C)-102 °F (38.9 °C)] 98.7 °F (37.1 °C)  Pulse:  [72-92] 73  Resp:  [16-18] 16  SpO2:  [95 %-99 %] 95 %  BP: (107-129)/(57-68) 116/57     Intake/Output - Last 3 Shifts         11/26 0700  11/27 0659 11/27 0700  11/28 0659 11/28 0700  11/29 0659    IV Piggyback  100     Total Intake(mL/kg)  100 (0.9)     Urine (mL/kg/hr)  500     Total Output  500     Net  -400            Urine Occurrence  1 x             Physical Exam  Constitutional:       General: She is not in acute distress.     Appearance: Normal appearance.   HENT:      Head: Normocephalic and atraumatic.      Mouth/Throat:      Mouth: Mucous membranes are moist.   Eyes:      Extraocular Movements: Extraocular movements intact.      Conjunctiva/sclera: Conjunctivae normal.   Cardiovascular:      Rate and Rhythm: Normal rate and regular rhythm.   Pulmonary:      Effort: Pulmonary effort is normal. No respiratory distress.   Abdominal:      General: There is no distension.      Palpations: Abdomen is soft.      Comments: Mildly TTP in suprapubic region   Skin:     General: Skin is warm and dry.   Neurological:      General: No focal deficit present.      Mental Status: She is alert and oriented to person, place, and time. Mental status is at baseline.     Significant Labs:  BMP  (Last 3 Results):   Recent Labs   Lab 11/27/22  1622 11/28/22  0510   * 86    137   K 3.4* 3.7    106   CO2 21* 21*   BUN 8 10   CREATININE 0.8 0.8   CALCIUM 9.1 8.9     CBC (Last 3 Results):   Recent Labs   Lab 11/27/22  1622 11/27/22  1629 11/28/22  0510   WBC 8.34  --  4.48   RBC 4.06  --  3.93*   HGB 12.7  --  12.2   HCT 37.1 36 37.3     --  116*   MCV 91  --  95   MCH 31.3*  --  31.0   MCHC 34.2  --  32.7     CRP (Last 3 Results):   Recent Labs   Lab 11/28/22  0510   .7*       Significant Diagnostics:  I have reviewed all pertinent imaging results/findings within the past 24 hours.

## 2022-11-28 NOTE — CONSULTS
Colon and Rectal Surgery    Reason for Consult: Complicated Diverticulitis    History of Present Illness:  52 y.o. female presents with fevers/chills at home.  She notes that she has constant, cramping abdominal pain that is unchanged over the last few months.  She was told from outpatient scan that she may have a fistula, so came in for evaluation.  Also endorses some nausea, no emesis.  No change in bowel habits.  Has been doing clears today.    In 2020, admitted at [facility] w/ diverticulitis and treated non-operatively.  She recovered from this and had C scope done in 2020 c/f continued inflammation and was discharged w/ PO abx, but then lost to follow up.  She has had multiple UTIs since this time.  She did have a normal cystoscopy in 2021.       Allergies: [place]  PMHx: Hx of CVA/MI  PSHx:  section; breast augmentation (); Cholecystectomy (); ORIF humerus fracture (); Laparoscopic gastric banding (2017); Breast surgery (1998); Fracture surgery (Right, ); Colonoscopy (N/A, 2020); and Augmentation of breast (Bilateral, ).  Last Colonoscopy: 2020 w/ Dr Solis.  Erythematous mucosa in sigmoid colon.  Unable to get scope past this point.    Normal Cystoscopy 2021    ROS - Negative except above    Vital Signs (Most Recent)  Temp: 99.1 °F (37.3 °C) (22 1449)  Pulse: 72 (22 184)  Resp: 18 (22)  BP: 107/63 (22)  SpO2: 99 % (22)    Physical Exam   Constitutional: She is oriented to person, place, and time. She appears well-developed and well-nourished. No distress.   HENT:   Head: Normocephalic and atraumatic.   Eyes: Pupils are equal, round, and reactive to light. Right eye exhibits no discharge. Left eye exhibits no discharge.   Cardiovascular: Normal rate and regular rhythm. Pulmonary:      Effort: Pulmonary effort is normal. No respiratory distress.      Breath sounds: Normal breath sounds.      Abdominal:   Soft, non-distended.  Somewhat tender to palpation in suprapubic region   Musculoskeletal:         General: No tenderness or deformity. Normal range of motion.      Cervical back: Normal range of motion and neck supple.   Neurological: She is alert and oriented to person, place, and time.   Skin: Skin is warm and dry. Capillary refill takes less than 2 seconds. No rash noted. She is not diaphoretic.   Psychiatric: Her behavior is normal.   Vitals reviewed.     Labs- CBC and CMP grossly normal, U/A w/ 3+ leukocytes, 19 WBC, occasional bacterial, culture pending   Imaging- CT reviewed.  Small fluid collections on right and left side.  Sigmoid colon looks to be stuck to dome of uterus w/ a couple small dots of air concerning for fistulous tract given her hx.    Assessment and Plan:  52 y.o. female w/ smoldering diverticulitis w/ ?fistulous tract    -D/w pt.  Non-toxic appearing and fistula likely chronic in nature.  Would treat for UTI with short interval follow up w/ colorectal surgery to discuss resection.  -Discussed with staff.    Mookie Bray MD  General Surgery, PGY-5  [phone]

## 2022-11-28 NOTE — H&P
See consult note.  Pt w/ worsened pain and nausea since seeing, so will obs.    -CLD  -IV abx  -Trend CRP    Mookie Bray MD  General Surgery, PGY-5  [phone]

## 2022-11-29 ENCOUNTER — PATIENT MESSAGE (OUTPATIENT)
Dept: SURGERY | Facility: CLINIC | Age: 52
End: 2022-11-29
Payer: COMMERCIAL

## 2022-11-29 VITALS
WEIGHT: 250 LBS | HEIGHT: 72 IN | TEMPERATURE: 98 F | RESPIRATION RATE: 16 BRPM | SYSTOLIC BLOOD PRESSURE: 119 MMHG | OXYGEN SATURATION: 95 % | BODY MASS INDEX: 33.86 KG/M2 | HEART RATE: 56 BPM | DIASTOLIC BLOOD PRESSURE: 67 MMHG

## 2022-11-29 LAB
ANION GAP SERPL CALC-SCNC: 9 MMOL/L (ref 8–16)
BASOPHILS # BLD AUTO: 0.02 K/UL (ref 0–0.2)
BASOPHILS NFR BLD: 0.6 % (ref 0–1.9)
BUN SERPL-MCNC: 7 MG/DL (ref 6–20)
CALCIUM SERPL-MCNC: 8.7 MG/DL (ref 8.7–10.5)
CHLORIDE SERPL-SCNC: 107 MMOL/L (ref 95–110)
CO2 SERPL-SCNC: 21 MMOL/L (ref 23–29)
CREAT SERPL-MCNC: 0.7 MG/DL (ref 0.5–1.4)
CRP SERPL-MCNC: 152 MG/L (ref 0–8.2)
DIFFERENTIAL METHOD: ABNORMAL
EOSINOPHIL # BLD AUTO: 0 K/UL (ref 0–0.5)
EOSINOPHIL NFR BLD: 0.6 % (ref 0–8)
ERYTHROCYTE [DISTWIDTH] IN BLOOD BY AUTOMATED COUNT: 13.1 % (ref 11.5–14.5)
EST. GFR  (NO RACE VARIABLE): >60 ML/MIN/1.73 M^2
GLUCOSE SERPL-MCNC: 99 MG/DL (ref 70–110)
HCT VFR BLD AUTO: 33.6 % (ref 37–48.5)
HGB BLD-MCNC: 11.2 G/DL (ref 12–16)
IMM GRANULOCYTES # BLD AUTO: 0.01 K/UL (ref 0–0.04)
IMM GRANULOCYTES NFR BLD AUTO: 0.3 % (ref 0–0.5)
LYMPHOCYTES # BLD AUTO: 1 K/UL (ref 1–4.8)
LYMPHOCYTES NFR BLD: 29.7 % (ref 18–48)
MAGNESIUM SERPL-MCNC: 1.8 MG/DL (ref 1.6–2.6)
MCH RBC QN AUTO: 30.9 PG (ref 27–31)
MCHC RBC AUTO-ENTMCNC: 33.3 G/DL (ref 32–36)
MCV RBC AUTO: 93 FL (ref 82–98)
MONOCYTES # BLD AUTO: 0.4 K/UL (ref 0.3–1)
MONOCYTES NFR BLD: 13.5 % (ref 4–15)
NEUTROPHILS # BLD AUTO: 1.8 K/UL (ref 1.8–7.7)
NEUTROPHILS NFR BLD: 55.3 % (ref 38–73)
NRBC BLD-RTO: 0 /100 WBC
PLATELET # BLD AUTO: 131 K/UL (ref 150–450)
PMV BLD AUTO: 12.5 FL (ref 9.2–12.9)
POTASSIUM SERPL-SCNC: 3.7 MMOL/L (ref 3.5–5.1)
RBC # BLD AUTO: 3.62 M/UL (ref 4–5.4)
SODIUM SERPL-SCNC: 137 MMOL/L (ref 136–145)
WBC # BLD AUTO: 3.27 K/UL (ref 3.9–12.7)

## 2022-11-29 PROCEDURE — 83735 ASSAY OF MAGNESIUM: CPT | Performed by: COLON & RECTAL SURGERY

## 2022-11-29 PROCEDURE — 25000003 PHARM REV CODE 250: Performed by: COLON & RECTAL SURGERY

## 2022-11-29 PROCEDURE — 25000003 PHARM REV CODE 250: Performed by: STUDENT IN AN ORGANIZED HEALTH CARE EDUCATION/TRAINING PROGRAM

## 2022-11-29 PROCEDURE — 80048 BASIC METABOLIC PNL TOTAL CA: CPT | Performed by: STUDENT IN AN ORGANIZED HEALTH CARE EDUCATION/TRAINING PROGRAM

## 2022-11-29 PROCEDURE — 63600175 PHARM REV CODE 636 W HCPCS: Performed by: STUDENT IN AN ORGANIZED HEALTH CARE EDUCATION/TRAINING PROGRAM

## 2022-11-29 PROCEDURE — 36415 COLL VENOUS BLD VENIPUNCTURE: CPT | Performed by: STUDENT IN AN ORGANIZED HEALTH CARE EDUCATION/TRAINING PROGRAM

## 2022-11-29 PROCEDURE — 86140 C-REACTIVE PROTEIN: CPT | Performed by: STUDENT IN AN ORGANIZED HEALTH CARE EDUCATION/TRAINING PROGRAM

## 2022-11-29 PROCEDURE — 85025 COMPLETE CBC W/AUTO DIFF WBC: CPT | Performed by: STUDENT IN AN ORGANIZED HEALTH CARE EDUCATION/TRAINING PROGRAM

## 2022-11-29 RX ORDER — AMOXICILLIN AND CLAVULANATE POTASSIUM 875; 125 MG/1; MG/1
1 TABLET, FILM COATED ORAL EVERY 12 HOURS
Status: CANCELLED | OUTPATIENT
Start: 2022-11-29

## 2022-11-29 RX ORDER — POTASSIUM CHLORIDE 20 MEQ/1
20 TABLET, EXTENDED RELEASE ORAL ONCE
Status: COMPLETED | OUTPATIENT
Start: 2022-11-29 | End: 2022-11-29

## 2022-11-29 RX ADMIN — POTASSIUM CHLORIDE 20 MEQ: 1500 TABLET, EXTENDED RELEASE ORAL at 09:11

## 2022-11-29 RX ADMIN — ASPIRIN 81 MG: 81 TABLET, CHEWABLE ORAL at 09:11

## 2022-11-29 RX ADMIN — PIPERACILLIN SODIUM AND TAZOBACTAM SODIUM 4.5 G: 4; .5 INJECTION, POWDER, LYOPHILIZED, FOR SOLUTION INTRAVENOUS at 05:11

## 2022-11-29 RX ADMIN — Medication 6 MG: at 12:11

## 2022-11-29 NOTE — DISCHARGE SUMMARY
[facility]  Colorectal Surgery  Discharge Summary      Patient Name: Shireen Anton  MRN: [id number]  Admission Date: 2022  Hospital Length of Stay: 1 days  Discharge Date and Time:  2022 12:17 PM  Attending Physician: Roz Solis MD   Discharging Provider: Mookie Bray MD  Primary Care Provider: Jeancarlos Zamora MD     HPI:  52 y.o. female presents with fevers/chills at home.  She notes that she has constant, cramping abdominal pain that is unchanged over the last few months.  She was told from outpatient scan that she may have a fistula, so came in for evaluation.  Also endorses some nausea, no emesis.  No change in bowel habits.  Has been doing clears today.     In 2020, admitted at [facility] w/ diverticulitis and treated non-operatively.  She recovered from this and had C scope done in 2020 c/f continued inflammation and was discharged w/ PO abx, but then lost to follow up.  She has had multiple UTIs since this time.  She did have a normal cystoscopy in 2021.        Allergies: [place]  PMHx: Hx of CVA/MI  PSHx:  section; breast augmentation (); Cholecystectomy (); ORIF humerus fracture (); Laparoscopic gastric banding (2017); Breast surgery (1998); Fracture surgery (Right, ); Colonoscopy (N/A, 2020); and Augmentation of breast (Bilateral, ).  Last Colonoscopy: 2020 w/ Dr Solis.  Erythematous mucosa in sigmoid colon.  Unable to get scope past this point.    Normal Cystoscopy 2021    Hospital Course:  Admitted with fevers chills, worsening of diverticulitis on CT.  She came in for IV abx and after 48 hours was feeling better and had 24 hours without fevers and chills, so was discharged with close f/u.      Goals of Care Treatment Preferences:  Code Status: Full Code      Consults (From admission, onward)        Status Ordering Provider     Inpatient consult to Colorectal Surgery  Once        Provider:  (Not yet assigned)     Completed BUDDY GR          Significant Diagnostic Studies: Labs:   BMP:   Recent Labs   Lab 11/27/22  1622 11/28/22  0510 11/29/22  0432   * 86 99    137 137   K 3.4* 3.7 3.7    106 107   CO2 21* 21* 21*   BUN 8 10 7   CREATININE 0.8 0.8 0.7   CALCIUM 9.1 8.9 8.7   MG  --   --  1.8       Pending Diagnostic Studies:     None        Final Active Diagnoses:    Diagnosis Date Noted POA    PRINCIPAL PROBLEM:  Perforation of sigmoid colon due to diverticulitis [K57.20] 03/24/2020 Yes      Problems Resolved During this Admission:      Discharged Condition: good    Disposition: Home or Self Care    Follow Up:   Follow-up Information     Roz Solis MD. Call in 1 day.    Specialty: Colon and Rectal Surgery  Contact information:  [address] [place] [address]  [phone]             Roz Solis MD Follow up in 2 week(s).    Specialty: Colon and Rectal Surgery  Contact information:  [address] [facility] [address]  [phone]                       Patient Instructions:      Diet Adult Regular     No dressing needed     Activity as tolerated     Medications:  Reconciled Home Medications:      Medication List      START taking these medications    ciprofloxacin HCl 500 MG tablet  Commonly known as: CIPRO  Take 1 tablet (500 mg total) by mouth 2 (two) times daily. for 14 days     metroNIDAZOLE 500 MG tablet  Commonly known as: FLAGYL  Take 1 tablet (500 mg total) by mouth every 12 (twelve) hours. for 14 days        CONTINUE taking these medications    aspirin 81 MG EC tablet  Commonly known as: ECOTRIN  Take 1 tablet (81 mg total) by mouth once daily.     calcium citrate 200 mg (950 mg) tablet  Commonly known as: CALCITRATE  Take 1 tablet (200 mg total) by mouth 3 (three) times daily.     cetirizine 10 MG tablet  Commonly known as: ZYRTEC  Take 10 mg by mouth once daily.     estradioL 0.01 % (0.1 mg/gram) vaginal cream  Commonly known as: ESTRACE  Place 1 g  vaginally twice a week.     omeprazole 20 MG capsule  Commonly known as: PRILOSEC  Take 20 mg by mouth once daily.     PARAGARD T 380A 380 square mm IUD  Generic drug: copper intrauterine device        STOP taking these medications    ondansetron 4 MG Tbdl  Commonly known as: ZOFRAN-ODT     traZODone 50 MG tablet  Commonly known as: DESYREL        ASK your doctor about these medications    cyanocobalamin 250 MCG tablet  Commonly known as: VITAMIN B-12  Take 1 tablet (250 mcg total) by mouth once daily.            Mookie Bray MD  Colorectal Surgery  [facility]

## 2022-11-29 NOTE — HOSPITAL COURSE
Admitted with fevers chills, worsening of diverticulitis on CT.  She came in for IV abx and after 48 hours was feeling better and had 24 hours without fevers and chills, so was discharged with close f/u.

## 2022-11-29 NOTE — PLAN OF CARE
END OF SHIFT NOTE  Pt rested well throughout shift, no distress at this time, no complaints, VSS, TMAX 99.9, bed in lowest position, side rails up x2. Bed alarm refused, personal items within reach. Call light within reach,       JEZ Da Silva RN         Problem: Adult Inpatient Plan of Care  Goal: Plan of Care Review  Outcome: Ongoing, Progressing  Goal: Patient-Specific Goal (Individualized)  Outcome: Ongoing, Progressing  Goal: Absence of Hospital-Acquired Illness or Injury  Outcome: Ongoing, Progressing  Goal: Optimal Comfort and Wellbeing  Outcome: Ongoing, Progressing  Goal: Readiness for Transition of Care  Outcome: Ongoing, Progressing     Problem: Fall Injury Risk  Goal: Absence of Fall and Fall-Related Injury  Outcome: Ongoing, Progressing     Problem: Pain Acute  Goal: Acceptable Pain Control and Functional Ability  Outcome: Ongoing, Progressing

## 2022-11-29 NOTE — ASSESSMENT & PLAN NOTE
- Regular diet  - Zosyn   - Plan to transition to PO abx  - Trend CRP  - Discussed recommendation for sigmoid colectomy, ideally after resolution of this episode  - Dispo: potential d/c to home today on PO abx

## 2022-11-29 NOTE — PROGRESS NOTES
[facility]  Colorectal Surgery  Progress Note    Patient Name: Shireen Anton  MRN: [id number]  Admission Date: 11/27/2022  Hospital Length of Stay: 1 days  Attending Physician: Roz Solis MD    Subjective:     Interval History: No acute events overnight. Patient resting comfortably in bed this morning. Denies N/V.     Post-Op Info:  * No surgery found *          Medications:  Continuous Infusions:  Scheduled Meds:   enoxaparin  40 mg Subcutaneous Daily    piperacillin-tazobactam (ZOSYN) IVPB  4.5 g Intravenous Q8H     PRN Meds:   acetaminophen    LIDOcaine (PF) 10 mg/ml (1%)    melatonin    ondansetron    sodium chloride 0.9%        Objective:     Vital Signs (Most Recent):  Temp: 98.7 °F (37.1 °C) (11/28/22 0344)  Pulse: 73 (11/28/22 0344)  Resp: 16 (11/28/22 0344)  BP: (!) 116/57 (11/28/22 0344)  SpO2: 95 % (11/28/22 0344)   Vital Signs (24h Range):  Temp:  [98.7 °F (37.1 °C)-102 °F (38.9 °C)] 98.7 °F (37.1 °C)  Pulse:  [72-92] 73  Resp:  [16-18] 16  SpO2:  [95 %-99 %] 95 %  BP: (107-129)/(57-68) 116/57     Intake/Output - Last 3 Shifts         11/26 0700  11/27 0659 11/27 0700  11/28 0659 11/28 0700  11/29 0659    IV Piggyback  100     Total Intake(mL/kg)  100 (0.9)     Urine (mL/kg/hr)  500     Total Output  500     Net  -400            Urine Occurrence  1 x             Physical Exam  Constitutional:       General: She is not in acute distress.     Appearance: Normal appearance.   HENT:      Head: Normocephalic and atraumatic.      Mouth/Throat:      Mouth: Mucous membranes are moist.   Eyes:      Extraocular Movements: Extraocular movements intact.      Conjunctiva/sclera: Conjunctivae normal.   Cardiovascular:      Rate and Rhythm: Normal rate and regular rhythm.   Pulmonary:      Effort: Pulmonary effort is normal. No respiratory distress.   Abdominal:      General: There is no distension.      Palpations: Abdomen is soft.      Comments: Mildly TTP in suprapubic region   Skin:     General:  Skin is warm and dry.   Neurological:      General: No focal deficit present.      Mental Status: She is alert and oriented to person, place, and time. Mental status is at baseline.     Significant Labs:  BMP (Last 3 Results):   Recent Labs   Lab 11/27/22  1622 11/28/22  0510   * 86    137   K 3.4* 3.7    106   CO2 21* 21*   BUN 8 10   CREATININE 0.8 0.8   CALCIUM 9.1 8.9     CBC (Last 3 Results):   Recent Labs   Lab 11/27/22  1622 11/27/22  1629 11/28/22  0510   WBC 8.34  --  4.48   RBC 4.06  --  3.93*   HGB 12.7  --  12.2   HCT 37.1 36 37.3     --  116*   MCV 91  --  95   MCH 31.3*  --  31.0   MCHC 34.2  --  32.7     CRP (Last 3 Results):   Recent Labs   Lab 11/28/22  0510   .7*       Significant Diagnostics:  I have reviewed all pertinent imaging results/findings within the past 24 hours.    Assessment/Plan:     * Perforation of sigmoid colon due to diverticulitis  - Regular diet  - Zosyn   - Plan to transition to PO abx  - Trend CRP  - Discussed recommendation for sigmoid colectomy, ideally after resolution of this episode  - Dispo: potential d/c to home today on PO abx          Alina Harman MD  Colorectal Surgery  [facility]

## 2022-11-30 NOTE — PLAN OF CARE
[facility]  Discharge Final Note    Primary Care Provider: Jeancarlos Zamora MD    Expected Discharge Date: 11/29/2022    Final Discharge Note (most recent)       Final Note - 11/30/22 1022          Final Note    Assessment Type Discharge Planning Brief Assessment     Anticipated Discharge Disposition Home or Self Care     Hospital Resources/Appts/Education Provided Provided patient/caregiver with written discharge plan information        Post-Acute Status    Discharge Delays None known at this time                     Important Message from Medicare             Contact Info       Roz Solis MD   Specialty: Colon and Rectal Surgery    [facility] [place] [id number]   Phone: [phone]       Next Steps: Call in 1 day(s)    Roz Solis MD   Specialty: Colon and Rectal Surgery    [facility] [place] [address]   Phone: [phone]       Next Steps: Follow up in 2 week(s)            Pt d.c on with family. No d.c needs reported by medical team at this time.    Miya Martinez LCSW  Case Management/[facility]  [phone]

## 2022-12-06 NOTE — PROGRESS NOTES
CRS Office Visit History and Physical      SUBJECTIVE:     Chief Complaint: Diverticulitis    History of Present Illness:  Patient is a 52 y.o. female presents for management of recurrent sigmoid diverticulitis.  Recently admitted from 11/27-11/29 with recurrent symptoms. Still on antibiotics.  Back at work. Eating and passing loose Bms.  No fevers.  Pain resolved. No feculent drainage or flatus from the vagina.    Prior:  Initially developed upper respiratory in symptoms in March 2020, test was negative for COVID, but did have ground-glass opacities on CT scan and was later found to have antibodies.  Presented to the emergency department at [facility] and had a CT scan which demonstrated sigmoid inflammation with 1.6 cm abscess.  Was admitted to the hospital and treated with IV Zosyn which was later transitioned to oral ciprofloxacin and Flagyl for a total of 10 day course.  She was discharged from the hospital on day 4.       Attempted colonoscopy in June 2021, aborted because of inflammation associated with sigmoid narrowing.  Had flare from the Golytely prep.  No family history of colon or rectal cancer.  Prior surgical history includes laparoscopic cholecystectomy, and laparoscopic sleeve gastrectomy.  Sleeve gastrectomy was later complicated by readmission for hypotension, NSTEMI, GASPER and cerebellar stroke, felt to be a reaction to Lortab.    Review of patient's allergies indicates:   Allergen Reactions    Hydrocodone        Past Medical History:   Diagnosis Date    Allergy     Coronary artery disease     Depression     3/24/22: denies feelings of current or history of self harm    Diverticulitis of colon 2020    GERD (gastroesophageal reflux disease)     History of acute renal failure 05/2017    received 1 month of hemodialysis    NSTEMI (non-ST elevated myocardial infarction)     Renal disorder     acute renal failure post gastric sleeve    Stroke     cerebellar stroke post gastric sleeve     Past Surgical  History:   Procedure Laterality Date    AUGMENTATION OF BREAST Bilateral     breast augmentation      BREAST SURGERY  1998     SECTION      CHOLECYSTECTOMY      COLONOSCOPY N/A 2020    Procedure: COLONOSCOPY;  Surgeon: Roz Solis MD;  Location: [facility] ([address] [place] [address]);  Service: Endoscopy;  Laterality: N/A;  covid test on 20 at [facility]    FRACTURE SURGERY Right 1987    humerus, ORIF    LAPAROSCOPIC GASTRIC BANDING  2017    ORIF HUMERUS FRACTURE  1987     Family History   Problem Relation Age of Onset    Thyroid disease Mother     Cancer Mother 71        SCC of lung    Diabetes Father     Hypertension Father     Heart disease Father     Esophageal cancer Sister     Cancer Sister 48        pharyngeal cancer    Breast cancer Maternal Aunt     Breast cancer Maternal Grandmother 63    Colon cancer Neg Hx      Social History     Tobacco Use    Smoking status: Former     Packs/day: 1.00     Years: 16.00     Pack years: 16.00     Types: Cigarettes     Quit date: 2011     Years since quittin.0    Smokeless tobacco: Never   Substance Use Topics    Alcohol use: Yes     Comment: occasional    Drug use: No        Review of Systems:  Review of Systems   Constitutional:  Negative for chills and fever.   Gastrointestinal:  Negative for abdominal pain, blood in stool, constipation and diarrhea.   All other systems reviewed and are negative.    OBJECTIVE:     Physical Exam:  BP (!) 141/80 (BP Location: Left arm, Patient Position: Sitting, BP Method: Large (Automatic))   Pulse 70   Ht 6' (1.829 m)   Wt 118.6 kg (261 lb 8 oz)   BMI 35.47 kg/m²   General: White female in no distress   Neuro: Alert and oriented x 4.   Abdomen: Soft, non-distended, non-tender    Imaging:   CT abd/pel (2022)  1. Findings concerning for acute sigmoid diverticulitis, complicated by suspected complex fistula/sinus tract involving the sigmoid colon uterus which appears more distended from prior, as  well as bilateral adnexal region new hypodense masses, overall complex/multiloculated on the right with an internal gas focus, and simple on the left.  The gas focus on the right could be related to suspected chronic sigmoid and uterine fistula/sinus tract; however, gas-forming infection/abscess formation not excluded.  Clinical correlation advised.  Further evaluation/follow-up as warranted.  2. Hepatosplenomegaly.  3. Remote gastric sleeve surgery and cholecystectomy.  4. IUD.    CT abd/pel (3/24/20)  1. CT findings consistent with acute sigmoid colon diverticulitis complicated by microperforation and a small extraluminal/presumed intramural phlegmon versus developing abscess.  No obstruction.  No focal drainable fluid collections.  2. Multifocal subsegmental ground-glass and consolidative opacities throughout the visualized lungs most concerning for viral pneumonia.  3. Few gas bubbles within the non dependent portion of the bladder lumen, presumably related to recent instrumentation.  A gas-forming infection could present with similar findings and is possible.  4. Mild splenomegaly.  5. Subcentimeter hepatic hypodensity, too small to characterize but favored to represent a small cyst.  6. Left nonobstructing nephrolithiasis.  Left renal cyst and right renal too small to characterize hypodensity.  7. 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    CT abd/pel (5/2017)  Status post gastric bypass surgery.  No evidence of bowel obstruction or gastric outlet obstruction.  Fatty infiltration of the liver.  Diverticulosis coli with minimal inflammatory changes surrounding the sigmoid mesocolon.  Suggest clinical correlation to exclude component of acute diverticulitis.    ** I have reviewed these images **    ASSESSMENT/PLAN:     50-year-old female with recurrent sigmoid diverticulitis with small abscess    We reviewed the anatomy and development of diverticulitis.  We reviewed the patient's imaging  studies. I have recommended elective resection. We reviewed the laparoscopic sigmoid colectomy procedure using visual aids, including risks.  We reviewed anastomotic leak, abscess, wound infection, hernia, damage to ureter or bowel, and death.  Discussed planning colonoscopy at start of the procedure, as I am worried another attempt at prep may precipitate flare. Surgical consent signed. Asked if any additional questions, none at this time.    Roz Solis MD  Staff Surgeon, Colon and Rectal Surgery  [facility]

## 2022-12-07 ENCOUNTER — OFFICE VISIT (OUTPATIENT)
Dept: SURGERY | Facility: CLINIC | Age: 52
End: 2022-12-07
Attending: COLON & RECTAL SURGERY
Payer: COMMERCIAL

## 2022-12-07 VITALS
HEIGHT: 72 IN | WEIGHT: 261.5 LBS | HEART RATE: 70 BPM | SYSTOLIC BLOOD PRESSURE: 141 MMHG | BODY MASS INDEX: 35.42 KG/M2 | DIASTOLIC BLOOD PRESSURE: 80 MMHG

## 2022-12-07 DIAGNOSIS — K57.20 PERFORATION OF SIGMOID COLON DUE TO DIVERTICULITIS: Primary | ICD-10-CM

## 2022-12-07 PROCEDURE — 99214 OFFICE O/P EST MOD 30 MIN: CPT | Mod: S$GLB,,, | Performed by: COLON & RECTAL SURGERY

## 2022-12-07 PROCEDURE — 99214 PR OFFICE/OUTPT VISIT, EST, LEVL IV, 30-39 MIN: ICD-10-PCS | Mod: S$GLB,,, | Performed by: COLON & RECTAL SURGERY

## 2022-12-07 PROCEDURE — 1159F PR MEDICATION LIST DOCUMENTED IN MEDICAL RECORD: ICD-10-PCS | Mod: CPTII,S$GLB,, | Performed by: COLON & RECTAL SURGERY

## 2022-12-07 PROCEDURE — 3079F DIAST BP 80-89 MM HG: CPT | Mod: CPTII,S$GLB,, | Performed by: COLON & RECTAL SURGERY

## 2022-12-07 PROCEDURE — 99999 PR PBB SHADOW E&M-EST. PATIENT-LVL IV: CPT | Mod: PBBFAC,,, | Performed by: COLON & RECTAL SURGERY

## 2022-12-07 PROCEDURE — 3077F SYST BP >= 140 MM HG: CPT | Mod: CPTII,S$GLB,, | Performed by: COLON & RECTAL SURGERY

## 2022-12-07 PROCEDURE — 3079F PR MOST RECENT DIASTOLIC BLOOD PRESSURE 80-89 MM HG: ICD-10-PCS | Mod: CPTII,S$GLB,, | Performed by: COLON & RECTAL SURGERY

## 2022-12-07 PROCEDURE — 3008F PR BODY MASS INDEX (BMI) DOCUMENTED: ICD-10-PCS | Mod: CPTII,S$GLB,, | Performed by: COLON & RECTAL SURGERY

## 2022-12-07 PROCEDURE — 1159F MED LIST DOCD IN RCRD: CPT | Mod: CPTII,S$GLB,, | Performed by: COLON & RECTAL SURGERY

## 2022-12-07 PROCEDURE — 1111F PR DISCHARGE MEDS RECONCILED W/ CURRENT OUTPATIENT MED LIST: ICD-10-PCS | Mod: CPTII,S$GLB,, | Performed by: COLON & RECTAL SURGERY

## 2022-12-07 PROCEDURE — 1160F PR REVIEW ALL MEDS BY PRESCRIBER/CLIN PHARMACIST DOCUMENTED: ICD-10-PCS | Mod: CPTII,S$GLB,, | Performed by: COLON & RECTAL SURGERY

## 2022-12-07 PROCEDURE — 3008F BODY MASS INDEX DOCD: CPT | Mod: CPTII,S$GLB,, | Performed by: COLON & RECTAL SURGERY

## 2022-12-07 PROCEDURE — 1111F DSCHRG MED/CURRENT MED MERGE: CPT | Mod: CPTII,S$GLB,, | Performed by: COLON & RECTAL SURGERY

## 2022-12-07 PROCEDURE — 3077F PR MOST RECENT SYSTOLIC BLOOD PRESSURE >= 140 MM HG: ICD-10-PCS | Mod: CPTII,S$GLB,, | Performed by: COLON & RECTAL SURGERY

## 2022-12-07 PROCEDURE — 99999 PR PBB SHADOW E&M-EST. PATIENT-LVL IV: ICD-10-PCS | Mod: PBBFAC,,, | Performed by: COLON & RECTAL SURGERY

## 2022-12-07 PROCEDURE — 1160F RVW MEDS BY RX/DR IN RCRD: CPT | Mod: CPTII,S$GLB,, | Performed by: COLON & RECTAL SURGERY

## 2022-12-07 NOTE — ED TRIAGE NOTES
Pt. Arrived to ED via EMS. Pt. Reported she woke up this afternoon with the inability to hear. Pt. Reports her symptoms are in both eyes. Denies any pain.    Improved.

## 2022-12-19 ENCOUNTER — PATIENT MESSAGE (OUTPATIENT)
Dept: FAMILY MEDICINE | Facility: CLINIC | Age: 52
End: 2022-12-19
Payer: COMMERCIAL

## 2022-12-19 DIAGNOSIS — Z12.31 BREAST CANCER SCREENING BY MAMMOGRAM: Primary | ICD-10-CM

## 2022-12-29 ENCOUNTER — PATIENT MESSAGE (OUTPATIENT)
Dept: SURGERY | Facility: CLINIC | Age: 52
End: 2022-12-29
Payer: COMMERCIAL

## 2023-01-05 ENCOUNTER — HOSPITAL ENCOUNTER (OUTPATIENT)
Dept: RADIOLOGY | Facility: HOSPITAL | Age: 53
Discharge: HOME | End: 2023-01-05
Attending: FAMILY MEDICINE
Payer: COMMERCIAL

## 2023-01-05 VITALS — HEIGHT: 72 IN | BODY MASS INDEX: 35.41 KG/M2 | WEIGHT: 261.44 LBS

## 2023-01-05 DIAGNOSIS — Z12.31 BREAST CANCER SCREENING BY MAMMOGRAM: ICD-10-CM

## 2023-01-05 PROCEDURE — 77063 MAMMO DIGITAL SCREENING BILAT WITH TOMO: ICD-10-PCS | Mod: 26,,, | Performed by: RADIOLOGY

## 2023-01-05 PROCEDURE — 77067 MAMMO DIGITAL SCREENING BILAT WITH TOMO: ICD-10-PCS | Mod: 26,,, | Performed by: RADIOLOGY

## 2023-01-05 PROCEDURE — 77067 SCR MAMMO BI INCL CAD: CPT | Mod: 26,,, | Performed by: RADIOLOGY

## 2023-01-05 PROCEDURE — 77063 BREAST TOMOSYNTHESIS BI: CPT | Mod: 26,,, | Performed by: RADIOLOGY

## 2023-01-05 PROCEDURE — 77067 SCR MAMMO BI INCL CAD: CPT | Mod: TC

## 2023-01-10 ENCOUNTER — IMMUNIZATION (OUTPATIENT)
Dept: PHARMACY | Facility: CLINIC | Age: 53
End: 2023-01-10
Payer: COMMERCIAL

## 2023-01-10 DIAGNOSIS — Z23 NEED FOR VACCINATION: Primary | ICD-10-CM

## 2023-01-11 ENCOUNTER — OFFICE VISIT (OUTPATIENT)
Dept: OBSTETRICS AND GYNECOLOGY | Facility: CLINIC | Age: 53
End: 2023-01-11
Payer: COMMERCIAL

## 2023-01-11 VITALS
DIASTOLIC BLOOD PRESSURE: 78 MMHG | BODY MASS INDEX: 35.22 KG/M2 | WEIGHT: 260.06 LBS | HEIGHT: 72 IN | SYSTOLIC BLOOD PRESSURE: 112 MMHG

## 2023-01-11 DIAGNOSIS — Z30.431 IUD CHECK UP: ICD-10-CM

## 2023-01-11 DIAGNOSIS — Z12.4 CERVICAL CANCER SCREENING: ICD-10-CM

## 2023-01-11 DIAGNOSIS — Z01.419 WELL WOMAN EXAM WITH ROUTINE GYNECOLOGICAL EXAM: Primary | ICD-10-CM

## 2023-01-11 PROCEDURE — 3078F PR MOST RECENT DIASTOLIC BLOOD PRESSURE < 80 MM HG: ICD-10-PCS | Mod: CPTII,S$GLB,, | Performed by: OBSTETRICS & GYNECOLOGY

## 2023-01-11 PROCEDURE — 99386 PR PREVENTIVE VISIT,NEW,40-64: ICD-10-PCS | Mod: S$GLB,,, | Performed by: OBSTETRICS & GYNECOLOGY

## 2023-01-11 PROCEDURE — 3008F BODY MASS INDEX DOCD: CPT | Mod: CPTII,S$GLB,, | Performed by: OBSTETRICS & GYNECOLOGY

## 2023-01-11 PROCEDURE — 3008F PR BODY MASS INDEX (BMI) DOCUMENTED: ICD-10-PCS | Mod: CPTII,S$GLB,, | Performed by: OBSTETRICS & GYNECOLOGY

## 2023-01-11 PROCEDURE — 88141 PR  CYTOPATH CERV/VAG INTERPRET: ICD-10-PCS | Mod: ,,, | Performed by: PATHOLOGY

## 2023-01-11 PROCEDURE — 99999 PR PBB SHADOW E&M-EST. PATIENT-LVL III: ICD-10-PCS | Mod: PBBFAC,,, | Performed by: OBSTETRICS & GYNECOLOGY

## 2023-01-11 PROCEDURE — 1160F PR REVIEW ALL MEDS BY PRESCRIBER/CLIN PHARMACIST DOCUMENTED: ICD-10-PCS | Mod: CPTII,S$GLB,, | Performed by: OBSTETRICS & GYNECOLOGY

## 2023-01-11 PROCEDURE — 87624 HPV HI-RISK TYP POOLED RSLT: CPT | Performed by: OBSTETRICS & GYNECOLOGY

## 2023-01-11 PROCEDURE — 99386 PREV VISIT NEW AGE 40-64: CPT | Mod: S$GLB,,, | Performed by: OBSTETRICS & GYNECOLOGY

## 2023-01-11 PROCEDURE — 99999 PR PBB SHADOW E&M-EST. PATIENT-LVL III: CPT | Mod: PBBFAC,,, | Performed by: OBSTETRICS & GYNECOLOGY

## 2023-01-11 PROCEDURE — 88175 CYTOPATH C/V AUTO FLUID REDO: CPT | Performed by: PATHOLOGY

## 2023-01-11 PROCEDURE — 88141 CYTOPATH C/V INTERPRET: CPT | Mod: ,,, | Performed by: PATHOLOGY

## 2023-01-11 PROCEDURE — 3074F SYST BP LT 130 MM HG: CPT | Mod: CPTII,S$GLB,, | Performed by: OBSTETRICS & GYNECOLOGY

## 2023-01-11 PROCEDURE — 3074F PR MOST RECENT SYSTOLIC BLOOD PRESSURE < 130 MM HG: ICD-10-PCS | Mod: CPTII,S$GLB,, | Performed by: OBSTETRICS & GYNECOLOGY

## 2023-01-11 PROCEDURE — 3078F DIAST BP <80 MM HG: CPT | Mod: CPTII,S$GLB,, | Performed by: OBSTETRICS & GYNECOLOGY

## 2023-01-11 PROCEDURE — 1159F MED LIST DOCD IN RCRD: CPT | Mod: CPTII,S$GLB,, | Performed by: OBSTETRICS & GYNECOLOGY

## 2023-01-11 PROCEDURE — 1160F RVW MEDS BY RX/DR IN RCRD: CPT | Mod: CPTII,S$GLB,, | Performed by: OBSTETRICS & GYNECOLOGY

## 2023-01-11 PROCEDURE — 1159F PR MEDICATION LIST DOCUMENTED IN MEDICAL RECORD: ICD-10-PCS | Mod: CPTII,S$GLB,, | Performed by: OBSTETRICS & GYNECOLOGY

## 2023-01-11 NOTE — PROGRESS NOTES
[facility]  Ambulatory Clinic  Obstetrics & Gynecology    Visit Date:  2023    Chief Complaint:  Annual GYN exam    History of Present Illness:      Shireen Anton is a 53 y.o.  here for a gynecologic exam.      Pt has no major GYN complaints today.      Pt is perimenopausal and is not on hormone replacement therapy.      Pt current method of family planning is copper IUD and reports no problems with this method.      Last pap ~ was benign.    Last mammo ~2023 was benign; however, Tyrer-Cuzick > 20%, refer to breast clinic dicussed, pt declined at this time and will notify us when she is changes her mind.    Pt is a non-smoker.    Pt has h/o diverticulitis and has colectomy scheduled later this month.    Pt denies abnormal vaginal bleeding, vaginal discharge, dysmenorrhea, dyspareunia, pelvic pain, bloating, early satiety, unintentional weight loss, breast mass/skin changes, incontinence, urinary complaints.      Otherwise, the pt is in her usual state of health.    Past History:  Gynecologic history as noted above.    Review of Systems:      GENERAL:  No fever, fatigue, excessive weight gain or loss  HEENT:  No headaches, hearing changes, visual disturbance  RESPIRATORY:  No cough, shortness of breath  CARDIOVASCULAR:  No chest pain, heart palpitations, leg swelling  BREAST:  No lump, pain, nipple discharge, skin changes  GASTROINTESTINAL:  Currently, no nausea, vomiting, constipation, diarrhea, abd pain, rectal bleeding   GENITOURINARY:  See HPI  ENDOCRINE:  No heat or cold intolerance  HEMATOLOGIC:  No easy bruisability or bleeding   LYMPHATICS:  No enlarged nodes  MUSCULOSKELETAL:  No acute joint pain or swelling  SKIN:  No rash, lesions, jaundice  NEUROLOGIC:  No dizziness, weakness, syncope  PSYCHIATRIC:  No significant mood changes, homicidal/suicidal ideations, abuse    Physical Exam:     /78   Ht 6' (1.829 m)   Wt 117.9 kg (260 lb 0.5 oz)   LMP 2022  Comment: having cycle every other month  BMI 35.27 kg/m²   Pulse 60's, Resp rate 14     GENERAL:  No acute distress, well-nourished  HEENT:  Atraumatic, anicteric, moist mucus membranes. Neck supple w/o masses.  BREAST:  Symmetric, nontender, no obvious masses, adenopathy, skin changes or nipple discharge.  LUNGS:  Clear normal respiratory effort  HEART:  Regular rate and rhythm, no murmurs, gallops, or rubs  ABDOMEN:  Soft, non-tender, non-distended, normoactive bowel sounds, no obvious organomegaly  EXT:  Symmetric w/o cramping, claudication, or edema. +2 distal pulses.  SKIN:  No rashes or bruising  PSYCH:  Mood and affect appropriate  NEURO:  Grossly intact bilaterally    GENITOURINARY:    VULVAR:  Female external genitalia w/o obvious lesions. Female hair distribution. Normal urethral meatus. No gross lymphadenopathy.    VAGINA:  Normal vaginal mucosa. Good support. No obvious lesion. No discharge.  CERVIX:  No cervical motion tenderness, discharge, or obvious lesions. IUD strings present.  UTERUS:  Small, non-tender, normal contour  ADNEXA:  No masses, non-tender    RECTAL:  Deferred. No obvious external lesions    Chaperone present for exam.    Assessment:     53 y.o.  with copper IUD:    Well woman gynecologic exam  Copper IUD check    Plan:    A gynecologic health assessment was performed with age appropriate counseling.    Cervical cancer screening - pap obtained.    Screening mammogram up to date 2023 was benign; however, Tyrer-Cuzick > 20%, refer to breast clinic dicussed, pt declined at this time and will notify us when she is changes her mind.    Continue Copper IUD.  Risks, benefits, and alternatives to Copper reviewed.  Pt advised Copper IUD will need to be removed 10 yrs from insertion date.  Self string check.    Encourage healthy lifestyle modifications, monthly self breast exams, postmenopausal bleeding precautions.    F/u with PCP for health maintenance.    Return 1 year for  gynecologic exam, or sooner as needed.  All questions answered, pt voiced understanding.        Antione Jung MD

## 2023-01-12 NOTE — PROGRESS NOTES
CRS Office Visit History and Physical      SUBJECTIVE:     Chief Complaint: Diverticulitis    History of Present Illness:  Patient is a 53 y.o. female presents for pre-op paperwork.    Prior:  Initially developed upper respiratory in symptoms in March 2020, test was negative for COVID, but did have ground-glass opacities on CT scan and was later found to have antibodies.  Presented to the emergency department at [facility] and had a CT scan which demonstrated sigmoid inflammation with 1.6 cm abscess.  Was admitted to the hospital and treated with IV Zosyn which was later transitioned to oral ciprofloxacin and Flagyl for a total of 10 day course.  She was discharged from the hospital on day 4.       Attempted colonoscopy in June 2021, aborted because of inflammation associated with sigmoid narrowing.  Had flare from the Golytely prep.  No family history of colon or rectal cancer.  Prior surgical history includes laparoscopic cholecystectomy, and laparoscopic sleeve gastrectomy.  Sleeve gastrectomy was later complicated by readmission for hypotension, NSTEMI, GASPER and cerebellar stroke, felt to be a reaction to Lortab. Had rhabo as cause of GASPER, was down at home from the stroke.    Recently admitted from 11/27-11/29 with recurrent symptoms. Still on antibiotics.  Back at work. Eating and passing loose Bms.  No fevers.  Pain resolved. No feculent drainage or flatus from the vagina.    Review of patient's allergies indicates:   Allergen Reactions    Hydrocodone        Past Medical History:   Diagnosis Date    Allergy     Coronary artery disease     Depression     3/24/22: denies feelings of current or history of self harm    Diverticulitis of colon 2020    GERD (gastroesophageal reflux disease)     History of acute renal failure 05/2017    received 1 month of hemodialysis    NSTEMI (non-ST elevated myocardial infarction)     Renal disorder     acute renal failure post gastric sleeve    Stroke     cerebellar stroke post  gastric sleeve     Past Surgical History:   Procedure Laterality Date    AUGMENTATION OF BREAST Bilateral     breast augmentation      BREAST SURGERY  1998     SECTION      CHOLECYSTECTOMY      COLONOSCOPY N/A 2020    Procedure: COLONOSCOPY;  Surgeon: Roz Solis MD;  Location: [facility] ([address] [place] [address]);  Service: Endoscopy;  Laterality: N/A;  covid test on 20 at [facility]    FRACTURE SURGERY Right 1987    humerus, ORIF    LAPAROSCOPIC GASTRIC BANDING  2017    ORIF HUMERUS FRACTURE  1987     Family History   Problem Relation Age of Onset    Thyroid disease Mother     Cancer Mother 71        SCC of lung    Diabetes Father     Hypertension Father     Heart disease Father     Esophageal cancer Sister     Cancer Sister 48        pharyngeal cancer    Breast cancer Maternal Aunt     Breast cancer Maternal Grandmother 63    Colon cancer Neg Hx      Social History     Tobacco Use    Smoking status: Former     Packs/day: 1.00     Years: 16.00     Pack years: 16.00     Types: Cigarettes     Quit date: 2011     Years since quittin.1    Smokeless tobacco: Never   Substance Use Topics    Alcohol use: Yes     Comment: occasional    Drug use: No        Review of Systems:  Review of Systems   Constitutional:  Negative for chills and fever.   Gastrointestinal:  Negative for abdominal pain, blood in stool, constipation and diarrhea.   All other systems reviewed and are negative.    OBJECTIVE:     Physical Exam:  BP (!) 112/56 (BP Location: Left arm, Patient Position: Sitting)   Pulse 65   Resp 19   Ht 6' (1.829 m)   Wt 120.4 kg (265 lb 6.9 oz)   SpO2 (!) 79%   BMI 36.00 kg/m²     There were no vitals taken for this visit.  General: White female in no distress   Neuro: Alert and oriented x 4.   Abdomen: Soft, non-distended, non-tender    Imaging:   CT abd/pel (2022)  1. Findings concerning for acute sigmoid diverticulitis, complicated by suspected complex fistula/sinus tract  involving the sigmoid colon uterus which appears more distended from prior, as well as bilateral adnexal region new hypodense masses, overall complex/multiloculated on the right with an internal gas focus, and simple on the left.  The gas focus on the right could be related to suspected chronic sigmoid and uterine fistula/sinus tract; however, gas-forming infection/abscess formation not excluded.  Clinical correlation advised.  Further evaluation/follow-up as warranted.  2. Hepatosplenomegaly.  3. Remote gastric sleeve surgery and cholecystectomy.  4. IUD.    CT abd/pel (3/24/20)  1. CT findings consistent with acute sigmoid colon diverticulitis complicated by microperforation and a small extraluminal/presumed intramural phlegmon versus developing abscess.  No obstruction.  No focal drainable fluid collections.  2. Multifocal subsegmental ground-glass and consolidative opacities throughout the visualized lungs most concerning for viral pneumonia.  3. Few gas bubbles within the non dependent portion of the bladder lumen, presumably related to recent instrumentation.  A gas-forming infection could present with similar findings and is possible.  4. Mild splenomegaly.  5. Subcentimeter hepatic hypodensity, too small to characterize but favored to represent a small cyst.  6. Left nonobstructing nephrolithiasis.  Left renal cyst and right renal too small to characterize hypodensity.  7. 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    CT abd/pel (5/2017)  Status post gastric bypass surgery.  No evidence of bowel obstruction or gastric outlet obstruction.  Fatty infiltration of the liver.  Diverticulosis coli with minimal inflammatory changes surrounding the sigmoid mesocolon.  Suggest clinical correlation to exclude component of acute diverticulitis.    ** I have reviewed these images **    ASSESSMENT/PLAN:     50-year-old female with recurrent sigmoid diverticulitis with small abscess    We reviewed the  anatomy and development of diverticulitis.  We reviewed the patient's imaging studies. I have recommended elective resection. We reviewed the laparoscopic sigmoid colectomy procedure using visual aids, including risks.  We reviewed anastomotic leak, abscess, wound infection, hernia, damage to ureter or bowel, and death.  Discussed planning colonoscopy at start of the procedure, as I am worried another attempt at prep may precipitate flare. Surgical consent signed. Asked if any additional questions, none at this time.    Will plan colonoscopy at start of procedure.  Needs to see Anesthesia pre-op.    Roz Solis MD  Staff Surgeon, Colon and Rectal Surgery  [facility]

## 2023-01-12 NOTE — H&P (VIEW-ONLY)
CRS Office Visit History and Physical      SUBJECTIVE:     Chief Complaint: Diverticulitis    History of Present Illness:  Patient is a 53 y.o. female presents for pre-op paperwork.    Prior:  Initially developed upper respiratory in symptoms in March 2020, test was negative for COVID, but did have ground-glass opacities on CT scan and was later found to have antibodies.  Presented to the emergency department at [facility] and had a CT scan which demonstrated sigmoid inflammation with 1.6 cm abscess.  Was admitted to the hospital and treated with IV Zosyn which was later transitioned to oral ciprofloxacin and Flagyl for a total of 10 day course.  She was discharged from the hospital on day 4.       Attempted colonoscopy in June 2021, aborted because of inflammation associated with sigmoid narrowing.  Had flare from the Golytely prep.  No family history of colon or rectal cancer.  Prior surgical history includes laparoscopic cholecystectomy, and laparoscopic sleeve gastrectomy.  Sleeve gastrectomy was later complicated by readmission for hypotension, NSTEMI, GASPER and cerebellar stroke, felt to be a reaction to Lortab. Had rhabo as cause of GASPER, was down at home from the stroke.    Recently admitted from 11/27-11/29 with recurrent symptoms. Still on antibiotics.  Back at work. Eating and passing loose Bms.  No fevers.  Pain resolved. No feculent drainage or flatus from the vagina.    Review of patient's allergies indicates:   Allergen Reactions    Hydrocodone        Past Medical History:   Diagnosis Date    Allergy     Coronary artery disease     Depression     3/24/22: denies feelings of current or history of self harm    Diverticulitis of colon 2020    GERD (gastroesophageal reflux disease)     History of acute renal failure 05/2017    received 1 month of hemodialysis    NSTEMI (non-ST elevated myocardial infarction)     Renal disorder     acute renal failure post gastric sleeve    Stroke     cerebellar stroke post  gastric sleeve     Past Surgical History:   Procedure Laterality Date    AUGMENTATION OF BREAST Bilateral     breast augmentation      BREAST SURGERY  1998     SECTION      CHOLECYSTECTOMY      COLONOSCOPY N/A 2020    Procedure: COLONOSCOPY;  Surgeon: Roz Solis MD;  Location: [facility] ([address] [place] [address]);  Service: Endoscopy;  Laterality: N/A;  covid test on 20 at [facility]    FRACTURE SURGERY Right 1987    humerus, ORIF    LAPAROSCOPIC GASTRIC BANDING  2017    ORIF HUMERUS FRACTURE  1987     Family History   Problem Relation Age of Onset    Thyroid disease Mother     Cancer Mother 71        SCC of lung    Diabetes Father     Hypertension Father     Heart disease Father     Esophageal cancer Sister     Cancer Sister 48        pharyngeal cancer    Breast cancer Maternal Aunt     Breast cancer Maternal Grandmother 63    Colon cancer Neg Hx      Social History     Tobacco Use    Smoking status: Former     Packs/day: 1.00     Years: 16.00     Pack years: 16.00     Types: Cigarettes     Quit date: 2011     Years since quittin.1    Smokeless tobacco: Never   Substance Use Topics    Alcohol use: Yes     Comment: occasional    Drug use: No        Review of Systems:  Review of Systems   Constitutional:  Negative for chills and fever.   Gastrointestinal:  Negative for abdominal pain, blood in stool, constipation and diarrhea.   All other systems reviewed and are negative.    OBJECTIVE:     Physical Exam:  BP (!) 112/56 (BP Location: Left arm, Patient Position: Sitting)   Pulse 65   Resp 19   Ht 6' (1.829 m)   Wt 120.4 kg (265 lb 6.9 oz)   SpO2 (!) 79%   BMI 36.00 kg/m²     There were no vitals taken for this visit.  General: White female in no distress   Neuro: Alert and oriented x 4.   Abdomen: Soft, non-distended, non-tender    Imaging:   CT abd/pel (2022)  1. Findings concerning for acute sigmoid diverticulitis, complicated by suspected complex fistula/sinus tract  involving the sigmoid colon uterus which appears more distended from prior, as well as bilateral adnexal region new hypodense masses, overall complex/multiloculated on the right with an internal gas focus, and simple on the left.  The gas focus on the right could be related to suspected chronic sigmoid and uterine fistula/sinus tract; however, gas-forming infection/abscess formation not excluded.  Clinical correlation advised.  Further evaluation/follow-up as warranted.  2. Hepatosplenomegaly.  3. Remote gastric sleeve surgery and cholecystectomy.  4. IUD.    CT abd/pel (3/24/20)  1. CT findings consistent with acute sigmoid colon diverticulitis complicated by microperforation and a small extraluminal/presumed intramural phlegmon versus developing abscess.  No obstruction.  No focal drainable fluid collections.  2. Multifocal subsegmental ground-glass and consolidative opacities throughout the visualized lungs most concerning for viral pneumonia.  3. Few gas bubbles within the non dependent portion of the bladder lumen, presumably related to recent instrumentation.  A gas-forming infection could present with similar findings and is possible.  4. Mild splenomegaly.  5. Subcentimeter hepatic hypodensity, too small to characterize but favored to represent a small cyst.  6. Left nonobstructing nephrolithiasis.  Left renal cyst and right renal too small to characterize hypodensity.  7. 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    CT abd/pel (5/2017)  Status post gastric bypass surgery.  No evidence of bowel obstruction or gastric outlet obstruction.  Fatty infiltration of the liver.  Diverticulosis coli with minimal inflammatory changes surrounding the sigmoid mesocolon.  Suggest clinical correlation to exclude component of acute diverticulitis.    ** I have reviewed these images **    ASSESSMENT/PLAN:     50-year-old female with recurrent sigmoid diverticulitis with small abscess    We reviewed the  anatomy and development of diverticulitis.  We reviewed the patient's imaging studies. I have recommended elective resection. We reviewed the laparoscopic sigmoid colectomy procedure using visual aids, including risks.  We reviewed anastomotic leak, abscess, wound infection, hernia, damage to ureter or bowel, and death.  Discussed planning colonoscopy at start of the procedure, as I am worried another attempt at prep may precipitate flare. Surgical consent signed. Asked if any additional questions, none at this time.    Will plan colonoscopy at start of procedure.  Needs to see Anesthesia pre-op.    Roz Solis MD  Staff Surgeon, Colon and Rectal Surgery  [facility]

## 2023-01-13 ENCOUNTER — TELEPHONE (OUTPATIENT)
Dept: PREADMISSION TESTING | Facility: HOSPITAL | Age: 53
End: 2023-01-13
Payer: COMMERCIAL

## 2023-01-13 ENCOUNTER — OFFICE VISIT (OUTPATIENT)
Dept: SURGERY | Facility: CLINIC | Age: 53
End: 2023-01-13
Attending: COLON & RECTAL SURGERY
Payer: COMMERCIAL

## 2023-01-13 VITALS
HEIGHT: 72 IN | OXYGEN SATURATION: 79 % | WEIGHT: 265.44 LBS | DIASTOLIC BLOOD PRESSURE: 56 MMHG | SYSTOLIC BLOOD PRESSURE: 112 MMHG | RESPIRATION RATE: 19 BRPM | BODY MASS INDEX: 35.95 KG/M2 | HEART RATE: 65 BPM

## 2023-01-13 DIAGNOSIS — K57.20 PERFORATION OF SIGMOID COLON DUE TO DIVERTICULITIS: Primary | ICD-10-CM

## 2023-01-13 PROCEDURE — 99999 PR PBB SHADOW E&M-EST. PATIENT-LVL III: ICD-10-PCS | Mod: PBBFAC,,, | Performed by: COLON & RECTAL SURGERY

## 2023-01-13 PROCEDURE — 1160F RVW MEDS BY RX/DR IN RCRD: CPT | Mod: CPTII,S$GLB,, | Performed by: COLON & RECTAL SURGERY

## 2023-01-13 PROCEDURE — 99214 PR OFFICE/OUTPT VISIT, EST, LEVL IV, 30-39 MIN: ICD-10-PCS | Mod: S$GLB,,, | Performed by: COLON & RECTAL SURGERY

## 2023-01-13 PROCEDURE — 3078F DIAST BP <80 MM HG: CPT | Mod: CPTII,S$GLB,, | Performed by: COLON & RECTAL SURGERY

## 2023-01-13 PROCEDURE — 3008F BODY MASS INDEX DOCD: CPT | Mod: CPTII,S$GLB,, | Performed by: COLON & RECTAL SURGERY

## 2023-01-13 PROCEDURE — 1159F PR MEDICATION LIST DOCUMENTED IN MEDICAL RECORD: ICD-10-PCS | Mod: CPTII,S$GLB,, | Performed by: COLON & RECTAL SURGERY

## 2023-01-13 PROCEDURE — 99214 OFFICE O/P EST MOD 30 MIN: CPT | Mod: S$GLB,,, | Performed by: COLON & RECTAL SURGERY

## 2023-01-13 PROCEDURE — 3074F SYST BP LT 130 MM HG: CPT | Mod: CPTII,S$GLB,, | Performed by: COLON & RECTAL SURGERY

## 2023-01-13 PROCEDURE — 99999 PR PBB SHADOW E&M-EST. PATIENT-LVL III: CPT | Mod: PBBFAC,,, | Performed by: COLON & RECTAL SURGERY

## 2023-01-13 PROCEDURE — 3008F PR BODY MASS INDEX (BMI) DOCUMENTED: ICD-10-PCS | Mod: CPTII,S$GLB,, | Performed by: COLON & RECTAL SURGERY

## 2023-01-13 PROCEDURE — 1160F PR REVIEW ALL MEDS BY PRESCRIBER/CLIN PHARMACIST DOCUMENTED: ICD-10-PCS | Mod: CPTII,S$GLB,, | Performed by: COLON & RECTAL SURGERY

## 2023-01-13 PROCEDURE — 1159F MED LIST DOCD IN RCRD: CPT | Mod: CPTII,S$GLB,, | Performed by: COLON & RECTAL SURGERY

## 2023-01-13 PROCEDURE — 3078F PR MOST RECENT DIASTOLIC BLOOD PRESSURE < 80 MM HG: ICD-10-PCS | Mod: CPTII,S$GLB,, | Performed by: COLON & RECTAL SURGERY

## 2023-01-13 PROCEDURE — 3074F PR MOST RECENT SYSTOLIC BLOOD PRESSURE < 130 MM HG: ICD-10-PCS | Mod: CPTII,S$GLB,, | Performed by: COLON & RECTAL SURGERY

## 2023-01-13 RX ORDER — METRONIDAZOLE 500 MG/1
500 TABLET ORAL 2 TIMES DAILY
Qty: 2 TABLET | Refills: 0 | Status: SHIPPED | OUTPATIENT
Start: 2023-01-13 | End: 2023-01-14

## 2023-01-13 RX ORDER — CIPROFLOXACIN 500 MG/1
500 TABLET ORAL 2 TIMES DAILY
Qty: 2 TABLET | Refills: 0 | Status: SHIPPED | OUTPATIENT
Start: 2023-01-13 | End: 2023-01-14

## 2023-01-13 RX ORDER — ONDANSETRON 4 MG/1
4 TABLET, ORALLY DISINTEGRATING ORAL EVERY 6 HOURS PRN
Qty: 10 TABLET | Refills: 0 | Status: SHIPPED | OUTPATIENT
Start: 2023-01-13 | End: 2023-02-13

## 2023-01-18 RX ORDER — GABAPENTIN 300 MG/1
300 CAPSULE ORAL 3 TIMES DAILY
Status: CANCELLED | OUTPATIENT
Start: 2023-01-18

## 2023-01-19 ENCOUNTER — LAB VISIT (OUTPATIENT)
Dept: LAB | Facility: HOSPITAL | Age: 53
End: 2023-01-19
Payer: COMMERCIAL

## 2023-01-19 ENCOUNTER — OFFICE VISIT (OUTPATIENT)
Dept: INTERNAL MEDICINE | Facility: CLINIC | Age: 53
End: 2023-01-19
Payer: COMMERCIAL

## 2023-01-19 VITALS
DIASTOLIC BLOOD PRESSURE: 84 MMHG | BODY MASS INDEX: 35.49 KG/M2 | HEART RATE: 66 BPM | WEIGHT: 262 LBS | SYSTOLIC BLOOD PRESSURE: 144 MMHG | TEMPERATURE: 98 F | HEIGHT: 72 IN | OXYGEN SATURATION: 97 %

## 2023-01-19 DIAGNOSIS — I25.2 HISTORY OF MI (MYOCARDIAL INFARCTION): ICD-10-CM

## 2023-01-19 DIAGNOSIS — N39.0 RECURRENT UTI: ICD-10-CM

## 2023-01-19 DIAGNOSIS — E66.01 MORBID OBESITY DUE TO EXCESS CALORIES: ICD-10-CM

## 2023-01-19 DIAGNOSIS — Z86.73 HISTORY OF CVA IN ADULTHOOD: ICD-10-CM

## 2023-01-19 DIAGNOSIS — E04.2 MULTIPLE THYROID NODULES: ICD-10-CM

## 2023-01-19 DIAGNOSIS — K57.20 PERFORATION OF SIGMOID COLON DUE TO DIVERTICULITIS: ICD-10-CM

## 2023-01-19 LAB
ALBUMIN SERPL BCP-MCNC: 3.7 G/DL (ref 3.5–5.2)
ALP SERPL-CCNC: 94 U/L (ref 55–135)
ALT SERPL W/O P-5'-P-CCNC: 31 U/L (ref 10–44)
ANION GAP SERPL CALC-SCNC: 8 MMOL/L (ref 8–16)
AST SERPL-CCNC: 39 U/L (ref 10–40)
BASOPHILS # BLD AUTO: 0.06 K/UL (ref 0–0.2)
BASOPHILS NFR BLD: 0.9 % (ref 0–1.9)
BILIRUB SERPL-MCNC: 0.7 MG/DL (ref 0.1–1)
BUN SERPL-MCNC: 9 MG/DL (ref 6–20)
CALCIUM SERPL-MCNC: 9.7 MG/DL (ref 8.7–10.5)
CHLORIDE SERPL-SCNC: 105 MMOL/L (ref 95–110)
CO2 SERPL-SCNC: 27 MMOL/L (ref 23–29)
CREAT SERPL-MCNC: 0.8 MG/DL (ref 0.5–1.4)
DIFFERENTIAL METHOD: NORMAL
EOSINOPHIL # BLD AUTO: 0.1 K/UL (ref 0–0.5)
EOSINOPHIL NFR BLD: 2 % (ref 0–8)
ERYTHROCYTE [DISTWIDTH] IN BLOOD BY AUTOMATED COUNT: 13.6 % (ref 11.5–14.5)
EST. GFR  (NO RACE VARIABLE): >60 ML/MIN/1.73 M^2
GLUCOSE SERPL-MCNC: 88 MG/DL (ref 70–110)
HCT VFR BLD AUTO: 41.4 % (ref 37–48.5)
HGB BLD-MCNC: 13.7 G/DL (ref 12–16)
IMM GRANULOCYTES # BLD AUTO: 0.02 K/UL (ref 0–0.04)
IMM GRANULOCYTES NFR BLD AUTO: 0.3 % (ref 0–0.5)
LYMPHOCYTES # BLD AUTO: 2.3 K/UL (ref 1–4.8)
LYMPHOCYTES NFR BLD: 35.2 % (ref 18–48)
MCH RBC QN AUTO: 30.6 PG (ref 27–31)
MCHC RBC AUTO-ENTMCNC: 33.1 G/DL (ref 32–36)
MCV RBC AUTO: 92 FL (ref 82–98)
MONOCYTES # BLD AUTO: 0.6 K/UL (ref 0.3–1)
MONOCYTES NFR BLD: 9.2 % (ref 4–15)
NEUTROPHILS # BLD AUTO: 3.5 K/UL (ref 1.8–7.7)
NEUTROPHILS NFR BLD: 52.4 % (ref 38–73)
NRBC BLD-RTO: 0 /100 WBC
PLATELET # BLD AUTO: 186 K/UL (ref 150–450)
PMV BLD AUTO: 11.4 FL (ref 9.2–12.9)
POTASSIUM SERPL-SCNC: 3.8 MMOL/L (ref 3.5–5.1)
PROT SERPL-MCNC: 7.5 G/DL (ref 6–8.4)
RBC # BLD AUTO: 4.48 M/UL (ref 4–5.4)
SODIUM SERPL-SCNC: 140 MMOL/L (ref 136–145)
WBC # BLD AUTO: 6.64 K/UL (ref 3.9–12.7)

## 2023-01-19 PROCEDURE — 1159F PR MEDICATION LIST DOCUMENTED IN MEDICAL RECORD: ICD-10-PCS | Mod: CPTII,S$GLB,, | Performed by: NURSE PRACTITIONER

## 2023-01-19 PROCEDURE — 85025 COMPLETE CBC W/AUTO DIFF WBC: CPT | Performed by: NURSE PRACTITIONER

## 2023-01-19 PROCEDURE — 3079F PR MOST RECENT DIASTOLIC BLOOD PRESSURE 80-89 MM HG: ICD-10-PCS | Mod: CPTII,S$GLB,, | Performed by: NURSE PRACTITIONER

## 2023-01-19 PROCEDURE — 3077F SYST BP >= 140 MM HG: CPT | Mod: CPTII,S$GLB,, | Performed by: NURSE PRACTITIONER

## 2023-01-19 PROCEDURE — 80053 COMPREHEN METABOLIC PANEL: CPT | Performed by: NURSE PRACTITIONER

## 2023-01-19 PROCEDURE — 1159F MED LIST DOCD IN RCRD: CPT | Mod: CPTII,S$GLB,, | Performed by: NURSE PRACTITIONER

## 2023-01-19 PROCEDURE — 99215 OFFICE O/P EST HI 40 MIN: CPT | Mod: S$GLB,,, | Performed by: NURSE PRACTITIONER

## 2023-01-19 PROCEDURE — 3008F BODY MASS INDEX DOCD: CPT | Mod: CPTII,S$GLB,, | Performed by: NURSE PRACTITIONER

## 2023-01-19 PROCEDURE — 3079F DIAST BP 80-89 MM HG: CPT | Mod: CPTII,S$GLB,, | Performed by: NURSE PRACTITIONER

## 2023-01-19 PROCEDURE — 99215 PR OFFICE/OUTPT VISIT, EST, LEVL V, 40-54 MIN: ICD-10-PCS | Mod: S$GLB,,, | Performed by: NURSE PRACTITIONER

## 2023-01-19 PROCEDURE — 99999 PR PBB SHADOW E&M-EST. PATIENT-LVL IV: ICD-10-PCS | Mod: PBBFAC,,, | Performed by: NURSE PRACTITIONER

## 2023-01-19 PROCEDURE — 36415 COLL VENOUS BLD VENIPUNCTURE: CPT | Performed by: NURSE PRACTITIONER

## 2023-01-19 PROCEDURE — 3077F PR MOST RECENT SYSTOLIC BLOOD PRESSURE >= 140 MM HG: ICD-10-PCS | Mod: CPTII,S$GLB,, | Performed by: NURSE PRACTITIONER

## 2023-01-19 PROCEDURE — 99999 PR PBB SHADOW E&M-EST. PATIENT-LVL IV: CPT | Mod: PBBFAC,,, | Performed by: NURSE PRACTITIONER

## 2023-01-19 PROCEDURE — 3008F PR BODY MASS INDEX (BMI) DOCUMENTED: ICD-10-PCS | Mod: CPTII,S$GLB,, | Performed by: NURSE PRACTITIONER

## 2023-01-19 NOTE — PROGRESS NOTES
[facility]  Progress Note    Patient Name: Shireen Anton  MRN: [id number]  Date of Evaluation- 01/19/2023  PCP- Jeancarlos Zamora MD    Future cases for Shireen Anton [[id number]]       Case ID Status Date Time Maxi Procedure Provider Location    [id number] [facility] 1/24/2023  7:00  COLECTOMY, SIGMOID, LAPAROSCOPIC Roz Solis MD [[id number]] N[facility] OR 2ND FLR            HPI:  This is a 53 y.o. female  who presents today for a preoperative evaluation in preparation for a Colon and Rectal  procedure.  Scheduled for  1/24/23  Surgery is indicated for Colectomy sigmoid laparoscopic colonoscopy.   Patient is new to me.  Details of current problem: The duration of problem is Diverticulitis since 2020    Reports symptoms of abdominal pain, cramping, nausea, diarrhea  Aggravating factors include: unknown triggers- possible milk product intolerance- avoids dairy  Relieving factors are none noted     Treated with dietary restrictions/ eating times due to diarrhea, Fiber tablets for regularity and bulk  Reports pain: 5/10  The history has been obtained by speaking with the patient and reviewing the electronic medical record and/or outside health information. Significant health conditions for the perioperative period are discussed below in assessment and plan.     Patient reports current health status to be Good.  Denies any new symptoms before surgery.       Subjective/ Objective:     Chief Complaint: Preoperative evaulation, perioperative medical management, and complication reduction plan.     Functional Capacity:  Able to climb a flight of stairs without CP SOB or Syncope.  Able to meet 4 METs       Anesthesia issues: None    Difficulty mouth opening: none    Steroid use in the last 12 months:  none    Dental Issues: none    Family anesthesia difficulty: None     Family Hx of Thrombosis none    Past Medical History:   Diagnosis Date    Allergy     Depression     3/24/22: denies feelings of current or history of  self harm    Diverticulitis of colon     GERD (gastroesophageal reflux disease)     History of acute renal failure 2017    received 1 month of hemodialysis    NSTEMI (non-ST elevated myocardial infarction)     Renal disorder     acute renal failure post gastric sleeve    Stroke     cerebellar stroke post gastric sleeve     Past Surgical History:   Procedure Laterality Date    AUGMENTATION OF BREAST Bilateral     breast augmentation      BREAST SURGERY  1998     SECTION      CHOLECYSTECTOMY      COLONOSCOPY N/A 2020    Procedure: COLONOSCOPY;  Surgeon: Roz Solis MD;  Location: [address]);  Service: Endoscopy;  Laterality: N/A;  covid test on 20 at [facility]    FRACTURE SURGERY Right 1987    humerus, ORIF    LAPAROSCOPIC SLEEVE GASTRECTOMY  2017    ORIF HUMERUS FRACTURE         Review of Systems   Constitutional:  Negative for chills, fatigue and fever.   HENT:  Negative for trouble swallowing and voice change.    Eyes:  Negative for photophobia and visual disturbance.        No acute visual changes   Respiratory:  Negative for apnea, cough, chest tightness, shortness of breath and wheezing.         STOP bang  Score 3    High risk GENEVA   Cardiovascular:  Negative for chest pain, palpitations and leg swelling.   Gastrointestinal:  Positive for abdominal distention, abdominal pain, diarrhea and nausea. Negative for blood in stool, constipation and vomiting.        NO  hepatitis, cirrhosis  No BRB or black tarry stool  FLD   Endocrine: Negative for cold intolerance, heat intolerance, polydipsia, polyphagia and polyuria.   Genitourinary:  Negative for difficulty urinating, dysuria, flank pain, frequency, hematuria and urgency.        Frequent UTIs-    Musculoskeletal:  Negative for arthralgias, back pain, gait problem, joint swelling, myalgias, neck pain and neck stiffness.   Neurological:  Negative for dizziness, tremors, seizures, syncope, weakness,  light-headedness, numbness and headaches.   Psychiatric/Behavioral:  Negative for suicidal ideas. The patient is not nervous/anxious.       VITALS  Visit Vitals  BP (!) 144/84 (BP Location: Left arm, Patient Position: Sitting)   Pulse 66   Temp 97.8 °F (36.6 °C) (Oral)   Ht 6' (1.829 m)   Wt 118.8 kg (262 lb)   SpO2 97%   BMI 35.53 kg/m²          Physical Exam     Significant Labs:  Lab Results   Component Value Date    WBC 3.27 (L) 11/29/2022    HGB 11.2 (L) 11/29/2022    HCT 33.6 (L) 11/29/2022     (L) 11/29/2022    CHOL 125 05/03/2017    TRIG 163 (H) 05/03/2017    HDL 25 (L) 05/03/2017    ALT 15 11/27/2022    AST 17 11/27/2022     11/29/2022    K 3.7 11/29/2022     11/29/2022    CREATININE 0.7 11/29/2022    BUN 7 11/29/2022    CO2 21 (L) 11/29/2022    TSH 0.649 02/22/2022    INR 1.0 05/15/2017    HGBA1C 5.5 05/02/2017       Diagnostic Studies: No relevant studies.    EKG:   Results for orders placed or performed during the hospital encounter of 03/24/20   EKG 12-lead    Collection Time: 03/28/20  9:20 AM    Narrative    Test Reason : R79.82,    Vent. Rate : 066 BPM     Atrial Rate : 066 BPM     P-R Int : 142 ms          QRS Dur : 098 ms      QT Int : 424 ms       P-R-T Axes : 064 071 051 degrees     QTc Int : 444 ms    Sinus rhythm with frequent Premature ventricular complexes  Otherwise normal ECG  When compared with ECG of 20-SEP-2017 13:36,  Significant changes have occurred  Confirmed by Kevin Sánchez MD ([id number]) on 3/28/2020 11:02:49 PM    Referred By: DIMPLE   SELF           Confirmed By:Kevin Sánchez MD       2D ECHO:  TTE:  No results found for this or any previous visit.    BENTLEY:  No results found for this or any previous visit.     Imaging       Active Cardiac Conditions: None      Revised Cardiac Risk Index   High -Risk Surgery  Intraperitoneal; Intrathoracic; suprainguinal vascular Yes- + 1 No- 0   History of Ischemic Heart Disease   (Hx of MI/positive exercise test/current  chest pain due to ischemia/use of nitrate therapy/EKG with pathological Q waves) Yes- + 1 No- 0   History of CHF  (Pulmonary edema/bilateral rales or S3 gallop/PND/CXR showing pulmonary vascular redistribution) Yes- + 1 No- 0   History of CVA   (Prior stroke or TIA) Yes- + 1 No- 0   Pre-operative treatment with insulin Yes- + 1 No- 0   Pre-operative creatinine > 2mg/dl Yes- + 1 No- 0   Total:      Risk Status:  Estimated risk of cardiac complications after non-cardiac surgery using the Revised Cardiac Risk Index for Preoperative risk is 10%      ARISCAT (Canet) risk index: Intermediate: 13.3% risk of post-op pulmonary complications.    American Society of Anesthesiologists Physical Status classification (ASA): 3           No further cardiac workup needed prior to surgery.        Preoperative cardiac risk assessment-  The patient does not have any active cardiac conditions . Revised cardiac risk index predictors- ---.Functional capacity is more than 4 Mets. She will be undergoing a Colon and rectal procedure that carries a Moderate Risk risk     The estimated risk of the rate of adverse cardiac outcomes  10%    No further cardiac work up is indicated prior to proceeding with the surgery     Orders Placed This Encounter    CBC Auto Differential    Comprehensive Metabolic Panel    EKG 12-lead       American Society of Anesthesiologists Physical status classification ( ASA ) class: 3     Postoperative pulmonary complication risk assessment: 1.6     ARISCAT ( Canet) risk index- risk class -  Low, if duration of surgery is under 3 hours, intermediate, if duration of surgery is over 3 hours          Assessment/Plan:     History of CVA in adulthood  Cerebellar stroke after Gastric sleeve operation 2017  Reports due to hypotension  Related to Lortab  Also had a concurrent UTI  No Residual deficits  Currently works 3-4 days per week    History of MI (myocardial infarction)  H/O MI  Stress negative for blockages    Impression:  NORMAL MYOCARDIAL PERFUSION   1. The perfusion scan is free of evidence for myocardial ischemia or injury.   2. There is a moderate intensity fixed defect in the anterior wall of the left ventricle, secondary to breast attenuation.   3. Resting wall motion is physiologic.   4. Resting LV function is normal.   5. The ventricular volumes are normal at rest and stress.   6. The extracardiac distribution of radioactivity is normal.           Recurrent UTI  Reports she has frequent UTIs  Perform hygiene measures     Multiple thyroid nodules  Biopsy performed- cyst  Recheck April 2023    Morbid obesity due to excess calories  Has lost 80 pounds since gastric sleeve    Perforation of sigmoid colon due to diverticulitis  See HPI        Preventive perioperative care    Thromboembolic prophylaxis:  Her risk factors for thrombosis include morbid obesity, surgical procedure, age, and reduced mobility.I suggest  thromboembolic prophylaxis ( mechanical/pharmacological, weighing the risk benefits of pharmacological agent use considering itzel procedural bleeding )  during the perioperative period.I suggested being active in the post operative period.      Postoperative pulmonary complication prophylaxis-Risk factors for post operative pulmonary complications include surgery lasting over 3 hours and ASA class >2- I suggest incentive spirometry use, early ambulation, and pain control so as to avoid diaphragmatic splinting  Brush teeth twice per day, oral rinses, sleep with the head of the bed up 30 degrees     Renal complication prophylaxis-Risk factors for renal complications include age . I suggest keeping her well hydrated and avoidance/ minimizing the use of  NSAID's,SUN 2 Inhibitors ,IV contrast if possible in the perioperative period.I suggested drinking 2 litre's of water a day      Surgical site Infection Prophylaxis-I  suggest appropriate antibiotic for Prophylaxis against Surgical site infections Shower with Dial  antibacterial soap Hibiclens in the night before surgery and the morning of surgery      This visit was focused on Preoperative evaluation, Perioperative Medical management, complication reduction plans. I suggest that the patient follows up with primary care or relevant sub specialists for ongoing health care.    I appreciate the opportunity to be involved in this patients care. Please feel free to contact me if there were any questions about this consultation.    Patient is optimized          Jono Shaw NP  Perioperative Medicine  [facility]   Pager [phone]

## 2023-01-19 NOTE — ASSESSMENT & PLAN NOTE
Cerebellar stroke after Gastric sleeve operation 2017  Reports due to hypotension  Related to Lortab  Also had a concurrent UTI  No Residual deficits  Currently works 3-4 days per week

## 2023-01-19 NOTE — OUTPATIENT SUBJECTIVE & OBJECTIVE
Outpatient Subjective & Objective      Chief Complaint: Preoperative evaulation, perioperative medical management, and complication reduction plan.     Functional Capacity:  Able to climb a flight of stairs without CP SOB or Syncope.  Able to meet 4 METs       Anesthesia issues: None    Difficulty mouth opening: none    Steroid use in the last 12 months:  none    Dental Issues: none    Family anesthesia difficulty: None     Family Hx of Thrombosis none    Past Medical History:   Diagnosis Date    Allergy     Depression     3/24/22: denies feelings of current or history of self harm    Diverticulitis of colon     GERD (gastroesophageal reflux disease)     History of acute renal failure 2017    received 1 month of hemodialysis    NSTEMI (non-ST elevated myocardial infarction)     Renal disorder     acute renal failure post gastric sleeve    Stroke     cerebellar stroke post gastric sleeve     Past Surgical History:   Procedure Laterality Date    AUGMENTATION OF BREAST Bilateral     breast augmentation      BREAST SURGERY  1998     SECTION      CHOLECYSTECTOMY      COLONOSCOPY N/A 2020    Procedure: COLONOSCOPY;  Surgeon: Roz Solis MD;  Location: [address]);  Service: Endoscopy;  Laterality: N/A;  covid test on 20 at [facility]    FRACTURE SURGERY Right 1987    humerus, ORIF    LAPAROSCOPIC SLEEVE GASTRECTOMY  2017    ORIF HUMERUS FRACTURE  1987       Review of Systems   Constitutional:  Negative for chills, fatigue and fever.   HENT:  Negative for trouble swallowing and voice change.    Eyes:  Negative for photophobia and visual disturbance.        No acute visual changes   Respiratory:  Negative for apnea, cough, chest tightness, shortness of breath and wheezing.         STOP bang  Score 3    High risk GENEVA   Cardiovascular:  Negative for chest pain, palpitations and leg swelling.   Gastrointestinal:  Positive for abdominal distention, abdominal pain,  diarrhea and nausea. Negative for blood in stool, constipation and vomiting.        NO  hepatitis, cirrhosis  No BRB or black tarry stool  FLD   Endocrine: Negative for cold intolerance, heat intolerance, polydipsia, polyphagia and polyuria.   Genitourinary:  Negative for difficulty urinating, dysuria, flank pain, frequency, hematuria and urgency.        Frequent UTIs-    Musculoskeletal:  Negative for arthralgias, back pain, gait problem, joint swelling, myalgias, neck pain and neck stiffness.   Neurological:  Negative for dizziness, tremors, seizures, syncope, weakness, light-headedness, numbness and headaches.   Psychiatric/Behavioral:  Negative for suicidal ideas. The patient is not nervous/anxious.       VITALS  Visit Vitals  BP (!) 144/84 (BP Location: Left arm, Patient Position: Sitting)   Pulse 66   Temp 97.8 °F (36.6 °C) (Oral)   Ht 6' (1.829 m)   Wt 118.8 kg (262 lb)   SpO2 97%   BMI 35.53 kg/m²          Physical Exam  Constitutional:       General: She is not in acute distress.     Appearance: Normal appearance. She is well-developed. She is not diaphoretic.   HENT:      Head: Normocephalic.      Right Ear: Hearing normal.      Left Ear: Hearing normal.      Nose: Nose normal.      Mouth/Throat:      Lips: Pink.      Mouth: Mucous membranes are moist.      Pharynx: No oropharyngeal exudate.   Eyes:      General: Lids are normal.         Right eye: No discharge.         Left eye: No discharge.      Conjunctiva/sclera: Conjunctivae normal.      Pupils: Pupils are equal, round, and reactive to light.   Neck:      Thyroid: No thyromegaly.      Vascular: No carotid bruit or JVD.      Trachea: Trachea and phonation normal. No tracheal deviation.   Cardiovascular:      Rate and Rhythm: Normal rate and regular rhythm.      Pulses: Normal pulses.           Carotid pulses are 2+ on the right side and 2+ on the left side.       Radial pulses are 2+ on the right side and 2+ on the left side.        Posterior tibial  pulses are 2+ on the right side and 2+ on the left side.      Heart sounds: Normal heart sounds. No murmur heard.    No friction rub. No gallop.   Pulmonary:      Effort: Pulmonary effort is normal. No respiratory distress.      Breath sounds: Normal breath sounds. No stridor. No wheezing or rales.   Chest:      Chest wall: No tenderness.   Abdominal:      General: Abdomen is protuberant. Bowel sounds are normal. There is no distension.      Palpations: Abdomen is soft.      Tenderness: There is no abdominal tenderness. There is no guarding.   Musculoskeletal:         General: No tenderness or deformity. Normal range of motion.      Cervical back: Normal range of motion and neck supple. No rigidity.      Right lower leg: No edema.      Left lower leg: No edema.   Lymphadenopathy:      Cervical: No cervical adenopathy.   Skin:     General: Skin is warm and dry.      Capillary Refill: Capillary refill takes 2 to 3 seconds.      Coloration: Skin is not pale.      Findings: No erythema or rash.   Neurological:      Mental Status: She is alert and oriented to person, place, and time. Mental status is at baseline.      GCS: GCS eye subscore is 4. GCS verbal subscore is 5. GCS motor subscore is 6.      Motor: No abnormal muscle tone.      Coordination: Coordination normal.   Psychiatric:         Attention and Perception: Attention and perception normal.         Mood and Affect: Mood and affect normal.         Speech: Speech normal.         Behavior: Behavior normal. Behavior is cooperative.         Thought Content: Thought content normal.         Cognition and Memory: Cognition and memory normal.         Judgment: Judgment normal.        Significant Labs:  Lab Results   Component Value Date    WBC 3.27 (L) 11/29/2022    HGB 11.2 (L) 11/29/2022    HCT 33.6 (L) 11/29/2022     (L) 11/29/2022    CHOL 125 05/03/2017    TRIG 163 (H) 05/03/2017    HDL 25 (L) 05/03/2017    ALT 15 11/27/2022    AST 17 11/27/2022      11/29/2022    K 3.7 11/29/2022     11/29/2022    CREATININE 0.7 11/29/2022    BUN 7 11/29/2022    CO2 21 (L) 11/29/2022    TSH 0.649 02/22/2022    INR 1.0 05/15/2017    HGBA1C 5.5 05/02/2017       Diagnostic Studies: No relevant studies.    EKG:   Results for orders placed or performed during the hospital encounter of 03/24/20   EKG 12-lead    Collection Time: 03/28/20  9:20 AM    Narrative    Test Reason : R79.82,    Vent. Rate : 066 BPM     Atrial Rate : 066 BPM     P-R Int : 142 ms          QRS Dur : 098 ms      QT Int : 424 ms       P-R-T Axes : 064 071 051 degrees     QTc Int : 444 ms    Sinus rhythm with frequent Premature ventricular complexes  Otherwise normal ECG  When compared with ECG of 20-SEP-2017 13:36,  Significant changes have occurred  Confirmed by Kevin Sánchez MD ([id number]) on 3/28/2020 11:02:49 PM    Referred By: DIMPLE   SELF           Confirmed By:Kevin Sánchez MD       2D ECHO:  TTE:  No results found for this or any previous visit.    BENTLEY:  No results found for this or any previous visit.     Imaging       Active Cardiac Conditions: None      Revised Cardiac Risk Index   High -Risk Surgery  Intraperitoneal; Intrathoracic; suprainguinal vascular Yes- + 1 No- 0   History of Ischemic Heart Disease   (Hx of MI/positive exercise test/current chest pain due to ischemia/use of nitrate therapy/EKG with pathological Q waves) Yes- + 1 No- 0   History of CHF  (Pulmonary edema/bilateral rales or S3 gallop/PND/CXR showing pulmonary vascular redistribution) Yes- + 1 No- 0   History of CVA   (Prior stroke or TIA) Yes- + 1 No- 0   Pre-operative treatment with insulin Yes- + 1 No- 0   Pre-operative creatinine > 2mg/dl Yes- + 1 No- 0   Total:      Risk Status:  Estimated risk of cardiac complications after non-cardiac surgery using the Revised Cardiac Risk Index for Preoperative risk is 10%      ARISCAT (Canet) risk index: Intermediate: 13.3% risk of post-op pulmonary complications.    American  Society of Anesthesiologists Physical Status classification (ASA): 3           No further cardiac workup needed prior to surgery.    Outpatient Subjective & Objective

## 2023-01-19 NOTE — ASSESSMENT & PLAN NOTE
H/O MI- patient states after gastric sleeve related to hypoperfusion secondary to Lortab use    She denies any symptoms of chest pain, shortness of breath at rest, peripheral edema, orthopnea, palpitations, lightheadedness, presyncope, or syncope at this visit.    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 The perfusion scan is free of evidence for myocardial ischemia or injury.   2. There is a moderate intensity fixed defect in the anterior wall of the left ventricle, secondary to breast attenuation.   3. Resting wall motion is physiologic.   4. Resting LV function is normal.   5. The ventricular volumes are normal at rest and stress.   6. The extracardiac distribution of radioactivity is normal.

## 2023-01-19 NOTE — HPI
This is a 53 y.o. female  who presents today for a preoperative evaluation in preparation for a Colon and Rectal  procedure.  Scheduled for  1/24/23  Surgery is indicated for Colectomy sigmoid laparoscopic colonoscopy.   Patient is new to me.  Details of current problem: The duration of problem is Diverticulitis since 2020    Reports symptoms of abdominal pain, cramping, nausea, diarrhea  Aggravating factors include: unknown triggers- possible milk product intolerance- avoids dairy  Relieving factors are none noted     Treated with dietary restrictions/ eating times due to diarrhea, Fiber tablets for regularity and bulk  Reports pain: 5/10  The history has been obtained by speaking with the patient and reviewing the electronic medical record and/or outside health information. Significant health conditions for the perioperative period are discussed below in assessment and plan.     Patient reports current health status to be Good.  Denies any new symptoms before surgery.

## 2023-01-19 NOTE — PATIENT INSTRUCTIONS
Preventive perioperative care    Thromboembolic prophylaxis:  Her risk factors for thrombosis include morbid obesity, surgical procedure, age, and reduced mobility.I suggest  thromboembolic prophylaxis ( mechanical/pharmacological, weighing the risk benefits of pharmacological agent use considering itzel procedural bleeding )  during the perioperative period.I suggested being active in the post operative period.      Postoperative pulmonary complication prophylaxis-Risk factors for post operative pulmonary complications include surgery lasting over 3 hours and ASA class >2- I suggest incentive spirometry use, early ambulation, and pain control so as to avoid diaphragmatic splinting  Brush teeth twice per day, oral rinses, sleep with the head of the bed up 30 degrees     Renal complication prophylaxis-Risk factors for renal complications include age . I suggest keeping her well hydrated and avoidance/ minimizing the use of  NSAID's,SUN 2 Inhibitors ,IV contrast if possible in the perioperative period.I suggested drinking 2 litre's of water a day      Surgical site Infection Prophylaxis-I  suggest appropriate antibiotic for Prophylaxis against Surgical site infections Shower with Dial antibacterial soap Hibiclens in the night before surgery and the morning of surgery      This visit was focused on Preoperative evaluation, Perioperative Medical management, complication reduction plans. I suggest that the patient follows up with primary care or relevant sub specialists for ongoing health care.    I appreciate the opportunity to be involved in this patients care. Please feel free to contact me if there were any questions about this consultation.

## 2023-01-19 NOTE — DISCHARGE INSTRUCTIONS
Your surgery has been scheduled for:______1/24/23____________________________________    You should report to:  ____[facility], located on the [place] side of the first floor of the           [facility] ([phone])  __X__[facility], located on the [facility] side of the            Second floor of the [facility] ([phone])  ____3rd Floor SSCU located on the [facility] [address] side of the [facility] [phone]  ____[facility]: located at [address] [place] [address]. [address].     Please Note   Tell your doctor if you take Aspirin, products containing Aspirin, herbal medications  or blood thinners, such as Coumadin, Ticlid, or Plavix.  (Consult your provider regarding holding or stopping before surgery).  Arrange for someone to drive you home following surgery.  You will not be allowed to leave the surgical facility alone or drive yourself home following sedation and anesthesia.    Before Surgery  Stop taking all herbal medications, vitamins, and supplements 7 days prior to surgery  No Motrin/Advil (Ibuprofen) 7 days before surgery  No Aleve (Naproxen) 7 days before surgery  Stop Taking Asprin, products containing Aspirin __7___days before surgery   No Goody's/BC Powder 7 days before surgery  Refrain from drinking alcoholic beverages for 24 hours before and after surgery  Stop or limit smoking at least 24 hours prior to surgery  You may take Tylenol for pain    Night before Surgery  Do not eat or drink after midnight  Take a shower or bath (shower is recommended).  Bathe with Hibiclens soap or an antibacterial soap from the neck down.  If not supplied by your surgeon, hibiclens soap will need to be purchased over the counter in pharmacy.  Rinse soap off thoroughly.  Shampoo your hair with your regular shampoo    The Day of Surgery  Take another bath or shower with hibiclens  or any antibacterial soap, to reduce the chance of infection.  Take heart and blood pressure medications with a small sip of water, as advised by the perioperative team.  Do not take fluid pills  You may brush your teeth and rinse your mouth, but do not swallow any additional water.   Do not apply perfumes, powder, body lotions or deodorant on the day of surgery.  Nail polish should be removed.  Do not wear makeup or moisturizer  Wear comfortable clothes, such as a button front shirt and loose fitting pants.  Leave all jewelry, including body piercings, and valuables at home.    Bring any devices you will neeed after surgery such as crutches or canes.  If you have sleep apnea, please bring your CPAP machine  In the event that your physical condition changes including the onset of a cold or respiratory illness, or if you have to delay or cancel your surgery, please notify your surgeon.

## 2023-01-20 ENCOUNTER — HOSPITAL ENCOUNTER (OUTPATIENT)
Dept: CARDIOLOGY | Facility: HOSPITAL | Age: 53
Discharge: HOME | End: 2023-01-20
Attending: NURSE PRACTITIONER
Payer: COMMERCIAL

## 2023-01-20 DIAGNOSIS — Z86.73 HISTORY OF CVA IN ADULTHOOD: ICD-10-CM

## 2023-01-20 DIAGNOSIS — K57.20 PERFORATION OF SIGMOID COLON DUE TO DIVERTICULITIS: ICD-10-CM

## 2023-01-20 DIAGNOSIS — E66.01 MORBID OBESITY DUE TO EXCESS CALORIES: ICD-10-CM

## 2023-01-20 DIAGNOSIS — E04.2 MULTIPLE THYROID NODULES: ICD-10-CM

## 2023-01-20 DIAGNOSIS — I25.2 HISTORY OF MI (MYOCARDIAL INFARCTION): ICD-10-CM

## 2023-01-20 DIAGNOSIS — N39.0 RECURRENT UTI: ICD-10-CM

## 2023-01-20 PROCEDURE — 93005 ELECTROCARDIOGRAM TRACING: CPT

## 2023-01-20 PROCEDURE — 93010 ELECTROCARDIOGRAM REPORT: CPT | Mod: ,,, | Performed by: INTERNAL MEDICINE

## 2023-01-20 PROCEDURE — 93010 EKG 12-LEAD: ICD-10-PCS | Mod: ,,, | Performed by: INTERNAL MEDICINE

## 2023-01-23 ENCOUNTER — TELEPHONE (OUTPATIENT)
Dept: SURGERY | Facility: CLINIC | Age: 53
End: 2023-01-23
Payer: COMMERCIAL

## 2023-01-23 ENCOUNTER — PATIENT MESSAGE (OUTPATIENT)
Dept: OBSTETRICS AND GYNECOLOGY | Facility: CLINIC | Age: 53
End: 2023-01-23
Payer: COMMERCIAL

## 2023-01-23 LAB
FINAL PATHOLOGIC DIAGNOSIS: ABNORMAL
HPV HR 12 DNA SPEC QL NAA+PROBE: NEGATIVE
HPV16 AG SPEC QL: NEGATIVE
HPV18 DNA SPEC QL NAA+PROBE: NEGATIVE
Lab: ABNORMAL

## 2023-01-23 NOTE — TELEPHONE ENCOUNTER
Called patient with time of arrival for procedure tomorrow. Questions answered, patient verbalized understanding.

## 2023-01-24 ENCOUNTER — ANESTHESIA EVENT (OUTPATIENT)
Dept: SURGERY | Facility: HOSPITAL | Age: 53
DRG: 331 | End: 2023-01-24
Payer: COMMERCIAL

## 2023-01-24 ENCOUNTER — HOSPITAL ENCOUNTER (INPATIENT)
Facility: HOSPITAL | Age: 53
LOS: 3 days | Discharge: HOME | DRG: 331 | End: 2023-01-27
Attending: COLON & RECTAL SURGERY | Admitting: COLON & RECTAL SURGERY
Payer: COMMERCIAL

## 2023-01-24 ENCOUNTER — ANESTHESIA (OUTPATIENT)
Dept: SURGERY | Facility: HOSPITAL | Age: 53
DRG: 331 | End: 2023-01-24
Payer: COMMERCIAL

## 2023-01-24 DIAGNOSIS — K57.92 DIVERTICULITIS: Primary | ICD-10-CM

## 2023-01-24 LAB
ABO + RH BLD: NORMAL
B-HCG UR QL: NEGATIVE
BLD GP AB SCN CELLS X3 SERPL QL: NORMAL
CTP QC/QA: YES

## 2023-01-24 PROCEDURE — 25000003 PHARM REV CODE 250: Performed by: COLON & RECTAL SURGERY

## 2023-01-24 PROCEDURE — S0030 INJECTION, METRONIDAZOLE: HCPCS | Performed by: NURSE PRACTITIONER

## 2023-01-24 PROCEDURE — 37000009 HC ANESTHESIA EA ADD 15 MINS: Performed by: COLON & RECTAL SURGERY

## 2023-01-24 PROCEDURE — 36415 COLL VENOUS BLD VENIPUNCTURE: CPT | Performed by: COLON & RECTAL SURGERY

## 2023-01-24 PROCEDURE — 36000710: Performed by: COLON & RECTAL SURGERY

## 2023-01-24 PROCEDURE — 71000015 HC POSTOP RECOV 1ST HR: Performed by: COLON & RECTAL SURGERY

## 2023-01-24 PROCEDURE — 25000003 PHARM REV CODE 250: Performed by: NURSE PRACTITIONER

## 2023-01-24 PROCEDURE — 63600175 PHARM REV CODE 636 W HCPCS: Performed by: NURSE PRACTITIONER

## 2023-01-24 PROCEDURE — 44213 LAP MOBIL SPLENIC FL ADD-ON: CPT | Mod: ,,, | Performed by: COLON & RECTAL SURGERY

## 2023-01-24 PROCEDURE — 44204 PR LAP,SURG,COLECTOMY, PARTIAL, W/ANAST: ICD-10-PCS | Mod: ,,, | Performed by: COLON & RECTAL SURGERY

## 2023-01-24 PROCEDURE — 63600175 PHARM REV CODE 636 W HCPCS: Performed by: ANESTHESIOLOGY

## 2023-01-24 PROCEDURE — 25000003 PHARM REV CODE 250: Performed by: NURSE ANESTHETIST, CERTIFIED REGISTERED

## 2023-01-24 PROCEDURE — 88307 TISSUE EXAM BY PATHOLOGIST: CPT | Mod: 26,,, | Performed by: PATHOLOGY

## 2023-01-24 PROCEDURE — 27201423 OPTIME MED/SURG SUP & DEVICES STERILE SUPPLY: Performed by: COLON & RECTAL SURGERY

## 2023-01-24 PROCEDURE — D9220A PRA ANESTHESIA: ICD-10-PCS | Mod: CRNA,,, | Performed by: NURSE ANESTHETIST, CERTIFIED REGISTERED

## 2023-01-24 PROCEDURE — 88305 TISSUE EXAM BY PATHOLOGIST: CPT | Mod: 26,,, | Performed by: PATHOLOGY

## 2023-01-24 PROCEDURE — D9220A PRA ANESTHESIA: Mod: CRNA,,, | Performed by: NURSE ANESTHETIST, CERTIFIED REGISTERED

## 2023-01-24 PROCEDURE — D9220A PRA ANESTHESIA: ICD-10-PCS | Mod: ANES,,, | Performed by: ANESTHESIOLOGY

## 2023-01-24 PROCEDURE — 37000008 HC ANESTHESIA 1ST 15 MINUTES: Performed by: COLON & RECTAL SURGERY

## 2023-01-24 PROCEDURE — 63600175 PHARM REV CODE 636 W HCPCS: Performed by: NURSE ANESTHETIST, CERTIFIED REGISTERED

## 2023-01-24 PROCEDURE — 63600175 PHARM REV CODE 636 W HCPCS: Performed by: REGISTERED NURSE

## 2023-01-24 PROCEDURE — 88307 PR  SURG PATH,LEVEL V: ICD-10-PCS | Mod: 26,,, | Performed by: PATHOLOGY

## 2023-01-24 PROCEDURE — 25000003 PHARM REV CODE 250: Performed by: STUDENT IN AN ORGANIZED HEALTH CARE EDUCATION/TRAINING PROGRAM

## 2023-01-24 PROCEDURE — D9220A PRA ANESTHESIA: Mod: ANES,,, | Performed by: ANESTHESIOLOGY

## 2023-01-24 PROCEDURE — 88305 TISSUE EXAM BY PATHOLOGIST: ICD-10-PCS | Mod: 26,,, | Performed by: PATHOLOGY

## 2023-01-24 PROCEDURE — 44213 PR LAP, SURG MOBIL SPLENIC FL DUR PTL COLECTOMY: ICD-10-PCS | Mod: ,,, | Performed by: COLON & RECTAL SURGERY

## 2023-01-24 PROCEDURE — 25000003 PHARM REV CODE 250: Performed by: REGISTERED NURSE

## 2023-01-24 PROCEDURE — 81025 URINE PREGNANCY TEST: CPT | Performed by: COLON & RECTAL SURGERY

## 2023-01-24 PROCEDURE — 71000033 HC RECOVERY, INTIAL HOUR: Performed by: COLON & RECTAL SURGERY

## 2023-01-24 PROCEDURE — 86900 BLOOD TYPING SEROLOGIC ABO: CPT | Performed by: NURSE PRACTITIONER

## 2023-01-24 PROCEDURE — 20600001 HC STEP DOWN PRIVATE ROOM

## 2023-01-24 PROCEDURE — 88305 TISSUE EXAM BY PATHOLOGIST: CPT | Performed by: PATHOLOGY

## 2023-01-24 PROCEDURE — 88307 TISSUE EXAM BY PATHOLOGIST: CPT | Performed by: PATHOLOGY

## 2023-01-24 PROCEDURE — 63600175 PHARM REV CODE 636 W HCPCS: Mod: TB,JG | Performed by: STUDENT IN AN ORGANIZED HEALTH CARE EDUCATION/TRAINING PROGRAM

## 2023-01-24 PROCEDURE — 44204 LAPARO PARTIAL COLECTOMY: CPT | Mod: ,,, | Performed by: COLON & RECTAL SURGERY

## 2023-01-24 PROCEDURE — 36000711: Performed by: COLON & RECTAL SURGERY

## 2023-01-24 RX ORDER — SODIUM CHLORIDE 0.9 % (FLUSH) 0.9 %
10 SYRINGE (ML) INJECTION
Status: DISCONTINUED | OUTPATIENT
Start: 2023-01-24 | End: 2023-01-27 | Stop reason: HOSPADM

## 2023-01-24 RX ORDER — LIDOCAINE HYDROCHLORIDE ANHYDROUS AND DEXTROSE MONOHYDRATE .8; 5 G/100ML; G/100ML
INJECTION, SOLUTION INTRAVENOUS CONTINUOUS PRN
Status: DISCONTINUED | OUTPATIENT
Start: 2023-01-24 | End: 2023-01-24

## 2023-01-24 RX ORDER — GABAPENTIN 300 MG/1
300 CAPSULE ORAL 3 TIMES DAILY
Status: DISCONTINUED | OUTPATIENT
Start: 2023-01-24 | End: 2023-01-27 | Stop reason: HOSPADM

## 2023-01-24 RX ORDER — OXYCODONE HYDROCHLORIDE 5 MG/1
5 TABLET ORAL EVERY 6 HOURS PRN
Status: DISCONTINUED | OUTPATIENT
Start: 2023-01-24 | End: 2023-01-27 | Stop reason: HOSPADM

## 2023-01-24 RX ORDER — LIDOCAINE HYDROCHLORIDE 10 MG/ML
1 INJECTION, SOLUTION EPIDURAL; INFILTRATION; INTRACAUDAL; PERINEURAL
Status: ACTIVE | OUTPATIENT
Start: 2023-01-24

## 2023-01-24 RX ORDER — OXYCODONE HYDROCHLORIDE 10 MG/1
10 TABLET ORAL EVERY 4 HOURS PRN
Status: DISCONTINUED | OUTPATIENT
Start: 2023-01-24 | End: 2023-01-27 | Stop reason: HOSPADM

## 2023-01-24 RX ORDER — TRIPROLIDINE/PSEUDOEPHEDRINE 2.5MG-60MG
600 TABLET ORAL
Status: COMPLETED | OUTPATIENT
Start: 2023-01-24 | End: 2023-01-24

## 2023-01-24 RX ORDER — MUPIROCIN 20 MG/G
OINTMENT TOPICAL 2 TIMES DAILY
Status: DISCONTINUED | OUTPATIENT
Start: 2023-01-24 | End: 2023-01-27 | Stop reason: HOSPADM

## 2023-01-24 RX ORDER — ONDANSETRON 2 MG/ML
INJECTION INTRAMUSCULAR; INTRAVENOUS
Status: DISCONTINUED | OUTPATIENT
Start: 2023-01-24 | End: 2023-01-24

## 2023-01-24 RX ORDER — MUPIROCIN 20 MG/G
1 OINTMENT TOPICAL
Status: COMPLETED | OUTPATIENT
Start: 2023-01-24 | End: 2023-01-24

## 2023-01-24 RX ORDER — SODIUM CHLORIDE 9 MG/ML
INJECTION, SOLUTION INTRAVENOUS CONTINUOUS
Status: DISCONTINUED | OUTPATIENT
Start: 2023-01-24 | End: 2023-01-25

## 2023-01-24 RX ORDER — ALVIMOPAN 12 MG/1
12 CAPSULE ORAL ONCE
Status: COMPLETED | OUTPATIENT
Start: 2023-01-24 | End: 2023-01-24

## 2023-01-24 RX ORDER — BUPIVACAINE HYDROCHLORIDE AND EPINEPHRINE 2.5; 5 MG/ML; UG/ML
INJECTION, SOLUTION EPIDURAL; INFILTRATION; INTRACAUDAL; PERINEURAL
Status: DISCONTINUED | OUTPATIENT
Start: 2023-01-24 | End: 2023-01-24 | Stop reason: HOSPADM

## 2023-01-24 RX ORDER — SODIUM CHLORIDE 9 MG/ML
INJECTION, SOLUTION INTRAVENOUS
Status: COMPLETED | OUTPATIENT
Start: 2023-01-24 | End: 2023-01-24

## 2023-01-24 RX ORDER — METRONIDAZOLE 500 MG/100ML
500 INJECTION, SOLUTION INTRAVENOUS
Status: COMPLETED | OUTPATIENT
Start: 2023-01-24 | End: 2023-01-24

## 2023-01-24 RX ORDER — ALVIMOPAN 12 MG/1
12 CAPSULE ORAL 2 TIMES DAILY
Status: DISCONTINUED | OUTPATIENT
Start: 2023-01-24 | End: 2023-01-27 | Stop reason: HOSPADM

## 2023-01-24 RX ORDER — MIDAZOLAM HYDROCHLORIDE 1 MG/ML
INJECTION, SOLUTION INTRAMUSCULAR; INTRAVENOUS
Status: DISCONTINUED | OUTPATIENT
Start: 2023-01-24 | End: 2023-01-24

## 2023-01-24 RX ORDER — PANTOPRAZOLE SODIUM 40 MG/1
40 TABLET, DELAYED RELEASE ORAL DAILY
Status: DISCONTINUED | OUTPATIENT
Start: 2023-01-25 | End: 2023-01-27 | Stop reason: HOSPADM

## 2023-01-24 RX ORDER — ACETAMINOPHEN 650 MG/20.3ML
975 LIQUID ORAL
Status: COMPLETED | OUTPATIENT
Start: 2023-01-24 | End: 2023-01-24

## 2023-01-24 RX ORDER — HYDROMORPHONE HYDROCHLORIDE 1 MG/ML
0.2 INJECTION, SOLUTION INTRAMUSCULAR; INTRAVENOUS; SUBCUTANEOUS EVERY 5 MIN PRN
Status: DISCONTINUED | OUTPATIENT
Start: 2023-01-24 | End: 2023-01-24 | Stop reason: HOSPADM

## 2023-01-24 RX ORDER — DEXMEDETOMIDINE HYDROCHLORIDE 100 UG/ML
INJECTION, SOLUTION INTRAVENOUS
Status: DISCONTINUED | OUTPATIENT
Start: 2023-01-24 | End: 2023-01-24

## 2023-01-24 RX ORDER — SODIUM CHLORIDE 0.9 % (FLUSH) 0.9 %
3 SYRINGE (ML) INJECTION
Status: DISCONTINUED | OUTPATIENT
Start: 2023-01-24 | End: 2023-01-24 | Stop reason: HOSPADM

## 2023-01-24 RX ORDER — FENTANYL CITRATE 50 UG/ML
INJECTION, SOLUTION INTRAMUSCULAR; INTRAVENOUS
Status: DISCONTINUED | OUTPATIENT
Start: 2023-01-24 | End: 2023-01-24

## 2023-01-24 RX ORDER — LIDOCAINE HYDROCHLORIDE 20 MG/ML
INJECTION INTRAVENOUS
Status: DISCONTINUED | OUTPATIENT
Start: 2023-01-24 | End: 2023-01-24

## 2023-01-24 RX ORDER — ONDANSETRON 2 MG/ML
4 INJECTION INTRAMUSCULAR; INTRAVENOUS EVERY 12 HOURS PRN
Status: DISCONTINUED | OUTPATIENT
Start: 2023-01-24 | End: 2023-01-27 | Stop reason: HOSPADM

## 2023-01-24 RX ORDER — PROPOFOL 10 MG/ML
VIAL (ML) INTRAVENOUS
Status: DISCONTINUED | OUTPATIENT
Start: 2023-01-24 | End: 2023-01-24

## 2023-01-24 RX ORDER — GABAPENTIN 300 MG/1
300 CAPSULE ORAL
Status: COMPLETED | OUTPATIENT
Start: 2023-01-24 | End: 2023-01-24

## 2023-01-24 RX ORDER — ROCURONIUM BROMIDE 10 MG/ML
INJECTION, SOLUTION INTRAVENOUS
Status: DISCONTINUED | OUTPATIENT
Start: 2023-01-24 | End: 2023-01-24

## 2023-01-24 RX ORDER — TRAMADOL HYDROCHLORIDE 50 MG/1
50 TABLET ORAL EVERY 6 HOURS PRN
Status: DISCONTINUED | OUTPATIENT
Start: 2023-01-24 | End: 2023-01-27 | Stop reason: HOSPADM

## 2023-01-24 RX ORDER — HEPARIN SODIUM 5000 [USP'U]/ML
5000 INJECTION, SOLUTION INTRAVENOUS; SUBCUTANEOUS EVERY 8 HOURS
Status: COMPLETED | OUTPATIENT
Start: 2023-01-24 | End: 2023-01-24

## 2023-01-24 RX ORDER — CETIRIZINE HYDROCHLORIDE 10 MG/1
10 TABLET ORAL DAILY
Status: DISCONTINUED | OUTPATIENT
Start: 2023-01-25 | End: 2023-01-27 | Stop reason: HOSPADM

## 2023-01-24 RX ORDER — HYDROMORPHONE HYDROCHLORIDE 2 MG/ML
INJECTION, SOLUTION INTRAMUSCULAR; INTRAVENOUS; SUBCUTANEOUS
Status: DISCONTINUED | OUTPATIENT
Start: 2023-01-24 | End: 2023-01-24

## 2023-01-24 RX ORDER — DEXAMETHASONE SODIUM PHOSPHATE 4 MG/ML
INJECTION, SOLUTION INTRA-ARTICULAR; INTRALESIONAL; INTRAMUSCULAR; INTRAVENOUS; SOFT TISSUE
Status: DISCONTINUED | OUTPATIENT
Start: 2023-01-24 | End: 2023-01-24

## 2023-01-24 RX ORDER — ACETAMINOPHEN 10 MG/ML
1000 INJECTION, SOLUTION INTRAVENOUS EVERY 8 HOURS
Status: DISCONTINUED | OUTPATIENT
Start: 2023-01-24 | End: 2023-01-25

## 2023-01-24 RX ORDER — KETAMINE HCL IN 0.9 % NACL 50 MG/5 ML
SYRINGE (ML) INTRAVENOUS
Status: DISCONTINUED | OUTPATIENT
Start: 2023-01-24 | End: 2023-01-24

## 2023-01-24 RX ORDER — ACETAMINOPHEN 500 MG
1000 TABLET ORAL EVERY 8 HOURS
Status: DISCONTINUED | OUTPATIENT
Start: 2023-01-25 | End: 2023-01-25

## 2023-01-24 RX ADMIN — SODIUM CHLORIDE, SODIUM GLUCONATE, SODIUM ACETATE, POTASSIUM CHLORIDE, MAGNESIUM CHLORIDE, SODIUM PHOSPHATE, DIBASIC, AND POTASSIUM PHOSPHATE: .53; .5; .37; .037; .03; .012; .00082 INJECTION, SOLUTION INTRAVENOUS at 04:01

## 2023-01-24 RX ADMIN — METRONIDAZOLE 500 MG: 500 INJECTION, SOLUTION INTRAVENOUS at 02:01

## 2023-01-24 RX ADMIN — FENTANYL CITRATE 50 MCG: 50 INJECTION, SOLUTION INTRAMUSCULAR; INTRAVENOUS at 06:01

## 2023-01-24 RX ADMIN — DEXMEDETOMIDINE HYDROCHLORIDE 8 MCG: 100 INJECTION, SOLUTION INTRAVENOUS at 08:01

## 2023-01-24 RX ADMIN — Medication 20 MG: at 04:01

## 2023-01-24 RX ADMIN — PROPOFOL 200 MG: 10 INJECTION, EMULSION INTRAVENOUS at 04:01

## 2023-01-24 RX ADMIN — GABAPENTIN 300 MG: 300 CAPSULE ORAL at 02:01

## 2023-01-24 RX ADMIN — HEPARIN SODIUM 5000 UNITS: 5000 INJECTION INTRAVENOUS; SUBCUTANEOUS at 02:01

## 2023-01-24 RX ADMIN — PROPOFOL 50 MG: 10 INJECTION, EMULSION INTRAVENOUS at 05:01

## 2023-01-24 RX ADMIN — SODIUM CHLORIDE: 0.9 INJECTION, SOLUTION INTRAVENOUS at 04:01

## 2023-01-24 RX ADMIN — HYDROMORPHONE HYDROCHLORIDE 0.5 MG: 2 INJECTION INTRAMUSCULAR; INTRAVENOUS; SUBCUTANEOUS at 08:01

## 2023-01-24 RX ADMIN — ALVIMOPAN 12 MG: 12 CAPSULE ORAL at 02:01

## 2023-01-24 RX ADMIN — MIDAZOLAM HYDROCHLORIDE 2 MG: 1 INJECTION, SOLUTION INTRAMUSCULAR; INTRAVENOUS at 04:01

## 2023-01-24 RX ADMIN — OXYCODONE HYDROCHLORIDE 10 MG: 10 TABLET ORAL at 09:01

## 2023-01-24 RX ADMIN — ONDANSETRON 4 MG: 2 INJECTION INTRAMUSCULAR; INTRAVENOUS at 07:01

## 2023-01-24 RX ADMIN — ROCURONIUM BROMIDE 50 MG: 10 INJECTION INTRAVENOUS at 04:01

## 2023-01-24 RX ADMIN — DEXAMETHASONE SODIUM PHOSPHATE 8 MG: 4 INJECTION, SOLUTION INTRAMUSCULAR; INTRAVENOUS at 04:01

## 2023-01-24 RX ADMIN — HYDROMORPHONE HYDROCHLORIDE 0.2 MG: 1 INJECTION, SOLUTION INTRAMUSCULAR; INTRAVENOUS; SUBCUTANEOUS at 09:01

## 2023-01-24 RX ADMIN — ACETAMINOPHEN 976.6 MG: 650 SOLUTION ORAL at 02:01

## 2023-01-24 RX ADMIN — FENTANYL CITRATE 50 MCG: 50 INJECTION, SOLUTION INTRAMUSCULAR; INTRAVENOUS at 05:01

## 2023-01-24 RX ADMIN — ROCURONIUM BROMIDE 20 MG: 10 INJECTION INTRAVENOUS at 05:01

## 2023-01-24 RX ADMIN — GABAPENTIN 300 MG: 300 CAPSULE ORAL at 09:01

## 2023-01-24 RX ADMIN — ROCURONIUM BROMIDE 20 MG: 10 INJECTION INTRAVENOUS at 06:01

## 2023-01-24 RX ADMIN — Medication 10 MG: at 06:01

## 2023-01-24 RX ADMIN — SODIUM CHLORIDE: 9 INJECTION, SOLUTION INTRAVENOUS at 08:01

## 2023-01-24 RX ADMIN — LIDOCAINE HYDROCHLORIDE ANHYDROUS AND DEXTROSE MONOHYDRATE 0.03 MG/KG/MIN: .8; 5 INJECTION, SOLUTION INTRAVENOUS at 04:01

## 2023-01-24 RX ADMIN — IBUPROFEN 600 MG: 100 SUSPENSION ORAL at 02:01

## 2023-01-24 RX ADMIN — MUPIROCIN 1 G: 20 OINTMENT TOPICAL at 02:01

## 2023-01-24 RX ADMIN — CEFTRIAXONE SODIUM 2 G: 1 INJECTION, SOLUTION INTRAVENOUS at 04:01

## 2023-01-24 RX ADMIN — Medication 10 MG: at 05:01

## 2023-01-24 RX ADMIN — Medication 10 MG: at 07:01

## 2023-01-24 RX ADMIN — HYDROMORPHONE HYDROCHLORIDE 0.2 MG: 1 INJECTION, SOLUTION INTRAMUSCULAR; INTRAVENOUS; SUBCUTANEOUS at 10:01

## 2023-01-24 RX ADMIN — FENTANYL CITRATE 50 MCG: 50 INJECTION, SOLUTION INTRAMUSCULAR; INTRAVENOUS at 04:01

## 2023-01-24 RX ADMIN — LIDOCAINE HYDROCHLORIDE 60 MG: 20 INJECTION INTRAVENOUS at 04:01

## 2023-01-24 RX ADMIN — ACETAMINOPHEN 1000 MG: 10 INJECTION INTRAVENOUS at 09:01

## 2023-01-24 RX ADMIN — SUGAMMADEX 200 MG: 100 INJECTION, SOLUTION INTRAVENOUS at 08:01

## 2023-01-24 NOTE — TELEPHONE ENCOUNTER
Pt called.  Discussed ASCUS, HPV negative pap.  Recommend repeat cotesting in 3 yrs.  All questions answered, voiced understanding.

## 2023-01-24 NOTE — ANESTHESIA PREPROCEDURE EVALUATION
01/24/2023  Shireen Anton is a 53 y.o., female.      Pre-op Assessment    I have reviewed the Patient Summary Reports.          Review of Systems  Anesthesia Hx:  No problems with previous Anesthesia    Social:  Non-Smoker    Hematology/Oncology:  Hematology Normal   Oncology Normal     EENT/Dental:EENT/Dental Normal   Cardiovascular:   Past MI     Pulmonary:  Pulmonary Normal    Renal/:  Renal/ Normal     Hepatic/GI:   GERD    Musculoskeletal:  Musculoskeletal Normal    Neurological:   CVA    Endocrine:  Endocrine Normal    Dermatological:  Skin Normal    Psych:   anxiety depression          Physical Exam  General: Alert and Oriented    Airway:  Mallampati: II / II  Mouth Opening: Normal  TM Distance: Normal  Tongue: Normal  Neck ROM: Normal ROM    Dental:  Intact    Chest/Lungs:  Clear to auscultation, Normal Respiratory Rate    Heart:  Rate: Normal  Rhythm: Regular Rhythm  Sounds: Normal        Anesthesia Plan  Type of Anesthesia, risks & benefits discussed:    Anesthesia Type: Gen ETT, MAC  Intra-op Monitoring Plan: Standard ASA Monitors  Post Op Pain Control Plan: multimodal analgesia  Airway Plan: Direct  Informed Consent: Informed consent signed with the Patient and all parties understand the risks and agree with anesthesia plan.  All questions answered.   ASA Score: 2    Ready For Surgery From Anesthesia Perspective.     .

## 2023-01-24 NOTE — ANESTHESIA PROCEDURE NOTES
Intubation    Date/Time: 1/24/2023 4:30 PM  Performed by: Enriqueta Cooper CRNA  Authorized by: Fatemeh Leary MD     Intubation:     Induction:  Intravenous    Intubated:  Postinduction    Mask Ventilation:  Easy with oral airway    Attempts:  1    Attempted By:  CRNA    Method of Intubation:  Video laryngoscopy (attempted DL)    Blade:  Tellez 3    Laryngeal View Grade: Grade IIA - cords partially seen      Difficult Airway Encountered?: No      Complications:  None    Airway Device:  Oral endotracheal tube    Airway Device Size:  7.5    Style/Cuff Inflation:  Cuffed (inflated to minimal occlusive pressure)    Tube secured:  23    Secured at:  The lips    Placement Verified By:  Capnometry (auscultation)    Complicating Factors:  Retrognathia and anterior larynx    Findings Post-Intubation:  BS equal bilateral and atraumatic/condition of teeth unchanged  Notes:      Head and neck neutral

## 2023-01-25 LAB
ANION GAP SERPL CALC-SCNC: 11 MMOL/L (ref 8–16)
BASOPHILS # BLD AUTO: 0.01 K/UL (ref 0–0.2)
BASOPHILS NFR BLD: 0.1 % (ref 0–1.9)
BUN SERPL-MCNC: 9 MG/DL (ref 6–20)
CALCIUM SERPL-MCNC: 8.5 MG/DL (ref 8.7–10.5)
CHLORIDE SERPL-SCNC: 108 MMOL/L (ref 95–110)
CO2 SERPL-SCNC: 19 MMOL/L (ref 23–29)
CREAT SERPL-MCNC: 0.7 MG/DL (ref 0.5–1.4)
DIFFERENTIAL METHOD: ABNORMAL
EOSINOPHIL # BLD AUTO: 0 K/UL (ref 0–0.5)
EOSINOPHIL NFR BLD: 0 % (ref 0–8)
ERYTHROCYTE [DISTWIDTH] IN BLOOD BY AUTOMATED COUNT: 13.5 % (ref 11.5–14.5)
EST. GFR  (NO RACE VARIABLE): >60 ML/MIN/1.73 M^2
GLUCOSE SERPL-MCNC: 135 MG/DL (ref 70–110)
HCT VFR BLD AUTO: 37 % (ref 37–48.5)
HGB BLD-MCNC: 12.5 G/DL (ref 12–16)
IMM GRANULOCYTES # BLD AUTO: 0.02 K/UL (ref 0–0.04)
IMM GRANULOCYTES NFR BLD AUTO: 0.2 % (ref 0–0.5)
LYMPHOCYTES # BLD AUTO: 0.7 K/UL (ref 1–4.8)
LYMPHOCYTES NFR BLD: 5.7 % (ref 18–48)
MAGNESIUM SERPL-MCNC: 1.7 MG/DL (ref 1.6–2.6)
MCH RBC QN AUTO: 30.8 PG (ref 27–31)
MCHC RBC AUTO-ENTMCNC: 33.8 G/DL (ref 32–36)
MCV RBC AUTO: 91 FL (ref 82–98)
MONOCYTES # BLD AUTO: 0.5 K/UL (ref 0.3–1)
MONOCYTES NFR BLD: 3.8 % (ref 4–15)
NEUTROPHILS # BLD AUTO: 10.5 K/UL (ref 1.8–7.7)
NEUTROPHILS NFR BLD: 90.2 % (ref 38–73)
NRBC BLD-RTO: 0 /100 WBC
PHOSPHATE SERPL-MCNC: 2.5 MG/DL (ref 2.7–4.5)
PLATELET # BLD AUTO: 162 K/UL (ref 150–450)
PMV BLD AUTO: 11.7 FL (ref 9.2–12.9)
POTASSIUM SERPL-SCNC: 4.2 MMOL/L (ref 3.5–5.1)
RBC # BLD AUTO: 4.06 M/UL (ref 4–5.4)
SODIUM SERPL-SCNC: 138 MMOL/L (ref 136–145)
WBC # BLD AUTO: 11.69 K/UL (ref 3.9–12.7)

## 2023-01-25 PROCEDURE — 25000003 PHARM REV CODE 250: Performed by: STUDENT IN AN ORGANIZED HEALTH CARE EDUCATION/TRAINING PROGRAM

## 2023-01-25 PROCEDURE — 36415 COLL VENOUS BLD VENIPUNCTURE: CPT | Performed by: STUDENT IN AN ORGANIZED HEALTH CARE EDUCATION/TRAINING PROGRAM

## 2023-01-25 PROCEDURE — 97116 GAIT TRAINING THERAPY: CPT

## 2023-01-25 PROCEDURE — 97165 OT EVAL LOW COMPLEX 30 MIN: CPT

## 2023-01-25 PROCEDURE — 63600175 PHARM REV CODE 636 W HCPCS: Mod: TB,JG | Performed by: STUDENT IN AN ORGANIZED HEALTH CARE EDUCATION/TRAINING PROGRAM

## 2023-01-25 PROCEDURE — 20600001 HC STEP DOWN PRIVATE ROOM

## 2023-01-25 PROCEDURE — 80048 BASIC METABOLIC PNL TOTAL CA: CPT | Performed by: STUDENT IN AN ORGANIZED HEALTH CARE EDUCATION/TRAINING PROGRAM

## 2023-01-25 PROCEDURE — 97162 PT EVAL MOD COMPLEX 30 MIN: CPT

## 2023-01-25 PROCEDURE — 94761 N-INVAS EAR/PLS OXIMETRY MLT: CPT

## 2023-01-25 PROCEDURE — 85025 COMPLETE CBC W/AUTO DIFF WBC: CPT | Performed by: STUDENT IN AN ORGANIZED HEALTH CARE EDUCATION/TRAINING PROGRAM

## 2023-01-25 PROCEDURE — 97535 SELF CARE MNGMENT TRAINING: CPT

## 2023-01-25 PROCEDURE — 84100 ASSAY OF PHOSPHORUS: CPT | Performed by: STUDENT IN AN ORGANIZED HEALTH CARE EDUCATION/TRAINING PROGRAM

## 2023-01-25 PROCEDURE — 83735 ASSAY OF MAGNESIUM: CPT | Performed by: STUDENT IN AN ORGANIZED HEALTH CARE EDUCATION/TRAINING PROGRAM

## 2023-01-25 RX ORDER — ASPIRIN 81 MG/1
81 TABLET ORAL DAILY
Status: DISCONTINUED | OUTPATIENT
Start: 2023-01-25 | End: 2023-01-27 | Stop reason: HOSPADM

## 2023-01-25 RX ORDER — ACETAMINOPHEN 500 MG
1000 TABLET ORAL EVERY 8 HOURS
Status: DISCONTINUED | OUTPATIENT
Start: 2023-01-25 | End: 2023-01-27 | Stop reason: HOSPADM

## 2023-01-25 RX ORDER — ENOXAPARIN SODIUM 100 MG/ML
40 INJECTION SUBCUTANEOUS EVERY 24 HOURS
Status: DISCONTINUED | OUTPATIENT
Start: 2023-01-25 | End: 2023-01-27 | Stop reason: HOSPADM

## 2023-01-25 RX ADMIN — GABAPENTIN 300 MG: 300 CAPSULE ORAL at 08:01

## 2023-01-25 RX ADMIN — ACETAMINOPHEN 1000 MG: 500 TABLET ORAL at 08:01

## 2023-01-25 RX ADMIN — MUPIROCIN: 20 OINTMENT TOPICAL at 08:01

## 2023-01-25 RX ADMIN — ACETAMINOPHEN 1000 MG: 500 TABLET ORAL at 02:01

## 2023-01-25 RX ADMIN — ALVIMOPAN 12 MG: 12 CAPSULE ORAL at 08:01

## 2023-01-25 RX ADMIN — ACETAMINOPHEN 1000 MG: 10 INJECTION INTRAVENOUS at 06:01

## 2023-01-25 RX ADMIN — OXYCODONE HYDROCHLORIDE 10 MG: 10 TABLET ORAL at 02:01

## 2023-01-25 RX ADMIN — PANTOPRAZOLE SODIUM 40 MG: 40 TABLET, DELAYED RELEASE ORAL at 09:01

## 2023-01-25 RX ADMIN — GABAPENTIN 300 MG: 300 CAPSULE ORAL at 09:01

## 2023-01-25 RX ADMIN — OXYCODONE HYDROCHLORIDE 10 MG: 10 TABLET ORAL at 08:01

## 2023-01-25 RX ADMIN — ASPIRIN 81 MG: 81 TABLET, COATED ORAL at 02:01

## 2023-01-25 RX ADMIN — OXYCODONE HYDROCHLORIDE 10 MG: 10 TABLET ORAL at 09:01

## 2023-01-25 RX ADMIN — CETIRIZINE HYDROCHLORIDE 10 MG: 10 TABLET, FILM COATED ORAL at 09:01

## 2023-01-25 RX ADMIN — ALVIMOPAN 12 MG: 12 CAPSULE ORAL at 09:01

## 2023-01-25 RX ADMIN — ENOXAPARIN SODIUM 40 MG: 40 INJECTION SUBCUTANEOUS at 04:01

## 2023-01-25 RX ADMIN — GABAPENTIN 300 MG: 300 CAPSULE ORAL at 02:01

## 2023-01-25 RX ADMIN — OXYCODONE HYDROCHLORIDE 10 MG: 10 TABLET ORAL at 03:01

## 2023-01-25 RX ADMIN — MUPIROCIN: 20 OINTMENT TOPICAL at 09:01

## 2023-01-25 NOTE — NURSING TRANSFER
Nursing Transfer Note      1/24/2023     Reason patient is being transferred: post-procedure    Transfer To: 1058    Transfer via bed    Transfer with none    Transported by RN and PCT    Medicines sent: none    Any special needs or follow-up needed: routine    Chart send with patient: Yes    Notified: spouse

## 2023-01-25 NOTE — OP NOTE
[facility]  Operative Note    Date: 1/24/2023    Name: Shireen Anton    MRN: [id number]    Pre-Op Diagnosis: Perforation of sigmoid colon due to diverticulitis [K57.20]    Post-Op Diagnosis: Same    Procedure(s) Performed:   Laparoscopic sigmoid colectomy  Laparoscopic splenic flexure mobilization  Bilateral T AP blocks  Flexible sigmoidoscopy    Specimen(s):  Sigmoid colon    Staff Surgeon: Roz Solis    Assistant Surgeon: Natalia Shrestha (fellow)    Anesthesia: General    Indications: Shireen Anton is a 53 y.o. female with a history of recurrent sigmoid diverticulitis with abscess formation.  After a discussion of risks and benefits she agreed to proceed with surgical resection, please see my dictated clinic notes for further details.    Details of procedure:  The patient was taken to the operating room and transferred to the OR table.  SCD boots were applied and IV antibiotics administered.  She was placed under general endotracheal anesthesia.  A Campos catheter was placed.  She was then put into lithotomy position.  Her abdomen was prepped and draped in the standard sterile fashion.  A time-out was performed.  We created an 8 cm Pfannenstiel incision, dissecting through the subcutaneous fat and opening the anterior fascia transversely.  Flaps were raised and the peritoneum was opened vertically without any injury to the bowel.  We then placed an Issa wound protector.  We placed 5 mm ports in the umbilical, left lower quadrant, and right lower quadrant positions.  The abdomen was then inflated.  Diagnostic laparoscopy was performed, as well as bilateral T AP blocks using dilute 0.5% Marcaine with epinephrine.     We then placed the bed into a tilted position.  The omentum was placed over the transverse colon and the ligament of Treitz and inferior mesenteric vein were identified.  We incised the peritoneum just below the vein and began a medial to lateral dissection, sweeping down the  retroperitoneum.  Once this was done out to the lateral sidewall we divided the vein using the LigaSure.  We confirmed that we had good hemostasis.  We then entered the lesser sac over the pancreas, and worked down towards the artery.  We then placed the patient into Trendelenburg position and incised the peritoneum under the ROSA pedicle.  We performed a medial to lateral dissection, taking care to identify the left ureter.  We then divided the ROSA using the LigaSure.  We again confirmed good hemostasis.  We then completed our medial to lateral mobilization.  Next we incised the white line of Toldt along the descending colon, following this up to the splenic flexure.  We took down the splenocolic and phrenocolic ligaments.  We  the omentum from the distal transverse colon, entering into the lesser sac and completing our splenic flexure mobilization.    Once this was done we turned our attention to the pelvis.  We took down any remaining lateral attachments of the sigmoid colon.  We performed some blunt dissection and finger fracturing in order to straighten out the sigmoid colon, which was folded on top of it self near the upper portion of the uterus.  Once this was done we removed the laparoscopic equipment and perform the remaining dissection through the Pfannenstiel incision.  We delivered the colon into our field and identified a soft spot on the distal descending colon, at the level of our ROSA transection.  We flushed the marginal artery here to make sure that it had good blood flow and then divided the remaining mesentery with the LigaSure.  We then placed a Furness clamp across the colon, which was used to place a pursestring suture of nylon.  The 29 EEA anvil was secured in the colon using this nylon suture, and the colon was packed out of the pelvis.    We identified a soft area on the upper rectum which was appropriate for transection.  We created a small mesenteric window here and divided the  rectum with a green load of the contour stapler.  We divided any remaining mesorectum with the LigaSure.  The specimen was then handed off for pathology.  Next Dr. Shrestha went below and placed the 29 EEA stapler into the rectum.  The spike was delivered just adjacent to the contour staple line.  The stapler was mated, closed, and then fired after ensuring there was no twisting of the colon mesentery.  We confirmed that there were 2 intact donuts.  We then filled the pelvis with warm saline and performed an air leak test, which was negative.  The anastomosis appeared intact with healthy colon on either side.  The scope was then withdrawn.  Because of the amount of inflammation in her pelvis there was some diffuse surface bleeding, which we placed hemoblast on.  We also elected to leave a 19 Amharic Benny drain within the pelvis, which was brought out through the left lower quadrant port.    We then reinflated the abdomen and confirmed that we had good hemostasis and that there was no twisting of the mesentery.  The ports were then removed.  We changed our gowns and gloves and brought clean closing instruments onto the field.  The peritoneum was closed with running Vicryl suture.  The fascia was closed with running PDS suture.  The wound was copiously irrigated, and all skin incisions were closed with Monocryl followed by skin glue.  The procedure was then terminated.  All sponge instrument counts were correct.  I was present scrubbed for the entire procedure.  The patient was awakened and transferred to recovery in good condition.    Estimated Blood Loss: 150 mL     Drains/Implants: Benny x 1 (pelvis)    Wound Class: II    Roz Solis

## 2023-01-25 NOTE — BRIEF OP NOTE
[facility] ([facility])  Brief Operative Note    SUMMARY     Surgery Date: 1/24/2023     Surgeon(s) and Role:     * Roz Solis MD - Primary     * Natalia Shrestha MD - Resident - Assisting        Pre-op Diagnosis:  Perforation of sigmoid colon due to diverticulitis [K57.20]    Post-op Diagnosis:  Post-Op Diagnosis Codes:     * Perforation of sigmoid colon due to diverticulitis [K57.20]    Procedure(s) (LRB):  COLECTOMY, SIGMOID, LAPAROSCOPIC (N/A)  COLONOSCOPY (N/A)    Anesthesia: General    Operative Findings: Sigmoid inflammation, healthy rectum    Estimated Blood Loss: 150 mL    Estimated Blood Loss has been documented.         Specimens:   Specimen (24h ago, onward)       Start     Ordered    01/24/23 1933  Specimen to Pathology, Surgery General Surgery  Once        Comments: Pre-op Diagnosis: Perforation of sigmoid colon due to diverticulitis [K57.20]Procedure(s):COLECTOMY, SIGMOID, LAPAROSCOPICCOLONOSCOPY Number of specimens: 2Name of specimens: 1. SIGMOID COLON --- PERMANENT, 2. ANASTOMOTIC DONUTS --- PERMANENT     References:    Click here for ordering Quick Tip   Question Answer Comment   Procedure Type: General Surgery    Which provider would you like to cc? ROZ SOLIS    Release to patient Immediate        01/24/23 2021                    [id number]

## 2023-01-25 NOTE — PT/OT/SLP EVAL
Physical Therapy Co-Evaluation  Co-evaluation and treatment performed due to acuity and complexity of pt's medical status with 2 skilled disciplines needed to optimize pts functional performance in hospital setting.    Patient Name:  Shireen Anton   MRN:  [id number]    Recommendations:     Discharge Recommendations: home   Discharge Equipment Recommendations: none   Barriers to discharge: None    Assessment:     Shireen Anton is a 53 y.o. female admitted with a medical diagnosis of Diverticulitis.  She presents with the following impairments/functional limitations: impaired endurance, gait instability, impaired balance, pain, impaired functional mobility, impaired self care skills.     Rehab Prognosis: Good; patient would benefit from acute skilled PT services to address these deficits and reach maximum level of function.    Recent Surgery: Procedure(s) (LRB):  COLECTOMY, SIGMOID, LAPAROSCOPIC (N/A)  COLONOSCOPY (N/A) 1 Day Post-Op    Plan:     During this hospitalization, patient to be seen 3 x/week to address the identified rehab impairments via gait training, therapeutic activities, therapeutic exercises, neuromuscular re-education and progress toward the following goals:    Plan of Care Expires:  02/24/23    Subjective     Chief Complaint: pain  Patient/Family Comments/goals: return home  Pain/Comfort:  Pain Rating 1: 4/10  Location 1: abdomen  Pain Addressed 1: Pre-medicate for activity, Reposition, Distraction  Pain Rating Post-Intervention 1: other (see comments) (not rated)    Patients cultural, spiritual, Gnosticist conflicts given the current situation: no    Living Environment:  Pt lives with her significant other, Flynn, and her daughter in a [facility] with 0STE and a tub/shower.   Prior to admission, patients level of function was independent for all functional mobility and ADLs. 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  Equipment used at home: none.  DME owned (not currently used): none.  Upon  discharge, patient will have assistance from unknown.    Objective:     Communicated with RN prior to session.  Patient found HOB elevated with glover catheter, FIORELLA drain, peripheral IV  upon PT entry to room.    General Precautions: Standard, fall  Orthopedic Precautions:N/A   Braces: N/A  Respiratory Status: Room air    Exams:  Cognitive Exam:  Patient is oriented to Person, Place, Time, and Situation  Fine Motor Coordination:  BLE, heel/shin, WFL; limited by pain  Gross Motor Coordination:  WFL  Postural Exam:  Patient presented with the following abnormalities:    -       No postural abnormalities identified  Sensation:  denies numbness/tingling, endorses normal lgiht touch during exam in BLEs  RLE ROM: WFL  RLE Strength: hip flexion 4/5 (full resistance not applied 2/2 pain), knee extension 5/5, knee flexion 5/5, DF 5/5  LLE ROM: WFL  LLE Strength: hip flexion 4/5 (full resistance not applied 2/2 pain), knee extension 5/5, knee flexion 5/5, DF 5/5    Functional Mobility:  Bed Mobility:     Scooting: supervision  Supine to Sit: supervision  Transfers:     Sit to Stand:  stand by assistance with no AD  Gait: 200 ft, CGA progressed to SBA with no AD; decreased gait speed, no LOBs or unsteadiness noted; pt reporting pain with L stepping  Balance:   Static Sitting: Supervision  Dynamic Sitting: Supervision  Static Standing: SBA  Dynamic Standing: CGA-SBA      AM-PAC 6 CLICK MOBILITY  Total Score:18       Treatment & Education:  Patient educated on role of therapy, goals of session, and benefits of mobilizing.   Discussed PT plan of care during hospitalization.   Patient educated on calling for assistance.   Patient educated on how their diagnosis impacts their mobility within PT scope of practice.   All questions answered within PT scope of practice.    Patient left sitting edge of bed with all lines intact and call button in reach.    GOALS:   Multidisciplinary Problems       Physical Therapy Goals          Problem:  Physical Therapy    Goal Priority Disciplines Outcome Goal Variances Interventions   Physical Therapy Goal     PT, PT/OT Ongoing, Progressing     Description: Goals to be met by: 2023     Patient will increase functional independence with mobility by performin. Supine to sit with [place]  2. Sit to supine with [place]  3. Sit to stand transfer with [place]  4. Bed to chair transfer with [place] using No Assistive Device  5. Gait  x 400 feet with [place] using No Assistive Device.                          History:     Past Medical History:   Diagnosis Date    Allergy     Depression     3/24/22: denies feelings of current or history of self harm    Diverticulitis of colon     GERD (gastroesophageal reflux disease)     History of acute renal failure 2017    received 1 month of hemodialysis    Loss or death of family member 2014    Multiple thyroid nodules     NSTEMI (non-ST elevated myocardial infarction)     Renal disorder     acute renal failure post gastric sleeve    Stroke     cerebellar stroke post gastric sleeve       Past Surgical History:   Procedure Laterality Date    AUGMENTATION OF BREAST Bilateral     breast augmentation      BREAST SURGERY  1998     SECTION      CHOLECYSTECTOMY      COLONOSCOPY N/A 2020    Procedure: COLONOSCOPY;  Surgeon: Roz Solis MD;  Location: [facility] (4TH FLR);  Service: Endoscopy;  Laterality: N/A;  covid test on 20 at [facility]    COLONOSCOPY N/A 2023    Procedure: COLONOSCOPY;  Surgeon: Roz Solis MD;  Location: [facility] OR 2ND FLR;  Service: Colon and Rectal;  Laterality: N/A;    FRACTURE SURGERY Right 1987    humerus, ORIF    LAPAROSCOPIC SIGMOIDECTOMY N/A 2023    Procedure: COLECTOMY, SIGMOID, LAPAROSCOPIC;  Surgeon: Roz Solis MD;  [facility];  Service: Colon and Rectal;  Laterality: N/A;  ERAS low    LAPAROSCOPIC SLEEVE GASTRECTOMY  2017     ORIF HUMERUS FRACTURE  1987       Time Tracking:     PT Received On: 01/25/23  PT Start Time: 1007     PT Stop Time: 1024  PT Total Time (min): 17 min     Billable Minutes: Evaluation 9 and Gait Training 8      01/25/2023

## 2023-01-25 NOTE — PLAN OF CARE
PCP Jeancarlos Page  Rx 1)Bedside Delivery 2)[facility]     Lives with significant other & daughter - independent - significant other will provide transportation home        01/25/23 1007   Discharge Assessment   Assessment Type Discharge Planning Assessment   Confirmed/corrected address, phone number and insurance Yes   Confirmed Demographics Correct on Facesheet   Source of Information patient   People in Home spouse;child(mckenzie), dependent   Do you expect to return to your current living situation? Yes   Do you have help at home or someone to help you manage your care at home? Yes   Prior to hospitilization cognitive status: Alert/Oriented   Current cognitive status: Alert/Oriented   Equipment Currently Used at Home none   Readmission within 30 days? No   Do you currently have service(s) that help you manage your care at home? No   Do you take prescription medications? No   Do you have prescription coverage? Yes   Do you have any problems affording any of your prescribed medications? No   Who is going to help you get home at discharge? significant other   How do you get to doctors appointments? car, drives self   Are you on dialysis? No   Discharge Plan A Home with family   DME Needed Upon Discharge  none   Discharge Plan discussed with: Patient   Discharge Barriers Identified None

## 2023-01-25 NOTE — PLAN OF CARE
PT Eval complete and POC established.    2023    Problem: Physical Therapy  Goal: Physical Therapy Goal  Description: Goals to be met by: 2023     Patient will increase functional independence with mobility by performin. Supine to sit with [place]  2. Sit to supine with [place]  3. Sit to stand transfer with [place]  4. Bed to chair transfer with [place] using No Assistive Device  5. Gait  x 400 feet with [place] using No Assistive Device.     Outcome: Ongoing, Progressing

## 2023-01-25 NOTE — PROGRESS NOTES
Patient Name: Shireen Anton  Date: 1/25/2023  Service: Colon and Rectal Surgery    Subjective:  No acute events overnight. Pain well controlled. Tolerated small volume diet without nausea/vomiting. Ambulating and using IS. Denies fevers/chills/sweats, lightheadedness/dizziness, chest pain/shortness of breath, upper or lower extremity edema/pain. No flatus/stool per anus    Medications:    Current Facility-Administered Medications:     acetaminophen 1,000 mg/100 mL (10 mg/mL) injection 1,000 mg, 1,000 mg, Intravenous, Q8H, Stopped at 01/25/23 0644 **FOLLOWED BY** acetaminophen tablet 1,000 mg, 1,000 mg, Oral, Q8H, Natalia Shrestha MD    alvimopan capsule 12 mg, 12 mg, Oral, BID, Natalia Shrestha MD, 12 mg at 01/25/23 0924    cetirizine tablet 10 mg, 10 mg, Oral, Daily, Natalia Shrestha MD, 10 mg at 01/25/23 0924    enoxaparin injection 40 mg, 40 mg, Subcutaneous, Daily, Natalia Shrestha MD    gabapentin capsule 300 mg, 300 mg, Oral, TID, Natalia Shrestha MD, 300 mg at 01/25/23 0924    LIDOcaine (PF) 10 mg/ml (1%) injection 10 mg, 1 mL, Intradermal, On Call Procedure, Savanna Torres, NP    mupirocin 2 % ointment, , Nasal, BID, Natalia Shrestha MD, Given at 01/25/23 0941    ondansetron injection 4 mg, 4 mg, Intravenous, Q12H PRN, Natalia Shrestha MD    oxyCODONE immediate release tablet 5 mg, 5 mg, Oral, Q6H PRN, Natalia Shrestha MD    oxyCODONE immediate release tablet Tab 10 mg, 10 mg, Oral, Q4H PRN, Natalia Shrestha MD, 10 mg at 01/25/23 0940    pantoprazole EC tablet 40 mg, 40 mg, Oral, Daily, Natalia Shrestha MD, 40 mg at 01/25/23 0924    sodium chloride 0.9% flush 10 mL, 10 mL, Intra-Catheter, PRN, Natalia Shrestha MD    traMADoL tablet 50 mg, 50 mg, Oral, Q6H PRN, Natalia Shrestha MD    Vital Signs:  Vitals:    01/25/23 0940   BP:    Pulse:    Resp: 14   Temp:            Physical Exam:  General: Alert, oriented, in no apparent distress  HEENT: Sclera anicteric, trachea midline  Lungs: Normal respiratory  rate and effort on room air  Abdomen: Soft, nontender, nondistended. Incisions clean/dry/intact without erythema or drainage. FIORELLA drains serosanguinous.  Extremities: Warm, well perfused, no edema, SCDs in place  Neuro: Grossly intact, moves all extremities  Psych: Appropriate affect    Laboratory Studies:    Complete Blood Count:  Lab Results   Component Value Date/Time    WBC 11.69 01/25/2023 03:17 AM    HGB 12.5 01/25/2023 03:17 AM    HCT 37.0 01/25/2023 03:17 AM    HCT 36 11/27/2022 04:29 PM    RBC 4.06 01/25/2023 03:17 AM     01/25/2023 03:17 AM       Basic Chemistry Panel:  Lab Results   Component Value Date/Time     01/25/2023 03:17 AM    K 4.2 01/25/2023 03:17 AM     01/25/2023 03:17 AM    CO2 19 (L) 01/25/2023 03:17 AM    BUN 9 01/25/2023 03:17 AM    CREATININE 0.7 01/25/2023 03:17 AM    CALCIUM 8.5 (L) 01/25/2023 03:17 AM           Imaging Studies:  -    Assessment:  Shireen Anton is a 53 y.o. year old female S/p laparoscopic sigmoid colectomy for diverticular disease, doing well     Continue multimodality pain control  Advance to a regular diet  Stop IV fluids  Initiate DVT ppx  Campos removed, DTV        Plan:    Patient discussed with attending, Dr. Solis.    Natalia Shrestha MD  Colon and Rectal Surgery Fellow

## 2023-01-25 NOTE — NURSING
2245 Received to room 1058. Pt was transported on bed accompany via RN and other hospital staff. Pt was AAO x 4 and skin w/d .   IVFs infusing into right arm PIV without problems.  FIORELLA x 1 noted to LUQ intact and to bulb suction.  Campos intact and patent to bedside drainage bag.  Vital signs taken.  Call light in reach and bed in lower position.

## 2023-01-25 NOTE — PT/OT/SLP EVAL
Occupational Therapy   Evaluation    Name: Shireen Anton  MRN: [id number]  Admitting Diagnosis: Diverticulitis  Recent Surgery: Procedure(s) (LRB):  COLECTOMY, SIGMOID, LAPAROSCOPIC (N/A)  COLONOSCOPY (N/A) 1 Day Post-Op    Recommendations:     Discharge Recommendations: home  Discharge Equipment Recommendations:  none  Barriers to discharge:  None    Assessment:     Shireen Anton is a 53 y.o. female with a medical diagnosis of Diverticulitis.  She presents s/p colectomy on 1/24. Pt walked 150' with HHA. Performance deficits affecting function: impaired endurance, impaired self care skills, impaired functional mobility, gait instability, impaired balance, decreased coordination.      Rehab Prognosis: Good; patient would benefit from acute skilled OT services to address these deficits and reach maximum level of function.       Plan:     Patient to be seen 3 x/week to address the above listed problems via self-care/home management, therapeutic activities, therapeutic exercises  Plan of Care Expires: 02/24/23  Plan of Care Reviewed with: patient    Subjective     Occupational Profile:  Living Environment: Pt lives with her S/O and daughter in a [facility]; T/S setup.  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  Equipment Used at Home: none  Assistance upon Discharge: family    Pain/Comfort:  Pain Rating 1: 4/10  Location 1: abdomen    Patients cultural, spiritual, Hoahaoism conflicts given the current situation:      Objective:     Communicated with: rn prior to session.  Patient found supine with glover catheter, FIORELLA drain, peripheral IV upon OT entry to room.    General Precautions: Standard, fall  Orthopedic Precautions:    Braces:    Respiratory Status: Room air    Occupational Performance:    Bed Mobility:    Patient completed Scooting/Bridging with supervision  Patient completed Supine to Sit with supervision    Functional Mobility/Transfers:  Patient completed Sit <> Stand Transfer  with stand by assistance  with  no assistive device   Functional Mobility: Pt walked 150' with HHA.    Activities of Daily Living:  Feeding:  independence  Grooming: stand by assistance standing at the sink.  Lower Body Dressing: stand by assistance    Cognitive/Visual Perceptual:  Cognitive/Psychosocial Skills:     -       Oriented to: Person, Place, Time, and Situation   -       Safety awareness/insight to disability: intact     Physical Exam:  BUE AROM/MMT: WNL  Balance = sitting (S) / mobility CGA    AMPAC 6 Click ADL:  AMPAC Total Score: 21    Treatment & Education:  UE ROM/MMT  Bed mobility training / assessment  Functional mobility assessment  Sit/standing balance assessment  Educated on importance of sitting OOB in bedside chair to promote increased strength, endurance & breathing.  Discussed OT POC / Post-acute plan    Patient left sitting edge of bed with all lines intact and call button in reach    GOALS:   Multidisciplinary Problems       Occupational Therapy Goals          Problem: Occupational Therapy    Goal Priority Disciplines Outcome Interventions   Occupational Therapy Goal     OT, PT/OT Ongoing, Progressing    Description: Goals to be met by: 2/1/23    Patient will increase functional independence with ADLs by performing:    Grooming while standing at sink with Set-up Assistance.  Toileting from toilet with Fisher for hygiene and clothing management.   Supine to sit with Fisher.  Toilet transfer to toilet with Fisher.                         History:     Past Medical History:   Diagnosis Date    Allergy     Depression     3/24/22: denies feelings of current or history of self harm    Diverticulitis of colon 2020    GERD (gastroesophageal reflux disease)     History of acute renal failure 05/2017    received 1 month of hemodialysis    Loss or death of family member 5/31/2014    Multiple thyroid nodules     NSTEMI (non-ST elevated myocardial infarction)     Renal disorder      acute renal failure post gastric sleeve    Stroke     cerebellar stroke post gastric sleeve         Past Surgical History:   Procedure Laterality Date    AUGMENTATION OF BREAST Bilateral     breast augmentation      BREAST SURGERY  1998     SECTION      CHOLECYSTECTOMY      COLONOSCOPY N/A 2020    Procedure: COLONOSCOPY;  Surgeon: Roz Solis MD;  Location: [facility] (4TH FLR);  Service: Endoscopy;  Laterality: N/A;  covid test on 20 at [facility]    COLONOSCOPY N/A 2023    Procedure: COLONOSCOPY;  Surgeon: Roz Solis MD;  Location: [facility] OR 2ND FLR;  Service: Colon and Rectal;  Laterality: N/A;    FRACTURE SURGERY Right 1987    humerus, ORIF    LAPAROSCOPIC SIGMOIDECTOMY N/A 2023    Procedure: COLECTOMY, SIGMOID, LAPAROSCOPIC;  Surgeon: Roz Solis MD;  [facility];  Service: Colon and Rectal;  Laterality: N/A;  ERAS low    LAPAROSCOPIC SLEEVE GASTRECTOMY  2017    ORIF HUMERUS FRACTURE         Time Tracking:     OT Date of Treatment: 23  OT Start Time: 1007  OT Stop Time: 1024  OT Total Time (min): 17 min    Billable Minutes:Evaluation 8  Self Care/Home Management 9    2023

## 2023-01-26 PROCEDURE — 25000003 PHARM REV CODE 250: Performed by: STUDENT IN AN ORGANIZED HEALTH CARE EDUCATION/TRAINING PROGRAM

## 2023-01-26 PROCEDURE — 63600175 PHARM REV CODE 636 W HCPCS: Performed by: STUDENT IN AN ORGANIZED HEALTH CARE EDUCATION/TRAINING PROGRAM

## 2023-01-26 PROCEDURE — 20600001 HC STEP DOWN PRIVATE ROOM

## 2023-01-26 RX ADMIN — ACETAMINOPHEN 1000 MG: 500 TABLET ORAL at 08:01

## 2023-01-26 RX ADMIN — GABAPENTIN 300 MG: 300 CAPSULE ORAL at 08:01

## 2023-01-26 RX ADMIN — OXYCODONE HYDROCHLORIDE 10 MG: 10 TABLET ORAL at 10:01

## 2023-01-26 RX ADMIN — GABAPENTIN 300 MG: 300 CAPSULE ORAL at 02:01

## 2023-01-26 RX ADMIN — OXYCODONE HYDROCHLORIDE 5 MG: 5 TABLET ORAL at 06:01

## 2023-01-26 RX ADMIN — OXYCODONE HYDROCHLORIDE 10 MG: 10 TABLET ORAL at 11:01

## 2023-01-26 RX ADMIN — ASPIRIN 81 MG: 81 TABLET, COATED ORAL at 08:01

## 2023-01-26 RX ADMIN — PANTOPRAZOLE SODIUM 40 MG: 40 TABLET, DELAYED RELEASE ORAL at 08:01

## 2023-01-26 RX ADMIN — ALVIMOPAN 12 MG: 12 CAPSULE ORAL at 08:01

## 2023-01-26 RX ADMIN — MUPIROCIN: 20 OINTMENT TOPICAL at 08:01

## 2023-01-26 RX ADMIN — OXYCODONE HYDROCHLORIDE 10 MG: 10 TABLET ORAL at 05:01

## 2023-01-26 RX ADMIN — OXYCODONE HYDROCHLORIDE 10 MG: 10 TABLET ORAL at 02:01

## 2023-01-26 RX ADMIN — ACETAMINOPHEN 1000 MG: 500 TABLET ORAL at 05:01

## 2023-01-26 RX ADMIN — ENOXAPARIN SODIUM 40 MG: 40 INJECTION SUBCUTANEOUS at 05:01

## 2023-01-26 RX ADMIN — ACETAMINOPHEN 1000 MG: 500 TABLET ORAL at 01:01

## 2023-01-26 RX ADMIN — CETIRIZINE HYDROCHLORIDE 10 MG: 10 TABLET, FILM COATED ORAL at 08:01

## 2023-01-26 NOTE — PROGRESS NOTES
Patient Name: Shireen Anton  Date: 1/26/2023  Service: Colon and Rectal Surgery    Subjective:  No acute events overnight. Pain well controlled. Tolerated small volume diet without nausea/vomiting. Ambulating and using IS. Denies fevers/chills/sweats, lightheadedness/dizziness, chest pain/shortness of breath, upper or lower extremity edema/pain. No flatus/stool     Medications:    Current Facility-Administered Medications:     acetaminophen tablet 1,000 mg, 1,000 mg, Oral, Q8H, Natalia Shrestha MD, 1,000 mg at 01/26/23 0514    alvimopan capsule 12 mg, 12 mg, Oral, BID, Natalia Shrestha MD, 12 mg at 01/26/23 0825    aspirin EC tablet 81 mg, 81 mg, Oral, Daily, Natalia Shrestha MD, 81 mg at 01/26/23 0825    cetirizine tablet 10 mg, 10 mg, Oral, Daily, Natalia Shrestha MD, 10 mg at 01/26/23 0825    enoxaparin injection 40 mg, 40 mg, Subcutaneous, Daily, Natalia Shrestha MD, 40 mg at 01/25/23 1622    gabapentin capsule 300 mg, 300 mg, Oral, TID, Natalia Shrestha MD, 300 mg at 01/26/23 0825    LIDOcaine (PF) 10 mg/ml (1%) injection 10 mg, 1 mL, Intradermal, On Call Procedure, Savanna Torres, NP    mupirocin 2 % ointment, , Nasal, BID, Natalia Shrestha MD, Given at 01/26/23 0825    ondansetron injection 4 mg, 4 mg, Intravenous, Q12H PRN, Natalia Shrestha MD    oxyCODONE immediate release tablet 5 mg, 5 mg, Oral, Q6H PRN, Natalia Shrestha MD    oxyCODONE immediate release tablet Tab 10 mg, 10 mg, Oral, Q4H PRN, Natalia Shrestha MD, 10 mg at 01/26/23 0514    pantoprazole EC tablet 40 mg, 40 mg, Oral, Daily, Natalia Shrestha MD, 40 mg at 01/26/23 0825    sodium chloride 0.9% flush 10 mL, 10 mL, Intra-Catheter, PRN, Natalia Shrestha MD    traMADoL tablet 50 mg, 50 mg, Oral, Q6H PRN, Natalia Shrestha MD    Vital Signs:  Vitals:    01/26/23 0731   BP: (!) 104/59   Pulse: 74   Resp: 20   Temp: 98.9 °F (37.2 °C)           Physical Exam:  General: Alert, oriented, in no apparent distress  HEENT: Sclera anicteric, trachea  midline  Lungs: Normal respiratory rate and effort on room air  Abdomen: Soft, nontender, nondistended. Incisions clean/dry/intact without erythema or drainage. FIORELLA drains serosanguinous.  Extremities: Warm, well perfused, no edema, SCDs in place  Neuro: Grossly intact, moves all extremities  Psych: Appropriate affect    Laboratory Studies:    Complete Blood Count:  Lab Results   Component Value Date/Time    WBC 11.69 01/25/2023 03:17 AM    HGB 12.5 01/25/2023 03:17 AM    HCT 37.0 01/25/2023 03:17 AM    HCT 36 11/27/2022 04:29 PM    RBC 4.06 01/25/2023 03:17 AM     01/25/2023 03:17 AM       Basic Chemistry Panel:  Lab Results   Component Value Date/Time     01/25/2023 03:17 AM    K 4.2 01/25/2023 03:17 AM     01/25/2023 03:17 AM    CO2 19 (L) 01/25/2023 03:17 AM    BUN 9 01/25/2023 03:17 AM    CREATININE 0.7 01/25/2023 03:17 AM    CALCIUM 8.5 (L) 01/25/2023 03:17 AM           Imaging Studies:  -    Assessment:  Shireen Anton is a 53 y.o. year old female S/p laparoscopic sigmoid colectomy for diverticular disease, doing well     Continue multimodality pain control  Continue to a regular diet  DVT ppx   Await return of bowel function      Plan:    Patient discussed with attending, Dr. Solis.    Natalia Shrestha MD  Colon and Rectal Surgery Fellow

## 2023-01-26 NOTE — ANESTHESIA POSTPROCEDURE EVALUATION
Anesthesia Post Evaluation    Patient: Shireen Anton    Procedure(s) Performed: Procedure(s) (LRB):  COLECTOMY, SIGMOID, LAPAROSCOPIC (N/A)  COLONOSCOPY (N/A)    Final Anesthesia Type: general      Patient location during evaluation: PACU  Patient participation: Yes- Able to Participate  Level of consciousness: awake and alert and oriented  Post-procedure vital signs: reviewed and stable  Pain management: adequate  Airway patency: patent    PONV status at discharge: No PONV  Anesthetic complications: no      Cardiovascular status: hemodynamically stable  Respiratory status: unassisted, spontaneous ventilation and room air  Hydration status: euvolemic  Follow-up not needed.          Vitals Value Taken Time   /64 01/25/23 2324   Temp 36.5 °C (97.7 °F) 01/25/23 2324   Pulse 75 01/25/23 2324   Resp 16 01/25/23 2324   SpO2 96 % 01/25/23 2324         Event Time   Out of Recovery 21:30:00         Pain/Pradeep Score: Pain Rating Prior to Med Admin: 7 (1/25/2023  8:18 PM)  Pain Rating Post Med Admin: 0 (1/25/2023  6:59 AM)  Pradeep Score: 9 (1/24/2023  9:30 PM)

## 2023-01-26 NOTE — PLAN OF CARE
MAR, labs, and chart reviewed. Pt A&Ox4. Vital signs stable. Pt maintaining O2 >92 on RA. Pts pain controlled with scheduled and PRN pain meds. Pt ambulates with assistance in hallways. Pt voided 300ml post-glover removal. Pt experiences mild pain and abdominal discomfort with meals, no c/o N/V. Bed in lowest position, wheels locked, and call light within reach.       Problem: Adult Inpatient Plan of Care  Goal: Absence of Hospital-Acquired Illness or Injury  Outcome: Ongoing, Progressing  Goal: Optimal Comfort and Wellbeing  Outcome: Ongoing, Progressing     Problem: Infection  Goal: Absence of Infection Signs and Symptoms  Outcome: Ongoing, Progressing     Problem: Skin Injury Risk Increased  Goal: Skin Health and Integrity  Outcome: Ongoing, Progressing     Problem: Fall Injury Risk  Goal: Absence of Fall and Fall-Related Injury  Outcome: Ongoing, Progressing

## 2023-01-27 VITALS
HEART RATE: 70 BPM | WEIGHT: 258.63 LBS | TEMPERATURE: 98 F | HEIGHT: 72 IN | OXYGEN SATURATION: 97 % | BODY MASS INDEX: 35.03 KG/M2 | SYSTOLIC BLOOD PRESSURE: 117 MMHG | RESPIRATION RATE: 18 BRPM | DIASTOLIC BLOOD PRESSURE: 65 MMHG

## 2023-01-27 LAB — CRP SERPL-MCNC: 194.3 MG/L (ref 0–8.2)

## 2023-01-27 PROCEDURE — 86140 C-REACTIVE PROTEIN: CPT | Performed by: STUDENT IN AN ORGANIZED HEALTH CARE EDUCATION/TRAINING PROGRAM

## 2023-01-27 PROCEDURE — 25000003 PHARM REV CODE 250: Performed by: STUDENT IN AN ORGANIZED HEALTH CARE EDUCATION/TRAINING PROGRAM

## 2023-01-27 PROCEDURE — 36415 COLL VENOUS BLD VENIPUNCTURE: CPT | Performed by: STUDENT IN AN ORGANIZED HEALTH CARE EDUCATION/TRAINING PROGRAM

## 2023-01-27 PROCEDURE — 97116 GAIT TRAINING THERAPY: CPT

## 2023-01-27 RX ORDER — OXYCODONE HYDROCHLORIDE 5 MG/1
5 TABLET ORAL EVERY 6 HOURS PRN
Qty: 10 TABLET | Refills: 0 | Status: SHIPPED | OUTPATIENT
Start: 2023-01-27 | End: 2023-01-30 | Stop reason: SDUPTHER

## 2023-01-27 RX ADMIN — ALVIMOPAN 12 MG: 12 CAPSULE ORAL at 08:01

## 2023-01-27 RX ADMIN — MUPIROCIN: 20 OINTMENT TOPICAL at 08:01

## 2023-01-27 RX ADMIN — PANTOPRAZOLE SODIUM 40 MG: 40 TABLET, DELAYED RELEASE ORAL at 08:01

## 2023-01-27 RX ADMIN — GABAPENTIN 300 MG: 300 CAPSULE ORAL at 08:01

## 2023-01-27 RX ADMIN — OXYCODONE HYDROCHLORIDE 10 MG: 10 TABLET ORAL at 03:01

## 2023-01-27 RX ADMIN — CETIRIZINE HYDROCHLORIDE 10 MG: 10 TABLET, FILM COATED ORAL at 08:01

## 2023-01-27 RX ADMIN — OXYCODONE HYDROCHLORIDE 10 MG: 10 TABLET ORAL at 08:01

## 2023-01-27 RX ADMIN — ACETAMINOPHEN 1000 MG: 500 TABLET ORAL at 05:01

## 2023-01-27 RX ADMIN — ASPIRIN 81 MG: 81 TABLET, COATED ORAL at 08:01

## 2023-01-27 NOTE — PT/OT/SLP PROGRESS
Physical Therapy Treatment    Patient Name:  Shireen Anton   MRN:  [id number]    Recommendations:     Discharge Recommendations: home  Discharge Equipment Recommendations: none  Barriers to discharge: None    Assessment:     Shireen Anton is a 53 y.o. female admitted with a medical diagnosis of Diverticulitis.  She presents with the following impairments/functional limitations: impaired functional mobility, impaired self care skills.    Rehab Prognosis: Good; patient would benefit from acute skilled PT services to address these deficits and reach maximum level of function.    Recent Surgery: Procedure(s) (LRB):  COLECTOMY, SIGMOID, LAPAROSCOPIC (N/A)  COLONOSCOPY (N/A) 3 Days Post-Op    Plan:     During this hospitalization, patient to be seen 3 x/week to address the identified rehab impairments via gait training, therapeutic activities, therapeutic exercises, neuromuscular re-education and progress toward the following goals:    Plan of Care Expires:  02/24/23    Subjective     Chief Complaint: soreness  Patient/Family Comments/goals: return home  Pain/Comfort:  Pain Rating 1:  (not rated)  Location 1: abdomen  Pain Addressed 1: Reposition, Distraction  Pain Rating Post-Intervention 1:  (not rated)      Objective:     Communicated with RN prior to session.  Patient found HOB elevated with peripheral IV upon PT entry to room.     General Precautions: Standard, fall  Orthopedic Precautions: N/A  Braces: N/A  Respiratory Status: Room air     Functional Mobility:  Bed Mobility:     Scooting: independent  Supine to Sit: independent  Transfers:     Sit to Stand:  stand by assistance with no AD  Gait: 230 ft, mod I; decreased gait speed, no LOBs or unsteadiness noted, decreased step length  Balance:   Static Sitting: independent  Dynamic Sitting: independent  Static Standing: modified independent  Dynamic Standing: modified independent      AM-PAC 6 CLICK MOBILITY  Turning over in bed (including adjusting bedclothes,  sheets and blankets)?: 4  Sitting down on and standing up from a chair with arms (e.g., wheelchair, bedside commode, etc.): 4  Moving from lying on back to sitting on the side of the bed?: 4  Moving to and from a bed to a chair (including a wheelchair)?: 4  Need to walk in hospital room?: 4  Climbing 3-5 steps with a railing?: 3  Basic Mobility Total Score: 23       Treatment & Education:  Patient educated on role of therapy, goals of session, and benefits of mobilizing.   Discussed PT plan of care during hospitalization.   Patient educated on calling for assistance.   Patient educated on how their diagnosis impacts their mobility within PT scope of practice.   All questions answered within PT scope of practice.    Patient left HOB elevated with all lines intact and call button in reach..    GOALS:   Multidisciplinary Problems       Physical Therapy Goals          Problem: Physical Therapy    Goal Priority Disciplines Outcome Goal Variances Interventions   Physical Therapy Goal     PT, PT/OT Ongoing, Progressing     Description: Goals to be met by: 2023     Patient will increase functional independence with mobility by performin. Supine to sit with Dalton  2. Sit to supine with Dalton  3. Sit to stand transfer with Dalton  4. Bed to chair transfer with Dalton using No Assistive Device  5. Gait  x 400 feet with Dalton using No Assistive Device.                          Time Tracking:     PT Received On: 23  PT Start Time: 1012     PT Stop Time: 1022  PT Total Time (min): 10 min     Billable Minutes: Gait Training 10    Treatment Type: Treatment  PT/PTA: PT     PTA Visit Number: 0     2023

## 2023-01-27 NOTE — DISCHARGE SUMMARY
[facility]  Colorectal Surgery  Discharge Summary      Patient Name: Shireen Anton  MRN: [id number]  Admission Date: 1/24/2023  Hospital Length of Stay: 3 days  Discharge Date and Time:  01/27/2023 8:24 AM  Attending Physician: Roz Solis MD   Discharging Provider: HUGO FIGUEROA MD  Primary Care Provider: Jeancarlos Zamora MD     HPI:  No notes on file    Procedure(s) (LRB):  COLECTOMY, SIGMOID, LAPAROSCOPIC (N/A)  COLONOSCOPY (N/A)     Hospital Course:  Patient Name:        Shireen Anton  MRN:          [id number]  YOB: 1970  (53 y.o.)  Discharge Date:        1/27/2023    Surgery: laparoscopic sigmoid colectomy  Date of Surgery: 1/24/23  Complications (if any) in the post op period: NO  Outpatient follow up with other specialties (urology, ID, etc): NO**  Going home with antibiotics (duration and why): NO  Drain at discharge: NO  PICC at discharge: (date of placement and indication) NO  Vac at discharge: NO  Nutritional restrictions: NO    Shireen Anton presented to pre-op holding at [facility] on for laparoscopic sigmoid colectomy for diverticulitis. The patient tolerated the procedure well without complications (please see operative note for further details).     After a brief and uneventful stay in the PACU, the patient was transferred to the floor for further post-operative management.    Neuro: Pain was well controlled on multimodality pain medications.    CV: The patient remained stable from a cardiovascular standpoint; vital signs were routinely monitored.    Pulm: The patient remained stable from a pulmonary standpoint; oxygen saturation was routinely monitored. They had good pulmonary toileting, as early ambulation and incentive spirometry were encouraged throughout hospitalization.    GI: Post-procedure, the patient was advanced to and tolerated a regular diet. Patient's intake and output were closely monitored.     : The patient had a glover catheter that was removed POD1. The  patient was able to void without difficulty throughout hospitalization. Urine output was monitored as indicated.    ID: The patient was closely monitored for signs and symptoms of infection of which there were none.    Heme: The patient received VTE prophylaxis when labs were appropriate. They were encouraged to get up and ambulate as early as possible and veno dyne boots during this stay.     On POD3, the patient was discharged. At discharge, they were tolerating a regular diet, passing flatus, voiding, and ambulating independently. They will follow-up in the clinic in 1-2 weeks. This information was communicated to the patient directly prior to discharge.        Goals of Care Treatment Preferences:  Code Status: Full Code          Significant Diagnostic Studies: Labs: BMP: No results for input(s): GLU, NA, K, CL, CO2, BUN, CREATININE, CALCIUM, MG in the last 48 hours. and CBC No results for input(s): WBC, HGB, HCT, PLT in the last 48 hours.    Pending Diagnostic Studies:     Procedure Component Value Units Date/Time    Specimen to Pathology, Surgery General Surgery [[id number]] Collected: 01/24/23 2000    Order Status: Sent Lab Status: In process Updated: 01/25/23 1745    Specimen: Tissue         Final Active Diagnoses:    Diagnosis Date Noted POA    PRINCIPAL PROBLEM:  Diverticulitis [K57.92] 01/24/2023 Yes      Problems Resolved During this Admission:      Discharged Condition: good    Disposition: Home or Self Care    Follow Up:    Patient Instructions:      Diet Adult Regular     Lifting restrictions     No driving until:   Order Comments: No driving until no longer requiring pain medications     Notify your health care provider if you experience any of the following:  increased confusion or weakness     Notify your health care provider if you experience any of the following:  persistent dizziness, light-headedness, or visual disturbances     Notify your health care provider if you experience any of the  following:  worsening rash     Notify your health care provider if you experience any of the following:  difficulty breathing or increased cough     Notify your health care provider if you experience any of the following:  redness, tenderness, or signs of infection (pain, swelling, redness, odor or green/yellow discharge around incision site)     Notify your health care provider if you experience any of the following:  severe persistent headache     Notify your health care provider if you experience any of the following:  severe uncontrolled pain     Notify your health care provider if you experience any of the following:  persistent nausea and vomiting or diarrhea     Notify your health care provider if you experience any of the following:  temperature >100.4     No dressing needed     Medications:  Reconciled Home Medications:      Medication List      START taking these medications    oxyCODONE 5 MG immediate release tablet  Commonly known as: ROXICODONE  Take 1 tablet (5 mg total) by mouth every 6 (six) hours as needed for Pain.        CONTINUE taking these medications    aspirin 81 MG EC tablet  Commonly known as: ECOTRIN  Take 1 tablet (81 mg total) by mouth once daily.     calcium citrate 200 mg (950 mg) tablet  Commonly known as: CALCITRATE  Take 1 tablet (200 mg total) by mouth 3 (three) times daily.     cetirizine 10 MG tablet  Commonly known as: ZYRTEC  Take 10 mg by mouth once daily.     cyanocobalamin 250 MCG tablet  Commonly known as: VITAMIN B-12  Take 1 tablet (250 mcg total) by mouth once daily.     FIBER GUMMIES ORAL  Take by mouth.     multivitamin capsule  Take 1 capsule by mouth once daily.     omeprazole 20 MG capsule  Commonly known as: PRILOSEC  Take 20 mg by mouth once daily.     ondansetron 4 MG Tbdl  Commonly known as: ZOFRAN-ODT  Take 1 tablet (4 mg total) by mouth every 6 (six) hours as needed (nausea).     PARAGARD T 380A 380 square mm IUD  Generic drug: copper intrauterine device             HUGO FIGUEROA MD  Colorectal Surgery  [facility]

## 2023-01-27 NOTE — PLAN OF CARE
[facility]  Discharge Final Note    Primary Care Provider: Jeancarlos Zamora MD    Expected Discharge Date: 1/27/2023    Final Discharge Note (most recent)       Final Note - 01/27/23 1529          Final Note    Assessment Type Final Discharge Note     Anticipated Discharge Disposition Home or Self Care     Hospital Resources/Appts/Education Provided Appointments scheduled and added to AVS        Post-Acute Status    Post-Acute Authorization Other     Other Status No Post-Acute Service Needs                     Important Message from Medicare      Future Appointments   Date Time Provider Department Center   2/10/2023  8:40 AM Roz Solis MD [facility] COLSUR [facility]

## 2023-01-27 NOTE — HOSPITAL COURSE
Patient Name:        Shireen Anton  MRN:          [id number]  YOB: 1970  (53 y.o.)  Discharge Date:        1/27/2023    Surgery: laparoscopic sigmoid colectomy  Date of Surgery: 1/24/23  Complications (if any) in the post op period: NO  Outpatient follow up with other specialties (urology, ID, etc): NO**  Going home with antibiotics (duration and why): NO  Drain at discharge: NO  PICC at discharge: (date of placement and indication) NO  Vac at discharge: NO  Nutritional restrictions: NO    Shireen Anton presented to pre-op holding at [facility] on for laparoscopic sigmoid colectomy for diverticulitis. The patient tolerated the procedure well without complications (please see operative note for further details).     After a brief and uneventful stay in the PACU, the patient was transferred to the floor for further post-operative management.    Neuro: Pain was well controlled on multimodality pain medications.    CV: The patient remained stable from a cardiovascular standpoint; vital signs were routinely monitored.    Pulm: The patient remained stable from a pulmonary standpoint; oxygen saturation was routinely monitored. They had good pulmonary toileting, as early ambulation and incentive spirometry were encouraged throughout hospitalization.    GI: Post-procedure, the patient was advanced to and tolerated a regular diet. Patient's intake and output were closely monitored.     : The patient had a glover catheter that was removed POD1. The patient was able to void without difficulty throughout hospitalization. Urine output was monitored as indicated.    ID: The patient was closely monitored for signs and symptoms of infection of which there were none.    Heme: The patient received VTE prophylaxis when labs were appropriate. They were encouraged to get up and ambulate as early as possible and veno dyne boots during this stay.     On POD3, the patient was discharged. At discharge, they were tolerating a  regular diet, passing flatus, voiding, and ambulating independently. They will follow-up in the clinic in 1-2 weeks. This information was communicated to the patient directly prior to discharge.

## 2023-01-27 NOTE — PLAN OF CARE
Patient discharge to home ivs removed  at bedside.  Went over discharge instruction, patient verbalized understanding. Patient denies pain or discomfort at this time.  No noted distress.

## 2023-01-27 NOTE — NURSING
Patient discharge to home. Went over discharge instruction, patient verbalized understanding. Removed iv.   at bedside.  Patient refused transporter service.  Patient walked down with  no  noted distress.

## 2023-01-28 ENCOUNTER — PATIENT MESSAGE (OUTPATIENT)
Dept: SURGERY | Facility: CLINIC | Age: 53
End: 2023-01-28
Payer: COMMERCIAL

## 2023-01-30 RX ORDER — OXYCODONE HYDROCHLORIDE 5 MG/1
5 TABLET ORAL EVERY 6 HOURS PRN
Qty: 30 TABLET | Refills: 0 | Status: SHIPPED | OUTPATIENT
Start: 2023-01-30 | End: 2023-02-13

## 2023-02-01 LAB
FINAL PATHOLOGIC DIAGNOSIS: NORMAL
Lab: NORMAL

## 2023-02-09 NOTE — PROGRESS NOTES
CRS Office Visit Follow-up    SUBJECTIVE:     History of Present Illness:  Patient is a 53 y.o. female who presents following lap sigmoid colectomy for diverticulitis on 1/24/2023. Their post-operative course was uncomplicated. There are no new complaints today.    Final pathology:  A.   SIGMOID COLON, RESECTION:          - Benign colonic mucosa with diverticulosis, focal stricture, serosal adhesions and focal subserosal abscess, most suggestive of ruptured diverticulum.          - Eight(8) benign, reactive lymph nodes.   B.   ANASTOMOTIC DONUTS, REMOVAL:          - Benign colonic parenchyma with no pathologic abnormalities, consistent with anastomotic donuts.     Review of Systems:  ROS    OBJECTIVE:     Vital Signs (Most Recent)  /67 (BP Location: Left arm, Patient Position: Sitting)   Pulse 75   Resp 19   Ht 6' (1.829 m)   Wt 116.2 kg (256 lb 1 oz)   SpO2 96%   BMI 34.73 kg/m²     Physical Exam:  General: White female in no distress   Neuro: Alert and oriented x 4.  Moves all extremities.     HEENT: No icterus.  Trachea midline  Respiratory: Respirations are even and unlabored  Cardiac: Regular rate  Abdomen: Soft, non-distended, incisions healing well  Extremities: Warm dry and intact  Skin: No rashes      ASSESSMENT/PLAN:     52yo F s/p  lap sigmoid colectomy for diverticulitis on 1/24/2023, doing well    RTC prn  Next colonoscopy in 3 years    Roz Solis MD  Staff Surgeon, Colon and Rectal Surgery  [facility]

## 2023-02-10 ENCOUNTER — OFFICE VISIT (OUTPATIENT)
Dept: SURGERY | Facility: CLINIC | Age: 53
End: 2023-02-10
Attending: COLON & RECTAL SURGERY
Payer: COMMERCIAL

## 2023-02-10 VITALS
HEIGHT: 72 IN | OXYGEN SATURATION: 96 % | SYSTOLIC BLOOD PRESSURE: 111 MMHG | DIASTOLIC BLOOD PRESSURE: 67 MMHG | RESPIRATION RATE: 19 BRPM | WEIGHT: 256.06 LBS | HEART RATE: 75 BPM | BODY MASS INDEX: 34.68 KG/M2

## 2023-02-10 DIAGNOSIS — Z98.890 POST-OPERATIVE STATE: Primary | ICD-10-CM

## 2023-02-10 PROCEDURE — 3074F PR MOST RECENT SYSTOLIC BLOOD PRESSURE < 130 MM HG: ICD-10-PCS | Mod: CPTII,S$GLB,, | Performed by: COLON & RECTAL SURGERY

## 2023-02-10 PROCEDURE — 99999 PR PBB SHADOW E&M-EST. PATIENT-LVL III: ICD-10-PCS | Mod: PBBFAC,,, | Performed by: COLON & RECTAL SURGERY

## 2023-02-10 PROCEDURE — 3074F SYST BP LT 130 MM HG: CPT | Mod: CPTII,S$GLB,, | Performed by: COLON & RECTAL SURGERY

## 2023-02-10 PROCEDURE — 99024 PR POST-OP FOLLOW-UP VISIT: ICD-10-PCS | Mod: S$GLB,,, | Performed by: COLON & RECTAL SURGERY

## 2023-02-10 PROCEDURE — 99999 PR PBB SHADOW E&M-EST. PATIENT-LVL III: CPT | Mod: PBBFAC,,, | Performed by: COLON & RECTAL SURGERY

## 2023-02-10 PROCEDURE — 3008F PR BODY MASS INDEX (BMI) DOCUMENTED: ICD-10-PCS | Mod: CPTII,S$GLB,, | Performed by: COLON & RECTAL SURGERY

## 2023-02-10 PROCEDURE — 3078F DIAST BP <80 MM HG: CPT | Mod: CPTII,S$GLB,, | Performed by: COLON & RECTAL SURGERY

## 2023-02-10 PROCEDURE — 1159F PR MEDICATION LIST DOCUMENTED IN MEDICAL RECORD: ICD-10-PCS | Mod: CPTII,S$GLB,, | Performed by: COLON & RECTAL SURGERY

## 2023-02-10 PROCEDURE — 1160F RVW MEDS BY RX/DR IN RCRD: CPT | Mod: CPTII,S$GLB,, | Performed by: COLON & RECTAL SURGERY

## 2023-02-10 PROCEDURE — 99024 POSTOP FOLLOW-UP VISIT: CPT | Mod: S$GLB,,, | Performed by: COLON & RECTAL SURGERY

## 2023-02-10 PROCEDURE — 3008F BODY MASS INDEX DOCD: CPT | Mod: CPTII,S$GLB,, | Performed by: COLON & RECTAL SURGERY

## 2023-02-10 PROCEDURE — 3078F PR MOST RECENT DIASTOLIC BLOOD PRESSURE < 80 MM HG: ICD-10-PCS | Mod: CPTII,S$GLB,, | Performed by: COLON & RECTAL SURGERY

## 2023-02-10 PROCEDURE — 1160F PR REVIEW ALL MEDS BY PRESCRIBER/CLIN PHARMACIST DOCUMENTED: ICD-10-PCS | Mod: CPTII,S$GLB,, | Performed by: COLON & RECTAL SURGERY

## 2023-02-10 PROCEDURE — 1159F MED LIST DOCD IN RCRD: CPT | Mod: CPTII,S$GLB,, | Performed by: COLON & RECTAL SURGERY

## 2023-02-10 NOTE — LETTER
February 10, 2023      [facility]- Colon [facility] Rectal Surg  [address] [place] [id number]  Phone: [phone]  Fax: [phone]       Patient: Shireen Anton   YOB: 1970  Date of Visit: 02/10/2023    To Whom It May Concern:    Neil Anton  was at [facility] on 02/10/2023. 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    If you have any questions or concerns, or if I can be of further assistance, please do not hesitate to contact me.    Sincerely,    Roz Solis MD

## 2023-02-13 ENCOUNTER — OFFICE VISIT (OUTPATIENT)
Dept: FAMILY MEDICINE | Facility: CLINIC | Age: 53
End: 2023-02-13
Payer: COMMERCIAL

## 2023-02-13 VITALS
WEIGHT: 252.63 LBS | RESPIRATION RATE: 18 BRPM | OXYGEN SATURATION: 98 % | HEART RATE: 82 BPM | DIASTOLIC BLOOD PRESSURE: 60 MMHG | BODY MASS INDEX: 34.22 KG/M2 | TEMPERATURE: 98 F | HEIGHT: 72 IN | SYSTOLIC BLOOD PRESSURE: 116 MMHG

## 2023-02-13 DIAGNOSIS — E66.01 MORBID OBESITY DUE TO EXCESS CALORIES: ICD-10-CM

## 2023-02-13 DIAGNOSIS — Z00.00 WELL WOMAN EXAM WITHOUT GYNECOLOGICAL EXAM: Primary | ICD-10-CM

## 2023-02-13 DIAGNOSIS — I25.2 HISTORY OF MI (MYOCARDIAL INFARCTION): ICD-10-CM

## 2023-02-13 DIAGNOSIS — E04.2 MULTIPLE THYROID NODULES: ICD-10-CM

## 2023-02-13 DIAGNOSIS — F41.9 ANXIETY: ICD-10-CM

## 2023-02-13 DIAGNOSIS — Z90.49 HISTORY OF PARTIAL COLECTOMY: ICD-10-CM

## 2023-02-13 DIAGNOSIS — Z86.73 HISTORY OF CVA IN ADULTHOOD: ICD-10-CM

## 2023-02-13 PROCEDURE — 99999 PR PBB SHADOW E&M-EST. PATIENT-LVL IV: CPT | Mod: PBBFAC,,, | Performed by: FAMILY MEDICINE

## 2023-02-13 PROCEDURE — 1111F DSCHRG MED/CURRENT MED MERGE: CPT | Mod: CPTII,S$GLB,, | Performed by: FAMILY MEDICINE

## 2023-02-13 PROCEDURE — 3074F PR MOST RECENT SYSTOLIC BLOOD PRESSURE < 130 MM HG: ICD-10-PCS | Mod: CPTII,S$GLB,, | Performed by: FAMILY MEDICINE

## 2023-02-13 PROCEDURE — 1160F PR REVIEW ALL MEDS BY PRESCRIBER/CLIN PHARMACIST DOCUMENTED: ICD-10-PCS | Mod: CPTII,S$GLB,, | Performed by: FAMILY MEDICINE

## 2023-02-13 PROCEDURE — 1159F MED LIST DOCD IN RCRD: CPT | Mod: CPTII,S$GLB,, | Performed by: FAMILY MEDICINE

## 2023-02-13 PROCEDURE — 3008F PR BODY MASS INDEX (BMI) DOCUMENTED: ICD-10-PCS | Mod: CPTII,S$GLB,, | Performed by: FAMILY MEDICINE

## 2023-02-13 PROCEDURE — 99396 PR PREVENTIVE VISIT,EST,40-64: ICD-10-PCS | Mod: S$GLB,,, | Performed by: FAMILY MEDICINE

## 2023-02-13 PROCEDURE — 1160F RVW MEDS BY RX/DR IN RCRD: CPT | Mod: CPTII,S$GLB,, | Performed by: FAMILY MEDICINE

## 2023-02-13 PROCEDURE — 99999 PR PBB SHADOW E&M-EST. PATIENT-LVL IV: ICD-10-PCS | Mod: PBBFAC,,, | Performed by: FAMILY MEDICINE

## 2023-02-13 PROCEDURE — 99396 PREV VISIT EST AGE 40-64: CPT | Mod: S$GLB,,, | Performed by: FAMILY MEDICINE

## 2023-02-13 PROCEDURE — 1111F PR DISCHARGE MEDS RECONCILED W/ CURRENT OUTPATIENT MED LIST: ICD-10-PCS | Mod: CPTII,S$GLB,, | Performed by: FAMILY MEDICINE

## 2023-02-13 PROCEDURE — 3078F DIAST BP <80 MM HG: CPT | Mod: CPTII,S$GLB,, | Performed by: FAMILY MEDICINE

## 2023-02-13 PROCEDURE — 1159F PR MEDICATION LIST DOCUMENTED IN MEDICAL RECORD: ICD-10-PCS | Mod: CPTII,S$GLB,, | Performed by: FAMILY MEDICINE

## 2023-02-13 PROCEDURE — 3078F PR MOST RECENT DIASTOLIC BLOOD PRESSURE < 80 MM HG: ICD-10-PCS | Mod: CPTII,S$GLB,, | Performed by: FAMILY MEDICINE

## 2023-02-13 PROCEDURE — 3074F SYST BP LT 130 MM HG: CPT | Mod: CPTII,S$GLB,, | Performed by: FAMILY MEDICINE

## 2023-02-13 PROCEDURE — 3008F BODY MASS INDEX DOCD: CPT | Mod: CPTII,S$GLB,, | Performed by: FAMILY MEDICINE

## 2023-02-13 NOTE — PROGRESS NOTES
"Well Woman VISIT      CHIEF COMPLAINT  Chief Complaint   Patient presents with    Annual Exam       HPI  Shireen Anton is a 53 y.o. female who presents for physical.     Social Factors  Tobacco use: No  Ready to Quit: No  Alcohol: Yes rarely  Intimate partner violence screening  "Do you feel safe in your current relationship?" Yes   "Have you ever been in a relationship in which your partner frightened you or hurt you?" No  Living Will/POA: No  Regular Exercise: No    Depression  Over the past two weeks, have you felt down, depressed, or hopeless? No  Over the past two weeks, have you felt little interest or pleasure in doing things? No    Reproductive Health  Followed by OBGYN    CHD, HTN, DM2  CHD Risk Factors: obesity (BMI >= 30 kg/m2) and recently had CVA and MI  Women 45 years and older should be screened for dyslipidemia if at increased risk of CHD  Women 20 to 45 years of age should be screened for dyslipidemia if at increased risk of CHD  Asymptomatic adults with sustained blood pressure greater than 135/80 mm Hg (treated or untreated) should be screened for type 2 diabetes mellitus    Estimated body mass index is 34.27 kg/m² as calculated from the following:    Height as of this encounter: 6' (1.829 m).    Weight as of this encounter: 114.6 kg (252 lb 10.4 oz).      Screening  Mammogram needed: order 8/23/17  Colonoscopy needed: n/a  Osteoporosis screen needed: n/a     Women 50 to 74 years of age should be screened for breast cancer with mammography biennially.  Women should be screened for cervical cancer with Pap tests beginning at 21 years of age. Low-risk women should receive Pap testing every three years. Co-testing for human papillomavirus is an option beginning at 30 years of age, and can extend the screening interval to five years. Cervical cancer screening should be discontinued at 65 years of age or after total hysterectomy if the woman has a benign gynecologic history  Adults 50 to 75 years " of age should be screened for colorectal cancer with an FOBT annually, sigmoidoscopy every five years with an FOBT every three years, or colonoscopy every 10 years.  Women 65 years and older should be screened for osteoporosis. Women younger than 65 years should be screened if the risk of fracture is greater than or equal to that of a 65-year-old white woman without additional risk factors.      Immunizations  delayed    ALLERGIES and MEDS were verified.   PMHx, PSHx, FHx, SOCIALHx were updated as pertinent.    REVIEW OF SYSTEMS  Review of Systems   Constitutional: Negative.    HENT:  Negative for hearing loss.    Eyes:  Negative for discharge.   Respiratory:  Negative for wheezing.    Cardiovascular:  Negative for chest pain and palpitations.   Gastrointestinal:  Negative for blood in stool, constipation, diarrhea and vomiting.   Genitourinary:  Negative for dysuria and hematuria.   Musculoskeletal:  Negative for neck pain.   Skin: Negative.    Neurological: Negative.  Negative for weakness and headaches.   Endo/Heme/Allergies:  Negative for polydipsia.       PHYSICAL EXAM  VITAL SIGNS: /60   Pulse 82   Temp 98 °F (36.7 °C) (Oral)   Resp 18   Ht 6' (1.829 m)   Wt 114.6 kg (252 lb 10.4 oz)   SpO2 98%   BMI 34.27 kg/m²   GEN: Well developed, Well nourished, No acute distress.  HENT: Normocephalic, Atraumatic, Bilateral external ears normal, Nose normal, Oropharynx moist, No oral exudates.   Eyes: PERRL, EOMI, Conjunctiva normal, No discharge.   Neck: Supple, No tenderness.  Lymphatic: No cervical or supraclavicular lymphadenopathy noted.   Cardiovascular: Normal heart rate, Normal rhythm, No murmurs, No rubs, No gallops.   Thorax & Lungs: Normal breath sounds, No respiratory distress, No wheezing.  Abdomen: Soft, No tenderness, Bowel sounds normal.  Breast: deferred  Genital: deferred  Skin: Warm, Dry, No erythema, No rash.   Extremities: No edema, No tenderness.       ASSESSMENT/PLAN    Shireen was seen  today for annual exam.    Diagnoses and all orders for this visit:    Well woman exam without gynecological exam  -     CBC Auto Differential; Future  -     Comprehensive Metabolic Panel; Future  -     Lipid Panel; Future  -     Hemoglobin A1C; Future  -     Urinalysis; Future    Anxiety    History of CVA in adulthood    History of MI (myocardial infarction)    Morbid obesity due to excess calories    Multiple thyroid nodules  -     TSH; Future    History of partial colectomy         FOLLOW UP: 1 year or sooner if needed      Jeancarlos Zamora MD

## 2023-02-16 ENCOUNTER — LAB VISIT (OUTPATIENT)
Dept: LAB | Facility: HOSPITAL | Age: 53
End: 2023-02-16
Attending: FAMILY MEDICINE
Payer: COMMERCIAL

## 2023-02-16 ENCOUNTER — TELEPHONE (OUTPATIENT)
Dept: FAMILY MEDICINE | Facility: CLINIC | Age: 53
End: 2023-02-16
Payer: COMMERCIAL

## 2023-02-16 DIAGNOSIS — Z00.00 WELL WOMAN EXAM WITHOUT GYNECOLOGICAL EXAM: ICD-10-CM

## 2023-02-16 DIAGNOSIS — E04.2 MULTIPLE THYROID NODULES: ICD-10-CM

## 2023-02-16 LAB
ALBUMIN SERPL BCP-MCNC: 3.7 G/DL (ref 3.5–5.2)
ALP SERPL-CCNC: 86 U/L (ref 55–135)
ALT SERPL W/O P-5'-P-CCNC: 39 U/L (ref 10–44)
ANION GAP SERPL CALC-SCNC: 9 MMOL/L (ref 8–16)
AST SERPL-CCNC: 47 U/L (ref 10–40)
BACTERIA #/AREA URNS HPF: ABNORMAL /HPF
BASOPHILS # BLD AUTO: 0.03 K/UL (ref 0–0.2)
BASOPHILS NFR BLD: 0.7 % (ref 0–1.9)
BILIRUB SERPL-MCNC: 0.4 MG/DL (ref 0.1–1)
BILIRUB UR QL STRIP: NEGATIVE
BUN SERPL-MCNC: 9 MG/DL (ref 6–20)
CALCIUM SERPL-MCNC: 9.2 MG/DL (ref 8.7–10.5)
CHLORIDE SERPL-SCNC: 108 MMOL/L (ref 95–110)
CHOLEST SERPL-MCNC: 181 MG/DL (ref 120–199)
CHOLEST/HDLC SERPL: 3.6 {RATIO} (ref 2–5)
CLARITY UR: ABNORMAL
CO2 SERPL-SCNC: 26 MMOL/L (ref 23–29)
COLOR UR: YELLOW
CREAT SERPL-MCNC: 0.8 MG/DL (ref 0.5–1.4)
DIFFERENTIAL METHOD: ABNORMAL
EOSINOPHIL # BLD AUTO: 0.2 K/UL (ref 0–0.5)
EOSINOPHIL NFR BLD: 5.4 % (ref 0–8)
ERYTHROCYTE [DISTWIDTH] IN BLOOD BY AUTOMATED COUNT: 13.2 % (ref 11.5–14.5)
EST. GFR  (NO RACE VARIABLE): >60 ML/MIN/1.73 M^2
GLUCOSE SERPL-MCNC: 102 MG/DL (ref 70–110)
GLUCOSE UR QL STRIP: NEGATIVE
HCT VFR BLD AUTO: 37.6 % (ref 37–48.5)
HDLC SERPL-MCNC: 50 MG/DL (ref 40–75)
HDLC SERPL: 27.6 % (ref 20–50)
HGB BLD-MCNC: 12.7 G/DL (ref 12–16)
HGB UR QL STRIP: ABNORMAL
IMM GRANULOCYTES # BLD AUTO: 0.01 K/UL (ref 0–0.04)
IMM GRANULOCYTES NFR BLD AUTO: 0.2 % (ref 0–0.5)
KETONES UR QL STRIP: NEGATIVE
LDLC SERPL CALC-MCNC: 102.6 MG/DL (ref 63–159)
LEUKOCYTE ESTERASE UR QL STRIP: ABNORMAL
LYMPHOCYTES # BLD AUTO: 2 K/UL (ref 1–4.8)
LYMPHOCYTES NFR BLD: 45.9 % (ref 18–48)
MCH RBC QN AUTO: 30.2 PG (ref 27–31)
MCHC RBC AUTO-ENTMCNC: 33.8 G/DL (ref 32–36)
MCV RBC AUTO: 89 FL (ref 82–98)
MICROSCOPIC COMMENT: ABNORMAL
MONOCYTES # BLD AUTO: 0.5 K/UL (ref 0.3–1)
MONOCYTES NFR BLD: 10.5 % (ref 4–15)
NEUTROPHILS # BLD AUTO: 1.6 K/UL (ref 1.8–7.7)
NEUTROPHILS NFR BLD: 37.3 % (ref 38–73)
NITRITE UR QL STRIP: POSITIVE
NONHDLC SERPL-MCNC: 131 MG/DL
NRBC BLD-RTO: 0 /100 WBC
PH UR STRIP: 6 [PH] (ref 5–8)
PLATELET # BLD AUTO: 240 K/UL (ref 150–450)
PMV BLD AUTO: 12.5 FL (ref 9.2–12.9)
POTASSIUM SERPL-SCNC: 4 MMOL/L (ref 3.5–5.1)
PROT SERPL-MCNC: 7.4 G/DL (ref 6–8.4)
PROT UR QL STRIP: ABNORMAL
RBC # BLD AUTO: 4.21 M/UL (ref 4–5.4)
RBC #/AREA URNS HPF: 15 /HPF (ref 0–4)
SODIUM SERPL-SCNC: 143 MMOL/L (ref 136–145)
SP GR UR STRIP: 1.02 (ref 1–1.03)
SQUAMOUS #/AREA URNS HPF: 5 /HPF
T4 FREE SERPL-MCNC: 1.13 NG/DL (ref 0.71–1.51)
TRIGL SERPL-MCNC: 142 MG/DL (ref 30–150)
TSH SERPL DL<=0.005 MIU/L-ACNC: 0.38 UIU/ML (ref 0.4–4)
URN SPEC COLLECT METH UR: ABNORMAL
UROBILINOGEN UR STRIP-ACNC: NEGATIVE EU/DL
WBC # BLD AUTO: 4.27 K/UL (ref 3.9–12.7)
WBC #/AREA URNS HPF: >100 /HPF (ref 0–5)
WBC CLUMPS URNS QL MICRO: ABNORMAL

## 2023-02-16 PROCEDURE — 84439 ASSAY OF FREE THYROXINE: CPT | Performed by: FAMILY MEDICINE

## 2023-02-16 PROCEDURE — 85025 COMPLETE CBC W/AUTO DIFF WBC: CPT | Performed by: FAMILY MEDICINE

## 2023-02-16 PROCEDURE — 36415 COLL VENOUS BLD VENIPUNCTURE: CPT | Mod: PN | Performed by: FAMILY MEDICINE

## 2023-02-16 PROCEDURE — 84443 ASSAY THYROID STIM HORMONE: CPT | Performed by: FAMILY MEDICINE

## 2023-02-16 PROCEDURE — 80061 LIPID PANEL: CPT | Performed by: FAMILY MEDICINE

## 2023-02-16 PROCEDURE — 81000 URINALYSIS NONAUTO W/SCOPE: CPT | Performed by: FAMILY MEDICINE

## 2023-02-16 PROCEDURE — 80053 COMPREHEN METABOLIC PANEL: CPT | Performed by: FAMILY MEDICINE

## 2023-02-16 PROCEDURE — 83036 HEMOGLOBIN GLYCOSYLATED A1C: CPT | Performed by: FAMILY MEDICINE

## 2023-02-16 NOTE — TELEPHONE ENCOUNTER
Called Patient with results / FU from her lab today as reviewed by Norma Simpson NP.   No answer.  Left voice message on an identified recorder requesting a call back if further questions.

## 2023-02-17 ENCOUNTER — PATIENT MESSAGE (OUTPATIENT)
Dept: FAMILY MEDICINE | Facility: CLINIC | Age: 53
End: 2023-02-17
Payer: COMMERCIAL

## 2023-02-17 DIAGNOSIS — G47.00 INSOMNIA, UNSPECIFIED TYPE: Primary | ICD-10-CM

## 2023-02-17 LAB
ESTIMATED AVG GLUCOSE: 100 MG/DL (ref 68–131)
HBA1C MFR BLD: 5.1 % (ref 4–5.6)

## 2023-02-17 RX ORDER — TRAZODONE HYDROCHLORIDE 50 MG/1
50 TABLET ORAL NIGHTLY
Qty: 30 TABLET | Refills: 0 | Status: SHIPPED | OUTPATIENT
Start: 2023-02-17 | End: 2024-02-14 | Stop reason: SDUPTHER

## 2023-03-13 ENCOUNTER — PATIENT MESSAGE (OUTPATIENT)
Dept: ADMINISTRATIVE | Facility: OTHER | Age: 53
End: 2023-03-13
Payer: COMMERCIAL

## 2023-03-27 ENCOUNTER — PATIENT MESSAGE (OUTPATIENT)
Dept: FAMILY MEDICINE | Facility: CLINIC | Age: 53
End: 2023-03-27
Payer: COMMERCIAL

## 2023-03-28 NOTE — TELEPHONE ENCOUNTER
Patient to do an E-visit and will treat the UTI.  Please let her know that if they become recurrent again I will refer her to urology for further eval.    Thanks  Dr. Zamora

## 2023-03-31 ENCOUNTER — OFFICE VISIT (OUTPATIENT)
Dept: PRIMARY CARE CLINIC | Facility: CLINIC | Age: 53
End: 2023-03-31
Payer: COMMERCIAL

## 2023-03-31 ENCOUNTER — LAB VISIT (OUTPATIENT)
Dept: LAB | Facility: HOSPITAL | Age: 53
End: 2023-03-31
Attending: NURSE PRACTITIONER
Payer: COMMERCIAL

## 2023-03-31 DIAGNOSIS — R35.0 URINARY FREQUENCY: Primary | ICD-10-CM

## 2023-03-31 DIAGNOSIS — R35.0 URINARY FREQUENCY: ICD-10-CM

## 2023-03-31 DIAGNOSIS — N39.0 URINARY TRACT INFECTION WITHOUT HEMATURIA, SITE UNSPECIFIED: Primary | ICD-10-CM

## 2023-03-31 DIAGNOSIS — R39.15 URINARY URGENCY: ICD-10-CM

## 2023-03-31 DIAGNOSIS — R30.0 DYSURIA: ICD-10-CM

## 2023-03-31 LAB
AMORPH CRY UR QL COMP ASSIST: ABNORMAL
BACTERIA #/AREA URNS AUTO: ABNORMAL /HPF
BILIRUB UR QL STRIP: NEGATIVE
CLARITY UR REFRACT.AUTO: ABNORMAL
COLOR UR AUTO: ABNORMAL
GLUCOSE UR QL STRIP: NEGATIVE
HGB UR QL STRIP: ABNORMAL
KETONES UR QL STRIP: NEGATIVE
LEUKOCYTE ESTERASE UR QL STRIP: ABNORMAL
MICROSCOPIC COMMENT: ABNORMAL
NITRITE UR QL STRIP: POSITIVE
PH UR STRIP: 6 [PH] (ref 5–8)
PROT UR QL STRIP: ABNORMAL
RBC #/AREA URNS AUTO: 0 /HPF (ref 0–4)
SP GR UR STRIP: 1.02 (ref 1–1.03)
URN SPEC COLLECT METH UR: ABNORMAL
WBC #/AREA URNS AUTO: 0 /HPF (ref 0–5)

## 2023-03-31 PROCEDURE — 1159F MED LIST DOCD IN RCRD: CPT | Mod: CPTII,95,, | Performed by: NURSE PRACTITIONER

## 2023-03-31 PROCEDURE — 3044F PR MOST RECENT HEMOGLOBIN A1C LEVEL <7.0%: ICD-10-PCS | Mod: CPTII,95,, | Performed by: NURSE PRACTITIONER

## 2023-03-31 PROCEDURE — 3044F HG A1C LEVEL LT 7.0%: CPT | Mod: CPTII,95,, | Performed by: NURSE PRACTITIONER

## 2023-03-31 PROCEDURE — 99213 PR OFFICE/OUTPT VISIT, EST, LEVL III, 20-29 MIN: ICD-10-PCS | Mod: 95,,, | Performed by: NURSE PRACTITIONER

## 2023-03-31 PROCEDURE — 1159F PR MEDICATION LIST DOCUMENTED IN MEDICAL RECORD: ICD-10-PCS | Mod: CPTII,95,, | Performed by: NURSE PRACTITIONER

## 2023-03-31 PROCEDURE — 81001 URINALYSIS AUTO W/SCOPE: CPT | Performed by: NURSE PRACTITIONER

## 2023-03-31 PROCEDURE — 99213 OFFICE O/P EST LOW 20 MIN: CPT | Mod: 95,,, | Performed by: NURSE PRACTITIONER

## 2023-03-31 PROCEDURE — 1160F PR REVIEW ALL MEDS BY PRESCRIBER/CLIN PHARMACIST DOCUMENTED: ICD-10-PCS | Mod: CPTII,95,, | Performed by: NURSE PRACTITIONER

## 2023-03-31 PROCEDURE — 1160F RVW MEDS BY RX/DR IN RCRD: CPT | Mod: CPTII,95,, | Performed by: NURSE PRACTITIONER

## 2023-03-31 RX ORDER — NITROFURANTOIN 25; 75 MG/1; MG/1
100 CAPSULE ORAL 2 TIMES DAILY
Qty: 10 CAPSULE | Refills: 0 | Status: SHIPPED | OUTPATIENT
Start: 2023-03-31 | End: 2023-04-05

## 2023-03-31 NOTE — PROGRESS NOTES
The patient location is: [place]  The chief complaint leading to consultation is: urinary frequency and urgency.    Visit type: audiovisual    Face to Face time with patient: 10  20 minutes of total time spent on the encounter, which includes face to face time and non-face to face time preparing to see the patient (eg, review of tests), Obtaining and/or reviewing separately obtained history, Documenting clinical information in the electronic or other health record, Independently interpreting results (not separately reported) and communicating results to the patient/family/caregiver, or Care coordination (not separately reported).         Each patient to whom he or she provides medical services by telemedicine is:  (1) informed of the relationship between the physician and patient and the respective role of any other health care provider with respect to management of the patient; and (2) notified that he or she may decline to receive medical services by telemedicine and may withdraw from such care at any time.    Notes:   Subjective:       Patient ID: Shireen Anton is a 53 y.o. female.    Chief Complaint: urinary frequency and urgency.    Ms. Shireen Anton is a 53 year old female, new to me, presents via telemedicine with urinary frequency and urgency. PCP is Jeancarlos Zamora. Medical and surgical history in addition to problem list reviewed as listed below.    Urinary Frequency   This is a new problem. The current episode started in the past 7 days. The problem occurs every urination. The problem has been gradually worsening. The quality of the pain is described as burning. The pain is at a severity of 0/10. The patient is experiencing no pain. There has been no fever. She is Sexually active. There is No history of pyelonephritis. Associated symptoms include frequency and urgency. Pertinent negatives include no chills, flank pain, hematuria, nausea or bubble bath use. Associated symptoms comments: dysuria. She has tried  acetaminophen for the symptoms. The treatment provided no relief. Her past medical history is significant for recurrent UTIs.     Past Medical History:   Diagnosis Date    Allergy     Depression     3/24/22: denies feelings of current or history of self harm    Diverticulitis of colon     GERD (gastroesophageal reflux disease)     History of acute renal failure 2017    received 1 month of hemodialysis    Loss or death of family member 2014    Multiple thyroid nodules     NSTEMI (non-ST elevated myocardial infarction)     Renal disorder     acute renal failure post gastric sleeve    Stroke     cerebellar stroke post gastric sleeve        Past Surgical History:   Procedure Laterality Date    AUGMENTATION OF BREAST Bilateral     breast augmentation      BREAST SURGERY  1998     SECTION      CHOLECYSTECTOMY      COLONOSCOPY N/A 2020    Procedure: COLONOSCOPY;  Surgeon: Roz Solis MD;  Location: [facility] (4TH FLR);  Service: Endoscopy;  Laterality: N/A;  covid test on 20 at [facility]    COLONOSCOPY N/A 2023    Procedure: COLONOSCOPY;  Surgeon: Roz Solis MD;  Location: [facility] [place] 2ND FLR;  Service: Colon and Rectal;  Laterality: N/A;    FRACTURE SURGERY Right 1987    humerus, ORIF    LAPAROSCOPIC SIGMOIDECTOMY N/A 2023    Procedure: COLECTOMY, SIGMOID, LAPAROSCOPIC;  Surgeon: Roz Solis MD;  Location: [facility] [place] 2ND FLR;  Service: Colon and Rectal;  Laterality: N/A;  ERAS low    LAPAROSCOPIC SLEEVE GASTRECTOMY  2017    ORIF HUMERUS FRACTURE          Family History   Problem Relation Age of Onset    Thyroid disease Mother     Cancer Mother 71        SCC of lung    Diabetes Father     Hypertension Father     Heart disease Father     Esophageal cancer Sister     Cancer Sister 48        pharyngeal cancer    Breast cancer Maternal Aunt     Breast cancer Maternal Grandmother 63    Colon cancer Neg Hx        Social History     Tobacco Use    Smoking Status Former    Packs/day: 1.00    Years: 16.00    Pack years: 16.00    Types: Cigarettes    Quit date: 2011    Years since quittin.3    Passive exposure: Past   Smokeless Tobacco Never       Social History     Social History Narrative    Together since 2009 He is musician.  Playing [employer].She is a registered nurse for [employer]       Review of patient's allergies indicates:   Allergen Reactions    Hydrocodone         Review of Systems   Constitutional:  Negative for chills.   Gastrointestinal:  Negative for nausea.   Genitourinary:  Positive for dysuria, frequency and urgency. Negative for flank pain, hematuria, pelvic pain, vaginal bleeding, vaginal discharge and vaginal pain.       Objective:        There were no vitals filed for this visit.     Limited physical examination due to nature of virtual/telemedicine visit.    Physical Exam  Constitutional:       Appearance: Normal appearance.   Pulmonary:      Effort: Pulmonary effort is normal.   Neurological:      Mental Status: She is alert and oriented to person, place, and time.       Assessment:       1. Urinary frequency    2. Dysuria    3. Urinary urgency        Plan:       Urinary frequency  Rule out urinary tract infection, cystitis.  -     Urinalysis, Reflex to Urine Culture Urine, Clean Catch; Future; Expected date: 2023    Dysuria  -     Urinalysis, Reflex to Urine Culture Urine, Clean Catch; Future; Expected date: 2023    Urinary urgency  -     Urinalysis, Reflex to Urine Culture Urine, Clean Catch; Future; Expected date: 2023    UA pending.    Pyridium as needed for burning/irritation.    Medication List with Changes/Refills   Current Medications    ASPIRIN (ECOTRIN) 81 MG EC TABLET    Take 1 tablet (81 mg total) by mouth once daily.    CALCIUM CITRATE (CALCITRATE) 200 MG (950 MG) TABLET    Take 1 tablet (200 mg total) by mouth 3 (three) times daily.    CETIRIZINE (ZYRTEC) 10 MG TABLET    Take 10 mg by mouth once  daily.    CYANOCOBALAMIN (VITAMIN B-12) 250 MCG TABLET    Take 1 tablet (250 mcg total) by mouth once daily.    INULIN (FIBER GUMMIES ORAL)    Take by mouth.    MULTIVITAMIN CAPSULE    Take 1 capsule by mouth once daily.    OMEPRAZOLE (PRILOSEC) 20 MG CAPSULE    Take 20 mg by mouth once daily.     PARAGARD T 380A 380 SQUARE MM IUD        TRAZODONE (DESYREL) 50 MG TABLET    Take 1 tablet (50 mg total) by mouth every evening.            Follow up if symptoms worsen or fail to improve.    Arelis Snell APRN, MSN, FNP-C

## 2023-04-27 ENCOUNTER — OFFICE VISIT (OUTPATIENT)
Dept: ENDOCRINOLOGY | Facility: CLINIC | Age: 53
End: 2023-04-27
Payer: COMMERCIAL

## 2023-04-27 VITALS
BODY MASS INDEX: 36.02 KG/M2 | TEMPERATURE: 98 F | HEART RATE: 54 BPM | SYSTOLIC BLOOD PRESSURE: 131 MMHG | DIASTOLIC BLOOD PRESSURE: 87 MMHG | WEIGHT: 265.63 LBS

## 2023-04-27 DIAGNOSIS — E55.9 VITAMIN D DEFICIENCY: ICD-10-CM

## 2023-04-27 DIAGNOSIS — E04.2 MULTIPLE THYROID NODULES: Primary | ICD-10-CM

## 2023-04-27 PROCEDURE — 99214 PR OFFICE/OUTPT VISIT, EST, LEVL IV, 30-39 MIN: ICD-10-PCS | Mod: S$GLB,,, | Performed by: HOSPITALIST

## 2023-04-27 PROCEDURE — 3075F PR MOST RECENT SYSTOLIC BLOOD PRESS GE 130-139MM HG: ICD-10-PCS | Mod: CPTII,S$GLB,, | Performed by: HOSPITALIST

## 2023-04-27 PROCEDURE — 99999 PR PBB SHADOW E&M-EST. PATIENT-LVL III: ICD-10-PCS | Mod: PBBFAC,,, | Performed by: HOSPITALIST

## 2023-04-27 PROCEDURE — 3075F SYST BP GE 130 - 139MM HG: CPT | Mod: CPTII,S$GLB,, | Performed by: HOSPITALIST

## 2023-04-27 PROCEDURE — 1159F PR MEDICATION LIST DOCUMENTED IN MEDICAL RECORD: ICD-10-PCS | Mod: CPTII,S$GLB,, | Performed by: HOSPITALIST

## 2023-04-27 PROCEDURE — 1159F MED LIST DOCD IN RCRD: CPT | Mod: CPTII,S$GLB,, | Performed by: HOSPITALIST

## 2023-04-27 PROCEDURE — 3008F PR BODY MASS INDEX (BMI) DOCUMENTED: ICD-10-PCS | Mod: CPTII,S$GLB,, | Performed by: HOSPITALIST

## 2023-04-27 PROCEDURE — 3044F PR MOST RECENT HEMOGLOBIN A1C LEVEL <7.0%: ICD-10-PCS | Mod: CPTII,S$GLB,, | Performed by: HOSPITALIST

## 2023-04-27 PROCEDURE — 99999 PR PBB SHADOW E&M-EST. PATIENT-LVL III: CPT | Mod: PBBFAC,,, | Performed by: HOSPITALIST

## 2023-04-27 PROCEDURE — 3079F DIAST BP 80-89 MM HG: CPT | Mod: CPTII,S$GLB,, | Performed by: HOSPITALIST

## 2023-04-27 PROCEDURE — 3044F HG A1C LEVEL LT 7.0%: CPT | Mod: CPTII,S$GLB,, | Performed by: HOSPITALIST

## 2023-04-27 PROCEDURE — 99214 OFFICE O/P EST MOD 30 MIN: CPT | Mod: S$GLB,,, | Performed by: HOSPITALIST

## 2023-04-27 PROCEDURE — 3079F PR MOST RECENT DIASTOLIC BLOOD PRESSURE 80-89 MM HG: ICD-10-PCS | Mod: CPTII,S$GLB,, | Performed by: HOSPITALIST

## 2023-04-27 PROCEDURE — 3008F BODY MASS INDEX DOCD: CPT | Mod: CPTII,S$GLB,, | Performed by: HOSPITALIST

## 2023-04-27 NOTE — PROGRESS NOTES
Subjective:      Patient ID: Shireen Anton is a 53 y.o. female presented to [facility] on 4/27/2023.  Chief Complaint:  Thyroid Nodule    History of Present Illness: Shireen Anton is a 53 y.o. female here for Thyroid nodules  No other significant past medical history      Multiple Thyroid nodules:  - Presents with nodule that was diagnosed by ultrasound 2/22, Recently noticed  - Preexisting thyroid disease?: no  - Family history of thyroid cancer?: no   - Mom with hypothyroidism  - Sister with throat cancer  - FNA:  04/28/2022 Left Thyroid nodule:  Pathology: Benign    Compressive symptoms:  - Anterior neck pressure?: no  - Dysphagia?: no  - Voice changes?: no    Risk Factors:  - Radiation to head or neck for any treatment of cancer or exposure to radiation?: no  - Personal history of colon or breast cancer?: no  - Tobacco use?: no former smoker 20 years    Last ultrasound: 2/22/22  Right lobe 4.8 x 1.6 x 1.6 cm, isthmus 0.3 cm and left lobe 4.8 x 2.2 x 2.3 cm.  Multinodular goiter with partially cystic and complex nodules right lobe of 0.9, 0.7, 0.8 and 0.6 cm.    Left lobe other upper pole nodule 0.6 cm and complex predominately cystic major nodule left midpole 2.6 x 1.9 x 2.4 cm.     Impression:  Multinodular goiter with the major nodule, predominant cystic, left mid pole.  No strong indication for FNA evaluation of the left midpole complex nodule.    Lab Results   Component Value Date    TSH 0.379 (L) 02/16/2023    TSH 0.649 02/22/2022    TSH 0.474 05/02/2017    FREET4 1.13 02/16/2023    FREET4 0.95 02/22/2022    FREET4 1.52 (H) 05/02/2017     Reviewed past surgical, medical, family, social history and updated as appropriate.  Review of Systems: see HPI above    Objective:   /87 (BP Location: Right arm)   Pulse (!) 54   Temp 97.8 °F (36.6 °C) (Oral)   Wt 120.5 kg (265 lb 9.6 oz)   BMI 36.02 kg/m²     Body mass index is 36.02 kg/m².  Vital signs reviewed    Physical Exam  Vitals and  nursing note reviewed.   Constitutional:       General: She is not in acute distress.     Appearance: Normal appearance. She is well-developed. She is obese. She is not toxic-appearing.   Neck:      Thyroid: No thyromegaly.      Comments: Left side nodule  Cardiovascular:      Heart sounds: Normal heart sounds.   Pulmonary:      Effort: Pulmonary effort is normal. No respiratory distress.   Abdominal:      Tenderness: There is no abdominal tenderness.   Musculoskeletal:         General: No deformity. Normal range of motion.      Cervical back: Normal range of motion.   Skin:     Findings: No bruising.   Neurological:      Mental Status: She is alert and oriented to person, place, and time.   Psychiatric:         Mood and Affect: Mood normal.     Lab Reviewed:  See results in subjective  Lab Results   Component Value Date    HGBA1C 5.1 02/16/2023     Lab Results   Component Value Date    CHOL 181 02/16/2023    HDL 50 02/16/2023    LDLCALC 102.6 02/16/2023    TRIG 142 02/16/2023    CHOLHDL 27.6 02/16/2023     Lab Results   Component Value Date     02/16/2023    K 4.0 02/16/2023     02/16/2023    CO2 26 02/16/2023     02/16/2023    BUN 9 02/16/2023    CREATININE 0.8 02/16/2023    CALCIUM 9.2 02/16/2023    PHOS 2.5 (L) 01/25/2023    PROT 7.4 02/16/2023    ALBUMIN 3.7 02/16/2023    BILITOT 0.4 02/16/2023    ALKPHOS 86 02/16/2023    AST 47 (H) 02/16/2023    ALT 39 02/16/2023    ANIONGAP 9 02/16/2023    ESTGFRAFRICA >60 10/20/2021    EGFRNONAA >60 10/20/2021    TSH 0.379 (L) 02/16/2023    PTH 37.6 03/29/2020    SKJOOPFM70AU 28 (L) 03/29/2020     Assessment     1. Multiple thyroid nodules  US Soft Tissue Head Neck Thyroid    TSH    T4, Free    Thyroid Peroxidase Antibody      2. Vitamin D deficiency  Vitamin D        Plan     Multiple thyroid nodules  - US reviewed in clinic and reviewed with patient, noted large dominant left thyroid nodule.  Multiple subcentimeter right thyroid nodules  - patient did  have FNA of left large thyroid nodule:  4/22: Benign  - TSH reviewed: stable, reassuring  - we will repeat thyroid ultrasound for 2023 for monitoring  - will repeat TFTs and check antibodies given lower TSH   - Follow up in 1 year      Vitamin-D deficiency  - lab work show vitamin-D deficiency noted   - will repeat lab work for this year    Follow-up with me yearly or sooner if needed    Dann Ruano M.D.  Endocrinology  [facility]  4/27/2023      Disclaimer: This note has been generated in part with the use of voice-recognition software. There may be typographical errors that have been missed during proof-reading.

## 2023-05-11 ENCOUNTER — HOSPITAL ENCOUNTER (OUTPATIENT)
Dept: RADIOLOGY | Facility: HOSPITAL | Age: 53
Discharge: HOME | End: 2023-05-11
Attending: HOSPITALIST
Payer: COMMERCIAL

## 2023-05-11 DIAGNOSIS — E04.2 MULTIPLE THYROID NODULES: ICD-10-CM

## 2023-05-11 PROCEDURE — 76536 US SOFT TISSUE HEAD NECK THYROID: ICD-10-PCS | Mod: 26,,, | Performed by: RADIOLOGY

## 2023-05-11 PROCEDURE — 76536 US EXAM OF HEAD AND NECK: CPT | Mod: 26,,, | Performed by: RADIOLOGY

## 2023-05-11 PROCEDURE — 76536 US EXAM OF HEAD AND NECK: CPT | Mod: TC

## 2023-05-26 ENCOUNTER — LAB VISIT (OUTPATIENT)
Dept: LAB | Facility: HOSPITAL | Age: 53
End: 2023-05-26
Attending: NURSE PRACTITIONER
Payer: COMMERCIAL

## 2023-05-26 ENCOUNTER — OFFICE VISIT (OUTPATIENT)
Dept: INTERNAL MEDICINE | Facility: CLINIC | Age: 53
End: 2023-05-26
Payer: COMMERCIAL

## 2023-05-26 DIAGNOSIS — R30.0 DYSURIA: ICD-10-CM

## 2023-05-26 DIAGNOSIS — N30.00 ACUTE CYSTITIS WITHOUT HEMATURIA: Primary | ICD-10-CM

## 2023-05-26 LAB
BACTERIA #/AREA URNS HPF: ABNORMAL /HPF
BILIRUB UR QL STRIP: NEGATIVE
CLARITY UR: CLEAR
COLOR UR: YELLOW
GLUCOSE UR QL STRIP: NEGATIVE
HGB UR QL STRIP: ABNORMAL
KETONES UR QL STRIP: NEGATIVE
LEUKOCYTE ESTERASE UR QL STRIP: NEGATIVE
MICROSCOPIC COMMENT: ABNORMAL
NITRITE UR QL STRIP: NEGATIVE
PH UR STRIP: 6 [PH] (ref 5–8)
PROT UR QL STRIP: NEGATIVE
RBC #/AREA URNS HPF: 11 /HPF (ref 0–4)
SP GR UR STRIP: 1.01 (ref 1–1.03)
SQUAMOUS #/AREA URNS HPF: 1 /HPF
URN SPEC COLLECT METH UR: ABNORMAL
UROBILINOGEN UR STRIP-ACNC: NEGATIVE EU/DL
WBC #/AREA URNS HPF: 3 /HPF (ref 0–5)

## 2023-05-26 PROCEDURE — 3044F PR MOST RECENT HEMOGLOBIN A1C LEVEL <7.0%: ICD-10-PCS | Mod: CPTII,95,, | Performed by: NURSE PRACTITIONER

## 2023-05-26 PROCEDURE — 99213 PR OFFICE/OUTPT VISIT, EST, LEVL III, 20-29 MIN: ICD-10-PCS | Mod: 95,,, | Performed by: NURSE PRACTITIONER

## 2023-05-26 PROCEDURE — 81000 URINALYSIS NONAUTO W/SCOPE: CPT | Performed by: NURSE PRACTITIONER

## 2023-05-26 PROCEDURE — 3044F HG A1C LEVEL LT 7.0%: CPT | Mod: CPTII,95,, | Performed by: NURSE PRACTITIONER

## 2023-05-26 PROCEDURE — 99213 OFFICE O/P EST LOW 20 MIN: CPT | Mod: 95,,, | Performed by: NURSE PRACTITIONER

## 2023-05-26 RX ORDER — NITROFURANTOIN 25; 75 MG/1; MG/1
100 CAPSULE ORAL 2 TIMES DAILY
Qty: 14 CAPSULE | Refills: 0 | Status: SHIPPED | OUTPATIENT
Start: 2023-05-26 | End: 2024-02-06

## 2023-05-26 NOTE — PROGRESS NOTES
INTERNAL MEDICINE URGENT CARE NOTE    CHIEF COMPLAINT     Chief Complaint   Patient presents with    Urinary Tract Infection       HPI     Shireen Anton is a 53 y.o. female who presents for an urgent visit today.    The patient location is: home, [place]   The chief complaint leading to consultation is: UTI     Visit type: audiovisual    Face to Face time with patient: 10 minutes of total time spent on the encounter, which includes face to face time and non-face to face time preparing to see the patient (eg, review of tests), Obtaining and/or reviewing separately obtained history, Documenting clinical information in the electronic or other health record, Independently interpreting results (not separately reported) and communicating results to the patient/family/caregiver, or Care coordination (not separately reported).         Each patient to whom he or she provides medical services by telemedicine is:  (1) informed of the relationship between the physician and patient and the respective role of any other health care provider with respect to management of the patient; and (2) notified that he or she may decline to receive medical services by telemedicine and may withdraw from such care at any time.    Notes:      Here with c/o bladder infection - started a few days ago. +dysuria, hematuria, frequency and urgency     No fever or chill  No nausea or vomiting   No abd pain or lower back pain       Past Medical History:  Past Medical History:   Diagnosis Date    Allergy     Depression     3/24/22: denies feelings of current or history of self harm    Diverticulitis of colon 2020    GERD (gastroesophageal reflux disease)     History of acute renal failure 05/2017    received 1 month of hemodialysis    Loss or death of family member 5/31/2014    Multiple thyroid nodules     NSTEMI (non-ST elevated myocardial infarction)     Renal disorder     acute renal failure post gastric sleeve    Stroke     cerebellar stroke post gastric  sleeve       Home Medications:  Prior to Admission medications    Medication Sig Start Date End Date Taking? Authorizing Provider   aspirin (ECOTRIN) 81 MG EC tablet Take 1 tablet (81 mg total) by mouth once daily. 5/16/17 4/27/23  Cheo Wilks MD   calcium citrate (CALCITRATE) 200 mg (950 mg) tablet Take 1 tablet (200 mg total) by mouth 3 (three) times daily. 5/16/17 4/27/23  Cheo Wilks MD   cetirizine (ZYRTEC) 10 MG tablet Take 10 mg by mouth once daily.    Historical Provider   cyanocobalamin (VITAMIN B-12) 250 MCG tablet Take 1 tablet (250 mcg total) by mouth once daily. 5/16/17   Cheo Wilks MD   inulin (FIBER GUMMIES ORAL) Take by mouth.    Historical Provider   multivitamin capsule Take 1 capsule by mouth once daily.    Historical Provider   omeprazole (PRILOSEC) 20 MG capsule Take 20 mg by mouth once daily.  4/24/17   Historical Provider   PARAGARD T 380A 380 square mm IUD  9/25/17   Historical Provider   traZODone (DESYREL) 50 MG tablet Take 1 tablet (50 mg total) by mouth every evening. 2/17/23   Patricio Paul MD       Review of Systems:  Review of Systems   Constitutional:  Negative for chills.   Gastrointestinal:  Negative for constipation, nausea and vomiting.   Genitourinary:  Positive for dysuria, frequency, hematuria and urgency. Negative for flank pain.   Skin:  Negative for rash.     Health Maintainence:   Immunizations:  Health Maintenance         Date Due Completion Date    High Dose Statin Never done ---    Shingles Vaccine (1 of 2) Never done ---    Mammogram 01/05/2024 1/5/2023    Hemoglobin A1c (Diabetic Prevention Screening) 02/16/2026 2/16/2023    TETANUS VACCINE 08/23/2027 8/23/2017    Cervical Cancer Screening 01/11/2028 1/11/2023    Lipid Panel 02/16/2028 2/16/2023    Colorectal Cancer Screening 01/24/2033 1/24/2023             PHYSICAL EXAM     There were no vitals taken for this visit.    Physical Exam  Constitutional:       General: She is not in acute  distress.     Appearance: Normal appearance. She is not ill-appearing, toxic-appearing or diaphoretic.   Pulmonary:      Effort: No respiratory distress.   Neurological:      Mental Status: She is alert and oriented to person, place, and time.       LABS     Lab Results   Component Value Date    HGBA1C 5.1 02/16/2023     CMP  Sodium   Date Value Ref Range Status   02/16/2023 143 136 - 145 mmol/L Final     Potassium   Date Value Ref Range Status   02/16/2023 4.0 3.5 - 5.1 mmol/L Final     Chloride   Date Value Ref Range Status   02/16/2023 108 95 - 110 mmol/L Final     CO2   Date Value Ref Range Status   02/16/2023 26 23 - 29 mmol/L Final     Glucose   Date Value Ref Range Status   02/16/2023 102 70 - 110 mg/dL Final     BUN   Date Value Ref Range Status   02/16/2023 9 6 - 20 mg/dL Final     Creatinine   Date Value Ref Range Status   02/16/2023 0.8 0.5 - 1.4 mg/dL Final     Calcium   Date Value Ref Range Status   02/16/2023 9.2 8.7 - 10.5 mg/dL Final     Total Protein   Date Value Ref Range Status   02/16/2023 7.4 6.0 - 8.4 g/dL Final     Albumin   Date Value Ref Range Status   02/16/2023 3.7 3.5 - 5.2 g/dL Final     Total Bilirubin   Date Value Ref Range Status   02/16/2023 0.4 0.1 - 1.0 mg/dL Final     Comment:     For infants and newborns, interpretation of results should be based  on gestational age, weight and in agreement with clinical  observations.    Premature Infant recommended reference ranges:  Up to 24 hours.............<8.0 mg/dL  Up to 48 hours............<12.0 mg/dL  3-5 days..................<15.0 mg/dL  6-29 days.................<15.0 mg/dL       Alkaline Phosphatase   Date Value Ref Range Status   02/16/2023 86 55 - 135 U/L Final     AST   Date Value Ref Range Status   02/16/2023 47 (H) 10 - 40 U/L Final     ALT   Date Value Ref Range Status   02/16/2023 39 10 - 44 U/L Final     Anion Gap   Date Value Ref Range Status   02/16/2023 9 8 - 16 mmol/L Final     eGFR if    Date Value Ref  Range Status   10/20/2021 >60 >60 mL/min/1.73 m^2 Final     eGFR if non    Date Value Ref Range Status   10/20/2021 >60 >60 mL/min/1.73 m^2 Final     Comment:     Calculation used to obtain the estimated glomerular filtration  rate (eGFR) is the CKD-EPI equation.        Lab Results   Component Value Date    WBC 4.27 02/16/2023    HGB 12.7 02/16/2023    HCT 37.6 02/16/2023    MCV 89 02/16/2023     02/16/2023     Lab Results   Component Value Date    CHOL 181 02/16/2023    CHOL 125 05/03/2017    CHOL 136 05/02/2017     Lab Results   Component Value Date    HDL 50 02/16/2023    HDL 25 (L) 05/03/2017    HDL 29 (L) 05/02/2017     Lab Results   Component Value Date    LDLCALC 102.6 02/16/2023    LDLCALC 67.4 05/03/2017    LDLCALC 75.4 05/02/2017     Lab Results   Component Value Date    TRIG 142 02/16/2023    TRIG 163 (H) 05/03/2017    TRIG 158 (H) 05/02/2017     Lab Results   Component Value Date    CHOLHDL 27.6 02/16/2023    CHOLHDL 20.0 05/03/2017    CHOLHDL 21.3 05/02/2017     Lab Results   Component Value Date    TSH 0.379 (L) 02/16/2023       ASSESSMENT/PLAN     Shireen Anton is a 53 y.o. female     Acute cystitis without hematuria/Dysuria- will send u/a for C&S. Start macrobid bid x 1 week   -     Urinalysis, Reflex to Urine Culture Urine, Clean Catch; Future; Expected date: 05/26/2023  -     nitrofurantoin, macrocrystal-monohydrate, (MACROBID) 100 MG capsule; Take 1 capsule (100 mg total) by mouth 2 (two) times daily.  Dispense: 14 capsule; Refill: 0           Follow up with PCP    Patient education provided from Cynthia. Patient was counseled on when and how to seek emergent care.       Bonnie Alva-Damien NICOLE, APRN, FNP-c   Department of Internal Medicine - [facility]  7:41 AM  Answers submitted by the patient for this visit:  Painful Urination Questionnaire (Submitted on 5/25/2023)  Chief Complaint: Dysuria  Chronicity: recurrent  Onset: in the past 7 days  Frequency: every  urination  Progression since onset: gradually worsening  Pain quality: burning  Pain - numeric: 7/10  Fever: no fever  Sexually active?: Yes  History of pyelonephritis?: Yes  discharge: No  hesitancy: No  possible pregnancy: No  sweats: No  weight loss: No  withholding: No  behavior changes: No  Treatments tried: acetaminophen, increased fluids  Improvement on treatment: no relief  Pain severity: moderate  catheterization: No  diabetes insipidus: No  diabetes mellitus: No  genitourinary reflux: No  hypertension: No  recurrent UTIs: Yes  single kidney: No  STD: No  urinary stasis: No  urological procedure: No  kidney stones: Yes

## 2023-08-22 ENCOUNTER — LAB VISIT (OUTPATIENT)
Dept: LAB | Facility: HOSPITAL | Age: 53
End: 2023-08-22
Attending: HOSPITALIST
Payer: COMMERCIAL

## 2023-08-22 DIAGNOSIS — E55.9 VITAMIN D DEFICIENCY: ICD-10-CM

## 2023-08-22 DIAGNOSIS — E04.2 MULTIPLE THYROID NODULES: ICD-10-CM

## 2023-08-22 LAB
25(OH)D3+25(OH)D2 SERPL-MCNC: 19 NG/ML (ref 30–96)
T4 FREE SERPL-MCNC: 0.98 NG/DL (ref 0.71–1.51)
THYROPEROXIDASE IGG SERPL-ACNC: <6 IU/ML
TSH SERPL DL<=0.005 MIU/L-ACNC: 1.12 UIU/ML (ref 0.4–4)

## 2023-08-22 PROCEDURE — 84443 ASSAY THYROID STIM HORMONE: CPT | Performed by: HOSPITALIST

## 2023-08-22 PROCEDURE — 84439 ASSAY OF FREE THYROXINE: CPT | Performed by: HOSPITALIST

## 2023-08-22 PROCEDURE — 36415 COLL VENOUS BLD VENIPUNCTURE: CPT | Mod: PN | Performed by: HOSPITALIST

## 2023-08-22 PROCEDURE — 82306 VITAMIN D 25 HYDROXY: CPT | Performed by: HOSPITALIST

## 2023-08-22 PROCEDURE — 86376 MICROSOMAL ANTIBODY EACH: CPT | Performed by: HOSPITALIST

## 2023-08-23 ENCOUNTER — PATIENT MESSAGE (OUTPATIENT)
Dept: ENDOCRINOLOGY | Facility: CLINIC | Age: 53
End: 2023-08-23
Payer: COMMERCIAL

## 2024-01-12 ENCOUNTER — HOSPITAL ENCOUNTER (OUTPATIENT)
Dept: RADIOLOGY | Facility: HOSPITAL | Age: 54
Discharge: HOME | End: 2024-01-12
Attending: FAMILY MEDICINE
Payer: COMMERCIAL

## 2024-01-12 DIAGNOSIS — Z12.31 ENCOUNTER FOR SCREENING MAMMOGRAM FOR BREAST CANCER: ICD-10-CM

## 2024-01-12 PROCEDURE — 77067 SCR MAMMO BI INCL CAD: CPT | Mod: TC

## 2024-01-12 PROCEDURE — 77063 BREAST TOMOSYNTHESIS BI: CPT | Mod: 26,,, | Performed by: RADIOLOGY

## 2024-01-12 PROCEDURE — 77067 SCR MAMMO BI INCL CAD: CPT | Mod: 26,,, | Performed by: RADIOLOGY

## 2024-01-24 ENCOUNTER — PATIENT MESSAGE (OUTPATIENT)
Dept: OBSTETRICS AND GYNECOLOGY | Facility: CLINIC | Age: 54
End: 2024-01-24
Payer: COMMERCIAL

## 2024-02-06 ENCOUNTER — OFFICE VISIT (OUTPATIENT)
Dept: OBSTETRICS AND GYNECOLOGY | Facility: CLINIC | Age: 54
End: 2024-02-06
Payer: COMMERCIAL

## 2024-02-06 ENCOUNTER — PATIENT MESSAGE (OUTPATIENT)
Dept: FAMILY MEDICINE | Facility: CLINIC | Age: 54
End: 2024-02-06
Payer: COMMERCIAL

## 2024-02-06 VITALS
SYSTOLIC BLOOD PRESSURE: 104 MMHG | HEIGHT: 72 IN | DIASTOLIC BLOOD PRESSURE: 66 MMHG | BODY MASS INDEX: 37.31 KG/M2 | WEIGHT: 275.44 LBS

## 2024-02-06 DIAGNOSIS — Z00.00 WELL WOMAN EXAM WITHOUT GYNECOLOGICAL EXAM: Primary | ICD-10-CM

## 2024-02-06 DIAGNOSIS — Z01.419 WELL WOMAN EXAM WITH ROUTINE GYNECOLOGICAL EXAM: Primary | ICD-10-CM

## 2024-02-06 DIAGNOSIS — Z91.89 AT RISK FOR BREAST CANCER: ICD-10-CM

## 2024-02-06 DIAGNOSIS — N30.90 CYSTITIS: ICD-10-CM

## 2024-02-06 DIAGNOSIS — N95.0 PMB (POSTMENOPAUSAL BLEEDING): ICD-10-CM

## 2024-02-06 PROCEDURE — 99999 PR PBB SHADOW E&M-EST. PATIENT-LVL IV: CPT | Mod: PBBFAC,,, | Performed by: OBSTETRICS & GYNECOLOGY

## 2024-02-06 PROCEDURE — 3078F DIAST BP <80 MM HG: CPT | Mod: CPTII,S$GLB,, | Performed by: OBSTETRICS & GYNECOLOGY

## 2024-02-06 PROCEDURE — 3074F SYST BP LT 130 MM HG: CPT | Mod: CPTII,S$GLB,, | Performed by: OBSTETRICS & GYNECOLOGY

## 2024-02-06 PROCEDURE — 1159F MED LIST DOCD IN RCRD: CPT | Mod: CPTII,S$GLB,, | Performed by: OBSTETRICS & GYNECOLOGY

## 2024-02-06 PROCEDURE — 99396 PREV VISIT EST AGE 40-64: CPT | Mod: S$GLB,,, | Performed by: OBSTETRICS & GYNECOLOGY

## 2024-02-06 PROCEDURE — 3008F BODY MASS INDEX DOCD: CPT | Mod: CPTII,S$GLB,, | Performed by: OBSTETRICS & GYNECOLOGY

## 2024-02-06 PROCEDURE — 1160F RVW MEDS BY RX/DR IN RCRD: CPT | Mod: CPTII,S$GLB,, | Performed by: OBSTETRICS & GYNECOLOGY

## 2024-02-06 RX ORDER — SULFAMETHOXAZOLE AND TRIMETHOPRIM 800; 160 MG/1; MG/1
1 TABLET ORAL 2 TIMES DAILY
Qty: 10 TABLET | Refills: 0 | Status: SHIPPED | OUTPATIENT
Start: 2024-02-06 | End: 2024-02-11

## 2024-02-06 NOTE — PROGRESS NOTES
[facility]  Ambulatory Clinic  Obstetrics & Gynecology    Visit Date:  2024    Chief Complaint:  Annual GYN exam, bladder discomfort, postmenopausal bleeding    History of Present Illness:      Shireen Anton is a 54 y.o.  here for a gynecologic exam with c/o bladder discomfort.      Pt reports menopause ~14 month ago.    Pt reports one episode of postmenopausal bleeding in 2024.    Patient's last menstrual period was 2024.    Pt current method of family planning is copper IUD and reports no problems with this method.      Last pap ~2023 as benign.    Last mammo ~2024 was benign; however, Tyrer-Cuzick risk assessment model is 26.7%, pt reports grandmother with breast cancer.    Pt denies vaginal discharge, dyspareunia, pelvic pain, bloating, early satiety, unintentional weight loss, breast mass/skin changes.      Otherwise, the pt is in her usual state of health.    Past History:  Gynecologic history as noted above.    Review of Systems:      GENERAL:  No fever, fatigue, excessive weight gain or loss  HEENT:  No headaches, hearing changes, visual disturbance  RESPIRATORY:  No cough, shortness of breath  CARDIOVASCULAR:  No chest pain, heart palpitations, leg swelling  BREAST:  No lump, pain, nipple discharge, skin changes  GASTROINTESTINAL:  No nausea, vomiting, constipation, diarrhea, abd pain, rectal bleeding   GENITOURINARY:  See HPI  ENDOCRINE:  No heat or cold intolerance  HEMATOLOGIC:  No easy bruisability or bleeding   LYMPHATICS:  No enlarged nodes  MUSCULOSKELETAL:  No acute joint pain or swelling  SKIN:  No rash, lesions, jaundice  NEUROLOGIC:  No dizziness, weakness, syncope  PSYCHIATRIC:  No significant mood changes, homicidal/suicidal ideations, abuse    Physical Exam:     /66   Ht 6' (1.829 m)   Wt 125 kg (275 lb 7.4 oz)   LMP 2024   BMI 37.36 kg/m²   Pulse 60's, Resp rate 14     GENERAL:  No acute distress, well-nourished  HEENT:   Atraumatic, anicteric, moist mucus membranes. Neck supple w/o masses.  BREAST:  Symmetric, nontender, no obvious masses, adenopathy, skin changes or nipple discharge.  LUNGS:  Clear normal respiratory effort  HEART:  Regular rate and rhythm  ABDOMEN:  Soft, non-tender, non-distended, normoactive bowel sounds, no obvious organomegaly  EXT:  Symmetric w/o cramping, claudication, or edema. +2 distal pulses.  SKIN:  No rashes or bruising  PSYCH:  Mood and affect appropriate  NEURO:  Grossly intact bilaterally    GENITOURINARY:    VULVAR:  Female external genitalia w/o obvious lesions. Female hair distribution. Normal urethral meatus. No gross lymphadenopathy.    VAGINA:  Normal vaginal mucosa. Good support. No obvious lesion. No discharge.  CERVIX:  No cervical motion tenderness, discharge, or obvious lesions. IUD string visualized ~2 cm from Os.  UTERUS:  Small, non-tender, normal contour  ADNEXA:  No masses, non-tender    RECTAL:  Deferred. No obvious external lesions    Chaperone present for exam.    Urine dip:  2024 +1LE, +1 nitrite    Assessment:     54 y.o.  with copper IUD:    Well woman gynecologic exam    A gynecologic health assessment was performed with age appropriate counseling  Cervical cancer screening - pap up to date  Encourage healthy lifestyle modifications  Monthly self breast exams  F/u with PCP for health maintenance    Postmenopausal bleeding    We discussed PMB including benign and malignant etiology.    Order pelvic u/s to evaluate endometrial stripe.    Endometrial biopsy as clinically indicated.   Order FSH to assess menopausal status.   Bleeding, pelvic precautions.      Cystitis, uncomplicated    Empiric antibx therapy with Bactrim DS bid x 3-5 days for UTI.    Increase fluids.    Phenazopyridine (Azo OTC) for bladder discomfort.    UTI precautions and hygiene advice.    Elevated breast cancer risk score     Screening mammogram up to date  Refer to breast clinic for additional  counseling/testing    Copper IUD check    Continue Copper IUD  Risks, benefits, and alternatives to Copper reviewed  Copper IUD will need to be removed 10 yrs from insertion date or sooner prn  Self string check prn  Discussed safe sex    Return 1 year for gynecologic exam or sooner as needed pending pelvic US results.    All questions answered, pt voiced understanding.        Antione Jung MD

## 2024-02-09 ENCOUNTER — HOSPITAL ENCOUNTER (OUTPATIENT)
Dept: RADIOLOGY | Facility: HOSPITAL | Age: 54
Discharge: HOME | End: 2024-02-09
Attending: OBSTETRICS & GYNECOLOGY
Payer: COMMERCIAL

## 2024-02-09 DIAGNOSIS — N95.0 PMB (POSTMENOPAUSAL BLEEDING): ICD-10-CM

## 2024-02-09 PROCEDURE — 76830 TRANSVAGINAL US NON-OB: CPT | Mod: 26,,, | Performed by: RADIOLOGY

## 2024-02-09 PROCEDURE — 76856 US EXAM PELVIC COMPLETE: CPT | Mod: 26,,, | Performed by: RADIOLOGY

## 2024-02-09 PROCEDURE — 76830 TRANSVAGINAL US NON-OB: CPT | Mod: TC

## 2024-02-12 ENCOUNTER — TELEPHONE (OUTPATIENT)
Dept: FAMILY MEDICINE | Facility: CLINIC | Age: 54
End: 2024-02-12
Payer: COMMERCIAL

## 2024-02-14 ENCOUNTER — OFFICE VISIT (OUTPATIENT)
Dept: FAMILY MEDICINE | Facility: CLINIC | Age: 54
End: 2024-02-14
Payer: COMMERCIAL

## 2024-02-14 VITALS
OXYGEN SATURATION: 96 % | HEART RATE: 61 BPM | WEIGHT: 276.13 LBS | RESPIRATION RATE: 18 BRPM | SYSTOLIC BLOOD PRESSURE: 110 MMHG | HEIGHT: 72 IN | TEMPERATURE: 98 F | BODY MASS INDEX: 37.4 KG/M2 | DIASTOLIC BLOOD PRESSURE: 74 MMHG

## 2024-02-14 DIAGNOSIS — Z00.00 WELL WOMAN EXAM WITHOUT GYNECOLOGICAL EXAM: Primary | ICD-10-CM

## 2024-02-14 DIAGNOSIS — F41.9 ANXIETY: ICD-10-CM

## 2024-02-14 DIAGNOSIS — K21.9 GASTROESOPHAGEAL REFLUX DISEASE, UNSPECIFIED WHETHER ESOPHAGITIS PRESENT: ICD-10-CM

## 2024-02-14 DIAGNOSIS — E66.01 MORBID OBESITY DUE TO EXCESS CALORIES: ICD-10-CM

## 2024-02-14 DIAGNOSIS — G47.00 INSOMNIA, UNSPECIFIED TYPE: ICD-10-CM

## 2024-02-14 DIAGNOSIS — Z86.73 HISTORY OF CVA IN ADULTHOOD: ICD-10-CM

## 2024-02-14 DIAGNOSIS — R31.9 HEMATURIA, UNSPECIFIED TYPE: ICD-10-CM

## 2024-02-14 DIAGNOSIS — N39.0 RECURRENT UTI: ICD-10-CM

## 2024-02-14 PROCEDURE — 3074F SYST BP LT 130 MM HG: CPT | Mod: CPTII,S$GLB,, | Performed by: FAMILY MEDICINE

## 2024-02-14 PROCEDURE — 3078F DIAST BP <80 MM HG: CPT | Mod: CPTII,S$GLB,, | Performed by: FAMILY MEDICINE

## 2024-02-14 PROCEDURE — 99396 PREV VISIT EST AGE 40-64: CPT | Mod: S$GLB,,, | Performed by: FAMILY MEDICINE

## 2024-02-14 PROCEDURE — 1159F MED LIST DOCD IN RCRD: CPT | Mod: CPTII,S$GLB,, | Performed by: FAMILY MEDICINE

## 2024-02-14 PROCEDURE — 3008F BODY MASS INDEX DOCD: CPT | Mod: CPTII,S$GLB,, | Performed by: FAMILY MEDICINE

## 2024-02-14 PROCEDURE — 1160F RVW MEDS BY RX/DR IN RCRD: CPT | Mod: CPTII,S$GLB,, | Performed by: FAMILY MEDICINE

## 2024-02-14 PROCEDURE — 99999 PR PBB SHADOW E&M-EST. PATIENT-LVL IV: CPT | Mod: PBBFAC,,, | Performed by: FAMILY MEDICINE

## 2024-02-14 PROCEDURE — 3044F HG A1C LEVEL LT 7.0%: CPT | Mod: CPTII,S$GLB,, | Performed by: FAMILY MEDICINE

## 2024-02-14 RX ORDER — TRAZODONE HYDROCHLORIDE 50 MG/1
50 TABLET ORAL NIGHTLY
Qty: 30 TABLET | Refills: 0 | Status: SHIPPED | OUTPATIENT
Start: 2024-02-14

## 2024-02-14 RX ORDER — OMEPRAZOLE 20 MG/1
20 CAPSULE, DELAYED RELEASE ORAL DAILY
Qty: 90 CAPSULE | Refills: 1 | Status: SHIPPED | OUTPATIENT
Start: 2024-02-14

## 2024-02-14 NOTE — PROGRESS NOTES
"Well Woman VISIT      CHIEF COMPLAINT  Chief Complaint   Patient presents with    Follow-up    Medication Refill       HPI  Shireen Anton is a 54 y.o. female who presents for physical.     Social Factors  Tobacco use: No  Ready to Quit: No  Alcohol: Yes rarely  Intimate partner violence screening  "Do you feel safe in your current relationship?" Yes   "Have you ever been in a relationship in which your partner frightened you or hurt you?" No  Living Will/POA: No  Regular Exercise: No    Depression  Over the past two weeks, have you felt down, depressed, or hopeless? No  Over the past two weeks, have you felt little interest or pleasure in doing things? No    Reproductive Health  Followed by OBGYN    CHD, HTN, DM2  CHD Risk Factors: obesity (BMI >= 30 kg/m2) and recently had CVA and MI  Women 45 years and older should be screened for dyslipidemia if at increased risk of CHD  Women 20 to 45 years of age should be screened for dyslipidemia if at increased risk of CHD  Asymptomatic adults with sustained blood pressure greater than 135/80 mm Hg (treated or untreated) should be screened for type 2 diabetes mellitus    Estimated body mass index is 37.45 kg/m² as calculated from the following:    Height as of this encounter: 6' (1.829 m).    Weight as of this encounter: 125.3 kg (276 lb 2 oz).      Screening  Mammogram needed: up to date  Colonoscopy needed: up to date  Osteoporosis screen needed: n/a     Women 50 to 74 years of age should be screened for breast cancer with mammography biennially.  Women should be screened for cervical cancer with Pap tests beginning at 21 years of age. Low-risk women should receive Pap testing every three years. Co-testing for human papillomavirus is an option beginning at 30 years of age, and can extend the screening interval to five years. Cervical cancer screening should be discontinued at 65 years of age or after total hysterectomy if the woman has a benign gynecologic " history  Adults 50 to 75 years of age should be screened for colorectal cancer with an FOBT annually, sigmoidoscopy every five years with an FOBT every three years, or colonoscopy every 10 years.  Women 65 years and older should be screened for osteoporosis. Women younger than 65 years should be screened if the risk of fracture is greater than or equal to that of a 65-year-old white woman without additional risk factors.      Immunizations  delayed    ALLERGIES and MEDS were verified.   PMHx, PSHx, FHx, SOCIALHx were updated as pertinent.    REVIEW OF SYSTEMS  Review of Systems   Constitutional: Negative.    HENT:  Negative for hearing loss.    Eyes:  Negative for discharge.   Respiratory:  Negative for wheezing.    Cardiovascular:  Negative for chest pain and palpitations.   Gastrointestinal:  Negative for blood in stool, constipation, diarrhea and vomiting.   Genitourinary:  Negative for dysuria and hematuria.   Musculoskeletal:  Negative for neck pain.   Skin: Negative.    Neurological: Negative.  Negative for weakness and headaches.   Endo/Heme/Allergies:  Negative for polydipsia.         PHYSICAL EXAM  VITAL SIGNS: /74   Pulse 61   Temp 98.1 °F (36.7 °C) (Oral)   Resp 18   Ht 6' (1.829 m)   Wt 125.3 kg (276 lb 2 oz)   LMP 01/23/2024 (Exact Date)   SpO2 96%   BMI 37.45 kg/m²   GEN: Well developed, Well nourished, No acute distress.  HENT: Normocephalic, Atraumatic, Bilateral external ears normal, Nose normal, Oropharynx moist, No oral exudates.   Eyes: PERRL, EOMI, Conjunctiva normal, No discharge.   Neck: Supple, No tenderness.  Lymphatic: No cervical or supraclavicular lymphadenopathy noted.   Cardiovascular: Normal heart rate, Normal rhythm, No murmurs, No rubs, No gallops.   Thorax & Lungs: Normal breath sounds, No respiratory distress, No wheezing.  Abdomen: Soft, No tenderness, Bowel sounds normal.  Breast: deferred  Genital: deferred  Skin: Warm, Dry, No erythema, No rash.   Extremities: No  edema, No tenderness.       ASSESSMENT/PLAN    Shireen was seen today for follow-up and medication refill.    Diagnoses and all orders for this visit:    Well woman exam without gynecological exam  - Labs up to date     Insomnia, unspecified type  -     traZODone (DESYREL) 50 MG tablet; Take 1 tablet (50 mg total) by mouth every evening.    Gastroesophageal reflux disease, unspecified whether esophagitis present  -     omeprazole (PRILOSEC) 20 MG capsule; Take 1 capsule (20 mg total) by mouth once daily.    History of CVA in adulthood  - No deficits    Anxiety  - Stable at this time  -  No medication adjustments needed    Recurrent UTI  -     Cancel: Urine culture; Future  -     Cancel: Urinalysis; Future  -     Urinalysis; Future  -     Urine culture; Future  - Recently treated for UTI and asymptomatic  - Urine labs ordered should symptoms recur    Hematuria, unspecified type  - stable    Morbid obesity due to excess calories  - Encouraged diet modification  - She will check to see if insurance covers GLP1's for weight loss         FOLLOW UP: 1 year or sooner if needed      Jeancarlos Zamora MD

## 2024-02-15 ENCOUNTER — PATIENT MESSAGE (OUTPATIENT)
Dept: OBSTETRICS AND GYNECOLOGY | Facility: CLINIC | Age: 54
End: 2024-02-15
Payer: COMMERCIAL

## 2024-02-19 ENCOUNTER — PATIENT MESSAGE (OUTPATIENT)
Dept: FAMILY MEDICINE | Facility: CLINIC | Age: 54
End: 2024-02-19
Payer: COMMERCIAL

## 2024-02-19 DIAGNOSIS — E66.01 MORBID OBESITY DUE TO EXCESS CALORIES: ICD-10-CM

## 2024-02-19 DIAGNOSIS — E66.01 CLASS 2 SEVERE OBESITY DUE TO EXCESS CALORIES WITH SERIOUS COMORBIDITY AND BODY MASS INDEX (BMI) OF 37.0 TO 37.9 IN ADULT: Primary | ICD-10-CM

## 2024-02-19 RX ORDER — TIRZEPATIDE 2.5 MG/.5ML
2.5 INJECTION, SOLUTION SUBCUTANEOUS
Qty: 4 PEN | Refills: 0 | Status: SHIPPED | OUTPATIENT
Start: 2024-02-19

## 2024-02-19 NOTE — TELEPHONE ENCOUNTER
Called pt.  Discussed. FSH and pelvic US results.  FSH is in menopause range.  Pelvic showed no acute findings.  Endometrial stripe of 4 mm.  Pt denies any new vaginal bleeding.  Discussed need for endometrial biopsy if pt has any new bleeding, pt advised to call office.  Postmenopausal bleeding precautions.  Discussed small cyst on left ovary suspect physiologic.  Pt OK with expectant management at this time.  All questions answered, voiced understanding.

## 2024-06-10 ENCOUNTER — OFFICE VISIT (OUTPATIENT)
Dept: ENDOCRINOLOGY | Facility: CLINIC | Age: 54
End: 2024-06-10
Payer: COMMERCIAL

## 2024-06-10 VITALS
BODY MASS INDEX: 36.97 KG/M2 | WEIGHT: 272.63 LBS | DIASTOLIC BLOOD PRESSURE: 80 MMHG | SYSTOLIC BLOOD PRESSURE: 130 MMHG | HEART RATE: 73 BPM

## 2024-06-10 DIAGNOSIS — E04.2 MULTIPLE THYROID NODULES: Primary | ICD-10-CM

## 2024-06-10 DIAGNOSIS — E66.09 CLASS 2 OBESITY DUE TO EXCESS CALORIES WITHOUT SERIOUS COMORBIDITY WITH BODY MASS INDEX (BMI) OF 36.0 TO 36.9 IN ADULT: ICD-10-CM

## 2024-06-10 PROBLEM — E66.812 CLASS 2 OBESITY DUE TO EXCESS CALORIES WITHOUT SERIOUS COMORBIDITY WITH BODY MASS INDEX (BMI) OF 36.0 TO 36.9 IN ADULT: Status: ACTIVE | Noted: 2024-06-10

## 2024-06-10 PROCEDURE — 1159F MED LIST DOCD IN RCRD: CPT | Mod: CPTII,S$GLB,, | Performed by: HOSPITALIST

## 2024-06-10 PROCEDURE — 3079F DIAST BP 80-89 MM HG: CPT | Mod: CPTII,S$GLB,, | Performed by: HOSPITALIST

## 2024-06-10 PROCEDURE — 1160F RVW MEDS BY RX/DR IN RCRD: CPT | Mod: CPTII,S$GLB,, | Performed by: HOSPITALIST

## 2024-06-10 PROCEDURE — 3008F BODY MASS INDEX DOCD: CPT | Mod: CPTII,S$GLB,, | Performed by: HOSPITALIST

## 2024-06-10 PROCEDURE — 99214 OFFICE O/P EST MOD 30 MIN: CPT | Mod: S$GLB,,, | Performed by: HOSPITALIST

## 2024-06-10 PROCEDURE — 3075F SYST BP GE 130 - 139MM HG: CPT | Mod: CPTII,S$GLB,, | Performed by: HOSPITALIST

## 2024-06-10 PROCEDURE — 3044F HG A1C LEVEL LT 7.0%: CPT | Mod: CPTII,S$GLB,, | Performed by: HOSPITALIST

## 2024-06-10 PROCEDURE — 99999 PR PBB SHADOW E&M-EST. PATIENT-LVL III: CPT | Mod: PBBFAC,,, | Performed by: HOSPITALIST

## 2024-06-10 NOTE — ASSESSMENT & PLAN NOTE
- US reviewed in clinic and reviewed with patient, noted large dominant left thyroid nodule.  Multiple subcentimeter right thyroid nodules  - patient did have FNA of left large thyroid nodule:  4/22: Benign  - TSH reviewed: stable, reassuring 2024  - we will repeat thyroid ultrasound for 2024 for monitoring  - Follow up in 1 year

## 2024-06-10 NOTE — ASSESSMENT & PLAN NOTE
- Body mass index is 36.97 kg/m².  - Encourage pt to work on healthy diet, increase exercise as tolerated.  - [facility]

## 2024-06-10 NOTE — PROGRESS NOTES
Subjective:      Patient ID: Shireen Anton is a 54 y.o. female presented to [facility] on 6/10/2024.  Chief Complaint:  Thyroid Nodule    History of Present Illness: Shireen Anton is a 54 y.o. female here for Thyroid nodules  No other significant past medical history: obesity    Interval history: Here for follow-up multiple thyroid nodules.  Denies compressive symptoms.  Unable to get Zepbound due to insurance coverage.    Multiple Thyroid nodules:  - Presents with nodule that was diagnosed by ultrasound 2/22, Recently noticed  - Preexisting thyroid disease?: no  - Family history of thyroid cancer?: no   - Mom with hypothyroidism  - Sister with throat cancer  - patient with left mid 2.6 cm spongiform nodule  - FNA:  04/28/2022 Left Thyroid nodule:  Pathology: Benign    Compressive symptoms:  - Anterior neck pressure?: no  - Dysphagia?: no  - Voice changes?: no    Risk Factors:  - Radiation to head or neck for any treatment of cancer or exposure to radiation?: no  - Personal history of colon or breast cancer?: no  - Tobacco use?: no former smoker 20 years    Last ultrasound:   2/22/2022  Right lobe 4.8 x 1.6 x 1.6 cm, isthmus 0.3 cm and left lobe 4.8 x 2.2 x 2.3 cm.  Multinodular goiter with partially cystic and complex nodules right lobe of 0.9, 0.7, 0.8 and 0.6 cm.    Left lobe other upper pole nodule 0.6 cm and complex predominately cystic major nodule left midpole 2.6 x 1.9 x 2.4 cm.     Impression:  Multinodular goiter with the major nodule, predominant cystic, left mid pole.  No strong indication for FNA evaluation of the left midpole complex nodule.    Thyroid lab work  Lab Results   Component Value Date    TSH 1.194 02/09/2024    TSH 1.117 08/22/2023    TSH 0.379 (L) 02/16/2023    FREET4 0.98 08/22/2023    FREET4 1.13 02/16/2023    FREET4 0.95 02/22/2022      Antibodies  Lab Results   Component Value Date    THYROPEROXID <6.0 08/22/2023      Reviewed past surgical, medical, family, social  history and updated as appropriate.  Review of Systems: see HPI above    Objective:   /80   Pulse 73   Wt 123.7 kg (272 lb 9.6 oz)   BMI 36.97 kg/m²     Body mass index is 36.97 kg/m².  Vital signs reviewed    Physical Exam  Vitals and nursing note reviewed.   Constitutional:       General: She is not in acute distress.     Appearance: Normal appearance. She is well-developed. She is obese. She is not toxic-appearing.   Neck:      Thyroid: No thyromegaly.      Comments: Left side nodule  Cardiovascular:      Heart sounds: Normal heart sounds.   Pulmonary:      Effort: Pulmonary effort is normal. No respiratory distress.   Abdominal:      Tenderness: There is no abdominal tenderness.   Musculoskeletal:         General: No deformity. Normal range of motion.      Cervical back: Normal range of motion.   Skin:     Findings: No bruising.   Neurological:      Mental Status: She is alert and oriented to person, place, and time.   Psychiatric:         Mood and Affect: Mood normal.       Lab Reviewed:  See results in subjective  Lab Results   Component Value Date    HGBA1C 4.9 02/09/2024     Lab Results   Component Value Date    CHOL 187 02/09/2024    HDL 57 02/09/2024    LDLCALC 101.6 02/09/2024    TRIG 142 02/09/2024    CHOLHDL 30.5 02/09/2024     Lab Results   Component Value Date     02/09/2024    K 4.0 02/09/2024     02/09/2024    CO2 21 (L) 02/09/2024    GLU 89 02/09/2024    BUN 13 02/09/2024    CREATININE 0.9 02/09/2024    CALCIUM 9.5 02/09/2024    PHOS 2.5 (L) 01/25/2023    PROT 7.1 02/09/2024    ALBUMIN 3.8 02/09/2024    BILITOT 0.8 02/09/2024    ALKPHOS 73 02/09/2024    AST 33 02/09/2024    ALT 30 02/09/2024    ANIONGAP 9 02/09/2024    ESTGFRAFRICA >60 10/20/2021    EGFRNONAA >60 10/20/2021    TSH 1.194 02/09/2024    PTH 37.6 03/29/2020    [id number] 19 (L) 08/22/2023     Assessment     1. Multiple thyroid nodules  US Soft Tissue Head Neck      2. Class 2 obesity due to excess calories  without serious comorbidity with body mass index (BMI) of 36.0 to 36.9 in adult           Plan     Multiple thyroid nodules  - US reviewed in clinic and reviewed with patient, noted large dominant left thyroid nodule.  Multiple subcentimeter right thyroid nodules  - patient did have FNA of left large thyroid nodule:  4/22: Benign  - TSH reviewed: stable, reassuring 2024  - we will repeat thyroid ultrasound for 2024 for monitoring  - Follow up in 1 year    Class 2 obesity due to excess calories without serious comorbidity with body mass index (BMI) of 36.0 to 36.9 in adult  - Body mass index is 36.97 kg/m².  - Encourage pt to work on healthy diet, increase exercise as tolerated.  - [facility] weight loss program info given    Follow-up with me yearly or sooner if needed    Dann Ruano M.D.  Endocrinology  [facility]  6/10/2024      Disclaimer: This note has been generated in part with the use of voice-recognition software. There may be typographical errors that have been missed during proof-reading.

## 2024-06-20 ENCOUNTER — HOSPITAL ENCOUNTER (OUTPATIENT)
Dept: RADIOLOGY | Facility: HOSPITAL | Age: 54
Discharge: HOME | End: 2024-06-20
Attending: HOSPITALIST
Payer: COMMERCIAL

## 2024-06-20 DIAGNOSIS — E04.2 MULTIPLE THYROID NODULES: ICD-10-CM

## 2024-06-20 PROCEDURE — 76536 US EXAM OF HEAD AND NECK: CPT | Mod: TC

## 2024-06-20 PROCEDURE — 76536 US EXAM OF HEAD AND NECK: CPT | Mod: 26,,, | Performed by: RADIOLOGY

## 2024-07-19 ENCOUNTER — OFFICE VISIT (OUTPATIENT)
Dept: INTERNAL MEDICINE | Facility: CLINIC | Age: 54
End: 2024-07-19
Payer: COMMERCIAL

## 2024-07-19 ENCOUNTER — PATIENT MESSAGE (OUTPATIENT)
Dept: INTERNAL MEDICINE | Facility: CLINIC | Age: 54
End: 2024-07-19

## 2024-07-19 DIAGNOSIS — E66.09 CLASS 2 OBESITY DUE TO EXCESS CALORIES WITHOUT SERIOUS COMORBIDITY WITH BODY MASS INDEX (BMI) OF 36.0 TO 36.9 IN ADULT: Primary | ICD-10-CM

## 2024-07-19 PROCEDURE — 99499 UNLISTED E&M SERVICE: CPT | Mod: 95,,,

## 2024-07-19 RX ORDER — SEMAGLUTIDE 1 MG/.5ML
1 INJECTION, SOLUTION SUBCUTANEOUS
Qty: 2 ML | Refills: 0 | Status: ACTIVE | OUTPATIENT
Start: 2024-09-13

## 2024-07-19 RX ORDER — SEMAGLUTIDE 0.25 MG/.5ML
0.25 INJECTION, SOLUTION SUBCUTANEOUS
Qty: 2 ML | Refills: 0 | Status: ACTIVE | OUTPATIENT
Start: 2024-07-19

## 2024-07-19 RX ORDER — SEMAGLUTIDE 0.5 MG/.5ML
0.5 INJECTION, SOLUTION SUBCUTANEOUS
Qty: 2 ML | Refills: 0 | Status: ACTIVE | OUTPATIENT
Start: 2024-08-16

## 2024-07-19 NOTE — PATIENT INSTRUCTIONS
WEGOVY® (wee-SAVITA-vee), (semaglutide) injection, for subcutaneous use  Read this Medication Guide and Instructions for Use before you start using WEGOVY and each time you get a refill. There may be new information. This information does not take the place of talking to your healthcare provider about your medical condition or your treatment.  What is the most important information I should know about WEGOVY?   WEGOVY may cause serious side effects, including:   Possible thyroid tumors, including cancer. Tell your healthcare provider if you get a lump or swelling in your neck, hoarseness, trouble swallowing, or shortness of breath. These may be symptoms of thyroid cancer. In studies with rodents, WEGOVY and medicines that work like WEGOVY caused thyroid tumors, including thyroid cancer. It is not known if WEGOVY will cause thyroid tumors or a type of thyroid cancer called medullary thyroid carcinoma (MTC) in people.   Do not use WEGOVY if you or any of your family have ever had a type of thyroid cancer called medullary thyroid carcinoma (MTC), or if you have an endocrine system condition called Multiple Endocrine Neoplasia syndrome type 2 (MEN 2).  What is WEGOVY?   WEGOVY is an injectable prescription medicine used with a reduced calorie diet and increased physical activity:   to reduce the risk of major cardiovascular events such as death, heart attack, or stroke in adults with known heart disease and with either obesity or overweight.   that may help adults and children aged 12 years and older with obesity, or some adults with overweight who also have weight-related medical problems, to help them lose excess body weight and keep the weight off.   WEGOVY contains semaglutide and should not be used with other semaglutide-containing products or other GLP-1 receptor agonist medicines.  It is not known if WEGOVY is safe and effective for use in children under 12 years of age.  Do not use WEGOVY if:   you or any of your  family have ever had a type of thyroid cancer called medullary thyroid carcinoma (MTC) or if you have an endocrine system condition called Multiple Endocrine Neoplasia syndrome type 2 (MEN 2).   you have had a serious allergic reaction to semaglutide or any of the ingredients in WEGOVY. See the end of this Medication Guide for a complete list of ingredients in WEGOVY. Symptoms of a serious allergic reaction include:   swelling of your face, lips, tongue or throat   fainting or feeling dizzy   problems breathing or swallowing   very rapid heartbeat   severe rash or itching  Before using WEGOVY, tell your healthcare provider if you have any other medical conditions, including if you:   have or have had problems with your pancreas or kidneys.   have type 2 diabetes and a history of diabetic retinopathy.   have or have had depression or suicidal thoughts, or mental health issues.   are pregnant or plan to become pregnant. WEGOVY may harm your unborn baby. You should stop using WEGOVY 2 months before you plan to become pregnant.   Pregnancy Exposure Registry: There is a pregnancy exposure registry for women who use WEGOVY during pregnancy. The purpose of this registry is to collect information about the health of you and your baby. Talk to your healthcare provider about how you can take part in this registry or you may contact [employer] at 1-551.157.8345.   are breastfeeding or plan to breastfeed. It is not known if WEGOVY passes into your breast milk. You should talk with your healthcare provider about the best way to feed your baby while using WEGOVY.   Tell your healthcare provider about all the medicines you take, including prescription and over-the-counter medicines, vitamins, and herbal supplements. WEGOVY may affect the way some medicines work and some medicines may affect the way WEGOVY works. Tell your healthcare provider if you are taking other medicines to treat diabetes, including sulfonylureas or insulin.  WEGOVY slows stomach emptying and can affect medicines that need to pass through the stomach quickly.   Know the medicines you take. Keep a list of them to show your healthcare provider and pharmacist when you get a new medicine.  How should I use WEGOVY?   Read the Instructions for Use that comes with WEGOVY.   Use WEGOVY exactly as your healthcare provider tells you to.   Your healthcare provider should show you how to use WEGOVY before you use it for the first time.   WEGOVY is injected under the skin (subcutaneously) of your stomach (abdomen), thigh, or upper arm. Do not inject WEGOVY into a muscle (intramuscularly) or vein (intravenously).   Change (rotate) your injection site with each injection. Do not use the same site for each injection.   Use WEGOVY 1 time each week, on the same day each week, at any time of the day.   Start WEGOVY with 0.25 mg per week in your first month. In your second month, increase your weekly dose to 0.5 mg. In the third month, increase your weekly dose to 1 mg. In the fourth month, increase your weekly dose to 1.7 mg. In the fifth month onwards, your healthcare provider may either maintain your dose at 1.7 mg weekly or increase your weekly dose to 2.4 mg.   If you need to change the day of the week, you may do so as long as your last dose of WEGOVY was given 2 or more days before.   If you miss a dose of WEGOVY and the next scheduled dose is more than 2 days away (48 hours), take the missed dose as soon as possible. If you miss a dose of WEGOVY and the next schedule dose is less than 2 days away (48 hours), do not administer the dose. Take your next dose on the regularly scheduled day.   If you miss doses of WEGOVY for more than 2 weeks, take your next dose on the regularly scheduled day or call your healthcare provider to talk about how to restart your treatment.   You can take WEGOVY with or without food.   If you take too much WEGOVY, you may have severe nausea, severe vomiting  and severe low blood sugar. Call your healthcare provider or go to the nearest hospital emergency room right away if you experience any of these symptoms.  What are the possible side effects of WEGOVY?   WEGOVY may cause serious side effects, including:   See What is the most important information I should know about WEGOVY?   inflammation of your pancreas (pancreatitis). Stop using WEGOVY and call your healthcare provider right away if you have severe pain in your stomach area (abdomen) that will not go away, with or without vomiting. You may feel the pain from your abdomen to your back.   gallbladder problems. WEGOVY may cause gallbladder problems including gallstones. Some gallbladder problems need surgery. Call your healthcare provider if you have any of the following symptoms:   pain in your upper stomach (abdomen)   yellowing of skin or eyes (jaundice)   fever   sid-colored stools   increased risk of low blood sugar (hypoglycemia), especially those who also take medicines to treat diabetes mellitus such as insulin or sulfonylureas. Low blood sugar in patients with diabetes who receive WEGOVY can be a serious side effect. Talk to your healthcare provider about how to recognize and treat low blood sugar. You should check your blood sugar before you start taking WEGOVY and while you take WEGOVY. Signs and symptoms of low blood sugar may include:   dizziness or light-headedness   sweating   shakiness   blurred vision   slurred speech   weakness   anxiety   hunger   headache   irritability or mood changes   confusion or drowsiness   fast heartbeat   feeling jittery  kidney problems (kidney failure). In people who have kidney problems, diarrhea, nausea, and vomiting may cause a loss of fluids (dehydration) which may cause kidney problems to get worse. It is important for you to drink fluids to help reduce your chance of dehydration.   serious allergic reactions. Stop using WEGOVY and get medical help right away,  if you have any symptoms of a serious allergic reaction including:   swelling of your face, lips, tongue   severe rash or itching o very rapid heartbeat or throat   problems breathing or swallowing fainting or feeling dizzy   change in vision in people with type 2 diabetes. Tell your healthcare provider if you have changes in vision during treatment with WEGOVY.   increased heart rate. WEGOVY can increase your heart rate while you are at rest. Your healthcare provider should check your heart rate while you take WEGOVY. Tell your healthcare provider if you feel your heart racing or pounding in your chest and it lasts for several minutes.   depression or thoughts of suicide. You should pay attention to any mental changes, especially sudden changes in your mood, behaviors, thoughts, or feelings. Call your healthcare provider right away if you have any mental changes that are new, worse, or worry you.   The most common side effects of WEGOVY in adults or children aged 12 years and older may include:  nausea   stomach (abdomen) pain   dizziness   gas   diarrhea   headache   feeling bloated   stomach flu   vomiting   tiredness (fatigue)   belching   heartburn   constipation   upset stomach   low blood sugar in people   runny nose or sore throat with type 2 diabetes   Talk to your healthcare provider about any side effect that bothers you or does not go away. These are not all the possible side effects of WEGOVY. Call your doctor for medical advice about side effects. You may report side effects to FDA at 1-345-FDA-3986.  General information about the safe and effective use of WEGOVY.  Medicines are sometimes prescribed for purposes other than those listed in a Medication Guide. Do not use WEGOVY for a condition for which it was not prescribed. Do not give WEGOVY to other people, even if they have the same symptoms that you have. It may harm them. You can ask your pharmacist or healthcare provider for information about  WEGOVY that is written for health professionals.  What are the ingredients in WEGOVY?   Active Ingredient: semaglutide   Inactive Ingredients: disodium phosphate dihydrate, 1.42 mg; sodium chloride, 8.25 mg; water for injection; and hydrochloric acid or sodium hydroxide may be added to adjust pH.  For more information, go to [employer] or call [phone]-[phone].

## 2024-07-19 NOTE — PROGRESS NOTES
Patient ID: Shireen Anton is a 54 y.o. White female    Subjective  Chief Complaint: patient presents for medical weight loss management.    Contraindications to GLP-1 receptor agonist therapy:   Denies personal or family history of MTC, personal history of MEN2, history of allergic reaction while taking a GLP-1 receptor agonist, and history of pancreatitis while taking a GLP-1 receptor agonist     History of weight loss therapy:  None. Is interested in starting Wegovy today after discussing available GLP-1 RA options.    Weight loss history:  Current weight:    7/19/2024   Recent Readings    Weight (lbs) 272.4 lb    BMI 36.94 BMI      Objective  Lab Results   Component Value Date     02/09/2024     02/16/2023     01/25/2023     Lab Results   Component Value Date    K 4.0 02/09/2024    K 4.0 02/16/2023    K 4.2 01/25/2023     Lab Results   Component Value Date     02/09/2024     02/16/2023     01/25/2023     Lab Results   Component Value Date    CO2 21 (L) 02/09/2024    CO2 26 02/16/2023    CO2 19 (L) 01/25/2023     Lab Results   Component Value Date    BUN 13 02/09/2024    BUN 9 02/16/2023    BUN 9 01/25/2023     Lab Results   Component Value Date    GLU 89 02/09/2024     02/16/2023     (H) 01/25/2023     Lab Results   Component Value Date    CALCIUM 9.5 02/09/2024    CALCIUM 9.2 02/16/2023    CALCIUM 8.5 (L) 01/25/2023     Lab Results   Component Value Date    PROT 7.1 02/09/2024    PROT 7.4 02/16/2023    PROT 7.5 01/19/2023     Lab Results   Component Value Date    ALBUMIN 3.8 02/09/2024    ALBUMIN 3.7 02/16/2023    ALBUMIN 3.7 01/19/2023     Lab Results   Component Value Date    BILITOT 0.8 02/09/2024    BILITOT 0.4 02/16/2023    BILITOT 0.7 01/19/2023     Lab Results   Component Value Date    AST 33 02/09/2024    AST 47 (H) 02/16/2023    AST 39 01/19/2023     Lab Results   Component Value Date    ALT 30 02/09/2024    ALT 39 02/16/2023    ALT 31 01/19/2023      Lab Results   Component Value Date    ANIONGAP 9 02/09/2024    ANIONGAP 9 02/16/2023    ANIONGAP 11 01/25/2023     Lab Results   Component Value Date    CREATININE 0.9 02/09/2024    CREATININE 0.8 02/16/2023    CREATININE 0.7 01/25/2023     Lab Results   Component Value Date    EGFRNORACEVR >60.0 02/09/2024    EGFRNORACEVR >60 02/16/2023    EGFRNORACEVR >60.0 01/25/2023         Assessment/Plan  -Pt qualifies for GLP-1 RA therapy based on BMI greater than or equal to 30 kg/m2  -Initiate Wegovy 0.25 mg once weekly for 1 month  -Then increase to Wegovy 0.5 mg once weekly for 1 month  -Then increase to Wegovy 1 mg once weekly  -RTC in 3 months        Patient consented to pharmacist management via collaborative practice.

## 2024-08-24 ENCOUNTER — OFFICE VISIT (OUTPATIENT)
Dept: URGENT CARE | Facility: CLINIC | Age: 54
End: 2024-08-24
Payer: COMMERCIAL

## 2024-08-24 VITALS
RESPIRATION RATE: 17 BRPM | HEIGHT: 72 IN | BODY MASS INDEX: 34.9 KG/M2 | DIASTOLIC BLOOD PRESSURE: 78 MMHG | OXYGEN SATURATION: 96 % | SYSTOLIC BLOOD PRESSURE: 126 MMHG | TEMPERATURE: 98 F | WEIGHT: 257.69 LBS | HEART RATE: 73 BPM

## 2024-08-24 DIAGNOSIS — N39.0 ACUTE UTI: ICD-10-CM

## 2024-08-24 DIAGNOSIS — U07.1 COVID-19: Primary | ICD-10-CM

## 2024-08-24 DIAGNOSIS — R30.0 DYSURIA: ICD-10-CM

## 2024-08-24 LAB
BILIRUBIN, UA POC OHS: ABNORMAL
BLOOD, UA POC OHS: ABNORMAL
CLARITY, UA POC OHS: ABNORMAL
COLOR, UA POC OHS: YELLOW
CTP QC/QA: YES
GLUCOSE, UA POC OHS: NEGATIVE
KETONES, UA POC OHS: 80
LEUKOCYTES, UA POC OHS: ABNORMAL
NITRITE, UA POC OHS: POSITIVE
PH, UA POC OHS: 5.5
PROTEIN, UA POC OHS: 100
SARS-COV-2 AG RESP QL IA.RAPID: POSITIVE
SPECIFIC GRAVITY, UA POC OHS: >=1.03
UROBILINOGEN, UA POC OHS: 0.2

## 2024-08-24 PROCEDURE — 87811 SARS-COV-2 COVID19 W/OPTIC: CPT | Mod: QW,S$GLB,,

## 2024-08-24 PROCEDURE — 99213 OFFICE O/P EST LOW 20 MIN: CPT | Mod: S$GLB,,,

## 2024-08-24 PROCEDURE — 81003 URINALYSIS AUTO W/O SCOPE: CPT | Mod: QW,S$GLB,,

## 2024-08-24 RX ORDER — PROMETHAZINE HYDROCHLORIDE AND DEXTROMETHORPHAN HYDROBROMIDE 6.25; 15 MG/5ML; MG/5ML
5 SYRUP ORAL EVERY 6 HOURS PRN
Qty: 118 ML | Refills: 0 | Status: SHIPPED | OUTPATIENT
Start: 2024-08-24 | End: 2024-09-03

## 2024-08-24 RX ORDER — FLUTICASONE PROPIONATE 50 MCG
1 SPRAY, SUSPENSION (ML) NASAL DAILY
Qty: 15.8 ML | Refills: 0 | Status: SHIPPED | OUTPATIENT
Start: 2024-08-24

## 2024-08-24 RX ORDER — NITROFURANTOIN 25; 75 MG/1; MG/1
100 CAPSULE ORAL 2 TIMES DAILY
Qty: 10 CAPSULE | Refills: 0 | Status: SHIPPED | OUTPATIENT
Start: 2024-08-24 | End: 2024-08-29

## 2024-08-24 NOTE — LETTER
August 24, 2024      [facility]  [address] [facility]  [place] [address]  Phone: [phone]  Fax: [phone]       Patient: Shireen Anton   YOB: 1970  Date of Visit: 08/24/2024    To Whom It May Concern:    Neil Anton  was at [facility] on 08/24/2024.  Please excuse patient from work from 08/24/2024 through 08/28/2024. If you have any questions or concerns, or if I can be of further assistance, please do not hesitate to contact me.    Sincerely,    Estuardo Jung PA-C

## 2024-08-24 NOTE — PATIENT INSTRUCTIONS
You can go back to your normal activities when, for at least 24 hours, both are true:  Your symptoms are getting better overall, and  You have not had a fever (and are not using fever-reducing medication).  When you go back to your normal activities, take added precaution over the next 5 days, such as taking additional steps for  air, hygiene, masks, physical distancing, and/or testing when you will be around other people indoors.  Keep in mind that you may still be able to spread the virus that made you sick, even if you are feeling better. You are likely to be less contagious at this time, depending on factors like how long you were sick or how sick you were.  If you develop a fever or you start to feel worse after you have gone back to normal activities, stay home and away from others again until, for at least 24 hours, both are true: your symptoms are improving overall, and you have not had a fever (and are not using fever-reducing medication). Then take added precaution for the next 5 days.    Take the antibiotics as prescribed for urinary tract infection.    - Rest.    - Drink plenty of fluids.  - Viral upper respiratory infections typically run their course in 10-14 days.      - You can take over-the-counter claritin, zyrtec, allegra, or xyzal as directed. These are antihistamines that can help with runny nose, nasal congestion, sneezing, and helps to dry up post-nasal drip, which usually causes sore throat and cough.              - If you do NOT have high blood pressure, you may use a decongestant form (D)  of this medication (ie. Claritin- D, zyrtec-D, allegra-D) or if you do not take the D form, you can take sudafed (pseudoephedrine) over the counter, which is a decongestant. Do NOT take two decongestant (D) medications at the same time (such as mucinex-D and claritin-D or plain sudafed and claritin D)    - If you DO have Hypertension, anxiety, or palpitations, it is safe to take Coricidin HBP for  relief of sinus symptoms.     - You can use Flonase (fluticasone) nasal spray as directed for sinus congestion and postnasal drip. This is a steroid nasal spray that works locally over time to decrease the inflammation in your nose/sinuses and help with allergic symptoms. This is not an quick- relief spray like afrin, but it works well if used daily.  Discontinue if you develop nose bleed  - use nasal saline prior to Flonase.  - Use Ocean Spray Nasal Saline 1-3 puffs each nostril every 2-3 hours then blow out onto tissue. This is to irrigate the nasal passage way to clear the sinus openings. Use until sinus problem resolved.     - you can take plain Mucinex (guaifenesin) 1200 mg twice a day to help loosen mucous.      -warm salt water gargles can help with sore throat     - warm tea with honey can help with cough. Honey is a natural cough suppressant.     - Dextromethorphan (DM) is a cough suppressant over the counter (ie. mucinex DM, robitussin, delsym; dayquil/nyquil has DM as well.)        - Follow up with your PCP or specialty clinic as directed in the next 1-2 weeks if not improved or as needed.  You can call [phone] to schedule an appointment with the appropriate provider.       - Go to the ER if you develop new or worsening symptoms.      - You must understand that you have received an Urgent Care treatment only and that you may be released before all of your medical problems are known or treated.   - You, the patient, will arrange for follow up care as instructed.   - If your condition worsens or fails to improve we recommend that you receive another evaluation at the ER immediately or contact your PCP to discuss your concerns or return here.

## 2024-08-24 NOTE — PROGRESS NOTES
Subjective:      Patient ID: Shireen Anton is a 54 y.o. female.    Vitals:  height is 6' (1.829 m) and weight is 116.9 kg (257 lb 11.5 oz). Her temperature is 97.8 °F (36.6 °C). Her blood pressure is 126/78 and her pulse is 73. Her respiration is 17 and oxygen saturation is 96%.     Chief Complaint: Sore Throat (Fever, body aches - Entered by patient) and Dysuria    54-year-old female here for dysuria, urinary frequency and urgency, hematuria that started 3 days ago.  She then developed sore throat, coughing, congestion, fever, chills, congestion 2 days ago.  Has been taking ibuprofen and increasing her fluid intake.  She denies abdominal pain, back or flank pain, pelvic pain, nausea, vomiting, diarrhea, chest pain, SOB.    Sore Throat   This is a new problem. The current episode started in the past 7 days. The problem has been gradually worsening. The maximum temperature recorded prior to her arrival was 101 - 101.9 F. The pain is at a severity of 7/10. The pain is moderate. Associated symptoms include congestion, coughing and headaches. Pertinent negatives include no abdominal pain, diarrhea, drooling, ear discharge, ear pain, hoarse voice, plugged ear sensation, neck pain, shortness of breath, stridor, swollen glands, trouble swallowing or vomiting. She has had no exposure to strep or mono. She has tried NSAIDs for the symptoms. The treatment provided no relief.   Dysuria   This is a new problem. The current episode started in the past 7 days. The problem has been gradually worsening. The quality of the pain is described as burning and shooting. The pain is at a severity of 5/10. The pain is moderate. The maximum temperature recorded prior to her arrival was 101 - 101.9 F. Associated symptoms include chills, frequency, hematuria and urgency. Pertinent negatives include no behavior changes, discharge, flank pain, hesitancy, nausea, possible pregnancy, sweats, vomiting, weight loss, bubble bath use, constipation,  rash or withholding. She has tried increased fluids and NSAIDs for the symptoms. The treatment provided no relief. Her past medical history is significant for recurrent UTIs. There is no history of catheterization, diabetes insipidus, diabetes mellitus, genitourinary reflux, hypertension, kidney stones, a single kidney, STD, urinary stasis or a urological procedure.       Constitution: Positive for chills, fatigue and fever.   HENT:  Positive for congestion, sinus pressure and sore throat. Negative for ear pain, ear discharge, drooling and trouble swallowing.    Neck: Negative for neck pain.   Cardiovascular:  Negative for chest pain.   Respiratory:  Positive for cough. Negative for sputum production, shortness of breath and stridor.    Gastrointestinal:  Negative for abdominal pain, nausea, vomiting, constipation and diarrhea.   Genitourinary:  Positive for dysuria, frequency, urgency and hematuria. Negative for flank pain and pelvic pain.   Musculoskeletal:  Negative for muscle ache.   Skin:  Negative for rash.   Neurological:  Positive for headaches. Negative for dizziness.      Objective:     Physical Exam   Constitutional: She is oriented to person, place, and time. She appears well-developed.   HENT:   Head: Normocephalic and atraumatic.   Ears:   Right Ear: Tympanic membrane, external ear and ear canal normal.   Left Ear: Tympanic membrane, external ear and ear canal normal.   Nose: Congestion present.   Mouth/Throat: Oropharynx is clear and moist. Mucous membranes are moist.   Eyes: Conjunctivae, EOM and lids are normal. Pupils are equal, round, and reactive to light.   Neck: Trachea normal and phonation normal. Neck supple.   Cardiovascular: Normal rate, regular rhythm, normal heart sounds and normal pulses.   Pulmonary/Chest: Effort normal and breath sounds normal.   Abdominal: Bowel sounds are normal. She exhibits no distension. Soft. There is no abdominal tenderness. There is no left CVA tenderness and  no right CVA tenderness.   Musculoskeletal: Normal range of motion.         General: Normal range of motion.   Neurological: no focal deficit. She is alert and oriented to person, place, and time. No cranial nerve deficit.   Skin: Skin is warm, dry and intact. Capillary refill takes less than 2 seconds.   Psychiatric: Her speech is normal and behavior is normal. Judgment and thought content normal.   Nursing note and vitals reviewed.      Results for orders placed or performed in visit on 08/24/24   SARS Coronavirus 2 Antigen, POCT Manual Read   Result Value Ref Range    SARS Coronavirus 2 Antigen Positive (A) Negative     Acceptable Yes    POCT Urinalysis(Instrument)   Result Value Ref Range    Color, POC UA Yellow Yellow, Straw, Colorless    Clarity, POC UA Slight Cloudy (A) Clear    Glucose, POC UA Negative Negative    Bilirubin, POC UA Small (A) Negative    Ketones, POC UA 80 (A) Negative    Spec Grav POC UA >=1.030 1.005 - 1.030    Blood, POC UA Large (A) Negative    pH, POC UA 5.5 5.0 - 8.0    Protein, POC  (A) Negative    Urobilinogen, POC UA 0.2 <=1.0    Nitrite, POC UA Positive (A) Negative    WBC, POC UA Small (A) Negative       Assessment:     1. COVID-19    2. Dysuria    3. Acute UTI      Plan:       COVID-19  -     SARS Coronavirus 2 Antigen, POCT Manual Read  -     promethazine-dextromethorphan (PROMETHAZINE-DM) 6.25-15 mg/5 mL Syrp; Take 5 mLs by mouth every 6 (six) hours as needed (cough).  Dispense: 118 mL; Refill: 0  -     fluticasone propionate (FLONASE) 50 mcg/actuation nasal spray; 1 spray (50 mcg total) by Each Nostril route once daily.  Dispense: 15.8 mL; Refill: 0  -     (Magic mouthwash) 1:1:1 diphenhydrAMINE(Benadryl) 12.5mg/5ml liq, aluminum & magnesium hydroxide-simethicone (Maalox), LIDOcaine viscous 2%; Swish and spit 10 mLs every 4 (four) hours as needed (sore throat).  Dispense: 180 mL; Refill: 0  -     nirmatrelvir-ritonavir 300 mg (150 mg x 2)-100 mg copackaged  tablets (EUA); Take 3 tablets by mouth 2 (two) times daily for 5 days. Each dose contains 2 nirmatrelvir (pink tablets) and 1 ritonavir (white tablet). Take all 3 tablets together  Dispense: 30 tablet; Refill: 0    Dysuria  -     POCT Urinalysis(Instrument)    Acute UTI  -     nitrofurantoin, macrocrystal-monohydrate, (MACROBID) 100 MG capsule; Take 1 capsule (100 mg total) by mouth 2 (two) times daily. for 5 days  Dispense: 10 capsule; Refill: 0    COVID positive.  UA with nitrites and leukocytes.  Discussed quarantine and isolation.  Discussed supportive care for COVID.  Will treat with Paxlovid.  Promethazine DM, Flonase, magic mouthwash for symptomatic management.  Will treat UTI with Macrobid.  Discussed rest and hydration.            Patient Instructions   You can go back to your normal activities when, for at least 24 hours, both are true:  Your symptoms are getting better overall, and  You have not had a fever (and are not using fever-reducing medication).  When you go back to your normal activities, take added precaution over the next 5 days, such as taking additional steps for  air, hygiene, masks, physical distancing, and/or testing when you will be around other people indoors.  Keep in mind that you may still be able to spread the virus that made you sick, even if you are feeling better. You are likely to be less contagious at this time, depending on factors like how long you were sick or how sick you were.  If you develop a fever or you start to feel worse after you have gone back to normal activities, stay home and away from others again until, for at least 24 hours, both are true: your symptoms are improving overall, and you have not had a fever (and are not using fever-reducing medication). Then take added precaution for the next 5 days.    Take the antibiotics as prescribed for urinary tract infection.    - Rest.    - Drink plenty of fluids.  - Viral upper respiratory infections typically run  their course in 10-14 days.      - You can take over-the-counter claritin, zyrtec, allegra, or xyzal as directed. These are antihistamines that can help with runny nose, nasal congestion, sneezing, and helps to dry up post-nasal drip, which usually causes sore throat and cough.              - If you do NOT have high blood pressure, you may use a decongestant form (D)  of this medication (ie. Claritin- D, zyrtec-D, allegra-D) or if you do not take the D form, you can take sudafed (pseudoephedrine) over the counter, which is a decongestant. Do NOT take two decongestant (D) medications at the same time (such as mucinex-D and claritin-D or plain sudafed and claritin D)    - If you DO have Hypertension, anxiety, or palpitations, it is safe to take Coricidin HBP for relief of sinus symptoms.     - You can use Flonase (fluticasone) nasal spray as directed for sinus congestion and postnasal drip. This is a steroid nasal spray that works locally over time to decrease the inflammation in your nose/sinuses and help with allergic symptoms. This is not an quick- relief spray like afrin, but it works well if used daily.  Discontinue if you develop nose bleed  - use nasal saline prior to Flonase.  - Use Ocean Spray Nasal Saline 1-3 puffs each nostril every 2-3 hours then blow out onto tissue. This is to irrigate the nasal passage way to clear the sinus openings. Use until sinus problem resolved.     - you can take plain Mucinex (guaifenesin) 1200 mg twice a day to help loosen mucous.      -warm salt water gargles can help with sore throat     - warm tea with honey can help with cough. Honey is a natural cough suppressant.     - Dextromethorphan (DM) is a cough suppressant over the counter (ie. mucinex DM, robitussin, delsym; dayquil/nyquil has DM as well.)        - Follow up with your PCP or specialty clinic as directed in the next 1-2 weeks if not improved or as needed.  You can call [phone] to schedule an appointment with  the appropriate provider.       - Go to the ER if you develop new or worsening symptoms.      - You must understand that you have received an Urgent Care treatment only and that you may be released before all of your medical problems are known or treated.   - You, the patient, will arrange for follow up care as instructed.   - If your condition worsens or fails to improve we recommend that you receive another evaluation at the ER immediately or contact your PCP to discuss your concerns or return here.

## 2024-10-07 DIAGNOSIS — E66.812 CLASS 2 OBESITY DUE TO EXCESS CALORIES WITHOUT SERIOUS COMORBIDITY WITH BODY MASS INDEX (BMI) OF 36.0 TO 36.9 IN ADULT: ICD-10-CM

## 2024-10-07 DIAGNOSIS — E66.09 CLASS 2 OBESITY DUE TO EXCESS CALORIES WITHOUT SERIOUS COMORBIDITY WITH BODY MASS INDEX (BMI) OF 36.0 TO 36.9 IN ADULT: ICD-10-CM

## 2024-10-07 RX ORDER — SEMAGLUTIDE 1 MG/.5ML
1 INJECTION, SOLUTION SUBCUTANEOUS
Qty: 2 ML | Refills: 0 | Status: ACTIVE | OUTPATIENT
Start: 2024-10-07

## 2024-10-31 DIAGNOSIS — E66.812 CLASS 2 OBESITY DUE TO EXCESS CALORIES WITHOUT SERIOUS COMORBIDITY WITH BODY MASS INDEX (BMI) OF 36.0 TO 36.9 IN ADULT: ICD-10-CM

## 2024-10-31 DIAGNOSIS — E66.09 CLASS 2 OBESITY DUE TO EXCESS CALORIES WITHOUT SERIOUS COMORBIDITY WITH BODY MASS INDEX (BMI) OF 36.0 TO 36.9 IN ADULT: ICD-10-CM

## 2024-10-31 RX ORDER — SEMAGLUTIDE 1 MG/.5ML
1 INJECTION, SOLUTION SUBCUTANEOUS
Qty: 2 ML | Refills: 0 | Status: ACTIVE | OUTPATIENT
Start: 2024-10-31

## 2024-11-07 ENCOUNTER — PATIENT MESSAGE (OUTPATIENT)
Dept: INTERNAL MEDICINE | Facility: CLINIC | Age: 54
End: 2024-11-07
Payer: COMMERCIAL

## 2024-11-13 NOTE — PROGRESS NOTES
Patient ID: Shireen Anton is a 54 y.o. White female    Subjective  Chief Complaint: patient presents for medical weight loss management.    Co-morbidities: none    HPI: Patient [facility] with [facility] in July 2024 and is currently managed on Wegovy 1 mg.     Tolerance to current therapy:  Denies nausea, vomiting, diarrhea, constipation, abdominal pain    Weight loss history:  Starting weight:    7/19/2024   Recent Readings    Weight (lbs) 272.4 lb    BMI 36.94 BMI    Current weight:    11/8/2024   Recent Readings    Weight (lbs) 240 lb    BMI 32.55 BMI    % weight loss since GLP-1 initiation: 11.9 %    Objective  Lab Results   Component Value Date     02/09/2024     02/16/2023     01/25/2023     Lab Results   Component Value Date    K 4.0 02/09/2024    K 4.0 02/16/2023    K 4.2 01/25/2023     Lab Results   Component Value Date     02/09/2024     02/16/2023     01/25/2023     Lab Results   Component Value Date    CO2 21 (L) 02/09/2024    CO2 26 02/16/2023    CO2 19 (L) 01/25/2023     Lab Results   Component Value Date    BUN 13 02/09/2024    BUN 9 02/16/2023    BUN 9 01/25/2023     Lab Results   Component Value Date    GLU 89 02/09/2024     02/16/2023     (H) 01/25/2023     Lab Results   Component Value Date    CALCIUM 9.5 02/09/2024    CALCIUM 9.2 02/16/2023    CALCIUM 8.5 (L) 01/25/2023     Lab Results   Component Value Date    PROT 7.1 02/09/2024    PROT 7.4 02/16/2023    PROT 7.5 01/19/2023     Lab Results   Component Value Date    ALBUMIN 3.8 02/09/2024    ALBUMIN 3.7 02/16/2023    ALBUMIN 3.7 01/19/2023     Lab Results   Component Value Date    BILITOT 0.8 02/09/2024    BILITOT 0.4 02/16/2023    BILITOT 0.7 01/19/2023     Lab Results   Component Value Date    AST 33 02/09/2024    AST 47 (H) 02/16/2023    AST 39 01/19/2023     Lab Results   Component Value Date    ALT 30 02/09/2024    ALT 39 02/16/2023    ALT 31 01/19/2023     Lab Results    Component Value Date    ANIONGAP 9 02/09/2024    ANIONGAP 9 02/16/2023    ANIONGAP 11 01/25/2023     Lab Results   Component Value Date    CREATININE 0.9 02/09/2024    CREATININE 0.8 02/16/2023    CREATININE 0.7 01/25/2023     Lab Results   Component Value Date    EGFRNORACEVR >60.0 02/09/2024    EGFRNORACEVR >60 02/16/2023    EGFRNORACEVR >60.0 01/25/2023     Assessment/Plan  - Increase to Wegovy 1.7 mg x 4 weeks once completed with Wegovy 1 mg  - Then increase to Wegovy 2.4 mg SQ weekly  - RTC in 3 months for follow-up evaluation    Patient consented to pharmacist management via collaborative practice.

## 2024-11-14 ENCOUNTER — OFFICE VISIT (OUTPATIENT)
Dept: INTERNAL MEDICINE | Facility: CLINIC | Age: 54
End: 2024-11-14
Payer: COMMERCIAL

## 2024-11-14 ENCOUNTER — PATIENT MESSAGE (OUTPATIENT)
Dept: INTERNAL MEDICINE | Facility: CLINIC | Age: 54
End: 2024-11-14

## 2024-11-14 DIAGNOSIS — E66.811 OBESITY, CLASS I, BMI 30-34.9: Primary | ICD-10-CM

## 2024-11-14 RX ORDER — SEMAGLUTIDE 2.4 MG/.75ML
2.4 INJECTION, SOLUTION SUBCUTANEOUS
Qty: 3 ML | Refills: 2 | Status: ACTIVE | OUTPATIENT
Start: 2024-11-14

## 2024-11-14 RX ORDER — SEMAGLUTIDE 1.7 MG/.75ML
1.7 INJECTION, SOLUTION SUBCUTANEOUS
Qty: 3 ML | Refills: 0 | Status: ACTIVE | OUTPATIENT
Start: 2024-11-14

## 2024-11-23 ENCOUNTER — PATIENT MESSAGE (OUTPATIENT)
Dept: FAMILY MEDICINE | Facility: CLINIC | Age: 54
End: 2024-11-23
Payer: COMMERCIAL

## 2024-11-25 ENCOUNTER — LAB VISIT (OUTPATIENT)
Dept: LAB | Facility: HOSPITAL | Age: 54
End: 2024-11-25
Attending: FAMILY MEDICINE
Payer: COMMERCIAL

## 2024-11-25 DIAGNOSIS — N30.01 ACUTE CYSTITIS WITH HEMATURIA: Primary | ICD-10-CM

## 2024-11-25 DIAGNOSIS — N39.0 RECURRENT UTI: ICD-10-CM

## 2024-11-25 LAB
BACTERIA #/AREA URNS HPF: ABNORMAL /HPF
BILIRUB UR QL STRIP: NEGATIVE
CAOX CRY URNS QL MICRO: ABNORMAL
CLARITY UR: ABNORMAL
COLOR UR: YELLOW
GLUCOSE UR QL STRIP: NEGATIVE
HGB UR QL STRIP: ABNORMAL
KETONES UR QL STRIP: NEGATIVE
LEUKOCYTE ESTERASE UR QL STRIP: ABNORMAL
MICROSCOPIC COMMENT: ABNORMAL
NITRITE UR QL STRIP: POSITIVE
PH UR STRIP: 6 [PH] (ref 5–8)
PROT UR QL STRIP: ABNORMAL
RBC #/AREA URNS HPF: 10 /HPF (ref 0–4)
SP GR UR STRIP: 1.02 (ref 1–1.03)
SQUAMOUS #/AREA URNS HPF: 1 /HPF
URN SPEC COLLECT METH UR: ABNORMAL
UROBILINOGEN UR STRIP-ACNC: NEGATIVE EU/DL
WBC #/AREA URNS HPF: 63 /HPF (ref 0–5)

## 2024-11-25 PROCEDURE — 87086 URINE CULTURE/COLONY COUNT: CPT | Performed by: FAMILY MEDICINE

## 2024-11-25 PROCEDURE — 87088 URINE BACTERIA CULTURE: CPT | Performed by: FAMILY MEDICINE

## 2024-11-25 PROCEDURE — 81000 URINALYSIS NONAUTO W/SCOPE: CPT | Performed by: FAMILY MEDICINE

## 2024-11-25 PROCEDURE — 87186 SC STD MICRODIL/AGAR DIL: CPT | Performed by: FAMILY MEDICINE

## 2024-11-25 RX ORDER — NITROFURANTOIN 25; 75 MG/1; MG/1
100 CAPSULE ORAL 2 TIMES DAILY
Qty: 10 CAPSULE | Refills: 0 | Status: SHIPPED | OUTPATIENT
Start: 2024-11-25 | End: 2024-11-30

## 2024-11-27 ENCOUNTER — PATIENT MESSAGE (OUTPATIENT)
Dept: FAMILY MEDICINE | Facility: CLINIC | Age: 54
End: 2024-11-27
Payer: COMMERCIAL

## 2024-11-27 LAB — BACTERIA UR CULT: ABNORMAL

## 2024-11-28 ENCOUNTER — PATIENT MESSAGE (OUTPATIENT)
Dept: ADMINISTRATIVE | Facility: OTHER | Age: 54
End: 2024-11-28
Payer: COMMERCIAL

## 2024-12-26 ENCOUNTER — PATIENT MESSAGE (OUTPATIENT)
Dept: ADMINISTRATIVE | Facility: OTHER | Age: 54
End: 2024-12-26
Payer: COMMERCIAL

## 2025-01-13 ENCOUNTER — HOSPITAL ENCOUNTER (OUTPATIENT)
Dept: RADIOLOGY | Facility: HOSPITAL | Age: 55
Discharge: HOME | End: 2025-01-13
Attending: FAMILY MEDICINE
Payer: COMMERCIAL

## 2025-01-13 DIAGNOSIS — Z12.31 ENCOUNTER FOR SCREENING MAMMOGRAM FOR BREAST CANCER: ICD-10-CM

## 2025-01-13 PROCEDURE — 77067 SCR MAMMO BI INCL CAD: CPT | Mod: TC

## 2025-01-13 PROCEDURE — 77067 SCR MAMMO BI INCL CAD: CPT | Mod: 26,,, | Performed by: RADIOLOGY

## 2025-01-13 PROCEDURE — 77063 BREAST TOMOSYNTHESIS BI: CPT | Mod: 26,,, | Performed by: RADIOLOGY

## 2025-01-23 ENCOUNTER — PATIENT MESSAGE (OUTPATIENT)
Dept: ADMINISTRATIVE | Facility: OTHER | Age: 55
End: 2025-01-23
Payer: COMMERCIAL

## 2025-01-27 ENCOUNTER — PATIENT MESSAGE (OUTPATIENT)
Dept: FAMILY MEDICINE | Facility: CLINIC | Age: 55
End: 2025-01-27
Payer: COMMERCIAL

## 2025-01-27 DIAGNOSIS — N39.0 RECURRENT UTI: ICD-10-CM

## 2025-01-27 DIAGNOSIS — E66.812 CLASS 2 OBESITY DUE TO EXCESS CALORIES WITHOUT SERIOUS COMORBIDITY WITH BODY MASS INDEX (BMI) OF 36.0 TO 36.9 IN ADULT: ICD-10-CM

## 2025-01-27 DIAGNOSIS — Z86.73 HISTORY OF CVA IN ADULTHOOD: ICD-10-CM

## 2025-01-27 DIAGNOSIS — Z00.00 WELL WOMAN EXAM WITHOUT GYNECOLOGICAL EXAM: Primary | ICD-10-CM

## 2025-01-27 DIAGNOSIS — F41.9 ANXIETY: ICD-10-CM

## 2025-01-27 DIAGNOSIS — I25.2 HISTORY OF MI (MYOCARDIAL INFARCTION): ICD-10-CM

## 2025-01-27 DIAGNOSIS — E04.2 MULTIPLE THYROID NODULES: ICD-10-CM

## 2025-01-27 DIAGNOSIS — E66.09 CLASS 2 OBESITY DUE TO EXCESS CALORIES WITHOUT SERIOUS COMORBIDITY WITH BODY MASS INDEX (BMI) OF 36.0 TO 36.9 IN ADULT: ICD-10-CM

## 2025-02-07 ENCOUNTER — LAB VISIT (OUTPATIENT)
Dept: LAB | Facility: HOSPITAL | Age: 55
End: 2025-02-07
Attending: FAMILY MEDICINE
Payer: COMMERCIAL

## 2025-02-07 DIAGNOSIS — Z86.73 HISTORY OF CVA IN ADULTHOOD: ICD-10-CM

## 2025-02-07 DIAGNOSIS — Z00.00 WELL WOMAN EXAM WITHOUT GYNECOLOGICAL EXAM: ICD-10-CM

## 2025-02-07 DIAGNOSIS — E66.09 CLASS 2 OBESITY DUE TO EXCESS CALORIES WITHOUT SERIOUS COMORBIDITY WITH BODY MASS INDEX (BMI) OF 36.0 TO 36.9 IN ADULT: ICD-10-CM

## 2025-02-07 DIAGNOSIS — I25.2 HISTORY OF MI (MYOCARDIAL INFARCTION): ICD-10-CM

## 2025-02-07 DIAGNOSIS — E66.812 CLASS 2 OBESITY DUE TO EXCESS CALORIES WITHOUT SERIOUS COMORBIDITY WITH BODY MASS INDEX (BMI) OF 36.0 TO 36.9 IN ADULT: ICD-10-CM

## 2025-02-07 DIAGNOSIS — E04.2 MULTIPLE THYROID NODULES: ICD-10-CM

## 2025-02-07 DIAGNOSIS — F41.9 ANXIETY: ICD-10-CM

## 2025-02-07 LAB
ALBUMIN SERPL BCP-MCNC: 3.7 G/DL (ref 3.5–5.2)
ALP SERPL-CCNC: 63 U/L (ref 40–150)
ALT SERPL W/O P-5'-P-CCNC: 30 U/L (ref 10–44)
ANION GAP SERPL CALC-SCNC: 10 MMOL/L (ref 8–16)
AST SERPL-CCNC: 28 U/L (ref 10–40)
BASOPHILS # BLD AUTO: 0.04 K/UL (ref 0–0.2)
BASOPHILS NFR BLD: 0.9 % (ref 0–1.9)
BILIRUB SERPL-MCNC: 0.6 MG/DL (ref 0.1–1)
BUN SERPL-MCNC: 13 MG/DL (ref 6–20)
CALCIUM SERPL-MCNC: 9.3 MG/DL (ref 8.7–10.5)
CHLORIDE SERPL-SCNC: 108 MMOL/L (ref 95–110)
CHOLEST SERPL-MCNC: 179 MG/DL (ref 120–199)
CHOLEST/HDLC SERPL: 4.2 {RATIO} (ref 2–5)
CO2 SERPL-SCNC: 23 MMOL/L (ref 23–29)
CREAT SERPL-MCNC: 0.8 MG/DL (ref 0.5–1.4)
DIFFERENTIAL METHOD BLD: ABNORMAL
EOSINOPHIL # BLD AUTO: 0.1 K/UL (ref 0–0.5)
EOSINOPHIL NFR BLD: 1.4 % (ref 0–8)
ERYTHROCYTE [DISTWIDTH] IN BLOOD BY AUTOMATED COUNT: 12.8 % (ref 11.5–14.5)
EST. GFR  (NO RACE VARIABLE): >60 ML/MIN/1.73 M^2
GLUCOSE SERPL-MCNC: 87 MG/DL (ref 70–110)
HCT VFR BLD AUTO: 44.4 % (ref 37–48.5)
HDLC SERPL-MCNC: 43 MG/DL (ref 40–75)
HDLC SERPL: 24 % (ref 20–50)
HGB BLD-MCNC: 15.4 G/DL (ref 12–16)
IMM GRANULOCYTES # BLD AUTO: 0.01 K/UL (ref 0–0.04)
IMM GRANULOCYTES NFR BLD AUTO: 0.2 % (ref 0–0.5)
LDLC SERPL CALC-MCNC: 106.4 MG/DL (ref 63–159)
LYMPHOCYTES # BLD AUTO: 1.6 K/UL (ref 1–4.8)
LYMPHOCYTES NFR BLD: 37.9 % (ref 18–48)
MCH RBC QN AUTO: 33.3 PG (ref 27–31)
MCHC RBC AUTO-ENTMCNC: 34.7 G/DL (ref 32–36)
MCV RBC AUTO: 96 FL (ref 82–98)
MONOCYTES # BLD AUTO: 0.4 K/UL (ref 0.3–1)
MONOCYTES NFR BLD: 8.9 % (ref 4–15)
NEUTROPHILS # BLD AUTO: 2.2 K/UL (ref 1.8–7.7)
NEUTROPHILS NFR BLD: 50.7 % (ref 38–73)
NONHDLC SERPL-MCNC: 136 MG/DL
NRBC BLD-RTO: 0 /100 WBC
PLATELET # BLD AUTO: 131 K/UL (ref 150–450)
PMV BLD AUTO: 12.3 FL (ref 9.2–12.9)
POTASSIUM SERPL-SCNC: 4 MMOL/L (ref 3.5–5.1)
PROT SERPL-MCNC: 7.1 G/DL (ref 6–8.4)
RBC # BLD AUTO: 4.63 M/UL (ref 4–5.4)
SODIUM SERPL-SCNC: 141 MMOL/L (ref 136–145)
TRIGL SERPL-MCNC: 148 MG/DL (ref 30–150)
TSH SERPL DL<=0.005 MIU/L-ACNC: 0.64 UIU/ML (ref 0.4–4)
WBC # BLD AUTO: 4.25 K/UL (ref 3.9–12.7)

## 2025-02-07 PROCEDURE — 80053 COMPREHEN METABOLIC PANEL: CPT | Performed by: FAMILY MEDICINE

## 2025-02-07 PROCEDURE — 84443 ASSAY THYROID STIM HORMONE: CPT | Performed by: FAMILY MEDICINE

## 2025-02-07 PROCEDURE — 80061 LIPID PANEL: CPT | Performed by: FAMILY MEDICINE

## 2025-02-07 PROCEDURE — 36415 COLL VENOUS BLD VENIPUNCTURE: CPT | Mod: PN | Performed by: FAMILY MEDICINE

## 2025-02-07 PROCEDURE — 85025 COMPLETE CBC W/AUTO DIFF WBC: CPT | Performed by: FAMILY MEDICINE

## 2025-02-10 ENCOUNTER — PATIENT MESSAGE (OUTPATIENT)
Dept: FAMILY MEDICINE | Facility: CLINIC | Age: 55
End: 2025-02-10
Payer: COMMERCIAL

## 2025-02-10 DIAGNOSIS — N30.01 ACUTE CYSTITIS WITH HEMATURIA: Primary | ICD-10-CM

## 2025-02-11 RX ORDER — NITROFURANTOIN 25; 75 MG/1; MG/1
100 CAPSULE ORAL 2 TIMES DAILY
Qty: 10 CAPSULE | Refills: 0 | Status: SHIPPED | OUTPATIENT
Start: 2025-02-11 | End: 2025-02-16

## 2025-02-14 ENCOUNTER — OFFICE VISIT (OUTPATIENT)
Dept: OBSTETRICS AND GYNECOLOGY | Facility: CLINIC | Age: 55
End: 2025-02-14
Payer: COMMERCIAL

## 2025-02-14 ENCOUNTER — OFFICE VISIT (OUTPATIENT)
Dept: FAMILY MEDICINE | Facility: CLINIC | Age: 55
End: 2025-02-14
Payer: COMMERCIAL

## 2025-02-14 VITALS
HEART RATE: 78 BPM | TEMPERATURE: 98 F | DIASTOLIC BLOOD PRESSURE: 78 MMHG | BODY MASS INDEX: 30.14 KG/M2 | WEIGHT: 222.56 LBS | RESPIRATION RATE: 18 BRPM | HEIGHT: 72 IN | SYSTOLIC BLOOD PRESSURE: 122 MMHG | OXYGEN SATURATION: 96 %

## 2025-02-14 VITALS
BODY MASS INDEX: 30.14 KG/M2 | DIASTOLIC BLOOD PRESSURE: 80 MMHG | WEIGHT: 222.25 LBS | SYSTOLIC BLOOD PRESSURE: 120 MMHG

## 2025-02-14 DIAGNOSIS — Z12.31 BREAST CANCER SCREENING BY MAMMOGRAM: ICD-10-CM

## 2025-02-14 DIAGNOSIS — Z01.419 ENCOUNTER FOR GYNECOLOGICAL EXAMINATION WITHOUT ABNORMAL FINDING: Primary | ICD-10-CM

## 2025-02-14 DIAGNOSIS — N39.0 RECURRENT UTI: ICD-10-CM

## 2025-02-14 DIAGNOSIS — G47.00 INSOMNIA, UNSPECIFIED TYPE: ICD-10-CM

## 2025-02-14 DIAGNOSIS — Z23 IMMUNIZATION DUE: ICD-10-CM

## 2025-02-14 DIAGNOSIS — Z23 NEED FOR PNEUMOCOCCAL 20-VALENT CONJUGATE VACCINATION: ICD-10-CM

## 2025-02-14 DIAGNOSIS — Z86.73 HISTORY OF CVA IN ADULTHOOD: ICD-10-CM

## 2025-02-14 DIAGNOSIS — Z00.00 WELL WOMAN EXAM WITHOUT GYNECOLOGICAL EXAM: Primary | ICD-10-CM

## 2025-02-14 DIAGNOSIS — I25.2 HISTORY OF MI (MYOCARDIAL INFARCTION): ICD-10-CM

## 2025-02-14 DIAGNOSIS — D69.6 THROMBOCYTOPENIA: ICD-10-CM

## 2025-02-14 PROBLEM — E66.811 CLASS 1 OBESITY DUE TO EXCESS CALORIES WITH SERIOUS COMORBIDITY AND BODY MASS INDEX (BMI) OF 30.0 TO 30.9 IN ADULT: Status: ACTIVE | Noted: 2024-06-10

## 2025-02-14 PROBLEM — E66.01 MORBID OBESITY DUE TO EXCESS CALORIES: Status: RESOLVED | Noted: 2017-05-02 | Resolved: 2025-02-14

## 2025-02-14 PROCEDURE — 90480 ADMN SARSCOV2 VAC 1/ONLY CMP: CPT | Mod: S$GLB,,, | Performed by: FAMILY MEDICINE

## 2025-02-14 PROCEDURE — 90677 PCV20 VACCINE IM: CPT | Mod: S$GLB,,, | Performed by: FAMILY MEDICINE

## 2025-02-14 PROCEDURE — 99396 PREV VISIT EST AGE 40-64: CPT | Mod: 25,S$GLB,, | Performed by: FAMILY MEDICINE

## 2025-02-14 PROCEDURE — 3008F BODY MASS INDEX DOCD: CPT | Mod: CPTII,S$GLB,, | Performed by: FAMILY MEDICINE

## 2025-02-14 PROCEDURE — 91320 SARSCV2 VAC 30MCG TRS-SUC IM: CPT | Mod: S$GLB,,, | Performed by: FAMILY MEDICINE

## 2025-02-14 PROCEDURE — 1160F RVW MEDS BY RX/DR IN RCRD: CPT | Mod: CPTII,S$GLB,, | Performed by: FAMILY MEDICINE

## 2025-02-14 PROCEDURE — 1159F MED LIST DOCD IN RCRD: CPT | Mod: CPTII,S$GLB,, | Performed by: FAMILY MEDICINE

## 2025-02-14 PROCEDURE — 3078F DIAST BP <80 MM HG: CPT | Mod: CPTII,S$GLB,, | Performed by: FAMILY MEDICINE

## 2025-02-14 PROCEDURE — 3074F SYST BP LT 130 MM HG: CPT | Mod: CPTII,S$GLB,, | Performed by: FAMILY MEDICINE

## 2025-02-14 PROCEDURE — 90471 IMMUNIZATION ADMIN: CPT | Mod: S$GLB,,, | Performed by: FAMILY MEDICINE

## 2025-02-14 PROCEDURE — 99999 PR PBB SHADOW E&M-EST. PATIENT-LVL II: CPT | Mod: PBBFAC,,, | Performed by: OBSTETRICS & GYNECOLOGY

## 2025-02-14 RX ORDER — TRAZODONE HYDROCHLORIDE 50 MG/1
50 TABLET ORAL NIGHTLY
Qty: 30 TABLET | Refills: 3 | Status: SHIPPED | OUTPATIENT
Start: 2025-02-14

## 2025-02-14 NOTE — PROGRESS NOTES
Subjective     Patient ID: Shireen Anton is a 55 y.o. female.    Chief Complaint:  Well Woman      History of Present Illness  HPI  Annual Exam-Postmenopausal  Patient presents for annual exam. The patient has no complaints today. The patient is sexually active. GYN screening history: last pap: was abnormal: ASCUS . The patient is not taking hormone replacement therapy. Patient denies post-menopausal vaginal bleeding. The patient wears seatbelts: yes. The patient participates in regular exercise: yes. Has the patient ever been transfused or tattooed?: yes. The patient reports that there is not domestic violence in her life.    GYN & OB History  No LMP recorded. Patient is perimenopausal.   Date of Last Pap: 2025    OB History    Para Term  AB Living   2 1 1   1 1   SAB IAB Ectopic Multiple Live Births   1       1      # Outcome Date GA Lbr Maxi/2nd Weight Sex Type Anes PTL Lv   2 Term 09/28/10   3.345 kg (7 lb 6 oz) F CS-LTranv EPI N TRUONG      Birth Comments: Breech   1 1989             Past Medical History:  Past Medical History:   Diagnosis Date    Allergy     Depression     3/24/22: denies feelings of current or history of self harm    Diverticulitis of colon     GERD (gastroesophageal reflux disease)     History of acute renal failure 2017    received 1 month of hemodialysis    Loss or death of family member 2014    Multiple thyroid nodules     NSTEMI (non-ST elevated myocardial infarction)     Renal disorder     acute renal failure post gastric sleeve    Stroke     cerebellar stroke post gastric sleeve       Past Surgical History:  Past Surgical History:   Procedure Laterality Date    AUGMENTATION OF BREAST Bilateral     breast augmentation  1998    BREAST SURGERY  1998     SECTION      CHOLECYSTECTOMY      COLONOSCOPY N/A 2020    Procedure: COLONOSCOPY;  Surgeon: Roz Solis MD;  Location: [facility] ([address] [place] [address]);  Service: Endoscopy;  Laterality:  N/A;  covid test on 6/9/20 at [facility]    COLONOSCOPY N/A 1/24/2023    Procedure: COLONOSCOPY;  Surgeon: Roz Solis MD;  Location: [facility] OR 2ND FLR;  Service: Colon and Rectal;  Laterality: N/A;    FRACTURE SURGERY Right 1987    humerus, ORIF    LAPAROSCOPIC SIGMOIDECTOMY N/A 1/24/2023    Procedure: COLECTOMY, SIGMOID, LAPAROSCOPIC;  Surgeon: Roz Solis MD;  Location: [facility] OR 2ND FLR;  Service: Colon and Rectal;  Laterality: N/A;  ERAS low    LAPAROSCOPIC SLEEVE GASTRECTOMY  04/2017    ORIF HUMERUS FRACTURE  1987       Family History:  Family History   Problem Relation Name Age of Onset    Thyroid disease Mother      Cancer Mother  71        SCC of lung    Diabetes Father      Hypertension Father      Heart disease Father      Esophageal cancer Sister      Cancer Sister  48        pharyngeal cancer    Breast cancer Maternal Aunt Makenna Jesus Manuel     Breast cancer Maternal Grandmother Socorro Lacey 63    Colon cancer Neg Hx         Allergies:  Review of patient's allergies indicates:   Allergen Reactions    Hydrocodone        Medications:  Current Outpatient Medications on File Prior to Visit   Medication Sig Dispense Refill    (Magic mouthwash) 1:1:1 diphenhydrAMINE(Benadryl) 12.5mg/5ml liq, aluminum & magnesium hydroxide-simethicone (Maalox), LIDOcaine viscous 2% Swish and spit 10 mLs every 4 (four) hours as needed (sore throat). 180 mL 0    aspirin (ECOTRIN) 81 MG EC tablet Take 1 tablet (81 mg total) by mouth once daily.  0    calcium citrate (CALCITRATE) 200 mg (950 mg) tablet Take 1 tablet (200 mg total) by mouth 3 (three) times daily. 90 tablet 5    cetirizine (ZYRTEC) 10 MG tablet Take 10 mg by mouth once daily.      cyanocobalamin (VITAMIN B-12) 250 MCG tablet Take 1 tablet (250 mcg total) by mouth once daily. 30 tablet 5    fluticasone propionate (FLONASE) 50 mcg/actuation nasal spray 1 spray (50 mcg total) by Each Nostril route once daily. 15.8 mL 0    inulin (FIBER GUMMIES ORAL) Take  by mouth.      multivitamin capsule Take 1 capsule by mouth once daily.      nitrofurantoin, macrocrystal-monohydrate, (MACROBID) 100 MG capsule Take 1 capsule (100 mg total) by mouth 2 (two) times daily. for 5 days 10 capsule 0    omeprazole (PRILOSEC) 20 MG capsule Take 1 capsule (20 mg total) by mouth once daily. 90 capsule 1    PARAGARD T 380A 380 square mm IUD       semaglutide, weight loss, (WEGOVY) 1.7 mg/0.75 mL PnIj Inject 1.7 mg into the skin every 7 days. 3 mL 0    semaglutide, weight loss, (WEGOVY) 2.4 mg/0.75 mL PnIj Inject 2.4 mg into the skin every 7 days. 3 mL 2    traZODone (DESYREL) 50 MG tablet Take 1 tablet (50 mg total) by mouth every evening. 30 tablet 0     Current Facility-Administered Medications on File Prior to Visit   Medication Dose Route Frequency Provider Last Rate Last Admin    LIDOcaine (PF) 10 mg/ml (1%) injection 10 mg  1 mL Intradermal On Call Procedure Savanna Torres, NP           Social History:  Social History     Tobacco Use    Smoking status: Former     Current packs/day: 0.00     Average packs/day: 1 pack/day for 16.0 years (16.0 ttl pk-yrs)     Types: Cigarettes     Start date: 1995     Quit date: 2011     Years since quittin.2     Passive exposure: Past    Smokeless tobacco: Never   Substance Use Topics    Alcohol use: Yes     Alcohol/week: 1.0 standard drink of alcohol     Types: 1 Glasses of wine per week     Comment: daigquir 1-2 days per week    Drug use: No            Review of Systems  Review of Systems   Constitutional: Negative.    HENT: Negative.     Eyes: Negative.    Respiratory: Negative.     Cardiovascular: Negative.    Gastrointestinal: Negative.    Endocrine: Negative.    Genitourinary: Negative.    Musculoskeletal: Negative.    Integumentary:  Negative.   Neurological: Negative.    Hematological: Negative.    Psychiatric/Behavioral: Negative.     Breast: negative.           Objective   Physical Exam:   Constitutional: She is oriented  to person, place, and time. She appears well-nourished.    HENT:   Head: Normocephalic and atraumatic.    Eyes: EOM are normal. Right eye exhibits normal extraocular motion. Left eye exhibits normal extraocular motion.    Neck: No thyromegaly present.    Cardiovascular:  Normal rate.             Pulmonary/Chest: Effort normal. No respiratory distress. Right breast exhibits no mass, no skin change and no tenderness. Left breast exhibits no mass, no skin change and no tenderness. Breasts are symmetrical.        Abdominal: Soft. She exhibits no distension and no mass. There is no abdominal tenderness.     Genitourinary:    Vagina, uterus, right adnexa and left adnexa normal.      Pelvic exam was performed with patient supine.   The external female genitalia was normal.   No external genitalia lesions identified,Genitalia hair distrobution normal .     Labial bartholins normal.There is no rash or lesion on the right labia. There is no rash or lesion on the left labia. Cervix is normal. Right adnexum displays no tenderness and no fullness. Left adnexum displays no tenderness and no fullness. No vaginal discharge or bleeding in the vagina.    No signs of injury in the vagina.   Vagina was moist.Cervix exhibits no motion tenderness and no friability. Uterus consistancy normal and Uerus contour normal  Uterus is not tender. Normal urethral meatus.Urethral Meatus exhibits: no urethral lesionUrethra findings: no urethral mass, no tenderness and no prolapsedBladder findings: no bladder distention and no bladder tendernessIUD strings visualized.          Musculoskeletal: Normal range of motion.      Lymphadenopathy:     She has no cervical adenopathy.    Neurological: She is oriented to person, place, and time.   Cranial Nerves II-XII grossly intact.    Skin: No rash noted. No erythema.    Psychiatric: She has a normal mood and affect. Her behavior is normal.            Assessment and Plan     1. Encounter for gynecological  examination without abnormal finding    2. Breast cancer screening by mammogram             Plan:  1. Encounter for gynecological examination without abnormal finding (Primary)  - Pap and HPV done today.  -   Screening tests as ordered.  - Diet and exercise encouraged.    Counseling: injury prevention: Driving under the influence of alcohol  Seatbelts  Perimenopause/Menopause  Stress management techniques  indications for and frequency of periodic gynecologic exam  reviewed current Pap guidelines. Explained new understanding of natural history of cervical disease and improved Paps. Recommended guideline concordant care.  - Liquid-Based Pap Smear, Screening  - HPV High Risk Genotypes, PCR    2. Breast cancer screening by mammogram  - Self breast exams encouraged

## 2025-02-14 NOTE — PROGRESS NOTES
"Well Woman VISIT      CHIEF COMPLAINT  Chief Complaint   Patient presents with    Annual Exam    Medication Refill       HPI  Shireen Anton is a 55 y.o. female who presents for physical.     Social Factors  Tobacco use: No  Ready to Quit: No  Alcohol: Yes rarely  Intimate partner violence screening  "Do you feel safe in your current relationship?" Yes   "Have you ever been in a relationship in which your partner frightened you or hurt you?" No  Living Will/POA: No  Regular Exercise: No    Depression  Over the past two weeks, have you felt down, depressed, or hopeless? No  Over the past two weeks, have you felt little interest or pleasure in doing things? No    Reproductive Health  Followed by OBGYN    CHD, HTN, DM2  CHD Risk Factors: obesity (BMI >= 30 kg/m2) and recently had CVA and MI  Women 45 years and older should be screened for dyslipidemia if at increased risk of CHD  Women 20 to 45 years of age should be screened for dyslipidemia if at increased risk of CHD  Asymptomatic adults with sustained blood pressure greater than 135/80 mm Hg (treated or untreated) should be screened for type 2 diabetes mellitus    Estimated body mass index is 30.18 kg/m² as calculated from the following:    Height as of this encounter: 6' (1.829 m).    Weight as of this encounter: 101 kg (222 lb 8.9 oz).      Screening  Mammogram needed: up to date  Colonoscopy needed: up to date  Osteoporosis screen needed: n/a     Women 50 to 74 years of age should be screened for breast cancer with mammography biennially.  Women should be screened for cervical cancer with Pap tests beginning at 21 years of age. Low-risk women should receive Pap testing every three years. Co-testing for human papillomavirus is an option beginning at 30 years of age, and can extend the screening interval to five years. Cervical cancer screening should be discontinued at 65 years of age or after total hysterectomy if the woman has a benign gynecologic " history  Adults 50 to 75 years of age should be screened for colorectal cancer with an FOBT annually, sigmoidoscopy every five years with an FOBT every three years, or colonoscopy every 10 years.  Women 65 years and older should be screened for osteoporosis. Women younger than 65 years should be screened if the risk of fracture is greater than or equal to that of a 65-year-old white woman without additional risk factors.      Immunizations  delayed    ALLERGIES and MEDS were verified.   PMHx, PSHx, FHx, SOCIALHx were updated as pertinent.    REVIEW OF SYSTEMS  Review of Systems   Constitutional: Negative.    HENT:  Negative for hearing loss.    Eyes:  Negative for discharge.   Respiratory:  Negative for wheezing.    Cardiovascular:  Negative for chest pain and palpitations.   Gastrointestinal:  Negative for blood in stool, constipation, diarrhea and vomiting.   Genitourinary:  Negative for dysuria and hematuria.   Musculoskeletal:  Negative for neck pain.   Skin: Negative.    Neurological: Negative.  Negative for weakness and headaches.   Endo/Heme/Allergies:  Negative for polydipsia.         PHYSICAL EXAM  VITAL SIGNS: /78   Pulse 78   Temp 97.8 °F (36.6 °C) (Oral)   Resp 18   Ht 6' (1.829 m)   Wt 101 kg (222 lb 8.9 oz)   SpO2 96%   BMI 30.18 kg/m²   GEN: Well developed, Well nourished, No acute distress.  HENT: Normocephalic, Atraumatic, Bilateral external ears normal, Nose normal, Oropharynx moist, No oral exudates.   Eyes: PERRL, EOMI, Conjunctiva normal, No discharge.   Neck: Supple, No tenderness.  Lymphatic: No cervical or supraclavicular lymphadenopathy noted.   Cardiovascular: Normal heart rate, Normal rhythm, No murmurs, No rubs, No gallops.   Thorax & Lungs: Normal breath sounds, No respiratory distress, No wheezing.  Abdomen: Soft, No tenderness, Bowel sounds normal.  Breast: deferred  Genital: deferred  Skin: Warm, Dry, No erythema, No rash.   Extremities: No edema, No tenderness.        ASSESSMENT/PLAN    Shireen was seen today for follow-up and medication refill.    Diagnoses and all orders for this visit:    Well woman exam without gynecological exam  - Labs up to date     Insomnia, unspecified type  -     traZODone (DESYREL) 50 MG tablet; Take 1 tablet (50 mg total) by mouth every evening.    Gastroesophageal reflux disease, unspecified whether esophagitis present  -     controlled    History of CVA in adulthood  - No deficits    Anxiety  - Stable at this time  -  No medication adjustments needed    Recurrent UTI  -     labs up to date and currently on antbiotics  - Recommended she follow up with urology as she has had several UTI's over the past year        Morbid obesity due to excess calories  - Encouraged her to continue her current diet as she has lost 50 pounds  - On GLP1 through [facility] weight [facility]         FOLLOW UP: 1 year or sooner if needed      Jeancarlos Zamora MD

## 2025-02-14 NOTE — PROGRESS NOTES
Patient given covid and pneumococcal  vaccine via injection. 0 complaints of, tolerated well. Advised to wait in lobby 15 mins for adverse reaction. Verbalized understanding.

## 2025-02-18 NOTE — PROGRESS NOTES
Patient ID: Shireen Anton is a 55 y.o. White female    Subjective  Chief Complaint: patient presents for medical weight loss management.    Co-morbidities: none    HPI: [facility] in July 2024 and is currently managed on Wegovy 2.4 mg.     Tolerance to current therapy:  {Denies/Endorses:44168} {GLP-1 ADRs:97221}  {Denies/Endorses:95071} {GLP-1 ADRs:70880}    Weight loss history:  Starting weight:    7/19/2024   Recent Readings    Weight (lbs) 272.4 lb    BMI 36.94 BMI    Current weight:    2/14/2025   Recent Readings    Weight (lbs) 219 lb    BMI 29.7 BMI    % weight loss since GLP-1 initiation: 19.6 %    Objective  Lab Results   Component Value Date     02/07/2025     02/09/2024     02/16/2023     Lab Results   Component Value Date    K 4.0 02/07/2025    K 4.0 02/09/2024    K 4.0 02/16/2023     Lab Results   Component Value Date     02/07/2025     02/09/2024     02/16/2023     Lab Results   Component Value Date    CO2 23 02/07/2025    CO2 21 (L) 02/09/2024    CO2 26 02/16/2023     Lab Results   Component Value Date    BUN 13 02/07/2025    BUN 13 02/09/2024    BUN 9 02/16/2023     Lab Results   Component Value Date    GLU 87 02/07/2025    GLU 89 02/09/2024     02/16/2023     Lab Results   Component Value Date    CALCIUM 9.3 02/07/2025    CALCIUM 9.5 02/09/2024    CALCIUM 9.2 02/16/2023     Lab Results   Component Value Date    PROT 7.1 02/07/2025    PROT 7.1 02/09/2024    PROT 7.4 02/16/2023     Lab Results   Component Value Date    ALBUMIN 3.7 02/07/2025    ALBUMIN 3.8 02/09/2024    ALBUMIN 3.7 02/16/2023     Lab Results   Component Value Date    BILITOT 0.6 02/07/2025    BILITOT 0.8 02/09/2024    BILITOT 0.4 02/16/2023     Lab Results   Component Value Date    AST 28 02/07/2025    AST 33 02/09/2024    AST 47 (H) 02/16/2023     Lab Results   Component Value Date    ALT 30 02/07/2025    ALT 30 02/09/2024    ALT 39 02/16/2023      Lab Results   Component Value Date    ANIONGAP 10 02/07/2025    ANIONGAP 9 02/09/2024    ANIONGAP 9 02/16/2023     Lab Results   Component Value Date    CREATININE 0.8 02/07/2025    CREATININE 0.9 02/09/2024    CREATININE 0.8 02/16/2023     Lab Results   Component Value Date    EGFRNORACEVR >60 02/07/2025    EGFRNORACEVR >60.0 02/09/2024    EGFRNORACEVR >60 02/16/2023     Assessment/Plan      Patient consented to pharmacist management via collaborative practice.

## 2025-02-19 ENCOUNTER — OFFICE VISIT (OUTPATIENT)
Dept: INTERNAL MEDICINE | Facility: CLINIC | Age: 55
End: 2025-02-19
Payer: COMMERCIAL

## 2025-02-19 ENCOUNTER — PATIENT MESSAGE (OUTPATIENT)
Dept: INTERNAL MEDICINE | Facility: CLINIC | Age: 55
End: 2025-02-19

## 2025-02-19 DIAGNOSIS — E66.3 OVERWEIGHT WITH BODY MASS INDEX (BMI) OF 29 TO 29.9 IN ADULT: Primary | ICD-10-CM

## 2025-02-19 PROCEDURE — 99499 UNLISTED E&M SERVICE: CPT | Mod: 95,,,

## 2025-02-19 RX ORDER — SEMAGLUTIDE 2.4 MG/.75ML
2.4 INJECTION, SOLUTION SUBCUTANEOUS
Qty: 3 ML | Refills: 5 | Status: ACTIVE | OUTPATIENT
Start: 2025-02-19

## 2025-02-19 NOTE — PROGRESS NOTES
Patient ID: Shireen Anton is a 55 y.o. White female    Subjective  Chief Complaint: patient presents for medical weight loss management.    Co-morbidities: none    HPI: Patient [facility] in July 2024 and is currently managed on Wegovy 2.4 mg.     Tolerance to current therapy:  Denies nausea, vomiting, diarrhea, abdominal pain  Endorses mild constipation    Weight loss history:  Starting weight:    7/19/2024   Recent Readings    Weight (lbs) 272.4 lb    BMI 36.94 BMI    Current weight:    2/14/2025   Recent Readings    Weight (lbs) 219 lb    BMI 29.7 BMI    % weight loss since GLP-1 initiation: 19.6 %    Objective  Lab Results   Component Value Date     02/07/2025     02/09/2024     02/16/2023     Lab Results   Component Value Date    K 4.0 02/07/2025    K 4.0 02/09/2024    K 4.0 02/16/2023     Lab Results   Component Value Date     02/07/2025     02/09/2024     02/16/2023     Lab Results   Component Value Date    CO2 23 02/07/2025    CO2 21 (L) 02/09/2024    CO2 26 02/16/2023     Lab Results   Component Value Date    BUN 13 02/07/2025    BUN 13 02/09/2024    BUN 9 02/16/2023     Lab Results   Component Value Date    GLU 87 02/07/2025    GLU 89 02/09/2024     02/16/2023     Lab Results   Component Value Date    CALCIUM 9.3 02/07/2025    CALCIUM 9.5 02/09/2024    CALCIUM 9.2 02/16/2023     Lab Results   Component Value Date    PROT 7.1 02/07/2025    PROT 7.1 02/09/2024    PROT 7.4 02/16/2023     Lab Results   Component Value Date    ALBUMIN 3.7 02/07/2025    ALBUMIN 3.8 02/09/2024    ALBUMIN 3.7 02/16/2023     Lab Results   Component Value Date    BILITOT 0.6 02/07/2025    BILITOT 0.8 02/09/2024    BILITOT 0.4 02/16/2023     Lab Results   Component Value Date    AST 28 02/07/2025    AST 33 02/09/2024    AST 47 (H) 02/16/2023     Lab Results   Component Value Date    ALT 30 02/07/2025    ALT 30 02/09/2024    ALT 39 02/16/2023     Lab Results    Component Value Date    ANIONGAP 10 02/07/2025    ANIONGAP 9 02/09/2024    ANIONGAP 9 02/16/2023     Lab Results   Component Value Date    CREATININE 0.8 02/07/2025    CREATININE 0.9 02/09/2024    CREATININE 0.8 02/16/2023     Lab Results   Component Value Date    EGFRNORACEVR >60 02/07/2025    EGFRNORACEVR >60.0 02/09/2024    EGFRNORACEVR >60 02/16/2023     Assessment/Plan  - Continue Wegovy 2.4 mg once weekly  - RTC in 6 months for follow-up evaluation     Patient consented to pharmacist management via collaborative practice.

## 2025-02-20 ENCOUNTER — PATIENT MESSAGE (OUTPATIENT)
Dept: ADMINISTRATIVE | Facility: OTHER | Age: 55
End: 2025-02-20
Payer: COMMERCIAL

## 2025-02-25 ENCOUNTER — PATIENT MESSAGE (OUTPATIENT)
Dept: OBSTETRICS AND GYNECOLOGY | Facility: CLINIC | Age: 55
End: 2025-02-25
Payer: COMMERCIAL

## 2025-03-20 ENCOUNTER — PATIENT MESSAGE (OUTPATIENT)
Dept: ADMINISTRATIVE | Facility: OTHER | Age: 55
End: 2025-03-20
Payer: COMMERCIAL

## 2025-03-28 ENCOUNTER — LAB VISIT (OUTPATIENT)
Dept: LAB | Facility: HOSPITAL | Age: 55
End: 2025-03-28
Attending: FAMILY MEDICINE
Payer: COMMERCIAL

## 2025-03-28 DIAGNOSIS — D69.6 THROMBOCYTOPENIA: ICD-10-CM

## 2025-03-28 LAB
ABSOLUTE EOSINOPHIL (OHS): 0.07 K/UL
ABSOLUTE MONOCYTE (OHS): 0.45 K/UL (ref 0.3–1)
ABSOLUTE NEUTROPHIL COUNT (OHS): 3.19 K/UL (ref 1.8–7.7)
BASOPHILS # BLD AUTO: 0.04 K/UL
BASOPHILS NFR BLD AUTO: 0.7 %
ERYTHROCYTE [DISTWIDTH] IN BLOOD BY AUTOMATED COUNT: 13.1 % (ref 11.5–14.5)
HCT VFR BLD AUTO: 43.9 % (ref 37–48.5)
HGB BLD-MCNC: 15 GM/DL (ref 12–16)
IMM GRANULOCYTES # BLD AUTO: 0.02 K/UL (ref 0–0.04)
IMM GRANULOCYTES NFR BLD AUTO: 0.3 % (ref 0–0.5)
LYMPHOCYTES # BLD AUTO: 2.1 K/UL (ref 1–4.8)
MCH RBC QN AUTO: 33 PG (ref 27–50)
MCHC RBC AUTO-ENTMCNC: 34.2 G/DL (ref 32–36)
MCV RBC AUTO: 97 FL (ref 82–98)
NUCLEATED RBC (/100WBC) (OHS): 0 /100 WBC
PLATELET # BLD AUTO: 134 K/UL (ref 150–450)
PMV BLD AUTO: 13 FL (ref 9.2–12.9)
RBC # BLD AUTO: 4.54 M/UL (ref 4–5.4)
RELATIVE EOSINOPHIL (OHS): 1.2 %
RELATIVE LYMPHOCYTE (OHS): 35.8 % (ref 18–48)
RELATIVE MONOCYTE (OHS): 7.7 % (ref 4–15)
RELATIVE NEUTROPHIL (OHS): 54.3 % (ref 38–73)
WBC # BLD AUTO: 5.87 K/UL (ref 3.9–12.7)

## 2025-03-28 PROCEDURE — 85025 COMPLETE CBC W/AUTO DIFF WBC: CPT

## 2025-03-28 PROCEDURE — 36415 COLL VENOUS BLD VENIPUNCTURE: CPT | Mod: PO

## 2025-03-31 ENCOUNTER — RESULTS FOLLOW-UP (OUTPATIENT)
Dept: FAMILY MEDICINE | Facility: CLINIC | Age: 55
End: 2025-03-31

## 2025-04-17 ENCOUNTER — PATIENT MESSAGE (OUTPATIENT)
Dept: ADMINISTRATIVE | Facility: OTHER | Age: 55
End: 2025-04-17
Payer: COMMERCIAL

## 2025-05-15 ENCOUNTER — PATIENT MESSAGE (OUTPATIENT)
Dept: ADMINISTRATIVE | Facility: OTHER | Age: 55
End: 2025-05-15
Payer: COMMERCIAL

## 2025-06-02 ENCOUNTER — PATIENT MESSAGE (OUTPATIENT)
Dept: ENDOCRINOLOGY | Facility: CLINIC | Age: 55
End: 2025-06-02
Payer: COMMERCIAL

## 2025-06-11 ENCOUNTER — OFFICE VISIT (OUTPATIENT)
Dept: ENDOCRINOLOGY | Facility: CLINIC | Age: 55
End: 2025-06-11
Payer: COMMERCIAL

## 2025-06-11 DIAGNOSIS — E66.09 CLASS 1 OBESITY DUE TO EXCESS CALORIES WITH SERIOUS COMORBIDITY AND BODY MASS INDEX (BMI) OF 30.0 TO 30.9 IN ADULT: ICD-10-CM

## 2025-06-11 DIAGNOSIS — E55.9 VITAMIN D DEFICIENCY: ICD-10-CM

## 2025-06-11 DIAGNOSIS — E66.811 CLASS 1 OBESITY DUE TO EXCESS CALORIES WITH SERIOUS COMORBIDITY AND BODY MASS INDEX (BMI) OF 30.0 TO 30.9 IN ADULT: ICD-10-CM

## 2025-06-11 DIAGNOSIS — E04.2 MULTIPLE THYROID NODULES: Primary | ICD-10-CM

## 2025-06-11 PROCEDURE — G2211 COMPLEX E/M VISIT ADD ON: HCPCS | Mod: 95,,, | Performed by: HOSPITALIST

## 2025-06-11 PROCEDURE — 1160F RVW MEDS BY RX/DR IN RCRD: CPT | Mod: CPTII,95,, | Performed by: HOSPITALIST

## 2025-06-11 PROCEDURE — 98005 SYNCH AUDIO-VIDEO EST LOW 20: CPT | Mod: 95,,, | Performed by: HOSPITALIST

## 2025-06-11 PROCEDURE — 1159F MED LIST DOCD IN RCRD: CPT | Mod: CPTII,95,, | Performed by: HOSPITALIST

## 2025-06-11 NOTE — ASSESSMENT & PLAN NOTE
- US reviewed in clinic and reviewed with patient, noted large dominant left thyroid nodule.  Multiple subcentimeter right thyroid nodules  - patient did have FNA of left large thyroid nodule:  4/22: Benign  - TSH reviewed: stable, reassuring 2024  - we will repeat thyroid ultrasound for 2025 for monitoring  - Follow up in 1 year

## 2025-06-11 NOTE — PROGRESS NOTES
The patient location is: [place]  The chief complaint leading to consultation is:  Thyroid nodules    Visit type: audiovisual    Face to Face time with patient:  10 minutes  20 minutes of total time spent on the encounter, which includes face to face time and non-face to face time preparing to see the patient (eg, review of tests), Obtaining and/or reviewing separately obtained history, Documenting clinical information in the electronic or other health record, Independently interpreting results (not separately reported) and communicating results to the patient/family/caregiver, or Care coordination (not separately reported).     Each patient to whom he or she provides medical services by telemedicine is:  (1) informed of the relationship between the physician and patient and the respective role of any other health care provider with respect to management of the patient; and (2) notified that he or she may decline to receive medical services by telemedicine and may withdraw from such care at any time.      Subjective:      Patient ID: Shireen Anton is a 55 y.o. female presented to [facility] on 6/11/2025.  Chief Complaint:  No chief complaint on file.    History of Present Illness: Shireen Anton is a 55 y.o. female here for Thyroid nodules  No other significant past medical history: obesity    Interval history: Here for follow-up multiple thyroid nodules.  Last time seen by me 06/2024, here for her yearly follow-up  Denies compressive symptoms.  Denies any compressive symptoms, choking sensation  Currently on Zepbound 2.4 mg once a week  Weight lost: 204, previous: 274 lb in 2024  Doing well, routine exercise and dietary changes has help with her weight loss       1) Multiple Thyroid nodules:  - Presents with nodule that was diagnosed by ultrasound 2/22, Recently noticed  - Preexisting thyroid disease?: no  - Family history of thyroid cancer?: no   - Mom with hypothyroidism  - Sister with throat  cancer  - patient with left mid 2.6 cm spongiform nodule  - FNA:  04/28/2022 Left Thyroid nodule:  Pathology: Benign    Compressive symptoms:  - Anterior neck pressure?: no  - Dysphagia?: no  - Voice changes?: no    Risk Factors:  - Radiation to head or neck for any treatment of cancer or exposure to radiation?: no  - Personal history of colon or breast cancer?: no  - Tobacco use?: no former smoker 20 years    Last ultrasound:   6/21/2024  The right lobe measures 4.0 cm in length and 1.5 x 1.5 cm in transverse dimensions.  The left lobe measures 5.2 cm in length and 1.9 x 2.7 cm in transverse dimension.  Three subcentimeter nodules in the right lobe; not significantly changed.  Subcentimeter left upper lobe nodule; not significantly changed.  2.6 cm left mid/lower pole cystic and solid nodule; not significantly changed.  The color doppler flow is unremarkable. No abnormal lymph nodes identified.    Impression:  Bilateral thyroid nodules, as above, not significantly changed from the 05/11/2023 exam.  No new/ concerning nodules identified.       2/22/2022  Right lobe 4.8 x 1.6 x 1.6 cm, isthmus 0.3 cm and left lobe 4.8 x 2.2 x 2.3 cm.  Multinodular goiter with partially cystic and complex nodules right lobe of 0.9, 0.7, 0.8 and 0.6 cm.    Left lobe other upper pole nodule 0.6 cm and complex predominately cystic major nodule left midpole 2.6 x 1.9 x 2.4 cm.     Impression:  Multinodular goiter with the major nodule, predominant cystic, left mid pole.  No strong indication for FNA evaluation of the left midpole complex nodule.    Thyroid lab work  Lab Results   Component Value Date    TSH 0.640 02/07/2025    TSH 1.194 02/09/2024    TSH 1.117 08/22/2023    FREET4 0.98 08/22/2023    FREET4 1.13 02/16/2023    FREET4 0.95 02/22/2022      Antibodies  Lab Results   Component Value Date    THYROPEROXID <6.0 08/22/2023        2) Vitamin-D deficiency  - currently taking Vit D3 4000 ui daily    Lab Results   Component Value Date     [id number] 19 (L) 08/22/2023    [id number] 28 (L) 03/29/2020      Reviewed past surgical, medical, family, social history and updated as appropriate.  Review of Systems: see HPI above    Objective:   There were no vitals taken for this visit.  There is no height or weight on file to calculate BMI.  Vital signs reviewed    Physical Exam  Constitutional:       Comments: Due to the virtual nature of this visit, a physical exam and vital signs were not obtained.       Lab Reviewed:  See results in subjective  Lab Results   Component Value Date    HGBA1C 4.9 02/09/2024     Lab Results   Component Value Date    CHOL 179 02/07/2025    HDL 43 02/07/2025    LDLCALC 106.4 02/07/2025    TRIG 148 02/07/2025    CHOLHDL 24.0 02/07/2025     Lab Results   Component Value Date     02/07/2025    K 4.0 02/07/2025     02/07/2025    CO2 23 02/07/2025    GLU 87 02/07/2025    BUN 13 02/07/2025    CREATININE 0.8 02/07/2025    CALCIUM 9.3 02/07/2025    PHOS 2.5 (L) 01/25/2023    PROT 7.1 02/07/2025    ALBUMIN 3.7 02/07/2025    BILITOT 0.6 02/07/2025    ALKPHOS 63 02/07/2025    AST 28 02/07/2025    ALT 30 02/07/2025    ANIONGAP 10 02/07/2025    ESTGFRAFRICA >60 10/20/2021    EGFRNONAA >60 10/20/2021    TSH 0.640 02/07/2025    PTH 37.6 03/29/2020    [id number] 19 (L) 08/22/2023     Assessment     1. Multiple thyroid nodules  US Thyroid      2. Class 1 obesity due to excess calories with serious comorbidity and body mass index (BMI) of 30.0 to 30.9 in adult        3. Vitamin D deficiency          Plan     Multiple thyroid nodules  - US reviewed in clinic and reviewed with patient, noted large dominant left thyroid nodule.  Multiple subcentimeter right thyroid nodules  - patient did have FNA of left large thyroid nodule:  4/22: Benign  - TSH reviewed: stable, reassuring 2024  - we will repeat thyroid ultrasound for 2025 for monitoring  - Follow up in 1 year    Class 1 obesity due to excess calories with serious comorbidity  and body mass index (BMI) of 30.0 to 30.9 in adult  - There is no height or weight on file to calculate BMI.  Previous:  Body mass index is 36.97 kg/m²   - Encourage pt to work on healthy diet, increase exercise as tolerated.  - [facility]: On Wegovy 2.4 mg once a week, with good weight loss      Vitamin D deficiency  - Vitamin-D goal >30  - Continue current supplements vitamin D3 4000 IU daily  - Check level regularly    Follow-up with me yearly or sooner if needed    Dann Ruano M.D.  Endocrinology  [facility]  6/11/2025      Disclaimer: This note has been generated in part with the use of voice-recognition software. There may be typographical errors that have been missed during proof-reading.

## 2025-06-11 NOTE — ASSESSMENT & PLAN NOTE
- There is no height or weight on file to calculate BMI.  Previous:  Body mass index is 36.97 kg/m²   - Encourage pt to work on healthy diet, increase exercise as tolerated.  - [facility]: On Wegovy 2.4 mg once a week, with good weight loss

## 2025-06-12 ENCOUNTER — PATIENT MESSAGE (OUTPATIENT)
Dept: ADMINISTRATIVE | Facility: OTHER | Age: 55
End: 2025-06-12
Payer: COMMERCIAL

## 2025-06-16 ENCOUNTER — HOSPITAL ENCOUNTER (OUTPATIENT)
Dept: RADIOLOGY | Facility: HOSPITAL | Age: 55
Discharge: HOME | End: 2025-06-16
Attending: HOSPITALIST
Payer: COMMERCIAL

## 2025-06-16 DIAGNOSIS — E04.2 MULTIPLE THYROID NODULES: ICD-10-CM

## 2025-06-16 PROCEDURE — 76536 US EXAM OF HEAD AND NECK: CPT | Mod: TC

## 2025-06-16 PROCEDURE — 76536 US EXAM OF HEAD AND NECK: CPT | Mod: 26,,, | Performed by: RADIOLOGY

## 2025-07-10 ENCOUNTER — PATIENT MESSAGE (OUTPATIENT)
Dept: ADMINISTRATIVE | Facility: OTHER | Age: 55
End: 2025-07-10
Payer: COMMERCIAL

## 2025-08-07 ENCOUNTER — PATIENT MESSAGE (OUTPATIENT)
Dept: ADMINISTRATIVE | Facility: OTHER | Age: 55
End: 2025-08-07
Payer: COMMERCIAL

## 2025-08-15 ENCOUNTER — PATIENT MESSAGE (OUTPATIENT)
Dept: INTERNAL MEDICINE | Facility: CLINIC | Age: 55
End: 2025-08-15

## 2025-08-15 ENCOUNTER — OFFICE VISIT (OUTPATIENT)
Dept: INTERNAL MEDICINE | Facility: CLINIC | Age: 55
End: 2025-08-15
Payer: COMMERCIAL

## 2025-08-15 RX ORDER — SEMAGLUTIDE 2.4 MG/.75ML
2.4 INJECTION, SOLUTION SUBCUTANEOUS
Qty: 3 ML | Refills: 5 | Status: ACTIVE | OUTPATIENT
Start: 2025-08-15

## 2025-08-28 ENCOUNTER — OFFICE VISIT (OUTPATIENT)
Dept: CARDIOLOGY | Facility: CLINIC | Age: 55
End: 2025-08-28
Payer: COMMERCIAL

## 2025-08-28 VITALS
OXYGEN SATURATION: 98 % | HEART RATE: 74 BPM | RESPIRATION RATE: 15 BRPM | DIASTOLIC BLOOD PRESSURE: 72 MMHG | SYSTOLIC BLOOD PRESSURE: 110 MMHG | WEIGHT: 191.13 LBS | HEIGHT: 72 IN | BODY MASS INDEX: 25.89 KG/M2

## 2025-08-28 DIAGNOSIS — Z13.6 ENCOUNTER FOR SCREENING FOR CARDIOVASCULAR DISORDERS: ICD-10-CM

## 2025-08-28 DIAGNOSIS — I25.2 HISTORY OF MI (MYOCARDIAL INFARCTION): Primary | ICD-10-CM

## 2025-08-28 PROCEDURE — 3008F BODY MASS INDEX DOCD: CPT | Mod: CPTII,S$GLB,, | Performed by: INTERNAL MEDICINE

## 2025-08-28 PROCEDURE — 1159F MED LIST DOCD IN RCRD: CPT | Mod: CPTII,S$GLB,, | Performed by: INTERNAL MEDICINE

## 2025-08-28 PROCEDURE — 3074F SYST BP LT 130 MM HG: CPT | Mod: CPTII,S$GLB,, | Performed by: INTERNAL MEDICINE

## 2025-08-28 PROCEDURE — 99999 PR PBB SHADOW E&M-EST. PATIENT-LVL IV: CPT | Mod: PBBFAC,,, | Performed by: INTERNAL MEDICINE

## 2025-08-28 PROCEDURE — 99204 OFFICE O/P NEW MOD 45 MIN: CPT | Mod: S$GLB,,, | Performed by: INTERNAL MEDICINE

## 2025-08-28 PROCEDURE — 3078F DIAST BP <80 MM HG: CPT | Mod: CPTII,S$GLB,, | Performed by: INTERNAL MEDICINE

## 2025-09-05 ENCOUNTER — HOSPITAL ENCOUNTER (OUTPATIENT)
Dept: RADIOLOGY | Facility: HOSPITAL | Age: 55
Discharge: HOME | End: 2025-09-05
Attending: INTERNAL MEDICINE
Payer: COMMERCIAL

## 2025-09-05 DIAGNOSIS — Z13.6 ENCOUNTER FOR SCREENING FOR CARDIOVASCULAR DISORDERS: ICD-10-CM

## 2025-09-05 PROCEDURE — 75571 CT HRT W/O DYE W/CA TEST: CPT | Mod: 26,,, | Performed by: RADIOLOGY

## 2025-09-05 PROCEDURE — 75571 CT HRT W/O DYE W/CA TEST: CPT | Mod: TC

## (undated) DEVICE — DRAPE LEGGINGS CUFF 33X51IN

## (undated) DEVICE — DRAPE INCISE IOBAN 2 23X17IN

## (undated) DEVICE — DRAPE ABDOMINAL TIBURON 14X11

## (undated) DEVICE — TRAY SKIN SCRUB WET PREMIUM

## (undated) DEVICE — LUBRICANT SURGILUBE 2 OZ

## (undated) DEVICE — COVER MAYO STND XL 30X57IN

## (undated) DEVICE — MARKER SKIN STND TIP BLUE BARR

## (undated) DEVICE — DRAPE CORETEMP FLD WRM 56X62IN

## (undated) DEVICE — SUT 1 36IN PDS II

## (undated) DEVICE — KIT GELPORT LAPAROSCOPIC ABD

## (undated) DEVICE — DRESSING TEGADERM 2 3/8 X 2.75

## (undated) DEVICE — KIT ANTIFOG W/SPONG & FLUID

## (undated) DEVICE — NDL BOX COUNTER

## (undated) DEVICE — TRAY CATH FOL SIL URIMTR 16FR

## (undated) DEVICE — TIP YANKAUERS BULB NO VENT

## (undated) DEVICE — SPONGE LAP 18X18 PREWASHED

## (undated) DEVICE — POWDER ARISTA AH 3G

## (undated) DEVICE — COVER LIGHT HANDLE 80/CA

## (undated) DEVICE — POUCH SENSURA MIO 3/8X2 1/8IN

## (undated) DEVICE — ELECTRODE REM PLYHSV RETURN 9

## (undated) DEVICE — SPONGE GAUZE 16PLY 4X4

## (undated) DEVICE — SUT 3/0 27IN PDS II VIO MO

## (undated) DEVICE — STAPLER ECHELON PWR CIR 29MM

## (undated) DEVICE — DRAPE UINDERBUT GRAD PCH

## (undated) DEVICE — ELECTRODE EXTENDED BLADE

## (undated) DEVICE — KIT VUETIP TROCAR SWAB

## (undated) DEVICE — APPLICATOR ARISTA FLEX XL

## (undated) DEVICE — STAPLER CONTOUR CRV GRN 40MM

## (undated) DEVICE — SOL NS 1000CC

## (undated) DEVICE — PAD PINK TRENDELENBURG POS XL

## (undated) DEVICE — BOWL STERILE LARGE 32OZ

## (undated) DEVICE — EVACUATOR WOUND BULB 100CC

## (undated) DEVICE — TUBING HF INSUFFLATION W/ FLTR

## (undated) DEVICE — NDL INSUF ULTRA VERESS 120MM

## (undated) DEVICE — STAPLE TRI-STAPLE 2.0 CRV TIP

## (undated) DEVICE — TOWEL OR DISP STRL BLUE 4/PK

## (undated) DEVICE — SEE MEDLINE ITEM 156902

## (undated) DEVICE — TROCAR ENDOPATH XCEL 5MM 7.5CM

## (undated) DEVICE — SEALER LIGASURE MARYLAND 37CM

## (undated) DEVICE — SUT MONOCRYL 4-0 PS-2

## (undated) DEVICE — SUT 2-0 NYLON D/A

## (undated) DEVICE — ADHESIVE DERMABOND ADVANCED

## (undated) DEVICE — STAPLER HANDLE XL 26 CM

## (undated) DEVICE — TROCAR ENDOPATH XCEL 5X75MM

## (undated) DEVICE — SUT CTD VICRYL 3-0 VIL BR